# Patient Record
Sex: MALE | Race: WHITE | Employment: OTHER | URBAN - METROPOLITAN AREA
[De-identification: names, ages, dates, MRNs, and addresses within clinical notes are randomized per-mention and may not be internally consistent; named-entity substitution may affect disease eponyms.]

---

## 2017-01-27 ENCOUNTER — HOSPITAL ENCOUNTER (OUTPATIENT)
Dept: CARDIOLOGY | Facility: CLINIC | Age: 52
Discharge: HOME | End: 2017-01-27
Attending: INTERNAL MEDICINE | Admitting: INTERNAL MEDICINE
Payer: COMMERCIAL

## 2017-01-27 DIAGNOSIS — I35.9 AORTIC VALVE DISORDER: ICD-10-CM

## 2017-01-27 DIAGNOSIS — I77.810 ASCENDING AORTA DILATATION (H): ICD-10-CM

## 2017-01-27 PROCEDURE — 25500064 ZZH RX 255 OP 636: Performed by: INTERNAL MEDICINE

## 2017-01-27 PROCEDURE — 40000264 ECHO COMPLETE WITH LUMASON

## 2017-01-27 PROCEDURE — 93306 TTE W/DOPPLER COMPLETE: CPT | Mod: 26 | Performed by: INTERNAL MEDICINE

## 2017-01-27 PROCEDURE — 93306 TTE W/DOPPLER COMPLETE: CPT

## 2017-01-27 RX ADMIN — SULFUR HEXAFLUORIDE 5 ML: KIT at 11:32

## 2017-01-30 ENCOUNTER — OFFICE VISIT (OUTPATIENT)
Dept: CARDIOLOGY | Facility: CLINIC | Age: 52
End: 2017-01-30
Payer: COMMERCIAL

## 2017-01-30 VITALS
HEART RATE: 87 BPM | BODY MASS INDEX: 33.96 KG/M2 | DIASTOLIC BLOOD PRESSURE: 88 MMHG | WEIGHT: 237.2 LBS | HEIGHT: 70 IN | SYSTOLIC BLOOD PRESSURE: 138 MMHG

## 2017-01-30 DIAGNOSIS — I77.810 ASCENDING AORTA DILATATION (H): ICD-10-CM

## 2017-01-30 DIAGNOSIS — G47.33 OSA (OBSTRUCTIVE SLEEP APNEA): Primary | ICD-10-CM

## 2017-01-30 DIAGNOSIS — I35.9 AORTIC VALVE DISORDER: ICD-10-CM

## 2017-01-30 PROCEDURE — 99213 OFFICE O/P EST LOW 20 MIN: CPT | Performed by: INTERNAL MEDICINE

## 2017-01-30 NOTE — LETTER
1/30/2017    Axel Roberson MD  [facility]  [address] [place] [address]    RE: Benjamín Peñaalex       Dear Colleague,    I had the pleasure of seeing Benjamín Us in the [facility].    REASON FOR CLINIC VISIT:  Followup aortic valve disease and ascending aorta dilatation.      Mr. Us is a very pleasant, 51-year-old gentleman with a history of hypertensive urgency in 2014, mild aortic stenosis, mild to moderate aortic regurgitation, mildly dilated ascending aorta and tobacco abuse who is being seen in Cardiology Clinic for followup.  The patient had a repeat echocardiogram done a few days ago that showed a hyperdynamic LV function with LVEF greater than 70%, moderate LVH, mild aortic regurgitation and moderate aortic stenosis with a mean gradient of 32 mmHg, calculated aortic valve area of 1.1 cm2 and peak aortic valve velocity of 3.8 m/sec.  Today he is coming for routine followup.  To be noted, the ascending aorta was also noted to be mildly dilated at 3.9 cm.  He tells me that overall he feels well other than that in the last one month or so, he is having trouble sleeping.  In fact, he is waking up with a headache, and sometimes he is waking up with difficulty breathing.  He tells me that this sounds like sleep apnea, which was diagnosed about 4 years ago and was advised to be on CPAP, but he could not tolerate CPAP because of allergies.  He also feels daytime somnolence and fatigue.  No chest discomfort or shortness of breath with physical activity.  Unfortunately, he is still consuming tobacco at least 1 pack per day, if not more.  He and his wife both smoke.  The patient has tried Chantix in the past without much success.  He tells me that his wife and he are now thinking seriously about at least cutting down on tobacco.  He tells me that he feels that his blood pressure has been not well controlled over the last 1 month, especially when  he wakes up, likely because of untreated sleep apnea.  Today in the clinic, his initial blood pressure was recorded high at around 150/90, but subsequently was recorded as 138/88.  He is on lisinopril and carvedilol for blood pressure control.  He also has some dyslipidemia and follows Dr. Roberson, who is planning to repeat a lipid panel to see if statins are indicated or not.  He also has diabetes with a hemoglobin A1c of 7.2.      PHYSICAL EXAMINATION:   VITAL SIGNS:  Blood pressure 138/88, heart rate 87 and regular, weight 237 pounds, BMI 34.11.   GENERAL:  The patient appears pleasant, comfortable.   NECK:  Normal JVP, no bruit.   CARDIOVASCULAR:  Grade 2/6 ejection systolic murmur with mid peaking best heard over the right upper sternal border without any radiation.  No S3, S4, rub or gallop.   RESPIRATORY:  Clear to auscultation bilaterally.   ABDOMEN:  Soft, nontender.   EXTREMITIES:  No pitting pedal edema.   NEUROLOGICAL:  Alert and oriented x3.   PSYCHIATRIC:  Normal affect.   SKIN:  No skin rash.   HEENT:  No pallor or icterus.     Outpatient Encounter Prescriptions as of 1/30/2017   Medication Sig Dispense Refill     lisinopril (PRINIVIL/ZESTRIL) 40 MG tablet Take 1 tablet (40 mg) by mouth daily 90 tablet 0     [DISCONTINUED] carvedilol (COREG) 6.25 MG tablet Take 3 tablets (18.75 mg) by mouth 2 times daily (with meals) 540 tablet 1     amphetamine-dextroamphetamine (ADDERALL) 20 MG tablet 20 mg 3 times daily        ibuprofen (ADVIL,MOTRIN) 800 MG tablet as needed   0     acyclovir (ZOVIRAX) 400 MG tablet   0     varenicline (CHANTIX) 1 MG tablet Take 1 tablet (1 mg) by mouth 2 times daily 60 tablet 1     No facility-administered encounter medications on file as of 1/30/2017.       IMPRESSION AND PLAN:  A very delightful, 51-year-old gentleman with a history of moderate aortic stenosis, mild aortic regurgitation, mildly dilated ascending aorta and tobacco abuse.  Overall, cardiovascular status wise, he  is doing well.  There is progression of aortic stenosis from mild to moderate with increase in mean gradient from 22-32 mmHg.  I had a long discussion with the patient regarding the natural history of aortic stenosis and indication for elective surgery.  At this time, I recommend a followup echocardiogram in about 1 year's time.  His symptoms are suggestive of significant sleep apnea, and I recommended a Sleep Medicine review, and I gave him a referral for the same.  The patient sees Dr. Roberson for dyslipidemia and diabetes, and he is due to be seen again, and usually with diabetes, a moderate-intensity statin is recommended, and I deferred this to Dr. Roberson, his Primary Care physician, regarding this.  I am hoping that with the treatment of sleep apnea, his blood pressure control will become better.  If he continues to feel well cardiovascular status wise, we will see him back in 1 year's time with a repeat echocardiogram.  I also strongly counseled him to quit tobacco.  Approximately 5 minutes was spent discussing about tobacco cessation.  He does not appear extremely motivated to quit tobacco, but tells me that he and his wife, who both smoke, are seriously thinking about at least cutting down on tobacco.     Again, thank you for allowing me to participate in the care of your patient.      Sincerely,    Myles Dwons MD     [facility]

## 2017-01-30 NOTE — MR AVS SNAPSHOT
After Visit Summary   1/30/2017    Benjamín Us    MRN: [id number]           Patient Information     Date Of Birth          1965        Visit Information        Provider Department      1/30/2017 8:45 AM Myles Downs MD [facility]        Today's Diagnoses     HARIS (obstructive sleep apnea)    -  1     Aortic valve disorder         Ascending aorta dilatation (H)            Follow-ups after your visit        Additional Services     SLEEP EVALUATION & MANAGEMENT REFERRAL - ADULT       Please be aware that coverage of these services is subject to the terms and limitations of your health insurance plan.  Call member services at your health plan with any benefit or coverage questions.      Please bring the following to your appointment:    >>   List of current medications   >>   This referral request   >>   Any documents/labs given to you for this referral    [facility] [phone] (Age 18 and up)            Follow-Up with Cardiologist                 Future tests that were ordered for you today     Open Future Orders        Priority Expected Expires Ordered    SLEEP EVALUATION & MANAGEMENT REFERRAL - ADULT Routine 1/30/2017 1/30/2018 1/30/2017    Echocardiogram Routine 1/30/2018 3/6/2018 1/30/2017    Follow-Up with Cardiologist Routine 1/30/2018 6/14/2018 1/30/2017            Who to contact     If you have questions or need follow up information about today's clinic visit or your schedule please contact [facility] directly at [phone].  Normal or non-critical lab and imaging results will be communicated to you by MyChart, letter or phone within 4 business days after the clinic has received the results. If you do not hear from us within 7 days, please contact the clinic through MyChart or phone. If you have a critical or abnormal lab result, we will notify you by phone as soon as  "possible.  Submit refill requests through [employer] or call your pharmacy and they will forward the refill request to us. Please allow 3 business days for your refill to be completed.          Additional Information About Your Visit        "[employer] Information     [employer] lets you send messages to your doctor, view your test results, renew your prescriptions, schedule appointments and more. To sign up, go to [email or web address] . Click on \"Log in\" on the left side of the screen, which will take you to the Welcome page. Then click on \"Sign up Now\" on the right side of the page.     You will be asked to enter the access code listed below, as well as some personal information. Please follow the directions to create your username and password.     Your access code is: [email or web address]  Expires: 2017  9:33 AM     Your access code will  in 90 days. If you need help or a new code, please call your [facility] clinic or [phone].        Care EveryWhere ID     This is your Care EveryWhere ID. This could be used by other organizations to access your [facility] medical records  [id number]        Your Vitals Were     Pulse Height BMI (Body Mass Index)             87 1.778 m (5' 10\") 34.03 kg/m2          Blood Pressure from Last 3 Encounters:   17 138/88   16 138/88   16 152/78    Weight from Last 3 Encounters:   17 107.593 kg (237 lb 3.2 oz)   16 101.606 kg (224 lb)   16 107.049 kg (236 lb)              We Performed the Following     Follow-Up with Cardiologist        Primary Care Provider Office Phone # Fax #    Axel Roberson [phone]       [address] [address]        Thank you!     Thank you for choosing [facility]  for your care. Our goal is always to provide you with excellent care. Hearing back from our patients is one way we can continue to improve our " services. Please take a few minutes to complete the written survey that you may receive in the mail after your visit with us. Thank you!             Your Updated Medication List - Protect others around you: Learn how to safely use, store and throw away your medicines at www.disposemymeds.org.          This list is accurate as of: 1/30/17  9:33 AM.  Always use your most recent med list.                   Brand Name Dispense Instructions for use    acyclovir 400 MG tablet    ZOVIRAX         amphetamine-dextroamphetamine 20 MG per tablet    ADDERALL     20 mg 3 times daily       carvedilol 6.25 MG tablet    COREG    540 tablet    Take 3 tablets (18.75 mg) by mouth 2 times daily (with meals)       ibuprofen 800 MG tablet    ADVIL/MOTRIN     as needed       lisinopril 40 MG tablet    PRINIVIL/ZESTRIL    90 tablet    Take 1 tablet (40 mg) by mouth daily       varenicline 1 MG tablet    CHANTIX    60 tablet    Take 1 tablet (1 mg) by mouth 2 times daily

## 2017-01-30 NOTE — PROGRESS NOTES
REASON FOR CLINIC VISIT:  Followup aortic valve disease and ascending aorta dilatation.      HISTORY OF PRESENT ILLNESS:  Mr. Us is a very pleasant, 51-year-old gentleman with a history of hypertensive urgency in 2014, mild aortic stenosis, mild to moderate aortic regurgitation, mildly dilated ascending aorta and tobacco abuse who is being seen in Cardiology Clinic for followup.  The patient had a repeat echocardiogram done a few days ago that showed a hyperdynamic LV function with LVEF greater than 70%, moderate LVH, mild aortic regurgitation and moderate aortic stenosis with a mean gradient of 32 mmHg, calculated aortic valve area of 1.1 cm2 and peak aortic valve velocity of 3.8 m/sec.  Today he is coming for routine followup.  To be noted, the ascending aorta was also noted to be mildly dilated at 3.9 cm.  He tells me that overall he feels well other than that in the last one month or so, he is having trouble sleeping.  In fact, he is waking up with a headache, and sometimes he is waking up with difficulty breathing.  He tells me that this sounds like sleep apnea, which was diagnosed about 4 years ago and was advised to be on CPAP, but he could not tolerate CPAP because of allergies.  He also feels daytime somnolence and fatigue.  No chest discomfort or shortness of breath with physical activity.  Unfortunately, he is still consuming tobacco at least 1 pack per day, if not more.  He and his wife both smoke.  The patient has tried Chantix in the past without much success.  He tells me that his wife and he are now thinking seriously about at least cutting down on tobacco.  He tells me that he feels that his blood pressure has been not well controlled over the last 1 month, especially when he wakes up, likely because of untreated sleep apnea.  Today in the clinic, his initial blood pressure was recorded high at around 150/90, but subsequently was recorded as 138/88.  He is on lisinopril and carvedilol for blood  pressure control.  He also has some dyslipidemia and follows Dr. Roberson, who is planning to repeat a lipid panel to see if statins are indicated or not.  He also has diabetes with a hemoglobin A1c of 7.2.      PHYSICAL EXAMINATION:   VITAL SIGNS:  Blood pressure 138/88, heart rate 87 and regular, weight 237 pounds, BMI 34.11.   GENERAL:  The patient appears pleasant, comfortable.   NECK:  Normal JVP, no bruit.   CARDIOVASCULAR:  Grade 2/6 ejection systolic murmur with mid peaking best heard over the right upper sternal border without any radiation.  No S3, S4, rub or gallop.   RESPIRATORY:  Clear to auscultation bilaterally.   ABDOMEN:  Soft, nontender.   EXTREMITIES:  No pitting pedal edema.   NEUROLOGICAL:  Alert and oriented x3.   PSYCHIATRIC:  Normal affect.   SKIN:  No skin rash.   HEENT:  No pallor or icterus.      IMPRESSION AND PLAN:  A very delightful, 51-year-old gentleman with a history of moderate aortic stenosis, mild aortic regurgitation, mildly dilated ascending aorta and tobacco abuse.  Overall, cardiovascular status wise, he is doing well.  There is progression of aortic stenosis from mild to moderate with increase in mean gradient from 22-32 mmHg.  I had a long discussion with the patient regarding the natural history of aortic stenosis and indication for elective surgery.  At this time, I recommend a followup echocardiogram in about 1 year's time.  His symptoms are suggestive of significant sleep apnea, and I recommended a Sleep Medicine review, and I gave him a referral for the same.  The patient sees Dr. Roberson for dyslipidemia and diabetes, and he is due to be seen again, and usually with diabetes, a moderate-intensity statin is recommended, and I deferred this to Dr. Roberson, his Primary Care physician, regarding this.  I am hoping that with the treatment of sleep apnea, his blood pressure control will become better.  If he continues to feel well cardiovascular status wise, we will see him  back in 1 year's time with a repeat echocardiogram.  I also strongly counseled him to quit tobacco.  Approximately 5 minutes was spent discussing about tobacco cessation.  He does not appear extremely motivated to quit tobacco, but tells me that he and his wife, who both smoke, are seriously thinking about at least cutting down on tobacco.         MARLEE MORALES MD             D: 2017 09:39   T: 2017 11:50   MT: essie      Name:     JOYCE MEDRANO   MRN:      [id number]        Account:      [id number]   :      1965           Service Date: 2017      Document: [id number]

## 2017-01-30 NOTE — PROGRESS NOTES
HPI and Plan:   See dictation    Orders Placed This Encounter   Procedures     Follow-Up with Cardiologist     SLEEP EVALUATION & MANAGEMENT REFERRAL - ADULT     Echocardiogram     No orders of the defined types were placed in this encounter.     There are no discontinued medications.      Encounter Diagnoses   Name Primary?     Aortic valve disorder      Ascending aorta dilatation (H)      HARIS (obstructive sleep apnea) Yes       CURRENT MEDICATIONS:  Current Outpatient Prescriptions   Medication Sig Dispense Refill     lisinopril (PRINIVIL/ZESTRIL) 40 MG tablet Take 1 tablet (40 mg) by mouth daily 90 tablet 0     carvedilol (COREG) 6.25 MG tablet Take 3 tablets (18.75 mg) by mouth 2 times daily (with meals) 540 tablet 1     amphetamine-dextroamphetamine (ADDERALL) 20 MG tablet 20 mg 3 times daily        ibuprofen (ADVIL,MOTRIN) 800 MG tablet as needed   0     acyclovir (ZOVIRAX) 400 MG tablet   0     varenicline (CHANTIX) 1 MG tablet Take 1 tablet (1 mg) by mouth 2 times daily 60 tablet 1       ALLERGIES     Allergies   Allergen Reactions     Penicillins Nausea and Vomiting     Zithromax [Azithromycin Dihydrate]      hives       PAST MEDICAL HISTORY:  Past Medical History   Diagnosis Date     ADD (attention deficit disorder)      Aortic valve disorders      Hypertension        PAST SURGICAL HISTORY:  Past Surgical History   Procedure Laterality Date     Appendectomy       Colonoscopy N/A 6/15/2015     Procedure: COLONOSCOPY;  Surgeon: Vasyl Lawson MD;  Location:  GI       FAMILY HISTORY:  Family History   Problem Relation Age of Onset     Hypertension Mother      Breast Cancer Mother      DIABETES Father      DIABETES Brother      Thyroid Disease Sister      Colon Cancer No family hx of        SOCIAL HISTORY:  Social History     Social History     Marital Status:      Spouse Name: N/A     Number of Children: N/A     Years of Education: N/A     Social History Main Topics     Smoking status: Current  Every Day Smoker -- 0.50 packs/day     Types: Cigarettes     Smokeless tobacco: Never Used      Comment: 5/2015 Two cigarettes per day      Alcohol Use: No     Drug Use: No     Sexual Activity:     Partners: Female     Other Topics Concern     Caffeine Concern No     Sleep Concern No     Weight Concern No     Exercise No     Seat Belt Yes     Social History Narrative       Review of Systems:  Skin:  Negative     Eyes:  Positive for glasses  ENT:  Negative    Respiratory:  Negative    Cardiovascular:  Negative    Gastroenterology: Negative    Genitourinary:  not assessed    Musculoskeletal:  Negative    Neurologic:  Negative    Psychiatric:  Negative    Heme/Lymph/Imm:  Positive for allergies  Endocrine:  Negative          Recent Lab Results:  LIPID RESULTS:  Lab Results   Component Value Date    CHOL 198 03/25/2014    HDL 37* 03/25/2014    * 03/25/2016    * 03/25/2014    TRIG 129 03/25/2014    CHOLHDLRATIO 5.3* 03/25/2014       LIVER ENZYME RESULTS:  Lab Results   Component Value Date    AST 26 03/25/2014    ALT 37 03/25/2014       CBC RESULTS:  Lab Results   Component Value Date    WBC 9.8 01/26/2016    RBC 4.48 01/26/2016    HGB 14.6 01/26/2016    HCT 41.2 01/26/2016    MCV 92 01/26/2016    MCH 32.6 01/26/2016    MCHC 35.4 01/26/2016    RDW 12.8 01/26/2016     01/26/2016       BMP RESULTS:  Lab Results   Component Value Date     03/25/2016    POTASSIUM 4.2 03/25/2016    CHLORIDE 104 03/25/2016    CO2 30 03/25/2016    ANIONGAP 6 03/25/2016    * 03/25/2016    BUN 16 03/25/2016    CR 0.88 03/25/2016     03/16/2014    GFRESTIMATED >90  Non  GFR Calc   03/25/2016    GFRESTBLACK >90   GFR Calc   03/25/2016    ARMIN 8.7 03/25/2016        A1C RESULTS:  Lab Results   Component Value Date    A1C 7.2* 01/26/2016       INR RESULTS:  No results found for: INR        CC  Axel Roberson MD  [address] [place] [address]

## 2017-02-06 DIAGNOSIS — I10 BENIGN ESSENTIAL HYPERTENSION: Primary | ICD-10-CM

## 2017-02-06 RX ORDER — CARVEDILOL 6.25 MG/1
18.75 TABLET ORAL 2 TIMES DAILY WITH MEALS
Qty: 270 TABLET | Refills: 1 | Status: SHIPPED | OUTPATIENT
Start: 2017-02-06 | End: 2017-05-01

## 2017-03-22 DIAGNOSIS — I16.0 HYPERTENSIVE URGENCY: ICD-10-CM

## 2017-03-22 RX ORDER — LISINOPRIL 40 MG/1
40 TABLET ORAL DAILY
Qty: 90 TABLET | Refills: 3 | Status: SHIPPED | OUTPATIENT
Start: 2017-03-22 | End: 2018-03-05

## 2017-03-22 NOTE — TELEPHONE ENCOUNTER
Received refill request for:  Lisinopril  Last OV was: 1/30/2017 with Dr. Downs  Labs/EKG: last BMP 5/25/2016  F/U scheduled: orders in Epic for 1/2018  New script sent to: [facility]

## 2017-05-01 DIAGNOSIS — I10 BENIGN ESSENTIAL HYPERTENSION: ICD-10-CM

## 2017-05-01 RX ORDER — CARVEDILOL 6.25 MG/1
18.75 TABLET ORAL 2 TIMES DAILY WITH MEALS
Qty: 270 TABLET | Refills: 2 | Status: SHIPPED | OUTPATIENT
Start: 2017-05-01 | End: 2017-10-16

## 2017-10-16 DIAGNOSIS — I10 BENIGN ESSENTIAL HYPERTENSION: ICD-10-CM

## 2017-10-16 RX ORDER — CARVEDILOL 6.25 MG/1
18.75 TABLET ORAL 2 TIMES DAILY WITH MEALS
Qty: 540 TABLET | Refills: 0 | Status: SHIPPED | OUTPATIENT
Start: 2017-10-16 | End: 2018-01-12

## 2018-01-12 DIAGNOSIS — I10 BENIGN ESSENTIAL HYPERTENSION: ICD-10-CM

## 2018-01-12 RX ORDER — CARVEDILOL 6.25 MG/1
18.75 TABLET ORAL 2 TIMES DAILY WITH MEALS
Qty: 540 TABLET | Refills: 0 | Status: SHIPPED | OUTPATIENT
Start: 2018-01-12 | End: 2018-03-22

## 2018-01-24 ENCOUNTER — MEDICAL CORRESPONDENCE (OUTPATIENT)
Dept: HEALTH INFORMATION MANAGEMENT | Facility: CLINIC | Age: 53
End: 2018-01-24

## 2018-01-26 ENCOUNTER — DOCUMENTATION ONLY (OUTPATIENT)
Dept: CARDIOLOGY | Facility: CLINIC | Age: 53
End: 2018-01-26

## 2018-01-26 NOTE — PROGRESS NOTES
Received letter from [employer] from [employer] requesting Dr. Downs to verify if his current  stimulant for inattention is acceptable.  BP has been elevated.   Scheduling has made several attempts by phone and letters for F/U appointments needed.  He is overdue. Will await for patient to call and schedule OV

## 2018-03-05 DIAGNOSIS — I16.0 HYPERTENSIVE URGENCY: ICD-10-CM

## 2018-03-05 RX ORDER — LISINOPRIL 40 MG/1
40 TABLET ORAL DAILY
Qty: 90 TABLET | Refills: 0 | Status: SHIPPED | OUTPATIENT
Start: 2018-03-05 | End: 2018-03-22

## 2018-03-05 NOTE — LETTER
March 5, 2018       TO: Benjamín Us  [id number]  [facility] [facility] [id number]       Dear Benjamín Us,    You have requested a medication refill for Lisinopril and our records indicate that you have not been seen by one of our providers for your annual office visit.  We will refill your medication for 3 months, which will allow you enough time to be seen by one of our providers.    Please call our clinic at [phone] to schedule your appointment as soon as possible.    Thank you,    [facility]

## 2018-03-05 NOTE — TELEPHONE ENCOUNTER
Received refill request for:  Lisinopril  Last OV was: 01/30/2017 Vats  Labs/EKG: 3/25/2016 BMP  F/U scheduled: Overdue for follow up, Refill letter sent  New script sent to: [facility]

## 2018-03-22 DIAGNOSIS — I10 BENIGN ESSENTIAL HYPERTENSION: ICD-10-CM

## 2018-03-22 DIAGNOSIS — I35.9 AORTIC VALVE DISORDER: Primary | ICD-10-CM

## 2018-03-22 DIAGNOSIS — I16.0 HYPERTENSIVE URGENCY: ICD-10-CM

## 2018-03-22 RX ORDER — CARVEDILOL 6.25 MG/1
18.75 TABLET ORAL 2 TIMES DAILY WITH MEALS
Qty: 540 TABLET | Refills: 1 | Status: SHIPPED | OUTPATIENT
Start: 2018-03-22 | End: 2018-04-03

## 2018-03-22 RX ORDER — LISINOPRIL 40 MG/1
40 TABLET ORAL DAILY
Qty: 90 TABLET | Refills: 1 | Status: SHIPPED | OUTPATIENT
Start: 2018-03-22 | End: 2018-10-22

## 2018-03-22 NOTE — TELEPHONE ENCOUNTER
Medication refills sent for lisinopril and coreg. Patient is to return for clinic visit this summer, 18 mo's after last OV per Dr. Downs. New orders placed.

## 2018-04-03 DIAGNOSIS — I10 BENIGN ESSENTIAL HYPERTENSION: ICD-10-CM

## 2018-04-03 RX ORDER — CARVEDILOL 6.25 MG/1
18.75 TABLET ORAL 2 TIMES DAILY WITH MEALS
Qty: 540 TABLET | Refills: 0 | Status: SHIPPED | OUTPATIENT
Start: 2018-04-03 | End: 2018-08-14

## 2018-08-14 DIAGNOSIS — I10 BENIGN ESSENTIAL HYPERTENSION: ICD-10-CM

## 2018-08-14 RX ORDER — CARVEDILOL 6.25 MG/1
18.75 TABLET ORAL 2 TIMES DAILY WITH MEALS
Qty: 180 TABLET | Refills: 0 | Status: SHIPPED | OUTPATIENT
Start: 2018-08-14 | End: 2018-09-24

## 2018-08-14 NOTE — TELEPHONE ENCOUNTER
Patient has an appointment with Dr. Downs scheduled for 8/27/18, so refilled his carvedilol for 1 month with no refills.  Last visit was 1/30/2017.  CLARENCE Cohen 8/14/2018

## 2018-09-14 ENCOUNTER — OFFICE VISIT (OUTPATIENT)
Dept: FAMILY MEDICINE | Facility: CLINIC | Age: 53
End: 2018-09-14
Payer: COMMERCIAL

## 2018-09-14 VITALS
HEIGHT: 70 IN | WEIGHT: 232 LBS | BODY MASS INDEX: 33.21 KG/M2 | DIASTOLIC BLOOD PRESSURE: 124 MMHG | HEART RATE: 109 BPM | SYSTOLIC BLOOD PRESSURE: 185 MMHG | TEMPERATURE: 98.3 F | RESPIRATION RATE: 16 BRPM

## 2018-09-14 DIAGNOSIS — E11.620 TYPE 2 DIABETES MELLITUS WITH DIABETIC DERMATITIS, WITHOUT LONG-TERM CURRENT USE OF INSULIN (H): Primary | ICD-10-CM

## 2018-09-14 DIAGNOSIS — I10 ESSENTIAL HYPERTENSION, BENIGN: ICD-10-CM

## 2018-09-14 DIAGNOSIS — L30.9 DERMATITIS: ICD-10-CM

## 2018-09-14 PROCEDURE — 99214 OFFICE O/P EST MOD 30 MIN: CPT | Performed by: FAMILY MEDICINE

## 2018-09-14 RX ORDER — HYDROXYZINE HYDROCHLORIDE 25 MG/1
25 TABLET, FILM COATED ORAL EVERY 6 HOURS PRN
Qty: 60 TABLET | Refills: 1 | Status: ON HOLD | OUTPATIENT
Start: 2018-09-14 | End: 2020-01-03

## 2018-09-14 RX ORDER — PREDNISONE 10 MG/1
10 TABLET ORAL DAILY
Qty: 10 TABLET | Refills: 0 | Status: ON HOLD | OUTPATIENT
Start: 2018-09-14 | End: 2020-01-03

## 2018-09-14 NOTE — PROGRESS NOTES
"  SUBJECTIVE:   Benjamín Us is a 53 year old male who presents to clinic today for the following health issues:        Dermatitis  Rash  9/8/18 woke up from a nap with an itchy rash on his arms bilaterally. States that it has progressively gotten worse, does not\" bubble.\" no therapies tried.   Onset:     Description:   Location: ALL OVER   Character: round, blotchy, red  Itching (Pruritis): YES    Progression of Symptoms:  worsening    Accompanying Signs & Symptoms:  Fever: no   Body aches or joint pain: no   Sore throat symptoms: no   Recent cold symptoms: no     History:   Previous similar rash: no     Precipitating factors:   Exposure to similar rash: no   New exposures: None   Recent travel: no     Alleviating factors:    Type 2 diabetes mellitus with diabetic dermatitis, without long-term current use of insulin (H) - Pt denies diabetes, reports he was told years ago  \"he had nothing to worry about\"  Notes Recorded by Melissa Washington CMA on 1/27/2016 at 9:03 AM  Talked to pt said the test was wrong. I did send letter with results. Pt said he will come back in to retest  Melissa Washington CMA    ------    Notes Recorded by Axel Roberson MD on 1/26/2016 at 4:53 PM  Blood shows full fledged diabetes now, you should see me again in three months fasting so I can check your cholesterol too.    Lab Results   Component Value Date    A1C 7.2 01/26/2016    A1C 6.5 03/12/2014     HTN  Pt reports he \"(doesn't) want to talk about his blood pressure\" becomes quite abgry when this subject is brought up  BP Readings from Last 1 Encounters:   09/14/18 (!) 185/124     Past Medical History:   Diagnosis Date     ADD (attention deficit disorder)      Aneurysm of thoracic aorta (H) 5/21/2014     Problem list name updated by automated process. Provider to review     Aortic valve disorder 5/21/2014     Aortic valve disorders      CARDIOVASCULAR SCREENING; LDL GOAL LESS THAN 160 2/25/2014     Dermatitis 9/14/2018     " "Essential hypertension, benign 9/14/2018     Essential hypertension, benign 9/14/2018     Hypertension      Hypertensive urgency 8/8/2013     Type 2 diabetes mellitus with diabetic dermatitis, without long-term current use of insulin (H) 9/14/2018       Past Surgical History:   Procedure Laterality Date     APPENDECTOMY       COLONOSCOPY N/A 6/15/2015    Procedure: COLONOSCOPY;  Surgeon: Vasyl Lawson MD;  Location:  GI       Family History   Problem Relation Age of Onset     Hypertension Mother      Breast Cancer Mother      Diabetes Father      Diabetes Brother      Thyroid Disease Sister      Colon Cancer No family hx of        Social History   Substance Use Topics     Smoking status: Current Every Day Smoker     Packs/day: 0.50     Types: Cigarettes     Smokeless tobacco: Never Used      Comment: 5/2015 Two cigarettes per day      Alcohol use No         Medications Reviewed    Problem list and histories reviewed & adjusted, as indicated.  Additional history: none    Reviewed and updated as needed this visit by clinical staff  Tobacco  Allergies  Meds  Med Hx  Surg Hx  Fam Hx  Soc Hx      Reviewed and updated as needed this visit by Provider       ROS:  No HA dyspnea visual disturbance    This document serves as a record of the services and decisions personally performed and made by Simone Brown MD. It was created on his behalf by Colton Bond, a trained medical scribe.  The creation of this document is based on the scribe's personal observations and the provider's statements to the medical scribe.  Colton Bond, September 14, 2018 9:47 AM  OBJECTIVE:   BP (!) 185/124 (BP Location: Left arm, Patient Position: Chair, Cuff Size: Adult Large)  Pulse 109  Temp 98.3  F (36.8  C) (Oral)  Resp 16  Ht 5' 10\" (1.778 m)  Wt 232 lb (105.2 kg)  BMI 33.29 kg/m2  Body mass index is 33.29 kg/(m^2).    GENERAL: angry  SKIN: Dermatitis noted on forearms bilaterally, less so on torso. No scale plaque  CHEST: " "Clear to auscultation, respirations unlabored      Diagnostic Test Results:  No results found for this or any previous visit (from the past 24 hour(s)).  ASSESSMENT/PLAN:   ASSESSMENT / PLAN:  (E11.620) Type 2 diabetes mellitus with diabetic dermatitis, without long-term current use of insulin (H)  (primary encounter diagnosis)  Comment: status unknown.   Plan: Pt refuses consideration    (I10) Essential hypertension, benign  Comment: uncontrolled  Plan:\"I'll let my cardiologist handle that\" no symptoms suggesting secondary effects    (L30.9) Dermatitis  Comment: broad differential including virus, diabetes. Lisinopril rash 2%, amphetamines rash frequency not in UptoDate  Plan: hydrOXYzine (ATARAX) 25 MG tablet, predniSONE         (DELTASONE) 10 MG tablet       Treat. Neutral soaps    We shall forward this note to pt's cardiologist and PCP          Simone Brown MD      The information in this document, created by the medical scribe for me, accurately reflects the services I personally performed and the decisions made by me. I have reviewed and approved this document for accuracy prior to leaving the patient care area.  Simone Brown MD September 14, 2018 9:48 AM    Simone Brown MD  [facility]  "

## 2018-09-14 NOTE — MR AVS SNAPSHOT
After Visit Summary   9/14/2018    Benjamín Us    MRN: [id number]           Patient Information     Date Of Birth          1965        Visit Information        Provider Department      9/14/2018 9:15 AM Simone Brown MD [facility]        Today's Diagnoses     Type 2 diabetes mellitus with diabetic dermatitis, without long-term current use of insulin (H)    -  1    Essential hypertension, benign        Dermatitis          Care Instructions    Use dove soap in order to reduce drying out skin.               Follow-ups after your visit        Your next 10 appointments already scheduled     Sep 25, 2018  9:45 AM CDT   Ech Complete with [id number] ([facility])    [address]  [facility] [address] [phone]           1.  Please bring or wear a comfortable two-piece outfit. 2.  You may eat, drink and take your normal medicines. 3.  For any questions that cannot be answered, please contact the ordering physician 4.  Please do not wear perfumes or scented lotions on the day of your exam. ***Please check-in at the [place] Registration Office located in [address] in the [facility] building. When you are finished registering, please go to [address] and have a seat. The technician will call your name for the test.            Nov 27, 2018  9:45 AM CST   Return Visit with Myles Downs MD   [facility] ([email or web address])    [address] [place] [address] [address]  [facility] [id number] [phone] OPT 2              Who to contact     If you have questions or need follow up information about today's clinic visit or your schedule please contact [facility] directly at [phone].  Normal or non-critical lab and imaging results will be communicated to you by MyChart, letter or phone within 4 business days after the clinic has received the  "results. If you do not hear from us within 7 days, please contact the clinic through [employer] or phone. If you have a critical or abnormal lab result, we will notify you by phone as soon as possible.  Submit refill requests through [employer] or call your pharmacy and they will forward the refill request to us. Please allow 3 business days for your refill to be completed.          Additional Information About Your Visit        Care EveryWhere ID     This is your Care EveryWhere ID. This could be used by other organizations to access your [facility] medical records  [id number]        Your Vitals Were     Pulse Temperature Respirations Height BMI (Body Mass Index)       109 98.3  F (36.8  C) (Oral) 16 5' 10\" (1.778 m) 33.29 kg/m2        Blood Pressure from Last 3 Encounters:   09/14/18 (!) 185/124   01/30/17 138/88   03/25/16 138/88    Weight from Last 3 Encounters:   09/14/18 232 lb (105.2 kg)   01/30/17 237 lb 3.2 oz (107.6 kg)   03/25/16 224 lb (101.6 kg)              Today, you had the following     No orders found for display         Today's Medication Changes          These changes are accurate as of 9/14/18 10:33 AM.  If you have any questions, ask your nurse or doctor.               Start taking these medicines.        Dose/Directions    hydrOXYzine 25 MG tablet   Commonly known as:  ATARAX   Used for:  Dermatitis   Started by:  Simone Brown MD        Dose:  25 mg   Take 1 tablet (25 mg) by mouth every 6 hours as needed for itching   Quantity:  60 tablet   Refills:  1       predniSONE 10 MG tablet   Commonly known as:  DELTASONE   Used for:  Dermatitis   Started by:  Simone Brown MD        Dose:  10 mg   Take 1 tablet (10 mg) by mouth daily   Quantity:  10 tablet   Refills:  0         Stop taking these medicines if you haven't already. Please contact your care team if you have questions.     ibuprofen 800 MG tablet   Commonly known as:  ADVIL/MOTRIN   Stopped by:  Simone Brown MD           varenicline 1 " MG tablet   Commonly known as:  CHANTIX   Stopped by:  Simone Brown MD                Where to get your medicines      These medications were sent to [id number] - [place], [place] - [address]  [address] [facility] [address]    Hours:  9Am-9Pm (M-F) 9Am-6Pm (S&S) Phone:  [phone]     hydrOXYzine 25 MG tablet    predniSONE 10 MG tablet                Primary Care Provider Office Phone # Fax #    Axel Gerald Roberson [phone] [address] [facility] [address]        Equal Access to Services     [facility]: Hadii aad ku hadasho Soomaali, waaxda luqadaha, qaybta kaalmada adeegyada, brenda austin haytyler patel . [facility] [phone].    ATENCIÓN: Si habla español, tiene a hooks disposición servicios gratuitos de asistencia lingüística. [facility] [phone].    We comply with applicable federal civil rights laws and [place] laws. We do not discriminate on the basis of race, color, national origin, age, disability, sex, sexual orientation, or gender identity.            Thank you!     Thank you for choosing [facility]  for your care. Our goal is always to provide you with excellent care. Hearing back from our patients is one way we can continue to improve our services. Please take a few minutes to complete the written survey that you may receive in the mail after your visit with us. Thank you!             Your Updated Medication List - Protect others around you: Learn how to safely use, store and throw away your medicines at www.disposemymeds.org.          This list is accurate as of 9/14/18 10:33 AM.  Always use your most recent med list.                   Brand Name Dispense Instructions for use Diagnosis    amphetamine-dextroamphetamine 20 MG per tablet    ADDERALL     20 mg 3 times daily        carvedilol 6.25 MG tablet    COREG    180 tablet    Take 3 tablets (18.75 mg) by mouth 2 times daily (with meals)    Benign  essential hypertension       hydrOXYzine 25 MG tablet    ATARAX    60 tablet    Take 1 tablet (25 mg) by mouth every 6 hours as needed for itching    Dermatitis       lisinopril 40 MG tablet    PRINIVIL/ZESTRIL    90 tablet    Take 1 tablet (40 mg) by mouth daily    Hypertensive urgency       predniSONE 10 MG tablet    DELTASONE    10 tablet    Take 1 tablet (10 mg) by mouth daily    Dermatitis

## 2018-09-24 DIAGNOSIS — I10 BENIGN ESSENTIAL HYPERTENSION: ICD-10-CM

## 2018-09-24 RX ORDER — CARVEDILOL 6.25 MG/1
18.75 TABLET ORAL 2 TIMES DAILY WITH MEALS
Qty: 540 TABLET | Refills: 2 | Status: SHIPPED | OUTPATIENT
Start: 2018-09-24 | End: 2019-06-12

## 2018-10-22 DIAGNOSIS — I16.0 HYPERTENSIVE URGENCY: ICD-10-CM

## 2018-10-22 RX ORDER — LISINOPRIL 40 MG/1
40 TABLET ORAL DAILY
Qty: 90 TABLET | Refills: 0 | Status: SHIPPED | OUTPATIENT
Start: 2018-10-22 | End: 2019-03-11

## 2019-02-18 ENCOUNTER — TELEPHONE (OUTPATIENT)
Dept: FAMILY MEDICINE | Facility: CLINIC | Age: 54
End: 2019-02-18

## 2019-02-19 NOTE — TELEPHONE ENCOUNTER
Panel Management Review      Patient has the following on his problem list:     Diabetes    ASA: Failed    Last A1C  Lab Results   Component Value Date    A1C 7.2 01/26/2016    A1C 6.5 03/12/2014     A1C tested: MONITOR    Last LDL:    Lab Results   Component Value Date    CHOL 198 03/25/2014     Lab Results   Component Value Date    HDL 37 03/25/2014     Lab Results   Component Value Date     03/25/2016     03/25/2014     Lab Results   Component Value Date    TRIG 129 03/25/2014     Lab Results   Component Value Date    CHOLHDLRATIO 5.3 03/25/2014     No results found for: NHDL    Is the patient on a Statin? NO             Is the patient on Aspirin? NO        Last three blood pressure readings:  BP Readings from Last 3 Encounters:   09/14/18 (!) 185/124   01/30/17 138/88   03/25/16 138/88       Date of last diabetes office visit: 09/14/2018     Tobacco History:     History   Smoking Status     Current Every Day Smoker     Packs/day: 0.50     Types: Cigarettes   Smokeless Tobacco     Never Used     Comment: 5/2015 Two cigarettes per day          Hypertension   Last three blood pressure readings:  BP Readings from Last 3 Encounters:   09/14/18 (!) 185/124   01/30/17 138/88   03/25/16 138/88     Blood pressure: FAILED    HTN Guidelines:  Age 18-59 BP range:  Less than 140/90  Age 60-85 with Diabetes:  Less than 140/90  Age 60-85 without Diabetes:  less than 150/90      Composite cancer screening  Chart review shows that this patient is due/due soon for the following None  Summary:    Patient is due/failing the following:   A1C and BP CHECK    Action needed:   Patient needs office visit for f/u BP check.    Type of outreach:    panel pool    Questions for provider review:    None                                                                                                                                    Nataliya Kraft/BRENNEN  [place]---[facility]       Chart routed to Care Team .

## 2019-03-11 DIAGNOSIS — I16.0 HYPERTENSIVE URGENCY: ICD-10-CM

## 2019-03-11 RX ORDER — LISINOPRIL 40 MG/1
40 TABLET ORAL DAILY
Qty: 90 TABLET | Refills: 0 | Status: SHIPPED | OUTPATIENT
Start: 2019-03-11 | End: 2019-06-04

## 2019-03-19 NOTE — TELEPHONE ENCOUNTER
Type of outreach:  Phone, spoke to patient.  Patient will see his cardiologist for his blood pressure follow up

## 2019-06-04 DIAGNOSIS — I16.0 HYPERTENSIVE URGENCY: ICD-10-CM

## 2019-06-04 RX ORDER — LISINOPRIL 40 MG/1
40 TABLET ORAL DAILY
Qty: 90 TABLET | Refills: 0 | Status: SHIPPED | OUTPATIENT
Start: 2019-06-04 | End: 2019-09-11

## 2019-06-12 DIAGNOSIS — I10 BENIGN ESSENTIAL HYPERTENSION: ICD-10-CM

## 2019-06-12 RX ORDER — CARVEDILOL 6.25 MG/1
18.75 TABLET ORAL 2 TIMES DAILY WITH MEALS
Qty: 540 TABLET | Refills: 0 | Status: SHIPPED | OUTPATIENT
Start: 2019-06-12 | End: 2019-09-11

## 2019-08-09 DIAGNOSIS — I35.9 AORTIC VALVE DISORDER: Primary | ICD-10-CM

## 2019-09-11 DIAGNOSIS — I16.0 HYPERTENSIVE URGENCY: ICD-10-CM

## 2019-09-11 DIAGNOSIS — I10 BENIGN ESSENTIAL HYPERTENSION: ICD-10-CM

## 2019-09-11 RX ORDER — LISINOPRIL 40 MG/1
40 TABLET ORAL DAILY
Qty: 90 TABLET | Refills: 0 | Status: SHIPPED | OUTPATIENT
Start: 2019-09-11 | End: 2019-10-30

## 2019-09-11 RX ORDER — CARVEDILOL 6.25 MG/1
18.75 TABLET ORAL 2 TIMES DAILY WITH MEALS
Qty: 540 TABLET | Refills: 0 | Status: SHIPPED | OUTPATIENT
Start: 2019-09-11 | End: 2019-10-30

## 2019-10-29 ENCOUNTER — HOSPITAL ENCOUNTER (OUTPATIENT)
Dept: CARDIOLOGY | Facility: CLINIC | Age: 54
Discharge: HOME | End: 2019-10-29
Attending: INTERNAL MEDICINE | Admitting: INTERNAL MEDICINE
Payer: COMMERCIAL

## 2019-10-29 DIAGNOSIS — I35.9 AORTIC VALVE DISORDER: ICD-10-CM

## 2019-10-29 PROCEDURE — 25500064 ZZH RX 255 OP 636: Performed by: INTERNAL MEDICINE

## 2019-10-29 PROCEDURE — 93306 TTE W/DOPPLER COMPLETE: CPT | Mod: 26 | Performed by: INTERNAL MEDICINE

## 2019-10-29 PROCEDURE — 40000264 ECHOCARDIOGRAM COMPLETE

## 2019-10-29 RX ADMIN — HUMAN ALBUMIN MICROSPHERES AND PERFLUTREN 3 ML: 10; .22 INJECTION, SOLUTION INTRAVENOUS at 13:05

## 2019-10-29 NOTE — PROGRESS NOTES
HISTORY OF PRESENT ILLNESS:  This is a 54 year old male who present to [facility] today for a follow up visit.  He is a patient of dr. Downs seen in our clinic for hypertension, aortic valve disease, ascending aorta dilatation, elevated glucose readings, untreated HARIS, and tobacco dependence.    Benjamín was hospitalized in 2014 for hypertensive urgency. An ECHO in 2017 showed LVEF > 70%, moderate LVH, moderate aortic stenosis (mean gradient 32 mmHg / MARIEL 1.1 cm2) and mild aortic regurgitation. His ascending aorta was measured at 3.9cm. He was previously diagnosed with sleep apnea, but has been unable to tolerate CPAPs.  He was last in our clinic in 2017 and was doing well from a cardiovascular standpoint.  Annual ECHOs were recommended, but he has been overdue for followup.     Benjamín denies any limitations in his activity.  He has no chest pain, shortness of breath, palpitations, orthopnea, or peripheral edema. He tells me he was diagnosed with sleep apnea years ago, but cannot tolerate the CPAP mask.  At times he feels his blood pressure is elevated by symptoms of a strong heart beat.    An ECHO performed yesterday (10/30/19) now showed severe aortic stenosis (peak AV gradient 75 mmHg / mean AV gradient 50 mmHg / MARIEL 0.9 cm2)  LVEF 65% with severe concentric LVH, normal RV function, mild ascending aorta dilatation (4.3cm)    Today BP: 152/96 mmHg  HR: 106 bpm and regular  His lungs are clear.  II/VI systolic murmur heard best at base.  There is no peripheral edema.  Weight 229 lbs (BMI 32)       IMPRESSION and PLAN:  1. HTN:  BP elevated in the office today and at time of ECHO yesterday.  I have asked him to increase his Coreg to 25mg BID.  He states that he is faithful about taking his meds.  2. Severe Aortic Stenosis: His aortic stenosis has progressed, now with mean AV gradient 50 mmhg and an MARIEL 0.9 cm2.  At this time, I have placed an order for TAVR evaluation.  Fortunately, he is  asymptomatic.  3. Untreated Sleep apnea.  He has not tolerated CPAP masks  He has deferred repeat sleep evaluation  4. Tobacco Dependence  Encouraged cessation  5. Severe concentric LVH  This has progressed over the past 1 1/2 years  6. Poor medical follow up  However, he assures me that he is taking his medications as prescribed  7. Likely untreated DM  His hgb A1C was 7.2 in 2016    Thanks for allowing me to participate in his care      JACKSON Decker, CNP          Orders Placed This Encounter   Procedures     [facility]       Orders Placed This Encounter   Medications     carvedilol (COREG) 25 MG tablet     Sig: Take 1 tablet (25 mg) by mouth 2 times daily (with meals)     Dispense:  180 tablet     Refill:  3     lisinopril (PRINIVIL/ZESTRIL) 40 MG tablet     Sig: Take 1 tablet (40 mg) by mouth daily     Dispense:  90 tablet     Refill:  0       Medications Discontinued During This Encounter   Medication Reason     carvedilol (COREG) 6.25 MG tablet      lisinopril (PRINIVIL/ZESTRIL) 40 MG tablet Reorder         Encounter Diagnoses   Name Primary?     Aortic valve stenosis, etiology of cardiac valve disease unspecified Yes     Benign essential hypertension      Hypertensive urgency        CURRENT MEDICATIONS:  Current Outpatient Medications   Medication Sig Dispense Refill     amphetamine-dextroamphetamine (ADDERALL) 20 MG tablet 20 mg 3 times daily        carvedilol (COREG) 25 MG tablet Take 1 tablet (25 mg) by mouth 2 times daily (with meals) 180 tablet 3     lisinopril (PRINIVIL/ZESTRIL) 40 MG tablet Take 1 tablet (40 mg) by mouth daily 90 tablet 0     hydrOXYzine (ATARAX) 25 MG tablet Take 1 tablet (25 mg) by mouth every 6 hours as needed for itching (Patient not taking: Reported on 10/30/2019) 60 tablet 1     predniSONE (DELTASONE) 10 MG tablet Take 1 tablet (10 mg) by mouth daily (Patient not taking: Reported on 10/30/2019) 10 tablet 0       ALLERGIES     Allergies   Allergen Reactions      Penicillins Nausea and Vomiting     Zithromax [Azithromycin Dihydrate]      hives       PAST MEDICAL HISTORY:  Past Medical History:   Diagnosis Date     ADD (attention deficit disorder)      Aneurysm of thoracic aorta (H) 5/21/2014     Problem list name updated by automated process. Provider to review     Aortic valve disorder 5/21/2014     CARDIOVASCULAR SCREENING; LDL GOAL LESS THAN 160 2/25/2014     Dermatitis 9/14/2018     Essential hypertension, benign 9/14/2018     Hypertension      Hypertensive urgency 8/8/2013     Type 2 diabetes mellitus with diabetic dermatitis, without long-term current use of insulin (H) 9/14/2018       PAST SURGICAL HISTORY:  Past Surgical History:   Procedure Laterality Date     APPENDECTOMY       COLONOSCOPY N/A 6/15/2015    Procedure: COLONOSCOPY;  Surgeon: Vasyl Lawson MD;  Location:  GI       FAMILY HISTORY:  Family History   Problem Relation Age of Onset     Hypertension Mother      Breast Cancer Mother      Diabetes Father      Diabetes Brother      Thyroid Disease Sister      Colon Cancer No family hx of        SOCIAL HISTORY:  Social History     Socioeconomic History     Marital status:      Spouse name: None     Number of children: None     Years of education: None     Highest education level: None   Occupational History     None   Social Needs     Financial resource strain: None     Food insecurity:     Worry: None     Inability: None     Transportation needs:     Medical: None     Non-medical: None   Tobacco Use     Smoking status: Current Every Day Smoker     Packs/day: 0.50     Types: Cigarettes     Smokeless tobacco: Never Used     Tobacco comment: 5/2015 Two cigarettes per day    Substance and Sexual Activity     Alcohol use: No     Alcohol/week: 0.0 standard drinks     Drug use: No     Sexual activity: Yes     Partners: Female   Lifestyle     Physical activity:     Days per week: None     Minutes per session: None     Stress: None   Relationships      "Social connections:     Talks on phone: None     Gets together: None     Attends Confucianist service: None     Active member of club or organization: None     Attends meetings of clubs or organizations: None     Relationship status: None     Intimate partner violence:     Fear of current or ex partner: None     Emotionally abused: None     Physically abused: None     Forced sexual activity: None   Other Topics Concern     Parent/sibling w/ CABG, MI or angioplasty before 65F 55M? Not Asked      Service Not Asked     Blood Transfusions Not Asked     Caffeine Concern No     Occupational Exposure Not Asked     Hobby Hazards Not Asked     Sleep Concern No     Stress Concern Not Asked     Weight Concern No     Special Diet Not Asked     Back Care Not Asked     Exercise No     Bike Helmet Not Asked     Seat Belt Yes     Self-Exams Not Asked   Social History Narrative     None       Review of Systems:  Skin:  Negative       Eyes:  Positive for glasses    ENT:  Negative sinus trouble    Respiratory:  Positive for cough     Cardiovascular:  Negative for;palpitations;chest pain;edema;fatigue;lightheadedness;dizziness      Gastroenterology: Negative      Genitourinary:  Negative      Musculoskeletal:  Negative      Neurologic:  Negative headaches(have decreased since starting meds)    Psychiatric:  Negative      Heme/Lymph/Imm:  Positive for allergies    Endocrine:  Negative        Physical Exam:  Vitals: BP (!) 152/96 (BP Location: Right arm, Patient Position: Sitting, Cuff Size: Adult Regular)   Pulse 106   Ht 1.778 m (5' 10\")   Wt 103.9 kg (229 lb)   SpO2 96%   BMI 32.86 kg/m      Constitutional:  cooperative;in no acute distress obese      Skin:  warm and dry to the touch          Head:  normocephalic        Eyes:  pupils equal and round        Lymph:      ENT:  no pallor or cyanosis poor dentition      Neck:  JVP normal transmitted murmur      Respiratory:  clear to auscultation;normal respiratory excursion     "     Cardiac: regular rhythm;normal S1 and S2       systolic murmur;LUSB;grade 2          pulses below the femoral arteries are diminished                                      GI:  abdomen soft obese      Extremities and Muscular Skeletal:  no edema              Neurological:  affect appropriate        Psych:  Alert and Oriented x 3          CC  Myles Downs MD  [address]  [place] [address]

## 2019-10-30 ENCOUNTER — DOCUMENTATION ONLY (OUTPATIENT)
Dept: CARDIOLOGY | Facility: CLINIC | Age: 54
End: 2019-10-30

## 2019-10-30 ENCOUNTER — OFFICE VISIT (OUTPATIENT)
Dept: CARDIOLOGY | Facility: CLINIC | Age: 54
End: 2019-10-30
Attending: INTERNAL MEDICINE
Payer: COMMERCIAL

## 2019-10-30 VITALS
SYSTOLIC BLOOD PRESSURE: 152 MMHG | HEART RATE: 106 BPM | HEIGHT: 70 IN | BODY MASS INDEX: 32.78 KG/M2 | OXYGEN SATURATION: 96 % | DIASTOLIC BLOOD PRESSURE: 96 MMHG | WEIGHT: 229 LBS

## 2019-10-30 DIAGNOSIS — I16.0 HYPERTENSIVE URGENCY: ICD-10-CM

## 2019-10-30 DIAGNOSIS — I35.0 AORTIC VALVE STENOSIS, ETIOLOGY OF CARDIAC VALVE DISEASE UNSPECIFIED: Primary | ICD-10-CM

## 2019-10-30 DIAGNOSIS — I10 BENIGN ESSENTIAL HYPERTENSION: ICD-10-CM

## 2019-10-30 PROCEDURE — 99214 OFFICE O/P EST MOD 30 MIN: CPT | Performed by: NURSE PRACTITIONER

## 2019-10-30 RX ORDER — LISINOPRIL 40 MG/1
40 TABLET ORAL DAILY
Qty: 90 TABLET | Refills: 0 | Status: SHIPPED | OUTPATIENT
Start: 2019-10-30 | End: 2019-12-12

## 2019-10-30 RX ORDER — CARVEDILOL 25 MG/1
25 TABLET ORAL 2 TIMES DAILY WITH MEALS
Qty: 180 TABLET | Refills: 3 | Status: ON HOLD | OUTPATIENT
Start: 2019-10-30 | End: 2020-05-28

## 2019-10-30 ASSESSMENT — MIFFLIN-ST. JEOR: SCORE: 1874.99

## 2019-10-30 NOTE — PROGRESS NOTES
Severe aortic stenosis now noted. I recommend clinical evaluation as he is already scheduled in the clinic today and referral to TAVR clinic for aortic valve replacement- he may qualify for either traditional AVR vs TAVR and it will be helpful to be seen in TAVR clinic by both CV surgeon and interventional TAVR team. No strenuous physical activity till AVR.    Thanks  Myles

## 2019-10-30 NOTE — PROGRESS NOTES
Echo done pre office visit. Scheduled to see Fátima Richards TIFFANIE on 10/30/19.   Changes noted are Severe aortic stenosis and LV hypertrophy.    Will message Dr. Downs to review and advise. Last seen in heart clinic was January 2017.    Echo:  Interpretation Summary     Severe valvular aortic stenosis. There has been significant progression of the  aortic stenosis since 2017.  There is severe concentric left ventricular hypertrophy.  The visual ejection fraction is estimated at 65-70%.  Left ventricular systolic function is normal.  The ascending aorta is Mildly dilated.  ___________________________________   Left Ventricle  The left ventricle is normal in size. There is severe concentric left  ventricular hypertrophy. Diastolic function not assessed due to significant  mitral annular calcification. The visual ejection fraction is estimated at 65-  70%. Left ventricular systolic function is normal.     Right Ventricle  The right ventricle is normal in size and function.     Atria  Normal left atrial size. Right atrial size is normal. There is no color  Doppler evidence of an atrial shunt.     Mitral Valve  There is mild to moderate mitral annular calcification. There is trace mitral  regurgitation.        Tricuspid Valve  There is trace tricuspid regurgitation. Right ventricular systolic pressure  could not be approximated due to inadequate tricuspid regurgitation.     Aortic Valve  The aortic valve is not well visualized. Thickened aortic valve leaflets.  There is trace aortic regurgitation. Severe valvular aortic stenosis. The peak  AoV pressure gradient is 75.0 mmHg. The mean AoV pressure gradient is 50.3  mmHg. The calculated aortic valve are is 0.90 cm^2.     Pulmonic Valve  There is no pulmonic valvular regurgitation. Normal pulmonic valve velocity.     Vessels  Mild aortic root dilatation. The ascending aorta is Mildly dilated. The  inferior vena cava was normal in size with preserved respiratory variability.      Pericardium  There is no pericardial effusion.        Rhythm  Sinus rhythm was noted.  _____________________________________________________________________________  __  MMode/2D Measurements & Calculations  IVSd: 1.8 cm     LVIDd: 3.6 cm  LVIDs: 2.1 cm  LVPWd: 1.9 cm  FS: 42.7 %  LV mass(C)d: 285.1 grams  LV mass(C)dI: 128.2 grams/m2  Ao root diam: 3.9 cm  asc Aorta Diam: 4.3 cm  LVOT diam: 2.0 cm  LVOT area: 3.1 cm2  LA Volume (BP): 73.1 ml  LA Volume Index (BP): 32.9 ml/m2  RWT: 1.0           Doppler Measurements & Calculations  MV E max emery: 105.4 cm/sec  MV A max emery: 117.6 cm/sec  MV E/A: 0.90  MV max P.3 mmHg  MV mean PG: 3.8 mmHg  MV V2 VTI: 30.6 cm  MVA(VTI): 2.6 cm2  MV P1/2t max emery: 118.1 cm/sec  MV P1/2t: 91.9 msec  MVA(P1/2t): 2.4 cm2  MV dec slope: 376.3 cm/sec2  MV dec time: 0.24 sec  Ao V2 max: 438.4 cm/sec  Ao max P.0 mmHg  Ao V2 mean: 337.3 cm/sec  Ao mean P.3 mmHg  Ao V2 VTI: 89.1 cm  MARIEL(I,D): 0.90 cm2  MARIEL(V,D): 0.83 cm2  AI P1/2t: 501.5 msec  LV V1 max P.5 mmHg  LV V1 max: 117.1 cm/sec  LV V1 VTI: 26.0 cm  SV(LVOT): 80.5 ml  SI(LVOT): 36.2 ml/m2  PA acc time: 0.09 sec  AV Emery Ratio (DI): 0.27  MARIEL Index (cm2/m2): 0.41  E/E' av.5  Lateral E/e': 28.6  Medial E/e': 20.4      Report approved by: Germaine Arshad 10/29/2019 01:32 PM

## 2019-10-30 NOTE — LETTER
10/30/2019    Axel Roberson MD  [id number] [facility] [id number]    RE: Benjamín Us       Dear Colleague,    I had the pleasure of seeing Benjamín Us in the [facility].    HISTORY OF PRESENT ILLNESS:  This is a 54 year old male who present to [facility] care today for a follow up visit.  He is a patient of dr. Downs seen in our clinic for hypertension, aortic valve disease, ascending aorta dilatation, elevated glucose readings, untreated HARIS, and tobacco dependence.    Benjamín was hospitalized in 2014 for hypertensive urgency. An ECHO in 2017 showed LVEF > 70%, moderate LVH, moderate aortic stenosis (mean gradient 32 mmHg / MARIEL 1.1 cm2) and mild aortic regurgitation. His ascending aorta was measured at 3.9cm. He was previously diagnosed with sleep apnea, but has been unable to tolerate CPAPs.  He was last in our clinic in 2017 and was doing well from a cardiovascular standpoint.  Annual ECHOs were recommended, but he has been overdue for followup.     Benjamín denies any limitations in his activity.  He has no chest pain, shortness of breath, palpitations, orthopnea, or peripheral edema. He tells me he was diagnosed with sleep apnea years ago, but cannot tolerate the CPAP mask.  At times he feels his blood pressure is elevated by symptoms of a strong heart beat.    An ECHO performed yesterday (10/30/19) now showed severe aortic stenosis (peak AV gradient 75 mmHg / mean AV gradient 50 mmHg / MARIEL 0.9 cm2)  LVEF 65% with severe concentric LVH, normal RV function, mild ascending aorta dilatation (4.3cm)    Today BP: 152/96 mmHg  HR: 106 bpm and regular  His lungs are clear.  II/VI systolic murmur heard best at base.  There is no peripheral edema.  Weight 229 lbs (BMI 32)       IMPRESSION and PLAN:  1. HTN:  BP elevated in the office today and at time of ECHO yesterday.  I have asked him to increase his Coreg to 25mg BID.  He states that he is faithful about  taking his meds.  2. Severe Aortic Stenosis: His aortic stenosis has progressed, now with mean AV gradient 50 mmhg and an MARIEL 0.9 cm2.  At this time, I have placed an order for TAVR evaluation.  Fortunately, he is asymptomatic.  3. Untreated Sleep apnea.  He has not tolerated CPAP masks  He has deferred repeat sleep evaluation  4. Tobacco Dependence  Encouraged cessation  5. Severe concentric LVH  This has progressed over the past 1 1/2 years  6. Poor medical follow up  However, he assures me that he is taking his medications as prescribed  7. Likely untreated DM  His hgb A1C was 7.2 in 2016    Thanks for allowing me to participate in his care      JACKSON Decker, CNP          Orders Placed This Encounter   Procedures     Cardiac [facility] Clinic       Orders Placed This Encounter   Medications     carvedilol (COREG) 25 MG tablet     Sig: Take 1 tablet (25 mg) by mouth 2 times daily (with meals)     Dispense:  180 tablet     Refill:  3     lisinopril (PRINIVIL/ZESTRIL) 40 MG tablet     Sig: Take 1 tablet (40 mg) by mouth daily     Dispense:  90 tablet     Refill:  0       Medications Discontinued During This Encounter   Medication Reason     carvedilol (COREG) 6.25 MG tablet      lisinopril (PRINIVIL/ZESTRIL) 40 MG tablet Reorder         Encounter Diagnoses   Name Primary?     Aortic valve stenosis, etiology of cardiac valve disease unspecified Yes     Benign essential hypertension      Hypertensive urgency        CURRENT MEDICATIONS:  Current Outpatient Medications   Medication Sig Dispense Refill     amphetamine-dextroamphetamine (ADDERALL) 20 MG tablet 20 mg 3 times daily        carvedilol (COREG) 25 MG tablet Take 1 tablet (25 mg) by mouth 2 times daily (with meals) 180 tablet 3     lisinopril (PRINIVIL/ZESTRIL) 40 MG tablet Take 1 tablet (40 mg) by mouth daily 90 tablet 0     hydrOXYzine (ATARAX) 25 MG tablet Take 1 tablet (25 mg) by mouth every 6 hours as needed for itching (Patient not taking:  Reported on 10/30/2019) 60 tablet 1     predniSONE (DELTASONE) 10 MG tablet Take 1 tablet (10 mg) by mouth daily (Patient not taking: Reported on 10/30/2019) 10 tablet 0       ALLERGIES     Allergies   Allergen Reactions     Penicillins Nausea and Vomiting     Zithromax [Azithromycin Dihydrate]      hives       PAST MEDICAL HISTORY:  Past Medical History:   Diagnosis Date     ADD (attention deficit disorder)      Aneurysm of thoracic aorta (H) 5/21/2014     Problem list name updated by automated process. Provider to review     Aortic valve disorder 5/21/2014     CARDIOVASCULAR SCREENING; LDL GOAL LESS THAN 160 2/25/2014     Dermatitis 9/14/2018     Essential hypertension, benign 9/14/2018     Hypertension      Hypertensive urgency 8/8/2013     Type 2 diabetes mellitus with diabetic dermatitis, without long-term current use of insulin (H) 9/14/2018       PAST SURGICAL HISTORY:  Past Surgical History:   Procedure Laterality Date     APPENDECTOMY       COLONOSCOPY N/A 6/15/2015    Procedure: COLONOSCOPY;  Surgeon: Vasyl Lawson MD;  Location:  GI       FAMILY HISTORY:  Family History   Problem Relation Age of Onset     Hypertension Mother      Breast Cancer Mother      Diabetes Father      Diabetes Brother      Thyroid Disease Sister      Colon Cancer No family hx of        SOCIAL HISTORY:  Social History     Socioeconomic History     Marital status:      Spouse name: None     Number of children: None     Years of education: None     Highest education level: None   Occupational History     None   Social Needs     Financial resource strain: None     Food insecurity:     Worry: None     Inability: None     Transportation needs:     Medical: None     Non-medical: None   Tobacco Use     Smoking status: Current Every Day Smoker     Packs/day: 0.50     Types: Cigarettes     Smokeless tobacco: Never Used     Tobacco comment: 5/2015 Two cigarettes per day    Substance and Sexual Activity     Alcohol use: No      "Alcohol/week: 0.0 standard drinks     Drug use: No     Sexual activity: Yes     Partners: Female   Lifestyle     Physical activity:     Days per week: None     Minutes per session: None     Stress: None   Relationships     Social connections:     Talks on phone: None     Gets together: None     Attends Muslim service: None     Active member of club or organization: None     Attends meetings of clubs or organizations: None     Relationship status: None     Intimate partner violence:     Fear of current or ex partner: None     Emotionally abused: None     Physically abused: None     Forced sexual activity: None   Other Topics Concern     Parent/sibling w/ CABG, MI or angioplasty before 65F 55M? Not Asked      Service Not Asked     Blood Transfusions Not Asked     Caffeine Concern No     Occupational Exposure Not Asked     Hobby Hazards Not Asked     Sleep Concern No     Stress Concern Not Asked     Weight Concern No     Special Diet Not Asked     Back Care Not Asked     Exercise No     Bike Helmet Not Asked     Seat Belt Yes     Self-Exams Not Asked   Social History Narrative     None       Review of Systems:  Skin:  Negative       Eyes:  Positive for glasses    ENT:  Negative sinus trouble    Respiratory:  Positive for cough     Cardiovascular:  Negative for;palpitations;chest pain;edema;fatigue;lightheadedness;dizziness      Gastroenterology: Negative      Genitourinary:  Negative      Musculoskeletal:  Negative      Neurologic:  Negative headaches(have decreased since starting meds)    Psychiatric:  Negative      Heme/Lymph/Imm:  Positive for allergies    Endocrine:  Negative        Physical Exam:  Vitals: BP (!) 152/96 (BP Location: Right arm, Patient Position: Sitting, Cuff Size: Adult Regular)   Pulse 106   Ht 1.778 m (5' 10\")   Wt 103.9 kg (229 lb)   SpO2 96%   BMI 32.86 kg/m       Constitutional:  cooperative;in no acute distress obese      Skin:  warm and dry to the touch          Head:  " normocephalic        Eyes:  pupils equal and round        Lymph:      ENT:  no pallor or cyanosis poor dentition      Neck:  JVP normal transmitted murmur      Respiratory:  clear to auscultation;normal respiratory excursion         Cardiac: regular rhythm;normal S1 and S2       systolic murmur;LUSB;grade 2          pulses below the femoral arteries are diminished                                      GI:  abdomen soft obese      Extremities and Muscular Skeletal:  no edema              Neurological:  affect appropriate        Psych:  Alert and Oriented x 3            Thank you for allowing me to participate in the care of your patient.    Sincerely,     JACKSON Dunaway [facility]

## 2019-10-30 NOTE — LETTER
10/30/2019    Axel Roberson MD  [id number] [facility] [address]    RE: Benjamín Us       Dear Colleague,    I had the pleasure of seeing Benjamín Us in the [facility].    HISTORY OF PRESENT ILLNESS:  This is a 54 year old male who present to [facility] care today for a follow up visit.  He is a patient of dr. Downs seen in our clinic for hypertension, aortic valve disease, ascending aorta dilatation, elevated glucose readings, untreated HARIS, and tobacco dependence.    Benjamín was hospitalized in 2014 for hypertensive urgency. An ECHO in 2017 showed LVEF > 70%, moderate LVH, moderate aortic stenosis (mean gradient 32 mmHg / MARIEL 1.1 cm2) and mild aortic regurgitation. His ascending aorta was measured at 3.9cm. He was previously diagnosed with sleep apnea, but has been unable to tolerate CPAPs.  He was last in our clinic in 2017 and was doing well from a cardiovascular standpoint.  Annual ECHOs were recommended, but he has been overdue for followup.     Benjamín denies any limitations in his activity.  He has no chest pain, shortness of breath, palpitations, orthopnea, or peripheral edema. He tells me he was diagnosed with sleep apnea years ago, but cannot tolerate the CPAP mask.  At times he feels his blood pressure is elevated by symptoms of a strong heart beat.    An ECHO performed yesterday (10/30/19) now showed severe aortic stenosis (peak AV gradient 75 mmHg / mean AV gradient 50 mmHg / MARIEL 0.9 cm2)  LVEF 65% with severe concentric LVH, normal RV function, mild ascending aorta dilatation (4.3cm)    Today BP: 152/96 mmHg  HR: 106 bpm and regular  His lungs are clear.  II/VI systolic murmur heard best at base.  There is no peripheral edema.  Weight 229 lbs (BMI 32)       IMPRESSION and PLAN:  1. HTN:  BP elevated in the office today and at time of ECHO yesterday.  I have asked him to increase his Coreg to 25mg BID.  He states that he is faithful about  taking his meds.  2. Severe Aortic Stenosis: His aortic stenosis has progressed, now with mean AV gradient 50 mmhg and an MARIEL 0.9 cm2.  At this time, I have placed an order for TAVR evaluation.  Fortunately, he is asymptomatic.  3. Untreated Sleep apnea.  He has not tolerated CPAP masks  He has deferred repeat sleep evaluation  4. Tobacco Dependence  Encouraged cessation  5. Severe concentric LVH  This has progressed over the past 1 1/2 years  6. Poor medical follow up  However, he assures me that he is taking his medications as prescribed  7. Likely untreated DM  His hgb A1C was 7.2 in 2016    Thanks for allowing me to participate in his care      JACKSON Decker, CNP          Orders Placed This Encounter   Procedures     Cardiac Structural Heart Clinic       Orders Placed This Encounter   Medications     carvedilol (COREG) 25 MG tablet     Sig: Take 1 tablet (25 mg) by mouth 2 times daily (with meals)     Dispense:  180 tablet     Refill:  3     lisinopril (PRINIVIL/ZESTRIL) 40 MG tablet     Sig: Take 1 tablet (40 mg) by mouth daily     Dispense:  90 tablet     Refill:  0       Medications Discontinued During This Encounter   Medication Reason     carvedilol (COREG) 6.25 MG tablet      lisinopril (PRINIVIL/ZESTRIL) 40 MG tablet Reorder         Encounter Diagnoses   Name Primary?     Aortic valve stenosis, etiology of cardiac valve disease unspecified Yes     Benign essential hypertension      Hypertensive urgency        CURRENT MEDICATIONS:  Current Outpatient Medications   Medication Sig Dispense Refill     amphetamine-dextroamphetamine (ADDERALL) 20 MG tablet 20 mg 3 times daily        carvedilol (COREG) 25 MG tablet Take 1 tablet (25 mg) by mouth 2 times daily (with meals) 180 tablet 3     lisinopril (PRINIVIL/ZESTRIL) 40 MG tablet Take 1 tablet (40 mg) by mouth daily 90 tablet 0     hydrOXYzine (ATARAX) 25 MG tablet Take 1 tablet (25 mg) by mouth every 6 hours as needed for itching (Patient not taking:  Reported on 10/30/2019) 60 tablet 1     predniSONE (DELTASONE) 10 MG tablet Take 1 tablet (10 mg) by mouth daily (Patient not taking: Reported on 10/30/2019) 10 tablet 0       ALLERGIES     Allergies   Allergen Reactions     Penicillins Nausea and Vomiting     Zithromax [Azithromycin Dihydrate]      hives       PAST MEDICAL HISTORY:  Past Medical History:   Diagnosis Date     ADD (attention deficit disorder)      Aneurysm of thoracic aorta (H) 5/21/2014     Problem list name updated by automated process. Provider to review     Aortic valve disorder 5/21/2014     CARDIOVASCULAR SCREENING; LDL GOAL LESS THAN 160 2/25/2014     Dermatitis 9/14/2018     Essential hypertension, benign 9/14/2018     Hypertension      Hypertensive urgency 8/8/2013     Type 2 diabetes mellitus with diabetic dermatitis, without long-term current use of insulin (H) 9/14/2018       PAST SURGICAL HISTORY:  Past Surgical History:   Procedure Laterality Date     APPENDECTOMY       COLONOSCOPY N/A 6/15/2015    Procedure: COLONOSCOPY;  Surgeon: Vasyl Lawson MD;  Location:  GI       FAMILY HISTORY:  Family History   Problem Relation Age of Onset     Hypertension Mother      Breast Cancer Mother      Diabetes Father      Diabetes Brother      Thyroid Disease Sister      Colon Cancer No family hx of        SOCIAL HISTORY:  Social History     Socioeconomic History     Marital status:      Spouse name: None     Number of children: None     Years of education: None     Highest education level: None   Occupational History     None   Social Needs     Financial resource strain: None     Food insecurity:     Worry: None     Inability: None     Transportation needs:     Medical: None     Non-medical: None   Tobacco Use     Smoking status: Current Every Day Smoker     Packs/day: 0.50     Types: Cigarettes     Smokeless tobacco: Never Used     Tobacco comment: 5/2015 Two cigarettes per day    Substance and Sexual Activity     Alcohol use: No      "Alcohol/week: 0.0 standard drinks     Drug use: No     Sexual activity: Yes     Partners: Female   Lifestyle     Physical activity:     Days per week: None     Minutes per session: None     Stress: None   Relationships     Social connections:     Talks on phone: None     Gets together: None     Attends Anabaptism service: None     Active member of club or organization: None     Attends meetings of clubs or organizations: None     Relationship status: None     Intimate partner violence:     Fear of current or ex partner: None     Emotionally abused: None     Physically abused: None     Forced sexual activity: None   Other Topics Concern     Parent/sibling w/ CABG, MI or angioplasty before 65F 55M? Not Asked      Service Not Asked     Blood Transfusions Not Asked     Caffeine Concern No     Occupational Exposure Not Asked     Hobby Hazards Not Asked     Sleep Concern No     Stress Concern Not Asked     Weight Concern No     Special Diet Not Asked     Back Care Not Asked     Exercise No     Bike Helmet Not Asked     Seat Belt Yes     Self-Exams Not Asked   Social History Narrative     None       Review of Systems:  Skin:  Negative       Eyes:  Positive for glasses    ENT:  Negative sinus trouble    Respiratory:  Positive for cough     Cardiovascular:  Negative for;palpitations;chest pain;edema;fatigue;lightheadedness;dizziness      Gastroenterology: Negative      Genitourinary:  Negative      Musculoskeletal:  Negative      Neurologic:  Negative headaches(have decreased since starting meds)    Psychiatric:  Negative      Heme/Lymph/Imm:  Positive for allergies    Endocrine:  Negative        Physical Exam:  Vitals: BP (!) 152/96 (BP Location: Right arm, Patient Position: Sitting, Cuff Size: Adult Regular)   Pulse 106   Ht 1.778 m (5' 10\")   Wt 103.9 kg (229 lb)   SpO2 96%   BMI 32.86 kg/m       Constitutional:  cooperative;in no acute distress obese      Skin:  warm and dry to the touch          Head:  " normocephalic        Eyes:  pupils equal and round        Lymph:      ENT:  no pallor or cyanosis poor dentition      Neck:  JVP normal transmitted murmur      Respiratory:  clear to auscultation;normal respiratory excursion         Cardiac: regular rhythm;normal S1 and S2       systolic murmur;LUSB;grade 2          pulses below the femoral arteries are diminished                                      GI:  abdomen soft obese      Extremities and Muscular Skeletal:  no edema              Neurological:  affect appropriate        Psych:  Alert and Oriented x 3          CC  Myles Downs MD  [address]  [place] [id number]                    Thank you for allowing me to participate in the care of your patient.      Sincerely,     JACKSON Dunaway [facility]    cc:   Myles Downs MD  [address]  [place] [id number]

## 2019-10-31 DIAGNOSIS — R09.89 ABNORMAL CHEST SOUNDS: Primary | ICD-10-CM

## 2019-10-31 DIAGNOSIS — I35.0 AORTIC VALVE STENOSIS, ETIOLOGY OF CARDIAC VALVE DISEASE UNSPECIFIED: ICD-10-CM

## 2019-11-13 ENCOUNTER — TELEPHONE (OUTPATIENT)
Dept: CARDIOLOGY | Facility: CLINIC | Age: 54
End: 2019-11-13

## 2019-11-13 NOTE — TELEPHONE ENCOUNTER
Pt called to see if he could move up his TAVR testing and appointment. Pt states his insurance is changing January 2020 and he has already met his deductible this year. He is concerned he would have to pay out of pocket for surgery if he waited until 2020. Explained the workup process and authorization required for patients under 65. Pt verbalized understanding. Stated I would call patient back if I could find a different date for him. Solange Livingston RN

## 2019-11-15 ENCOUNTER — TELEPHONE (OUTPATIENT)
Dept: FAMILY MEDICINE | Facility: CLINIC | Age: 54
End: 2019-11-15

## 2019-11-15 NOTE — TELEPHONE ENCOUNTER
Panel Management Review      Patient has the following on his problem list:     Diabetes    ASA: Not Required     Last A1C  Lab Results   Component Value Date    A1C 7.2 01/26/2016    A1C 6.5 03/12/2014     A1C tested: FAILED    Last LDL:    Lab Results   Component Value Date    CHOL 198 03/25/2014     Lab Results   Component Value Date    HDL 37 03/25/2014     Lab Results   Component Value Date     03/25/2016     03/25/2014     Lab Results   Component Value Date    TRIG 129 03/25/2014     Lab Results   Component Value Date    CHOLHDLRATIO 5.3 03/25/2014     No results found for: NHDL    Is the patient on a Statin? NO             Is the patient on Aspirin? NO        Last three blood pressure readings:  BP Readings from Last 3 Encounters:   10/30/19 (!) 152/96   09/14/18 (!) 185/124   01/30/17 138/88       Date of last diabetes office visit: Year ago     Tobacco History:     History   Smoking Status     Current Every Day Smoker     Packs/day: 0.50     Types: Cigarettes   Smokeless Tobacco     Never Used     Comment: 5/2015 Two cigarettes per day          Hypertension   Last three blood pressure readings:  BP Readings from Last 3 Encounters:   10/30/19 (!) 152/96   09/14/18 (!) 185/124   01/30/17 138/88     Blood pressure: FAILED    HTN Guidelines:  Less than 140/90      Composite cancer screening  Chart review shows that this patient is due/due soon for the following None  Summary:    Patient is due/failing the following:   Follow up with provider, A1C, BP CHECK and LDL    Action needed:   Patient needs office visit for Diabetes, HTN.    Type of outreach:    Phone, spoke to patient.  said he is not interested in dealing with another doctor with his Diabetes is having cardiac issues and will be having surgery will call us    Questions for provider review:    None                                                                                                                                    Melissa  Felix, [facility]       Chart routed to none .

## 2019-11-27 NOTE — TELEPHONE ENCOUNTER
Called patient to discuss clinic appointment date and he stated he would like to leave the appointments as scheduled. Solange Livingston RN

## 2019-12-03 ENCOUNTER — HOSPITAL ENCOUNTER (OUTPATIENT)
Dept: CARDIOLOGY | Facility: CLINIC | Age: 54
Discharge: HOME | End: 2019-12-03
Attending: INTERNAL MEDICINE | Admitting: INTERNAL MEDICINE
Payer: COMMERCIAL

## 2019-12-03 ENCOUNTER — HOSPITAL ENCOUNTER (OUTPATIENT)
Dept: ULTRASOUND IMAGING | Facility: CLINIC | Age: 54
End: 2019-12-03
Attending: INTERNAL MEDICINE
Payer: COMMERCIAL

## 2019-12-03 DIAGNOSIS — R09.89 ABNORMAL CHEST SOUNDS: ICD-10-CM

## 2019-12-03 DIAGNOSIS — I35.0 AORTIC VALVE STENOSIS, ETIOLOGY OF CARDIAC VALVE DISEASE UNSPECIFIED: ICD-10-CM

## 2019-12-03 LAB
CREAT BLD-MCNC: 0.8 MG/DL (ref 0.66–1.25)
GFR SERPL CREATININE-BSD FRML MDRD: >90 ML/MIN/{1.73_M2}

## 2019-12-03 PROCEDURE — 25000128 H RX IP 250 OP 636: Performed by: INTERNAL MEDICINE

## 2019-12-03 PROCEDURE — 25800030 ZZH RX IP 258 OP 636: Performed by: INTERNAL MEDICINE

## 2019-12-03 PROCEDURE — 74174 CTA ABD&PLVS W/CONTRAST: CPT

## 2019-12-03 PROCEDURE — 82565 ASSAY OF CREATININE: CPT

## 2019-12-03 PROCEDURE — 71275 CT ANGIOGRAPHY CHEST: CPT | Mod: 26 | Performed by: INTERNAL MEDICINE

## 2019-12-03 PROCEDURE — 93880 EXTRACRANIAL BILAT STUDY: CPT | Mod: 26 | Performed by: INTERNAL MEDICINE

## 2019-12-03 PROCEDURE — 74174 CTA ABD&PLVS W/CONTRAST: CPT | Mod: 26 | Performed by: INTERNAL MEDICINE

## 2019-12-03 PROCEDURE — 93880 EXTRACRANIAL BILAT STUDY: CPT

## 2019-12-03 RX ORDER — IOPAMIDOL 755 MG/ML
500 INJECTION, SOLUTION INTRAVASCULAR ONCE
Status: COMPLETED | OUTPATIENT
Start: 2019-12-03 | End: 2019-12-03

## 2019-12-03 RX ORDER — DIPHENHYDRAMINE HYDROCHLORIDE 50 MG/ML
25-50 INJECTION INTRAMUSCULAR; INTRAVENOUS
Status: DISCONTINUED | OUTPATIENT
Start: 2019-12-03 | End: 2019-12-04 | Stop reason: HOSPADM

## 2019-12-03 RX ORDER — DIPHENHYDRAMINE HCL 25 MG
25 CAPSULE ORAL
Status: DISCONTINUED | OUTPATIENT
Start: 2019-12-03 | End: 2019-12-04 | Stop reason: HOSPADM

## 2019-12-03 RX ORDER — ONDANSETRON 2 MG/ML
4 INJECTION INTRAMUSCULAR; INTRAVENOUS
Status: DISCONTINUED | OUTPATIENT
Start: 2019-12-03 | End: 2019-12-04 | Stop reason: HOSPADM

## 2019-12-03 RX ORDER — METHYLPREDNISOLONE SODIUM SUCCINATE 125 MG/2ML
125 INJECTION, POWDER, LYOPHILIZED, FOR SOLUTION INTRAMUSCULAR; INTRAVENOUS
Status: DISCONTINUED | OUTPATIENT
Start: 2019-12-03 | End: 2019-12-04 | Stop reason: HOSPADM

## 2019-12-03 RX ORDER — ACYCLOVIR 200 MG/1
0-1 CAPSULE ORAL
Status: DISCONTINUED | OUTPATIENT
Start: 2019-12-03 | End: 2019-12-04 | Stop reason: HOSPADM

## 2019-12-03 RX ADMIN — SODIUM CHLORIDE 100 ML: 9 INJECTION, SOLUTION INTRAVENOUS at 11:30

## 2019-12-03 RX ADMIN — IOPAMIDOL 115 ML: 755 INJECTION, SOLUTION INTRAVENOUS at 11:30

## 2019-12-12 DIAGNOSIS — I16.0 HYPERTENSIVE URGENCY: ICD-10-CM

## 2019-12-12 RX ORDER — LISINOPRIL 40 MG/1
40 TABLET ORAL DAILY
Qty: 90 TABLET | Refills: 3 | Status: ON HOLD | OUTPATIENT
Start: 2019-12-12 | End: 2020-05-28

## 2019-12-23 ENCOUNTER — OFFICE VISIT (OUTPATIENT)
Dept: CARDIOLOGY | Facility: CLINIC | Age: 54
End: 2019-12-23
Attending: NURSE PRACTITIONER
Payer: COMMERCIAL

## 2019-12-23 ENCOUNTER — OFFICE VISIT (OUTPATIENT)
Dept: CARDIOLOGY | Facility: CLINIC | Age: 54
End: 2019-12-23
Payer: COMMERCIAL

## 2019-12-23 VITALS
BODY MASS INDEX: 32.14 KG/M2 | HEART RATE: 86 BPM | SYSTOLIC BLOOD PRESSURE: 142 MMHG | DIASTOLIC BLOOD PRESSURE: 92 MMHG | WEIGHT: 224.5 LBS | HEIGHT: 70 IN

## 2019-12-23 DIAGNOSIS — I35.0 AORTIC VALVE STENOSIS, ETIOLOGY OF CARDIAC VALVE DISEASE UNSPECIFIED: Primary | ICD-10-CM

## 2019-12-23 DIAGNOSIS — I10 BENIGN ESSENTIAL HYPERTENSION: ICD-10-CM

## 2019-12-23 PROCEDURE — 99214 OFFICE O/P EST MOD 30 MIN: CPT | Performed by: INTERNAL MEDICINE

## 2019-12-23 ASSESSMENT — MIFFLIN-ST. JEOR: SCORE: 1854.58

## 2019-12-23 NOTE — LETTER
12/23/2019      RE: Benjamín Us  [id number]  [facility] [address]       Dear Colleague,    Thank you for the opportunity to participate in the care of your patient, Benjamín Us, at the [facility] CARDIOTHORACIC at [facility]. Please see a copy of my visit note below.    CARDIAC SURGERY CLINIC NOTE    Reason for visit: aortic valve stenosis    HPI: 54 year old male with severe symptomatic aortic stenosis. He has noted worsening dyspnea in the past few months.    He has completed much of his evaluation already, but still needs a coronary angiogram.    He is accompanied today by his wife.      A/P: Patient is a 54 year old male with severe symptomatic aortic stenosis. He is low risk for SAVR. It is unknown if he has coronary disease.    We discussed valve options, specifically related to tissue valve longevity. He is leaning toward a mechanical valve, with the understanding that it requires life long anticoagulation. He is interested in avoided subsequent interventions related to tissue valve degeneration.    He and his wife would like to discuss further. If he ultimately decides on a tissue valve, I think either SAVR or TAVR would be an option. Obviously coronary disease would push us toward surgery.    Will obtain cor angio. Will also need dental evaluation. Also needs tobacco cessation, and he was counseled in this regard.    Will be in touch with him as his further studies are completed. I happy to see him back to discuss surgery further should he desire.      Thank you for the opportunity to participate in the care of this patient.      Brian Bustillo MD         [facility]:  Past Medical History:   Diagnosis Date     ADD (attention deficit disorder)      Aneurysm of thoracic aorta (H) 5/21/2014     Problem list name updated by automated process. Provider to review     Aortic valve disorder 5/21/2014     CARDIOVASCULAR SCREENING; LDL GOAL LESS THAN 160 2/25/2014      Dermatitis 9/14/2018     Essential hypertension, benign 9/14/2018     Hypertension      Hypertensive urgency 8/8/2013     HARIS (obstructive sleep apnea)      Type 2 diabetes mellitus with diabetic dermatitis, without long-term current use of insulin (H) 9/14/2018         PSH:  Past Surgical History:   Procedure Laterality Date     APPENDECTOMY       COLONOSCOPY N/A 6/15/2015    Procedure: COLONOSCOPY;  Surgeon: Vasyl Lawson MD;  Location:  GI         FH:  family history includes Breast Cancer in his mother; Diabetes in his brother and father; Hypertension in his mother; Thyroid Disease in his sister.      SH:  Active Tobacco use    Allergies:  Allergies   Allergen Reactions     Penicillins Nausea and Vomiting     Zithromax [Azithromycin Dihydrate]      hives       Home Meds:  (Not in a hospital admission)      ROS: + as noted above      Physical Exam:  Pulse:  [86] 86  BP: (142)/(92) 142/92    Gen: NAD, appears older than stated age, poor dentition  Lungs: non-labored breathing  Abd: non distended  Ext: cool  Neuro: AOx3    Labs:  None today    Imaging:    ECHO    Interpretation Summary     Severe valvular aortic stenosis. There has been significant progression of the  aortic stenosis since 2017.  There is severe concentric left ventricular hypertrophy.  The visual ejection fraction is estimated at 65-70%.  Left ventricular systolic function is normal.  The ascending aorta is Mildly dilated.  _____________________________________________________________________________  __        Left Ventricle  The left ventricle is normal in size. There is severe concentric left  ventricular hypertrophy. Diastolic function not assessed due to significant  mitral annular calcification. The visual ejection fraction is estimated at 65-  70%. Left ventricular systolic function is normal.     Right Ventricle  The right ventricle is normal in size and function.     Atria  Normal left atrial size. Right atrial size is normal.  There is no color  Doppler evidence of an atrial shunt.     Mitral Valve  There is mild to moderate mitral annular calcification. There is trace mitral  regurgitation.        Tricuspid Valve  There is trace tricuspid regurgitation. Right ventricular systolic pressure  could not be approximated due to inadequate tricuspid regurgitation.     Aortic Valve  The aortic valve is not well visualized. Thickened aortic valve leaflets.  There is trace aortic regurgitation. Severe valvular aortic stenosis. The peak  AoV pressure gradient is 75.0 mmHg. The mean AoV pressure gradient is 50.3  mmHg. The calculated aortic valve are is 0.90 cm^2.     Pulmonic Valve  There is no pulmonic valvular regurgitation. Normal pulmonic valve velocity.     Vessels  Mild aortic root dilatation. The ascending aorta is Mildly dilated. The  inferior vena cava was normal in size with preserved respiratory variability.     Pericardium  There is no pericardial effusion.        Rhythm  Sinus rhythm was noted.        CAROTIDS    IMPRESSION:  1. Mild (<50%) bilateral internal carotid artery stenosis.     EXAM DESCRIPTION AND FINDINGS:  A bilateral carotid and vertebral artery ultrasound was performed  using duplex ultrasound imaging with spectral and color Doppler. There  is a minimal heterogeneous atherosclerotic plaque in the proximal  internal carotid arteries bilaterally. The flow velocities in the  right and left internal carotid artery are within normal range  indicating no significant stenosis (diameter stenosis less than 50%).  Normal antegrade vertebral flow bilaterally.     Right ICA peak systolic velocity (PSV) 81 cm/s  Right ICA end diastolic velocity (EDV) 38 cm/s  Right ICA/CCA peak systolic velocity ratio 0.9  Left ICA peak systolic velocity (PSV) 117  cm/s  Left ICA end diastolic velocity (EDV) 45 cm/s  Left ICA/CCA peak systolic velocity ratio  1.6      TAVR CTA    FINDINGS:     1.  Severely calcified trileafletaortic valve. Aortic  valve calcium  score is 2854. The LVOT is not calcified. The ascending aorta is  mildly calcified, aortic arch is  mildly calcified, the descending  thoracic aorta is mildly calcified.   2.  The arch vessel branching pattern is normal.   3.  The suprarenal abdominal aorta is mildly calcified; infrarenal  abdominal aorta is mildly calcified with moderate luminal  atherosclerosis.  4.  Widely patent origins of celiac trunk, superior mesenteric artery  and inferior mesenteric artery.  Single bilateral renal arteries with  widely patent origins.   5.  There is no acute aortic pathology, such as dissection, intramural  hematoma, or contained rupture.      MEASUREMENTS:   Representative dimensions of the thoracoabdominal aorta are as  follows:     1. AORTIC ANNULUS MEASUREMENTS:     1.  The average aortic annulus diameter is 26.3 mm, (long diameter is  29.2 mm and short diameter is 24.0 mm)  2.  Aortic annulus area is 5.46 cm2  3.  Aortic annulus perimeter is 84.6 mm  4.  Suggested 3-cusp coaxial angle for aortic valve is (Georgian 2 , CRA 6)  and alternate A-P coaxial angle is (Georgian 9 , CRA  15 ), these  angles  will vary depending upon the patient's body position  5.  Aortic root angle is 48.0  6.  Left main - Annulus distance: 17.6 mm, RCA - Annulus distance:  14.6 mm     2. LVOT MEASUREMENTS:     1.  The average LVOT diameter (measured 4 mm below annular plane) is  23.7 mm  2.  LVOT area is 4.44 cm2  3.  LVOT perimeter is 78.4 mm     3. AORTA MEASUREMENTS     1.  The aortic root at the sinuses of Valsalva (left cusp, right cusp,  non cusp): 34.5 mm x  33.4 mm x 35.2 mm  2.  The sinotubular junction:  30.6 mm x 30.2 mm  3.  Sinotubular junction height: 24.2 mm x 23.1 mm  4.  Proximal ascending aorta: 38.0 mm x 37.6 mm  5.  Distal abdominal aorta proximal to the bifurcation: 13.9 mm x 13.9  mm  6.  Innominate artery 3 cm from ostium: 14.2 mm x 11.9 mm  7.  Left common carotid artery 3 cm from ostium: 6.68 mm x 5.98 mm     4.  ILIOFEMORAL MEASUREMENTS:     RIGHT:  1.  Right common iliac artery: 9.67 mm x 8.63 mm   2.  Right external iliac artery: 8.83 mm x 8.38 mm   3.  Right common femoral artery: 8.75 mm x 8.13 mm   4.  Tortuosity: mild   5.  Calcification: mild      LEFT:  1.  Left common iliac artery: 9.46 mm x  8.6 mm  2.  Left external iliac artery: 8.55 mm x 8.0 mm  3.  Left common femoral artery: 8.23 mm x 7.66 mm  4.  Tortuosity: mild   5.  Calcification: mild      5. SUBCLAVIAN MEASUREMENTS:     LEFT:  1. Minimal luminal diameter: 10.5 mm x  10.1 mm  2. Tortuosity: mild   3. Calcification: mild           Please do not hesitate to contact me if you have any questions/concerns.     Sincerely,     Brian Bustillo MD

## 2019-12-23 NOTE — LETTER
12/23/2019    [place]  [address] [facility] [address]    RE: Benjamín Us       Dear Colleague,    I had the pleasure of seeing Benjamín Us in the [facility].    HPI and Plan:     MR. Us is a 55 yo male with hypertension, hyperlipidemia, nicotine dependency, diabetes and severe symptomatic aortic stenosis who is referred for aortic valve replacement. We've known about his aortic stenosis over the past several years. Echo from 2017 showed moderate aortic stenosis. Repeat echo last month showed progression of aortic stenosis which is now severe with a mean gradient of 50 mmHg and valve area of 0.9 cm2.     He developed fatigue and dyspnea over the past several months. No chest pain or syncope      IMPRESSION/PLAN    1. Severe symptomatic aortic stenosis  2. Diabetes  3. HTN  4. Nicotine dependency    Reviewed the patient's recent echocardiogram. He developed severe aortic stenosis over the past couple of years. He is now symptomatic, there fore AVR is indicated. Given his young age and low risk for surgery (STS 0.7%) would recommend surgical AVR with mechanical valve. If he declines mechanical AVR then tissue valve with surgery or TAVR is advised. He needs angiogram and carotid ultrasound to complete his work up.    I appreciate the opportunity to be part of his care    Delfino Belcher MD    No orders of the defined types were placed in this encounter.      No orders of the defined types were placed in this encounter.      There are no discontinued medications.      Encounter Diagnoses   Name Primary?     Aortic valve stenosis, etiology of cardiac valve disease unspecified Yes     Benign essential hypertension        CURRENT MEDICATIONS:  Current Outpatient Medications   Medication Sig Dispense Refill     amphetamine-dextroamphetamine (ADDERALL) 20 MG tablet 20 mg 3 times daily        carvedilol (COREG) 25 MG tablet Take 1 tablet (25 mg) by mouth 2 times  daily (with meals) 180 tablet 3     lisinopril (PRINIVIL/ZESTRIL) 40 MG tablet Take 1 tablet (40 mg) by mouth daily 90 tablet 3     hydrOXYzine (ATARAX) 25 MG tablet Take 1 tablet (25 mg) by mouth every 6 hours as needed for itching (Patient not taking: Reported on 12/23/2019) 60 tablet 1     predniSONE (DELTASONE) 10 MG tablet Take 1 tablet (10 mg) by mouth daily (Patient not taking: Reported on 12/23/2019) 10 tablet 0       ALLERGIES     Allergies   Allergen Reactions     Penicillins Nausea and Vomiting     Zithromax [Azithromycin Dihydrate]      hives       PAST MEDICAL HISTORY:  Past Medical History:   Diagnosis Date     ADD (attention deficit disorder)      Aneurysm of thoracic aorta (H) 5/21/2014     Problem list name updated by automated process. Provider to review     Aortic valve disorder 5/21/2014     CARDIOVASCULAR SCREENING; LDL GOAL LESS THAN 160 2/25/2014     Dermatitis 9/14/2018     Essential hypertension, benign 9/14/2018     Hypertension      Hypertensive urgency 8/8/2013     HARIS (obstructive sleep apnea)      Type 2 diabetes mellitus with diabetic dermatitis, without long-term current use of insulin (H) 9/14/2018       PAST SURGICAL HISTORY:  Past Surgical History:   Procedure Laterality Date     APPENDECTOMY       COLONOSCOPY N/A 6/15/2015    Procedure: COLONOSCOPY;  Surgeon: Vasyl Lawson MD;  Location:  GI       FAMILY HISTORY:  Family History   Problem Relation Age of Onset     Hypertension Mother      Breast Cancer Mother      Diabetes Father      Diabetes Brother      Thyroid Disease Sister      Colon Cancer No family hx of        SOCIAL HISTORY:  Social History     Socioeconomic History     Marital status:      Spouse name: None     Number of children: None     Years of education: None     Highest education level: None   Occupational History     None   Social Needs     Financial resource strain: None     Food insecurity:     Worry: None     Inability: None     Transportation  "needs:     Medical: None     Non-medical: None   Tobacco Use     Smoking status: Current Every Day Smoker     Packs/day: 0.50     Types: Cigarettes     Smokeless tobacco: Never Used     Tobacco comment: 5/2015 Two cigarettes per day    Substance and Sexual Activity     Alcohol use: No     Alcohol/week: 0.0 standard drinks     Drug use: No     Sexual activity: Yes     Partners: Female   Lifestyle     Physical activity:     Days per week: None     Minutes per session: None     Stress: None   Relationships     Social connections:     Talks on phone: None     Gets together: None     Attends Jew service: None     Active member of club or organization: None     Attends meetings of clubs or organizations: None     Relationship status: None     Intimate partner violence:     Fear of current or ex partner: None     Emotionally abused: None     Physically abused: None     Forced sexual activity: None   Other Topics Concern     Parent/sibling w/ CABG, MI or angioplasty before 65F 55M? Not Asked      Service Not Asked     Blood Transfusions Not Asked     Caffeine Concern No     Occupational Exposure Not Asked     Hobby Hazards Not Asked     Sleep Concern No     Stress Concern Not Asked     Weight Concern No     Special Diet Not Asked     Back Care Not Asked     Exercise No     Bike Helmet Not Asked     Seat Belt Yes     Self-Exams Not Asked   Social History Narrative     None       Review of Systems:  Skin:  Negative       Eyes:  Positive for glasses    ENT:  Negative      Respiratory:  Positive for dyspnea on exertion     Cardiovascular:    Positive for;fatigue    Gastroenterology: Negative      Genitourinary:  Negative      Musculoskeletal:  Negative      Neurologic:  Negative      Psychiatric:  Negative      Heme/Lymph/Imm:  Positive for allergies    Endocrine:  Negative        Physical Exam:  Vitals: BP (!) 142/92   Pulse 86   Ht 1.778 m (5' 10\")   Wt 101.8 kg (224 lb 8 oz)   BMI 32.21 kg/m   "     Constitutional:  cooperative;in no acute distress        Skin:  warm and dry to the touch          Head:  normocephalic        Eyes:           Lymph:No Cervical lymphadenopathy present     ENT:  no pallor or cyanosis        Neck:  JVP normal;no carotid bruit        Respiratory:  clear to auscultation         Cardiac: regular rhythm       systolic ejection murmur;grade 3        pulses full and equal                                        GI:  abdomen soft;no masses;non-tender        Extremities and Muscular Skeletal:  no edema              Neurological:  no gross motor deficits        Psych:  Alert and Oriented x 3        CC  Axel Roberson MD  [address] [place] [address]                Thank you for allowing me to participate in the care of your patient.      Sincerely,     Delfino Belcher MD, MD     [facility]    cc:   Axel Roberson MD  [address] [place] [address]

## 2019-12-23 NOTE — PROGRESS NOTES
HPI and Plan:     MR. Us is a 53 yo male with hypertension, hyperlipidemia, nicotine dependency, diabetes and severe symptomatic aortic stenosis who is referred for aortic valve replacement. We've known about his aortic stenosis over the past several years. Echo from 2017 showed moderate aortic stenosis. Repeat echo last month showed progression of aortic stenosis which is now severe with a mean gradient of 50 mmHg and valve area of 0.9 cm2.     He developed fatigue and dyspnea over the past several months. No chest pain or syncope      IMPRESSION/PLAN    1. Severe symptomatic aortic stenosis  2. Diabetes  3. HTN  4. Nicotine dependency    Reviewed the patient's recent echocardiogram. He developed severe aortic stenosis over the past couple of years. He is now symptomatic, there fore AVR is indicated. Given his young age and low risk for surgery (STS 0.7%) would recommend surgical AVR with mechanical valve. If he declines mechanical AVR then tissue valve with surgery or TAVR is advised. He needs angiogram and carotid ultrasound to complete his work up.    I appreciate the opportunity to be part of his care    Delfino Belcher MD    No orders of the defined types were placed in this encounter.      No orders of the defined types were placed in this encounter.      There are no discontinued medications.      Encounter Diagnoses   Name Primary?     Aortic valve stenosis, etiology of cardiac valve disease unspecified Yes     Benign essential hypertension        CURRENT MEDICATIONS:  Current Outpatient Medications   Medication Sig Dispense Refill     amphetamine-dextroamphetamine (ADDERALL) 20 MG tablet 20 mg 3 times daily        carvedilol (COREG) 25 MG tablet Take 1 tablet (25 mg) by mouth 2 times daily (with meals) 180 tablet 3     lisinopril (PRINIVIL/ZESTRIL) 40 MG tablet Take 1 tablet (40 mg) by mouth daily 90 tablet 3     hydrOXYzine (ATARAX) 25 MG tablet Take 1 tablet (25 mg) by mouth every 6 hours as needed for  itching (Patient not taking: Reported on 12/23/2019) 60 tablet 1     predniSONE (DELTASONE) 10 MG tablet Take 1 tablet (10 mg) by mouth daily (Patient not taking: Reported on 12/23/2019) 10 tablet 0       ALLERGIES     Allergies   Allergen Reactions     Penicillins Nausea and Vomiting     Zithromax [Azithromycin Dihydrate]      hives       PAST MEDICAL HISTORY:  Past Medical History:   Diagnosis Date     ADD (attention deficit disorder)      Aneurysm of thoracic aorta (H) 5/21/2014     Problem list name updated by automated process. Provider to review     Aortic valve disorder 5/21/2014     CARDIOVASCULAR SCREENING; LDL GOAL LESS THAN 160 2/25/2014     Dermatitis 9/14/2018     Essential hypertension, benign 9/14/2018     Hypertension      Hypertensive urgency 8/8/2013     HARIS (obstructive sleep apnea)      Type 2 diabetes mellitus with diabetic dermatitis, without long-term current use of insulin (H) 9/14/2018       PAST SURGICAL HISTORY:  Past Surgical History:   Procedure Laterality Date     APPENDECTOMY       COLONOSCOPY N/A 6/15/2015    Procedure: COLONOSCOPY;  Surgeon: Vasyl Lawson MD;  Location:  GI       FAMILY HISTORY:  Family History   Problem Relation Age of Onset     Hypertension Mother      Breast Cancer Mother      Diabetes Father      Diabetes Brother      Thyroid Disease Sister      Colon Cancer No family hx of        SOCIAL HISTORY:  Social History     Socioeconomic History     Marital status:      Spouse name: None     Number of children: None     Years of education: None     Highest education level: None   Occupational History     None   Social Needs     Financial resource strain: None     Food insecurity:     Worry: None     Inability: None     Transportation needs:     Medical: None     Non-medical: None   Tobacco Use     Smoking status: Current Every Day Smoker     Packs/day: 0.50     Types: Cigarettes     Smokeless tobacco: Never Used     Tobacco comment: 5/2015 Two cigarettes  "per day    Substance and Sexual Activity     Alcohol use: No     Alcohol/week: 0.0 standard drinks     Drug use: No     Sexual activity: Yes     Partners: Female   Lifestyle     Physical activity:     Days per week: None     Minutes per session: None     Stress: None   Relationships     Social connections:     Talks on phone: None     Gets together: None     Attends Christianity service: None     Active member of club or organization: None     Attends meetings of clubs or organizations: None     Relationship status: None     Intimate partner violence:     Fear of current or ex partner: None     Emotionally abused: None     Physically abused: None     Forced sexual activity: None   Other Topics Concern     Parent/sibling w/ CABG, MI or angioplasty before 65F 55M? Not Asked      Service Not Asked     Blood Transfusions Not Asked     Caffeine Concern No     Occupational Exposure Not Asked     Hobby Hazards Not Asked     Sleep Concern No     Stress Concern Not Asked     Weight Concern No     Special Diet Not Asked     Back Care Not Asked     Exercise No     Bike Helmet Not Asked     Seat Belt Yes     Self-Exams Not Asked   Social History Narrative     None       Review of Systems:  Skin:  Negative       Eyes:  Positive for glasses    ENT:  Negative      Respiratory:  Positive for dyspnea on exertion     Cardiovascular:    Positive for;fatigue    Gastroenterology: Negative      Genitourinary:  Negative      Musculoskeletal:  Negative      Neurologic:  Negative      Psychiatric:  Negative      Heme/Lymph/Imm:  Positive for allergies    Endocrine:  Negative        Physical Exam:  Vitals: BP (!) 142/92   Pulse 86   Ht 1.778 m (5' 10\")   Wt 101.8 kg (224 lb 8 oz)   BMI 32.21 kg/m      Constitutional:  cooperative;in no acute distress        Skin:  warm and dry to the touch          Head:  normocephalic        Eyes:           Lymph:No Cervical lymphadenopathy present     ENT:  no pallor or cyanosis        Neck:  JVP " normal;no carotid bruit        Respiratory:  clear to auscultation         Cardiac: regular rhythm       systolic ejection murmur;grade 3        pulses full and equal                                        GI:  abdomen soft;no masses;non-tender        Extremities and Muscular Skeletal:  no edema              Neurological:  no gross motor deficits        Psych:  Alert and Oriented x 3        CC  Axel Roberson [place]  [address] [place] [address]

## 2019-12-23 NOTE — PROGRESS NOTES
CARDIAC SURGERY CLINIC NOTE    Reason for visit: aortic valve stenosis    HPI: 54 year old male with severe symptomatic aortic stenosis. He has noted worsening dyspnea in the past few months.    He has completed much of his evaluation already, but still needs a coronary angiogram.    He is accompanied today by his wife.      A/P: Patient is a 54 year old male with severe symptomatic aortic stenosis. He is low risk for SAVR. It is unknown if he has coronary disease.    We discussed valve options, specifically related to tissue valve longevity. He is leaning toward a mechanical valve, with the understanding that it requires life long anticoagulation. He is interested in avoided subsequent interventions related to tissue valve degeneration.    He and his wife would like to discuss further. If he ultimately decides on a tissue valve, I think either SAVR or TAVR would be an option. Obviously coronary disease would push us toward surgery.    Will obtain cor angio. Will also need dental evaluation. Also needs tobacco cessation, and he was counseled in this regard.    Will be in touch with him as his further studies are completed. I happy to see him back to discuss surgery further should he desire.      Thank you for the opportunity to participate in the care of this patient.      Brian Bustillo MD         PMH:  Past Medical History:   Diagnosis Date     ADD (attention deficit disorder)      Aneurysm of thoracic aorta (H) 5/21/2014     Problem list name updated by automated process. Provider to review     Aortic valve disorder 5/21/2014     CARDIOVASCULAR SCREENING; LDL GOAL LESS THAN 160 2/25/2014     Dermatitis 9/14/2018     Essential hypertension, benign 9/14/2018     Hypertension      Hypertensive urgency 8/8/2013     HARIS (obstructive sleep apnea)      Type 2 diabetes mellitus with diabetic dermatitis, without long-term current use of insulin (H) 9/14/2018         PSH:  Past Surgical History:   Procedure Laterality  Date     APPENDECTOMY       COLONOSCOPY N/A 6/15/2015    Procedure: COLONOSCOPY;  Surgeon: Vasyl Lawson MD;  Location:  GI         FH:  family history includes Breast Cancer in his mother; Diabetes in his brother and father; Hypertension in his mother; Thyroid Disease in his sister.      SH:  Active Tobacco use    Allergies:  Allergies   Allergen Reactions     Penicillins Nausea and Vomiting     Zithromax [Azithromycin Dihydrate]      hives       Home Meds:  (Not in a hospital admission)      ROS: + as noted above      Physical Exam:  Pulse:  [86] 86  BP: (142)/(92) 142/92    Gen: NAD, appears older than stated age, poor dentition  Lungs: non-labored breathing  Abd: non distended  Ext: cool  Neuro: AOx3    Labs:  None today    Imaging:    ECHO    Interpretation Summary     Severe valvular aortic stenosis. There has been significant progression of the  aortic stenosis since 2017.  There is severe concentric left ventricular hypertrophy.  The visual ejection fraction is estimated at 65-70%.  Left ventricular systolic function is normal.  The ascending aorta is Mildly dilated.  _____________________________________________________________________________  __        Left Ventricle  The left ventricle is normal in size. There is severe concentric left  ventricular hypertrophy. Diastolic function not assessed due to significant  mitral annular calcification. The visual ejection fraction is estimated at 65-  70%. Left ventricular systolic function is normal.     Right Ventricle  The right ventricle is normal in size and function.     Atria  Normal left atrial size. Right atrial size is normal. There is no color  Doppler evidence of an atrial shunt.     Mitral Valve  There is mild to moderate mitral annular calcification. There is trace mitral  regurgitation.        Tricuspid Valve  There is trace tricuspid regurgitation. Right ventricular systolic pressure  could not be approximated due to inadequate tricuspid  regurgitation.     Aortic Valve  The aortic valve is not well visualized. Thickened aortic valve leaflets.  There is trace aortic regurgitation. Severe valvular aortic stenosis. The peak  AoV pressure gradient is 75.0 mmHg. The mean AoV pressure gradient is 50.3  mmHg. The calculated aortic valve are is 0.90 cm^2.     Pulmonic Valve  There is no pulmonic valvular regurgitation. Normal pulmonic valve velocity.     Vessels  Mild aortic root dilatation. The ascending aorta is Mildly dilated. The  inferior vena cava was normal in size with preserved respiratory variability.     Pericardium  There is no pericardial effusion.        Rhythm  Sinus rhythm was noted.        CAROTIDS    IMPRESSION:  1. Mild (<50%) bilateral internal carotid artery stenosis.     EXAM DESCRIPTION AND FINDINGS:  A bilateral carotid and vertebral artery ultrasound was performed  using duplex ultrasound imaging with spectral and color Doppler. There  is a minimal heterogeneous atherosclerotic plaque in the proximal  internal carotid arteries bilaterally. The flow velocities in the  right and left internal carotid artery are within normal range  indicating no significant stenosis (diameter stenosis less than 50%).  Normal antegrade vertebral flow bilaterally.     Right ICA peak systolic velocity (PSV) 81 cm/s  Right ICA end diastolic velocity (EDV) 38 cm/s  Right ICA/CCA peak systolic velocity ratio 0.9  Left ICA peak systolic velocity (PSV) 117  cm/s  Left ICA end diastolic velocity (EDV) 45 cm/s  Left ICA/CCA peak systolic velocity ratio  1.6      TAVR CTA    FINDINGS:     1.  Severely calcified trileafletaortic valve. Aortic valve calcium  score is 2854. The LVOT is not calcified. The ascending aorta is  mildly calcified, aortic arch is  mildly calcified, the descending  thoracic aorta is mildly calcified.   2.  The arch vessel branching pattern is normal.   3.  The suprarenal abdominal aorta is mildly calcified; infrarenal  abdominal aorta is  mildly calcified with moderate luminal  atherosclerosis.  4.  Widely patent origins of celiac trunk, superior mesenteric artery  and inferior mesenteric artery.  Single bilateral renal arteries with  widely patent origins.   5.  There is no acute aortic pathology, such as dissection, intramural  hematoma, or contained rupture.      MEASUREMENTS:   Representative dimensions of the thoracoabdominal aorta are as  follows:     1. AORTIC ANNULUS MEASUREMENTS:     1.  The average aortic annulus diameter is 26.3 mm, (long diameter is  29.2 mm and short diameter is 24.0 mm)  2.  Aortic annulus area is 5.46 cm2  3.  Aortic annulus perimeter is 84.6 mm  4.  Suggested 3-cusp coaxial angle for aortic valve is (Divehi 2 , CRA 6)  and alternate A-P coaxial angle is (Divehi 9 , CRA  15 ), these  angles  will vary depending upon the patient's body position  5.  Aortic root angle is 48.0  6.  Left main - Annulus distance: 17.6 mm, RCA - Annulus distance:  14.6 mm     2. LVOT MEASUREMENTS:     1.  The average LVOT diameter (measured 4 mm below annular plane) is  23.7 mm  2.  LVOT area is 4.44 cm2  3.  LVOT perimeter is 78.4 mm     3. AORTA MEASUREMENTS     1.  The aortic root at the sinuses of Valsalva (left cusp, right cusp,  non cusp): 34.5 mm x  33.4 mm x 35.2 mm  2.  The sinotubular junction:  30.6 mm x 30.2 mm  3.  Sinotubular junction height: 24.2 mm x 23.1 mm  4.  Proximal ascending aorta: 38.0 mm x 37.6 mm  5.  Distal abdominal aorta proximal to the bifurcation: 13.9 mm x 13.9  mm  6.  Innominate artery 3 cm from ostium: 14.2 mm x 11.9 mm  7.  Left common carotid artery 3 cm from ostium: 6.68 mm x 5.98 mm     4. ILIOFEMORAL MEASUREMENTS:     RIGHT:  1.  Right common iliac artery: 9.67 mm x 8.63 mm   2.  Right external iliac artery: 8.83 mm x 8.38 mm   3.  Right common femoral artery: 8.75 mm x 8.13 mm   4.  Tortuosity: mild   5.  Calcification: mild      LEFT:  1.  Left common iliac artery: 9.46 mm x  8.6 mm  2.  Left external  iliac artery: 8.55 mm x 8.0 mm  3.  Left common femoral artery: 8.23 mm x 7.66 mm  4.  Tortuosity: mild   5.  Calcification: mild      5. SUBCLAVIAN MEASUREMENTS:     LEFT:  1. Minimal luminal diameter: 10.5 mm x  10.1 mm  2. Tortuosity: mild   3. Calcification: mild

## 2019-12-23 NOTE — LETTER
12/23/2019    Axel Roberson [place]  [id number] [facility] [address]    RE: Benjamín Us       Dear Colleague,    I had the pleasure of seeing Benjamín Us in the [facility].    HPI and Plan:     MR. Us is a 55 yo male with hypertension, hyperlipidemia, nicotine dependency, diabetes and severe symptomatic aortic stenosis who is referred for aortic valve replacement. We've known about his aortic stenosis over the past several years. Echo from 2017 showed moderate aortic stenosis. Repeat echo last month showed progression of aortic stenosis which is now severe with a mean gradient of 50 mmHg and valve area of 0.9 cm2.     He developed fatigue and dyspnea over the past several months. No chest pain or syncope      IMPRESSION/PLAN    1. Severe symptomatic aortic stenosis  2. Diabetes  3. HTN  4. Nicotine dependency    Reviewed the patient's recent echocardiogram. He developed severe aortic stenosis over the past couple of years. He is now symptomatic, there fore AVR is indicated. Given his young age and low risk for surgery (STS 0.7%) would recommend surgical AVR with mechanical valve. If he declines mechanical AVR then tissue valve with surgery or TAVR is advised. He needs angiogram and carotid ultrasound to complete his work up.    I appreciate the opportunity to be part of his care    Delfino Belcher MD    No orders of the defined types were placed in this encounter.      No orders of the defined types were placed in this encounter.      There are no discontinued medications.      Encounter Diagnoses   Name Primary?     Aortic valve stenosis, etiology of cardiac valve disease unspecified Yes     Benign essential hypertension        CURRENT MEDICATIONS:  Current Outpatient Medications   Medication Sig Dispense Refill     amphetamine-dextroamphetamine (ADDERALL) 20 MG tablet 20 mg 3 times daily        carvedilol (COREG) 25 MG tablet Take 1 tablet (25 mg) by mouth 2 times  daily (with meals) 180 tablet 3     lisinopril (PRINIVIL/ZESTRIL) 40 MG tablet Take 1 tablet (40 mg) by mouth daily 90 tablet 3     hydrOXYzine (ATARAX) 25 MG tablet Take 1 tablet (25 mg) by mouth every 6 hours as needed for itching (Patient not taking: Reported on 12/23/2019) 60 tablet 1     predniSONE (DELTASONE) 10 MG tablet Take 1 tablet (10 mg) by mouth daily (Patient not taking: Reported on 12/23/2019) 10 tablet 0       ALLERGIES     Allergies   Allergen Reactions     Penicillins Nausea and Vomiting     Zithromax [Azithromycin Dihydrate]      hives       PAST MEDICAL HISTORY:  Past Medical History:   Diagnosis Date     ADD (attention deficit disorder)      Aneurysm of thoracic aorta (H) 5/21/2014     Problem list name updated by automated process. Provider to review     Aortic valve disorder 5/21/2014     CARDIOVASCULAR SCREENING; LDL GOAL LESS THAN 160 2/25/2014     Dermatitis 9/14/2018     Essential hypertension, benign 9/14/2018     Hypertension      Hypertensive urgency 8/8/2013     HARIS (obstructive sleep apnea)      Type 2 diabetes mellitus with diabetic dermatitis, without long-term current use of insulin (H) 9/14/2018       PAST SURGICAL HISTORY:  Past Surgical History:   Procedure Laterality Date     APPENDECTOMY       COLONOSCOPY N/A 6/15/2015    Procedure: COLONOSCOPY;  Surgeon: Vasyl Lawson MD;  Location:  GI       FAMILY HISTORY:  Family History   Problem Relation Age of Onset     Hypertension Mother      Breast Cancer Mother      Diabetes Father      Diabetes Brother      Thyroid Disease Sister      Colon Cancer No family hx of        SOCIAL HISTORY:  Social History     Socioeconomic History     Marital status:      Spouse name: None     Number of children: None     Years of education: None     Highest education level: None   Occupational History     None   Social Needs     Financial resource strain: None     Food insecurity:     Worry: None     Inability: None     Transportation  "needs:     Medical: None     Non-medical: None   Tobacco Use     Smoking status: Current Every Day Smoker     Packs/day: 0.50     Types: Cigarettes     Smokeless tobacco: Never Used     Tobacco comment: 5/2015 Two cigarettes per day    Substance and Sexual Activity     Alcohol use: No     Alcohol/week: 0.0 standard drinks     Drug use: No     Sexual activity: Yes     Partners: Female   Lifestyle     Physical activity:     Days per week: None     Minutes per session: None     Stress: None   Relationships     Social connections:     Talks on phone: None     Gets together: None     Attends [employer] service: None     Active member of club or organization: None     Attends meetings of clubs or organizations: None     Relationship status: None     Intimate partner violence:     Fear of current or ex partner: None     Emotionally abused: None     Physically abused: None     Forced sexual activity: None   Other Topics Concern     Parent/sibling w/ CABG, MI or angioplasty before 65F 55M? Not Asked      Service Not Asked     Blood Transfusions Not Asked     Caffeine Concern No     Occupational Exposure Not Asked     Hobby Hazards Not Asked     Sleep Concern No     Stress Concern Not Asked     Weight Concern No     Special Diet Not Asked     Back Care Not Asked     Exercise No     Bike Helmet Not Asked     Seat Belt Yes     Self-Exams Not Asked   Social History Narrative     None       Review of Systems:  Skin:  Negative       Eyes:  Positive for glasses    ENT:  Negative      Respiratory:  Positive for dyspnea on exertion     Cardiovascular:    Positive for;fatigue    Gastroenterology: Negative      Genitourinary:  Negative      Musculoskeletal:  Negative      Neurologic:  Negative      Psychiatric:  Negative      Heme/Lymph/Imm:  Positive for allergies    Endocrine:  Negative        Physical Exam:  Vitals: BP (!) 142/92   Pulse 86   Ht 1.778 m (5' 10\")   Wt 101.8 kg (224 lb 8 oz)   BMI 32.21 kg/m   "     Constitutional:  cooperative;in no acute distress        Skin:  warm and dry to the touch          Head:  normocephalic        Eyes:           Lymph:No Cervical lymphadenopathy present     ENT:  no pallor or cyanosis        Neck:  JVP normal;no carotid bruit        Respiratory:  clear to auscultation         Cardiac: regular rhythm       systolic ejection murmur;grade 3        pulses full and equal                                        GI:  abdomen soft;no masses;non-tender        Extremities and Muscular Skeletal:  no edema              Neurological:  no gross motor deficits        Psych:  Alert and Oriented x 3            Thank you for allowing me to participate in the care of your patient.    Sincerely,     Delfino Belcher MD, MD     [facility]

## 2019-12-30 ENCOUNTER — TELEPHONE (OUTPATIENT)
Dept: CARDIOLOGY | Facility: CLINIC | Age: 54
End: 2019-12-30

## 2019-12-30 RX ORDER — POTASSIUM CHLORIDE 1500 MG/1
20 TABLET, EXTENDED RELEASE ORAL
Status: CANCELLED | OUTPATIENT
Start: 2019-12-30

## 2019-12-30 RX ORDER — LIDOCAINE 40 MG/G
CREAM TOPICAL
Status: CANCELLED | OUTPATIENT
Start: 2019-12-30

## 2019-12-30 RX ORDER — SODIUM CHLORIDE 9 MG/ML
INJECTION, SOLUTION INTRAVENOUS CONTINUOUS
Status: CANCELLED | OUTPATIENT
Start: 2019-12-30

## 2019-12-30 NOTE — TELEPHONE ENCOUNTER
Called Benjamín in regards to their upcoming coronary angiogram. They know to be here at 0630 for a procedure time of 0830. He knows to be NPO after midnight and to take their am pills with a small sip of water.They will have a  present since they will be getting conscious sedation during the procedure. No further questions at this time.Masood Lucia RN

## 2020-01-03 ENCOUNTER — SURGERY (OUTPATIENT)
Age: 55
End: 2020-01-03
Payer: COMMERCIAL

## 2020-01-03 ENCOUNTER — HOSPITAL ENCOUNTER (OUTPATIENT)
Facility: CLINIC | Age: 55
Discharge: HOME | End: 2020-01-03
Admitting: INTERNAL MEDICINE
Payer: COMMERCIAL

## 2020-01-03 VITALS
BODY MASS INDEX: 31.78 KG/M2 | OXYGEN SATURATION: 96 % | RESPIRATION RATE: 16 BRPM | TEMPERATURE: 97 F | WEIGHT: 222 LBS | HEIGHT: 70 IN | HEART RATE: 72 BPM | DIASTOLIC BLOOD PRESSURE: 84 MMHG | SYSTOLIC BLOOD PRESSURE: 152 MMHG

## 2020-01-03 DIAGNOSIS — I35.0 AORTIC VALVE STENOSIS, ETIOLOGY OF CARDIAC VALVE DISEASE UNSPECIFIED: ICD-10-CM

## 2020-01-03 LAB
ANION GAP SERPL CALCULATED.3IONS-SCNC: 3 MMOL/L (ref 3–14)
APTT PPP: 34 SEC (ref 22–37)
BUN SERPL-MCNC: 14 MG/DL (ref 7–30)
CALCIUM SERPL-MCNC: 8.8 MG/DL (ref 8.5–10.1)
CHLORIDE SERPL-SCNC: 106 MMOL/L (ref 94–109)
CO2 SERPL-SCNC: 31 MMOL/L (ref 20–32)
CREAT SERPL-MCNC: 0.75 MG/DL (ref 0.66–1.25)
ERYTHROCYTE [DISTWIDTH] IN BLOOD BY AUTOMATED COUNT: 12.8 % (ref 10–15)
GFR SERPL CREATININE-BSD FRML MDRD: >90 ML/MIN/{1.73_M2}
GLUCOSE SERPL-MCNC: 225 MG/DL (ref 70–99)
HCT VFR BLD AUTO: 40.7 % (ref 40–53)
HGB BLD-MCNC: 14.5 G/DL (ref 13.3–17.7)
INR PPP: 0.97 (ref 0.86–1.14)
MCH RBC QN AUTO: 31.6 PG (ref 26.5–33)
MCHC RBC AUTO-ENTMCNC: 35.6 G/DL (ref 31.5–36.5)
MCV RBC AUTO: 89 FL (ref 78–100)
PLATELET # BLD AUTO: 288 10E9/L (ref 150–450)
POTASSIUM SERPL-SCNC: 3.7 MMOL/L (ref 3.4–5.3)
RBC # BLD AUTO: 4.59 10E12/L (ref 4.4–5.9)
SODIUM SERPL-SCNC: 140 MMOL/L (ref 133–144)
WBC # BLD AUTO: 11.8 10E9/L (ref 4–11)

## 2020-01-03 PROCEDURE — 85610 PROTHROMBIN TIME: CPT

## 2020-01-03 PROCEDURE — 40000852 ZZH STATISTIC HEART CATH LAB OR EP LAB

## 2020-01-03 PROCEDURE — 25000128 H RX IP 250 OP 636: Performed by: INTERNAL MEDICINE

## 2020-01-03 PROCEDURE — 27210794 ZZH OR GENERAL SUPPLY STERILE: Performed by: INTERNAL MEDICINE

## 2020-01-03 PROCEDURE — 25000125 ZZHC RX 250: Performed by: INTERNAL MEDICINE

## 2020-01-03 PROCEDURE — 93005 ELECTROCARDIOGRAM TRACING: CPT

## 2020-01-03 PROCEDURE — 80048 BASIC METABOLIC PNL TOTAL CA: CPT

## 2020-01-03 PROCEDURE — C1769 GUIDE WIRE: HCPCS | Performed by: INTERNAL MEDICINE

## 2020-01-03 PROCEDURE — 99152 MOD SED SAME PHYS/QHP 5/>YRS: CPT | Performed by: INTERNAL MEDICINE

## 2020-01-03 PROCEDURE — 85027 COMPLETE CBC AUTOMATED: CPT

## 2020-01-03 PROCEDURE — 93454 CORONARY ARTERY ANGIO S&I: CPT | Performed by: INTERNAL MEDICINE

## 2020-01-03 PROCEDURE — 93454 CORONARY ARTERY ANGIO S&I: CPT | Mod: 26 | Performed by: INTERNAL MEDICINE

## 2020-01-03 PROCEDURE — C1894 INTRO/SHEATH, NON-LASER: HCPCS | Performed by: INTERNAL MEDICINE

## 2020-01-03 PROCEDURE — C1725 CATH, TRANSLUMIN NON-LASER: HCPCS | Performed by: INTERNAL MEDICINE

## 2020-01-03 PROCEDURE — 36415 COLL VENOUS BLD VENIPUNCTURE: CPT

## 2020-01-03 PROCEDURE — 25000128 H RX IP 250 OP 636

## 2020-01-03 PROCEDURE — 85730 THROMBOPLASTIN TIME PARTIAL: CPT

## 2020-01-03 PROCEDURE — 40000065 ZZH STATISTIC EKG NON-CHARGEABLE

## 2020-01-03 PROCEDURE — 25800030 ZZH RX IP 258 OP 636: Performed by: INTERNAL MEDICINE

## 2020-01-03 PROCEDURE — 93010 ELECTROCARDIOGRAM REPORT: CPT | Performed by: INTERNAL MEDICINE

## 2020-01-03 PROCEDURE — 40000235 ZZH STATISTIC TELEMETRY

## 2020-01-03 RX ORDER — HEPARIN SODIUM 1000 [USP'U]/ML
INJECTION, SOLUTION INTRAVENOUS; SUBCUTANEOUS
Status: DISCONTINUED | OUTPATIENT
Start: 2020-01-03 | End: 2020-01-03 | Stop reason: HOSPADM

## 2020-01-03 RX ORDER — NITROGLYCERIN 5 MG/ML
VIAL (ML) INTRAVENOUS
Status: DISCONTINUED | OUTPATIENT
Start: 2020-01-03 | End: 2020-01-03 | Stop reason: HOSPADM

## 2020-01-03 RX ORDER — LIDOCAINE 40 MG/G
CREAM TOPICAL
Status: DISCONTINUED | OUTPATIENT
Start: 2020-01-03 | End: 2020-01-03 | Stop reason: HOSPADM

## 2020-01-03 RX ORDER — SODIUM CHLORIDE 9 MG/ML
INJECTION, SOLUTION INTRAVENOUS CONTINUOUS
Status: DISCONTINUED | OUTPATIENT
Start: 2020-01-03 | End: 2020-01-03 | Stop reason: HOSPADM

## 2020-01-03 RX ORDER — FLUMAZENIL 0.1 MG/ML
0.2 INJECTION, SOLUTION INTRAVENOUS
Status: DISCONTINUED | OUTPATIENT
Start: 2020-01-03 | End: 2020-01-03 | Stop reason: HOSPADM

## 2020-01-03 RX ORDER — NALOXONE HYDROCHLORIDE 0.4 MG/ML
.2-.4 INJECTION, SOLUTION INTRAMUSCULAR; INTRAVENOUS; SUBCUTANEOUS
Status: DISCONTINUED | OUTPATIENT
Start: 2020-01-03 | End: 2020-01-03 | Stop reason: HOSPADM

## 2020-01-03 RX ORDER — NALOXONE HYDROCHLORIDE 0.4 MG/ML
.1-.4 INJECTION, SOLUTION INTRAMUSCULAR; INTRAVENOUS; SUBCUTANEOUS
Status: DISCONTINUED | OUTPATIENT
Start: 2020-01-03 | End: 2020-01-03 | Stop reason: HOSPADM

## 2020-01-03 RX ORDER — ACETAMINOPHEN 325 MG/1
650 TABLET ORAL EVERY 4 HOURS PRN
Status: DISCONTINUED | OUTPATIENT
Start: 2020-01-03 | End: 2020-01-03 | Stop reason: HOSPADM

## 2020-01-03 RX ORDER — POTASSIUM CHLORIDE 1500 MG/1
20 TABLET, EXTENDED RELEASE ORAL
Status: DISCONTINUED | OUTPATIENT
Start: 2020-01-03 | End: 2020-01-03 | Stop reason: HOSPADM

## 2020-01-03 RX ORDER — FENTANYL CITRATE 50 UG/ML
INJECTION, SOLUTION INTRAMUSCULAR; INTRAVENOUS
Status: DISCONTINUED | OUTPATIENT
Start: 2020-01-03 | End: 2020-01-03 | Stop reason: HOSPADM

## 2020-01-03 RX ORDER — VERAPAMIL HYDROCHLORIDE 2.5 MG/ML
INJECTION, SOLUTION INTRAVENOUS
Status: DISCONTINUED | OUTPATIENT
Start: 2020-01-03 | End: 2020-01-03 | Stop reason: HOSPADM

## 2020-01-03 RX ORDER — FENTANYL CITRATE 50 UG/ML
25-50 INJECTION, SOLUTION INTRAMUSCULAR; INTRAVENOUS
Status: DISCONTINUED | OUTPATIENT
Start: 2020-01-03 | End: 2020-01-03 | Stop reason: HOSPADM

## 2020-01-03 RX ORDER — ATROPINE SULFATE 0.1 MG/ML
0.5 INJECTION INTRAVENOUS EVERY 5 MIN PRN
Status: DISCONTINUED | OUTPATIENT
Start: 2020-01-03 | End: 2020-01-03 | Stop reason: HOSPADM

## 2020-01-03 RX ADMIN — VERAPAMIL HYDROCHLORIDE 2.5 MG: 2.5 INJECTION, SOLUTION INTRAVENOUS at 08:49

## 2020-01-03 RX ADMIN — HEPARIN SODIUM 5000 UNITS: 1000 INJECTION, SOLUTION INTRAVENOUS; SUBCUTANEOUS at 08:59

## 2020-01-03 RX ADMIN — MIDAZOLAM 1 MG: 1 INJECTION INTRAMUSCULAR; INTRAVENOUS at 08:45

## 2020-01-03 RX ADMIN — FENTANYL CITRATE 50 MCG: 50 INJECTION, SOLUTION INTRAMUSCULAR; INTRAVENOUS at 08:36

## 2020-01-03 RX ADMIN — NITROGLYCERIN 200 MCG: 5 INJECTION, SOLUTION INTRAVENOUS at 08:49

## 2020-01-03 RX ADMIN — MIDAZOLAM 1 MG: 1 INJECTION INTRAMUSCULAR; INTRAVENOUS at 08:36

## 2020-01-03 RX ADMIN — SODIUM CHLORIDE: 9 INJECTION, SOLUTION INTRAVENOUS at 07:17

## 2020-01-03 RX ADMIN — LIDOCAINE HYDROCHLORIDE 2 ML: 10 INJECTION, SOLUTION EPIDURAL; INFILTRATION; INTRACAUDAL; PERINEURAL at 08:47

## 2020-01-03 RX ADMIN — FENTANYL CITRATE 50 MCG: 50 INJECTION, SOLUTION INTRAMUSCULAR; INTRAVENOUS at 08:45

## 2020-01-03 ASSESSMENT — MIFFLIN-ST. JEOR: SCORE: 1843.24

## 2020-01-03 NOTE — PROGRESS NOTES
Care Suites Post-Procedure Note    Procedure: post heart cath  CS arrival time: 0914  Accompanied by: noreen LIMA  Concerns/abnormal assessment after procedure: TR band to right wrist with 15 ml of air. No bleeding/swelling at site. VSS Denies pain. Taking juice. Instructed on activity restrictions. Wife at bedside.  Plan: Continue to monitor. Discharge later.

## 2020-01-03 NOTE — PRE-PROCEDURE
GENERAL PRE-PROCEDURE:   Procedure:  Heart cath  Date/Time:  1/3/2020 8:34 AM    Written consent obtained?: Yes    Risks and benefits: Risks, benefits and alternatives were discussed    DC Plan: Appropriate discharge home plan in place for patients who are going home after procedure   Consent given by:  Patient  Patient states understanding of procedure being performed: Yes    Patient's understanding of procedure matches consent: Yes    Procedure consent matches procedure scheduled: Yes    Expected level of sedation:  Moderate  Appropriately NPO:  Yes  ASA Class:  Class 3- Severe systemic disease, definite functional limitations  Mallampati  :  Grade 3- soft palate visible, posterior pharyngeal wall not visible  Lungs:  Lungs clear with good breath sounds bilaterally  Heart:  Normal heart sounds and rate  History & Physical reviewed:  History and physical reviewed and no updates needed  Statement of review:  I have reviewed the lab findings, diagnostic data, medications, and the plan for sedation

## 2020-01-03 NOTE — PROGRESS NOTES
Care Suites Admission Nursing Note    Reason for admission:heart cath  CS arrival time: 0645  Accompanied by: wife  Name/phone of DC : [email or web address]/[phone]  Medications held: NA  Consent signed: pending   Abnormal assessment/labs: pending   If abnormal, provider notified:pending   Education/questions answered: yes  Plan: heart cath

## 2020-01-03 NOTE — PROGRESS NOTES
Care Suites Discharge Nursing Note    Education/questions answered: yes  Patient DC location: care suite  Accompanied by: wife  CS discharge time: 1230

## 2020-01-03 NOTE — DISCHARGE INSTRUCTIONS
Cardiac Angiogram Discharge Instructions - Radial    After you go home:      Have an adult stay with you until tomorrow.    Drink extra fluids for 2 days.    You may resume your normal diet.    No smoking       For 24 hours - due to the sedation you received:    Relax and take it easy.    Do NOT make any important or legal decisions.    Do NOT drive or operate machines at home or at work.    Do NOT drink alcohol.    Care of Wrist Puncture Site:      For the first 24 hrs - check the puncture site every 1-2 hours while awake.    It is normal to have soreness at the puncture site and mild tingling in your hand for up to 3 days.    Remove the bandaid after 24 hours. If there is minor oozing, apply another bandaid and remove it after 12 hours.    You may shower tomorrow.  Do NOT take a bath, or use a hot tub or pool for at least 3 days. Do NOT scrub the site. Do not use lotion or powder near the puncture site.           Activity:        For 2 days:     do not use your hand or arm to support your weight (such as rising from a chair)     do not bend your wrist (such as lifting a garage door).    do not lift more than 5 pounds or exercise your arm (such as tennis, golf or bowling).    Do NOT do any heavy activity such as exercise, lifting, or straining.     Bleeding:      If you start bleeding from the site in your wrist, sit down and press firmly on/above the site for 10 minutes.     Once bleeding stops, keep arm still for 2 hours.     Call [facility] as soon as you can.       Call 911 right away if you have heavy bleeding or bleeding that does not stop.      Medicines:      If you are taking an antiplatelet medication such as Plavix, Brilinta or Effient, do not stop taking it until you talk to your cardiologist.        If you are on Metformin (Glucophage), do not restart it until you have blood tests (within 2 to 3 days after discharge).  After you have your blood drawn, you may restart the Metformin.     Take your  medications, including blood thinners, unless your provider tells you not to.  If you take Coumadin (Warfarin), have your INR checked by your provider in  3-5 days. Call your clinic to schedule this.    If you have stopped any medicines, check with your provider about when to restart them.    Follow Up Appointments:      Follow up with [facility] Nurse Practitioner at [facility] of patient preference in 7-10 days.    Call the clinic if:      You have a large or growing hard lump around the site.    The site is red, swollen, hot or tender.    Blood or fluid is draining from the site.    You have chills or a fever greater than 101 F (38 C).    Your arm feels numb, cool or changes color.    You have hives, a rash or unusual itching.    Any questions or concerns.          [facility] [place]:    [phone] [facility] (7 days a week)

## 2020-01-07 LAB — INTERPRETATION ECG - MUSE: NORMAL

## 2020-01-08 ENCOUNTER — TELEPHONE (OUTPATIENT)
Dept: CARDIOLOGY | Facility: CLINIC | Age: 55
End: 2020-01-08

## 2020-01-08 NOTE — TELEPHONE ENCOUNTER
Per task, pt needs to schedule a appt with a CV surgeon. LM asking pt to call back to schedule. Will try again later

## 2020-01-10 ENCOUNTER — DOCUMENTATION ONLY (OUTPATIENT)
Dept: CARDIOLOGY | Facility: CLINIC | Age: 55
End: 2020-01-10

## 2020-01-10 ENCOUNTER — OFFICE VISIT (OUTPATIENT)
Dept: CARDIOLOGY | Facility: CLINIC | Age: 55
End: 2020-01-10
Payer: COMMERCIAL

## 2020-01-10 VITALS
DIASTOLIC BLOOD PRESSURE: 88 MMHG | HEIGHT: 70 IN | WEIGHT: 224 LBS | OXYGEN SATURATION: 98 % | HEART RATE: 94 BPM | BODY MASS INDEX: 32.07 KG/M2 | SYSTOLIC BLOOD PRESSURE: 150 MMHG

## 2020-01-10 DIAGNOSIS — I25.10 CORONARY ARTERY DISEASE INVOLVING NATIVE CORONARY ARTERY OF NATIVE HEART WITHOUT ANGINA PECTORIS: ICD-10-CM

## 2020-01-10 DIAGNOSIS — I35.0 AORTIC VALVE STENOSIS, ETIOLOGY OF CARDIAC VALVE DISEASE UNSPECIFIED: Primary | ICD-10-CM

## 2020-01-10 DIAGNOSIS — I10 ESSENTIAL HYPERTENSION, BENIGN: ICD-10-CM

## 2020-01-10 DIAGNOSIS — E11.620 TYPE 2 DIABETES MELLITUS WITH DIABETIC DERMATITIS, WITHOUT LONG-TERM CURRENT USE OF INSULIN (H): ICD-10-CM

## 2020-01-10 DIAGNOSIS — G47.33 OBSTRUCTIVE SLEEP APNEA SYNDROME: ICD-10-CM

## 2020-01-10 DIAGNOSIS — Z72.0 TOBACCO ABUSE: ICD-10-CM

## 2020-01-10 PROCEDURE — 99214 OFFICE O/P EST MOD 30 MIN: CPT | Performed by: NURSE PRACTITIONER

## 2020-01-10 RX ORDER — ATORVASTATIN CALCIUM 40 MG/1
40 TABLET, FILM COATED ORAL DAILY
Qty: 30 TABLET | Refills: 3 | Status: SHIPPED | OUTPATIENT
Start: 2020-01-10 | End: 2020-05-14

## 2020-01-10 RX ORDER — VARENICLINE TARTRATE 1 MG/1
1 TABLET, FILM COATED ORAL 2 TIMES DAILY
COMMUNITY
End: 2021-01-05

## 2020-01-10 ASSESSMENT — MIFFLIN-ST. JEOR: SCORE: 1852.31

## 2020-01-10 NOTE — LETTER
1/10/2020    Axel Roberson MD  [id number]  [facility] [address]    RE: Benjamín Us       Dear Colleague,    I had the pleasure of seeing Benjamín Us in the [facility].    Cardiology Clinic Progress Note  Benjamín Us MRN# [id number]   YOB: 1965 Age: 54 year old     Reason For Visit:  Follow up post angiogram   Primary Cardiologist:   Dr. Downs          History of Presenting Illness:      Benjamín Us is a pleasant 54 year old patient who carries a past medical history significant for aortic stenosis, hypertension, hyperlipidemia, Type II diabetes, tobacco abuse, ascending aorta dilatation and HARIS without CPAP use.     In October 2019, he underwent a follow up echocardiogram that demonstrated severe aortic stenosis with a mean AoV pressure gradient of 50 mmHg and MARIEL of 0.9 cm2. He was evaluated in our structural heart clinic on 12/23/19. Given his young age and low risk of surgery ( STS 0.7%), he was recommended for surgical AVR with mechanical valve. He subsequently underwent a coronary angiogram that demonstrated significant single vessel coronanry disease involving the mid LAD and diagonal. Recommended for LAD/Diagonal grafting at the time of valve surgery. Carotid US showed < 50% bilateral internal carotid artery stenosis. He presents to the office today accompanied by his wife for follow up.      He is feeling well on a cardiac standpoint, denies chest pain, shortness of breath, PND, orthopnea, presyncope, syncope, edema, heart racing, or palpitations.     Blood pressure is elevated at 150/88  BMP showed a sodium of 140, potassium 3.7, BUN 14, creatinine 0.75, GFR greater than 90  Hemoglobin 14.5 and platelet 288  BMI 32.14    He does not engage in any routine exercise  Does not follow any strict diet.  He denies having diabetes although last A1c was 7.2 (2016)  Unable to tolerate CPAP due to allergies    He continues to smoke 1 ppd,  currently using Chantix, does not use alcohol.   Remains compliant with all medications.                   Assessment and Plan:       1.  Severe aortic stenosis -recent echocardiogram demonstrated severe aortic stenosis with a mean AOV pressure gradient of 50 mmHg and aortic valve area of 0.9 cm .  Due to his age and low risk for surgery, he is recommended for surgical AVR with a mechanical valve.  Follow-up with CV surgery for further management.    2.  Coronary artery disease -coronary angiogram demonstrated significant single-vessel coronary disease involving the mid LAD and diagonal.  Recommended for LAD/diagonal grafting at the time of valve surgery.  I have asked that he start low-dose aspirin 81 mg daily and atorvastatin 40 mg daily.  Follow-up fasting lipid panel in 8 weeks.  Continue with carvedilol and metoprolol.     3.  Hypertension -mildly elevated today at 150/88, which he attributes to whitecoat syndrome.  I have asked he monitor his blood pressure at home with a goal of less than 130/90.  Continue on lisinopril and carvedilol.  Strict low-sodium diet    4.  Obstructive sleep apnea -intolerant to CPAP  5.  Tobacco abuse -encourage smoking cessation.  Currently on Chantix   6.  Type 2 diabetes -refer to primary care physician for management             Thank you for allowing me to participate in this delightful patient's care.        Allie Soto, JACKSON CNP         Review of Systems:     Review of Systems:  Skin:  Negative     Eyes:  Positive for glasses  ENT:  Positive for    Respiratory:  Positive for sleep apnea  Cardiovascular:  Negative    Gastroenterology: Negative    Genitourinary:  Positive for urinary frequency;prostate problem  Musculoskeletal:  Negative    Neurologic:  Negative    Psychiatric:  Negative    Heme/Lymph/Imm:  Negative    Endocrine:  Positive for                Physical Exam:     GEN:  In general, this is a overweight male in no acute distress.  HEENT:  Pupils equal, round.  Sclerae nonicteric. Clear oropharynx. Mucous membranes moist.  NECK: Supple, no masses appreciated. Trachea midline.  No JVD   C/V:  Regular rate and rhythm, systolic murmur, rub or gallop. No S3 or RV heave.   RESP: Respirations are unlabored. No use of accessory muscles. Clear to auscultation bilaterally without wheezing, rales, or rhonchi.  GI: Abdomen soft, nontender, nondistended. No HSM appreciated.   EXTREM: No LE edema. No cyanosis or clubbing.  NEURO: Alert and oriented, cooperative. No obvious focal deficits.   PSYCH: Normal affect.  SKIN: Warm and dry. No rashes or petechiae appreciated.          Past Medical History:     Past Medical History:   Diagnosis Date     ADD (attention deficit disorder)      Aneurysm of thoracic aorta (H) 5/21/2014     Problem list name updated by automated process. Provider to review     Aortic valve disorder 5/21/2014     CARDIOVASCULAR SCREENING; LDL GOAL LESS THAN 160 2/25/2014     Dermatitis 9/14/2018     Essential hypertension, benign 9/14/2018     Hypertension      Hypertensive urgency 8/8/2013     HARIS (obstructive sleep apnea)      Type 2 diabetes mellitus with diabetic dermatitis, without long-term current use of insulin (H) 9/14/2018              Past Surgical History:     Past Surgical History:   Procedure Laterality Date     APPENDECTOMY       COLONOSCOPY N/A 6/15/2015    Procedure: COLONOSCOPY;  Surgeon: Vasyl Lawson MD;  Location:  GI     CV CORONARY ANGIOGRAM N/A 1/3/2020    Procedure: Coronary Angiogram;  Surgeon: Álvaro Andrade MD;  Location:  HEART CARDIAC CATH LAB              Allergies:   Penicillins and Zithromax [azithromycin dihydrate]       Data:   All laboratory data reviewed:    LAST CHOLESTEROL:  Lab Results   Component Value Date    CHOL 198 03/25/2014     Lab Results   Component Value Date    HDL 37 03/25/2014     Lab Results   Component Value Date     03/25/2016     03/25/2014     Lab Results   Component Value Date     TRIG 129 03/25/2014     Lab Results   Component Value Date    CHOLHDLRATIO 5.3 03/25/2014       LAST BMP:  Lab Results   Component Value Date     01/03/2020      Lab Results   Component Value Date    POTASSIUM 3.7 01/03/2020     Lab Results   Component Value Date    CHLORIDE 106 01/03/2020     Lab Results   Component Value Date    ARMIN 8.8 01/03/2020     Lab Results   Component Value Date    CO2 31 01/03/2020     Lab Results   Component Value Date    BUN 14 01/03/2020     Lab Results   Component Value Date    CR 0.75 01/03/2020     Lab Results   Component Value Date     01/03/2020       LAST CBC:  Lab Results   Component Value Date    WBC 11.8 01/03/2020     Lab Results   Component Value Date    RBC 4.59 01/03/2020     Lab Results   Component Value Date    HGB 14.5 01/03/2020     Lab Results   Component Value Date    HCT 40.7 01/03/2020     Lab Results   Component Value Date    MCV 89 01/03/2020     Lab Results   Component Value Date    MCH 31.6 01/03/2020     Lab Results   Component Value Date    MCHC 35.6 01/03/2020     Lab Results   Component Value Date    RDW 12.8 01/03/2020     Lab Results   Component Value Date     01/03/2020         Thank you for allowing me to participate in the care of your patient.    Sincerely,     JACKSON Obrien [facility]

## 2020-01-10 NOTE — PATIENT INSTRUCTIONS
Thanks for coming into [facility] Heart clinic today.    Doing well on a cardiac standpoint  Reviewed results of angiogram with need for single bypass at the time of valve surgery.   Start low dose aspirin 81mg daily  Start 40 mg atorvastatin, follow-up fasting lipid panel in 8 weeks  Blood pressure mildly elevated, continue on carvedilol and lisinopril  Recommend monitoring blood pressures daily with a goal of less than 130/90.  Follow up with Cardiovascular surgery for further recommendations regarding surgical intervention.          Please call my nurse at [phone] with any questions or concerns.    Scheduling phone number: [phone]  Reminder: Please bring in all current medications, over the counter supplements and vitamin bottles to your next appointment.

## 2020-01-10 NOTE — PROGRESS NOTES
Attempted to call pt with recommendations from Allie Soto NP, left message for pt to call back. Bobo LIMA

## 2020-01-10 NOTE — PROGRESS NOTES
Patient was seen in office today s/p angiogram in preparation for valve surgery.   He was found to have LAD/Diagonal disease. He has had no recent lipids evaluated.  Recommend he start atorvastatin 40mg daily with follow up FLP in 8 weeks.   Please contact patient with the above recommendations.

## 2020-01-10 NOTE — PROGRESS NOTES
Cardiology Clinic Progress Note  Benjamín Us MRN# [id number]   YOB: 1965 Age: 54 year old     Reason For Visit:  Follow up post angiogram   Primary Cardiologist:   Dr. Downs          History of Presenting Illness:      Benjamín Us is a pleasant 54 year old patient who carries a past medical history significant for aortic stenosis, hypertension, hyperlipidemia, Type II diabetes, tobacco abuse, ascending aorta dilatation and HARIS without CPAP use.     In October 2019, he underwent a follow up echocardiogram that demonstrated severe aortic stenosis with a mean AoV pressure gradient of 50 mmHg and MARIEL of 0.9 cm2. He was evaluated in our structural heart clinic on 12/23/19. Given his young age and low risk of surgery ( STS 0.7%), he was recommended for surgical AVR with mechanical valve. He subsequently underwent a coronary angiogram that demonstrated significant single vessel coronanry disease involving the mid LAD and diagonal. Recommended for LAD/Diagonal grafting at the time of valve surgery. Carotid US showed < 50% bilateral internal carotid artery stenosis. He presents to the office today accompanied by his wife for follow up.      He is feeling well on a cardiac standpoint, denies chest pain, shortness of breath, PND, orthopnea, presyncope, syncope, edema, heart racing, or palpitations.     Blood pressure is elevated at 150/88  BMP showed a sodium of 140, potassium 3.7, BUN 14, creatinine 0.75, GFR greater than 90  Hemoglobin 14.5 and platelet 288  BMI 32.14    He does not engage in any routine exercise  Does not follow any strict diet.  He denies having diabetes although last A1c was 7.2 (2016)  Unable to tolerate CPAP due to allergies    He continues to smoke 1 ppd, currently using Chantix, does not use alcohol.   Remains compliant with all medications.                   Assessment and Plan:       1.  Severe aortic stenosis -recent echocardiogram demonstrated severe aortic stenosis with a mean  AOV pressure gradient of 50 mmHg and aortic valve area of 0.9 cm .  Due to his age and low risk for surgery, he is recommended for surgical AVR with a mechanical valve.  Follow-up with CV surgery for further management.    2.  Coronary artery disease -coronary angiogram demonstrated significant single-vessel coronary disease involving the mid LAD and diagonal.  Recommended for LAD/diagonal grafting at the time of valve surgery.  I have asked that he start low-dose aspirin 81 mg daily and atorvastatin 40 mg daily.  Follow-up fasting lipid panel in 8 weeks.  Continue with carvedilol and metoprolol.     3.  Hypertension -mildly elevated today at 150/88, which he attributes to whitecoat syndrome.  I have asked he monitor his blood pressure at home with a goal of less than 130/90.  Continue on lisinopril and carvedilol.  Strict low-sodium diet    4.  Obstructive sleep apnea -intolerant to CPAP  5.  Tobacco abuse -encourage smoking cessation.  Currently on Chantix   6.  Type 2 diabetes -refer to primary care physician for management             Thank you for allowing me to participate in this delightful patient's care.        Allie Soto, JACKSON CNP         Review of Systems:     Review of Systems:  Skin:  Negative     Eyes:  Positive for glasses  ENT:  Positive for    Respiratory:  Positive for sleep apnea  Cardiovascular:  Negative    Gastroenterology: Negative    Genitourinary:  Positive for urinary frequency;prostate problem  Musculoskeletal:  Negative    Neurologic:  Negative    Psychiatric:  Negative    Heme/Lymph/Imm:  Negative    Endocrine:  Positive for                Physical Exam:     GEN:  In general, this is a overweight male in no acute distress.  HEENT:  Pupils equal, round. Sclerae nonicteric. Clear oropharynx. Mucous membranes moist.  NECK: Supple, no masses appreciated. Trachea midline.  No JVD   C/V:  Regular rate and rhythm, systolic murmur, rub or gallop. No S3 or RV heave.   RESP: Respirations are  unlabored. No use of accessory muscles. Clear to auscultation bilaterally without wheezing, rales, or rhonchi.  GI: Abdomen soft, nontender, nondistended. No HSM appreciated.   EXTREM: No LE edema. No cyanosis or clubbing.  NEURO: Alert and oriented, cooperative. No obvious focal deficits.   PSYCH: Normal affect.  SKIN: Warm and dry. No rashes or petechiae appreciated.          Past Medical History:     Past Medical History:   Diagnosis Date     ADD (attention deficit disorder)      Aneurysm of thoracic aorta (H) 5/21/2014     Problem list name updated by automated process. Provider to review     Aortic valve disorder 5/21/2014     CARDIOVASCULAR SCREENING; LDL GOAL LESS THAN 160 2/25/2014     Dermatitis 9/14/2018     Essential hypertension, benign 9/14/2018     Hypertension      Hypertensive urgency 8/8/2013     HARIS (obstructive sleep apnea)      Type 2 diabetes mellitus with diabetic dermatitis, without long-term current use of insulin (H) 9/14/2018              Past Surgical History:     Past Surgical History:   Procedure Laterality Date     APPENDECTOMY       COLONOSCOPY N/A 6/15/2015    Procedure: COLONOSCOPY;  Surgeon: Vasyl Lawson MD;  Location:  GI     CV CORONARY ANGIOGRAM N/A 1/3/2020    Procedure: Coronary Angiogram;  Surgeon: Álvaro Andrade MD;  Location:  HEART CARDIAC CATH LAB              Allergies:   Penicillins and Zithromax [azithromycin dihydrate]       Data:   All laboratory data reviewed:    LAST CHOLESTEROL:  Lab Results   Component Value Date    CHOL 198 03/25/2014     Lab Results   Component Value Date    HDL 37 03/25/2014     Lab Results   Component Value Date     03/25/2016     03/25/2014     Lab Results   Component Value Date    TRIG 129 03/25/2014     Lab Results   Component Value Date    CHOLHDLRATIO 5.3 03/25/2014       LAST BMP:  Lab Results   Component Value Date     01/03/2020      Lab Results   Component Value Date    POTASSIUM 3.7 01/03/2020      Lab Results   Component Value Date    CHLORIDE 106 01/03/2020     Lab Results   Component Value Date    ARMIN 8.8 01/03/2020     Lab Results   Component Value Date    CO2 31 01/03/2020     Lab Results   Component Value Date    BUN 14 01/03/2020     Lab Results   Component Value Date    CR 0.75 01/03/2020     Lab Results   Component Value Date     01/03/2020       LAST CBC:  Lab Results   Component Value Date    WBC 11.8 01/03/2020     Lab Results   Component Value Date    RBC 4.59 01/03/2020     Lab Results   Component Value Date    HGB 14.5 01/03/2020     Lab Results   Component Value Date    HCT 40.7 01/03/2020     Lab Results   Component Value Date    MCV 89 01/03/2020     Lab Results   Component Value Date    MCH 31.6 01/03/2020     Lab Results   Component Value Date    MCHC 35.6 01/03/2020     Lab Results   Component Value Date    RDW 12.8 01/03/2020     Lab Results   Component Value Date     01/03/2020

## 2020-01-13 ENCOUNTER — TELEPHONE (OUTPATIENT)
Dept: OTHER | Facility: CLINIC | Age: 55
End: 2020-01-13

## 2020-01-13 NOTE — TELEPHONE ENCOUNTER
Patient called regarding scheduling AVR/CABG with DR Bustillo. States he is needing to delay a few months to finish his dental work. Contact number given for patient to call when ready schedule. Instructed patient on symptoms of Aortic Stenosis/CAD and encouraged to call or go to ED if starts to become symptomatic. Patient states understanding. Will follow along

## 2020-01-21 NOTE — PROGRESS NOTES
Called pt with recommendations from Allie Soto NP to start atorvastatin 40 mg daily & recheck FLP/ALT in 8 weeks. Orders in chart & will message scheduling to call pt to arrange. Bobo LIMA

## 2020-01-30 ENCOUNTER — TELEPHONE (OUTPATIENT)
Dept: FAMILY MEDICINE | Facility: CLINIC | Age: 55
End: 2020-01-30

## 2020-01-30 NOTE — TELEPHONE ENCOUNTER
Panel Management Review      Patient has the following on his problem list:     **DIABETES, HTN, ASTHMA, COPD, DEPRESSION, IVD, CHLAMYDIA, ADHD, CHILDHOOD IMMUNIZATIONS**    Composite cancer screening  Chart review shows that this patient is due/due soon for the following None  Summary:    Patient is due/failing the following:   BP CHECK    Action needed:   Patient needs nurse only appointment.    Type of outreach:    Phone, left message for patient to call back.     Questions for provider review:    None                                                                                                                                    Nereyda Kapadia CMA      Chart routed to Care Team .

## 2020-04-24 ENCOUNTER — TELEPHONE (OUTPATIENT)
Dept: OTHER | Facility: CLINIC | Age: 55
End: 2020-04-24

## 2020-04-24 NOTE — TELEPHONE ENCOUNTER
Patient called stating he is becoming more symptomatic with his Aortic Stenosis/ CAD. Increasing shortness of breath and chest tightness on exertion. Needs multiple teeth extractions and dentures prior to surgery. Dental referral given and asked him to call Dr Downs office for follow up. Last seen 1/2020 and last echo 10/19. Patient states understanding of plan. Will follow along.

## 2020-04-29 ENCOUNTER — TELEPHONE (OUTPATIENT)
Dept: OTHER | Facility: CLINIC | Age: 55
End: 2020-04-29

## 2020-04-29 NOTE — TELEPHONE ENCOUNTER
Patient requiring AVR/CABG but also ahs multiple teeth that need to be extracted with done filling placed prior to dentures. Due to Covid 19 he is having trouble finding dental care. He is becoming more symptomatic with SOB and chest pain on exertion. [facility] given for [facility]. Message left to return call to me to help facilitate his care. Will also discuss with Dr Rose.  Follow up visit with Dr Downs arranged for 5/15.

## 2020-04-30 ENCOUNTER — TELEPHONE (OUTPATIENT)
Dept: OTHER | Facility: CLINIC | Age: 55
End: 2020-04-30

## 2020-04-30 NOTE — TELEPHONE ENCOUNTER
Oral surgeon called this am. Discussed patient's needs. Unfortunately they are not doing elective surgery during Covid 19 restrictions in place but would be able to consult if patient admitted to hospital for instability. Dr Downs appt 5/13. Appt made with Dr. Rose for 5/18. Instructed to go to ED if CP unrelieve or increasing shortness of breath or dizziness. Patient states understanding of plan.

## 2020-05-13 ENCOUNTER — PREP FOR PROCEDURE (OUTPATIENT)
Dept: CARDIOLOGY | Facility: CLINIC | Age: 55
End: 2020-05-13

## 2020-05-13 ENCOUNTER — TELEPHONE (OUTPATIENT)
Dept: OTHER | Facility: CLINIC | Age: 55
End: 2020-05-13

## 2020-05-13 ENCOUNTER — VIRTUAL VISIT (OUTPATIENT)
Dept: CARDIOLOGY | Facility: CLINIC | Age: 55
End: 2020-05-13
Payer: COMMERCIAL

## 2020-05-13 DIAGNOSIS — I35.0 AORTIC VALVE STENOSIS, ETIOLOGY OF CARDIAC VALVE DISEASE UNSPECIFIED: Primary | ICD-10-CM

## 2020-05-13 DIAGNOSIS — I25.10 CORONARY ARTERY DISEASE INVOLVING NATIVE CORONARY ARTERY OF NATIVE HEART WITHOUT ANGINA PECTORIS: ICD-10-CM

## 2020-05-13 DIAGNOSIS — I10 ESSENTIAL HYPERTENSION, BENIGN: ICD-10-CM

## 2020-05-13 DIAGNOSIS — I25.10 CAD (CORONARY ARTERY DISEASE): Primary | ICD-10-CM

## 2020-05-13 DIAGNOSIS — I35.0 AORTIC STENOSIS: ICD-10-CM

## 2020-05-13 PROCEDURE — 99214 OFFICE O/P EST MOD 30 MIN: CPT | Mod: 95 | Performed by: INTERNAL MEDICINE

## 2020-05-13 NOTE — PROGRESS NOTES
"Benjamín Us is a 54 year old male who is being evaluated via a billable video visit.      The patient has been notified of following:     \"This video visit will be conducted via a call between you and your physician/provider. We have found that certain health care needs can be provided without the need for an in-person physical exam.  This service lets us provide the care you need with a video conversation.  If a prescription is necessary we can send it directly to your pharmacy.  If lab work is needed we can place an order for that and you can then stop by our lab to have the test done at a later time.    Video visits are billed at different rates depending on your insurance coverage.  Please reach out to your insurance provider with any questions.    If during the course of the call the physician/provider feels a video visit is not appropriate, you will not be charged for this service.\"    BP: N/A  HR: N/A  Weight: 220lb  Review Of Systems  Skin: ezcema  Eyes:Ears/Nose/Throat: recent sore throat, dental problems  Respiratory: PAYNE, sleep apnea, not wearing cpap  Cardiovascular: chest pain on exertion, fatigue, exercise intolerance  Gastrointestinal: NEGATIVE  Genitourinary:NEGATIVE   Musculoskeletal: NEGATIVE  Neurologic: NEGATIVE  Psychiatric: NEGATIVE  Hematologic/Lymphatic/Immunologic: NEGATIVE  Endocrine:diet controlled diabtes    Patient has given verbal consent for Video visit?  Yes    How would you like to obtain your AVS? Mail a copy    Patient would like the video invitation sent by: Text to cell phone: [phone]    Will anyone else be joining your video visit? No        Video-Visit Details    Type of service:  Video Visit    Total video call duration 20 minutes    Originating Location (pt. Location): Home    Distant Location (provider location):  [facility]     Platform used for Video Visit: Doximity    Mr. Agustin is a pleasant 54-year-old gentleman with severe " aortic valve stenosis who I saw in fall last year and was symptomatic from severe aortic valve stenosis.  Patient was referred to TAVR clinic where he was seen by TAVR team and was felt to be a candidate for mechanical aortic valve replacement through traditional surgical route.  Coronary angiogram showed mid LAD and first diagonal disease.  The plan was to do aortic valve placement along with LAD and diagonal bypass grafting.  Remarkably patient also had dental issues and due to ongoing coronavirus pandemic the surgery has been deferred and delayed.  CV surgery has been in close contact with patient and looks like the plan is to proceed with surgery next week at [facility] this will be preceded by patient having dental work-up done while inpatient.  It looks like patient has been having more symptoms for past 1 month he gets chest discomfort and shortness of breath climbing stairs up and down.  Fortunately the symptoms have been stable for last few weeks.  No resting symptoms.  Patient tells me that CV surgery has been in touch with him and they are planning surgery next week.  Patient also will need to be seen by an oral maxillofacial/dental surgeon to have dental work done prior to the aortic valve surgery and this is being planned to be done inpatient.  He is on aspirin statin, beta-blocker, ACE inhibitor.  Patient tells me that his blood pressure is reasonably controlled this morning it was 132/85.    On examination  General patient appears comfortable  Neck no particular lump seen  HEENT no pallor  Respiratory system normal respiratory rate    Assessment and plan  A pleasant 54 a gentleman with severe symptomatic aortic stenosis, LAD diagonal obstructive coronary disease is being planned for mechanical aortic valve replacement surgery along with bypass grafting unfortunately this has been delayed due to ongoing coronavirus pandemic and need for dental treatment prior to the surgery.  It looks like this is been  planned for next week.  I was personally able to connect with the CV surgery and they are planning to expedite the surgery next week and will have dental work-up and treatment done inpatient.  I did discuss with patient that in case he notice any resting symptoms or worsening of symptoms he should call 911 and go to the ER.  Fortunately even the symptoms have worsened a month ago but looks like they have been stable for last few weeks.  He should continue current cardiac medications.    Myles Downs MD

## 2020-05-13 NOTE — LETTER
5/13/2020      RE: Benjamín Us  [address]  [facility] [address]       Dear Colleague,    Thank you for the opportunity to participate in the care of your patient, Benjamín Us, at the [facility] at [facility]. Please see a copy of my visit note below.    Mr. Agustin is a pleasant 54-year-old gentleman with severe aortic valve stenosis who I saw in fall last year and was symptomatic from severe aortic valve stenosis.  Patient was referred to TAVR clinic where he was seen by TAVR team and was felt to be a candidate for mechanical aortic valve replacement through traditional surgical route.  Coronary angiogram showed mid LAD and first diagonal disease.  The plan was to do aortic valve placement along with LAD and diagonal bypass grafting.  Remarkably patient also had dental issues and due to ongoing coronavirus pandemic the surgery has been deferred and delayed.  CV surgery has been in close contact with patient and looks like the plan is to proceed with surgery next week at [facility] this will be preceded by patient having dental work-up done while inpatient.  It looks like patient has been having more symptoms for past 1 month he gets chest discomfort and shortness of breath climbing stairs up and down.  Fortunately the symptoms have been stable for last few weeks.  No resting symptoms.  Patient tells me that CV surgery has been in touch with him and they are planning surgery next week.  Patient also will need to be seen by an oral maxillofacial/dental surgeon to have dental work done prior to the aortic valve surgery and this is being planned to be done inpatient.  He is on aspirin statin, beta-blocker, ACE inhibitor.  Patient tells me that his blood pressure is reasonably controlled this morning it was 132/85.    On examination  General patient appears comfortable  Neck no particular lump seen  HEENT no pallor  Respiratory system  normal respiratory rate    Assessment and plan  A pleasant 54 a gentleman with severe symptomatic aortic stenosis, LAD diagonal obstructive coronary disease is being planned for mechanical aortic valve replacement surgery along with bypass grafting unfortunately this has been delayed due to ongoing coronavirus pandemic and need for dental treatment prior to the surgery.  It looks like this is been planned for next week.  I was personally able to connect with the CV surgery and they are planning to expedite the surgery next week and will have dental work-up and treatment done inpatient.  I did discuss with patient that in case he notice any resting symptoms or worsening of symptoms he should call 911 and go to the ER.  Fortunately even the symptoms have worsened a month ago but looks like they have been stable for last few weeks.  He should continue current cardiac medications.    Please do not hesitate to contact me if you have any questions/concerns.     Sincerely,     Myles Downs MD

## 2020-05-13 NOTE — TELEPHONE ENCOUNTER
I received a call from Dr Downs after virtual visit with patient. Requested expediting patient's consult and surgery due to increasing symptoms of Aortic Stenosis/CAD. Patient has dental needs that have been difficult to address due to Covid 19 restrictions. Consultation arranged with Dr. Luís Feliz 5/13. Will proceed with consult and pre op testing accordingly with tentative surgery date 5/22. Patient updated on plan. Will follow along.

## 2020-05-14 DIAGNOSIS — I25.10 CORONARY ARTERY DISEASE INVOLVING NATIVE CORONARY ARTERY OF NATIVE HEART WITHOUT ANGINA PECTORIS: ICD-10-CM

## 2020-05-14 RX ORDER — ATORVASTATIN CALCIUM 40 MG/1
40 TABLET, FILM COATED ORAL DAILY
Qty: 90 TABLET | Refills: 1 | Status: SHIPPED | OUTPATIENT
Start: 2020-05-14 | End: 2020-11-30

## 2020-05-14 NOTE — TELEPHONE ENCOUNTER
Received refill request for:  Atorvastatin (started 1/10/20)  Last OV was: 5/13/2020  Labs/EKG: FLP last done 3/25/14, Orders in for repeat   F/U scheduled: 5/18/20  New script sent to:  Braulio Martin RN 8:14 AM 05/14/20

## 2020-05-15 ENCOUNTER — HOSPITAL ENCOUNTER (INPATIENT)
Facility: CLINIC | Age: 55
Setting detail: SURGERY ADMIT
End: 2020-05-15
Attending: SURGERY | Admitting: SURGERY
Payer: COMMERCIAL

## 2020-05-15 DIAGNOSIS — I25.10 CAD (CORONARY ARTERY DISEASE): ICD-10-CM

## 2020-05-15 DIAGNOSIS — I35.0 AORTIC STENOSIS: ICD-10-CM

## 2020-05-15 DIAGNOSIS — Z11.59 ENCOUNTER FOR SCREENING FOR OTHER VIRAL DISEASES: Primary | ICD-10-CM

## 2020-05-17 ENCOUNTER — ANESTHESIA EVENT (OUTPATIENT)
Dept: SURGERY | Facility: CLINIC | Age: 55
DRG: 220 | End: 2020-05-17
Payer: COMMERCIAL

## 2020-05-18 DIAGNOSIS — Z11.59 ENCOUNTER FOR SCREENING FOR OTHER VIRAL DISEASES: Primary | ICD-10-CM

## 2020-05-20 ENCOUNTER — ANESTHESIA EVENT (OUTPATIENT)
Dept: SURGERY | Facility: CLINIC | Age: 55
DRG: 220 | End: 2020-05-20
Payer: COMMERCIAL

## 2020-05-20 ENCOUNTER — OFFICE VISIT (OUTPATIENT)
Dept: URGENT CARE | Facility: URGENT CARE | Age: 55
End: 2020-05-20
Attending: SURGERY
Payer: COMMERCIAL

## 2020-05-20 DIAGNOSIS — Z11.59 ENCOUNTER FOR SCREENING FOR OTHER VIRAL DISEASES: ICD-10-CM

## 2020-05-20 PROCEDURE — 87635 SARS-COV-2 COVID-19 AMP PRB: CPT | Performed by: SURGERY

## 2020-05-20 PROCEDURE — 99207 ZZC NO BILLABLE SERVICE THIS VISIT: CPT

## 2020-05-20 PROCEDURE — 99000 SPECIMEN HANDLING OFFICE-LAB: CPT | Performed by: SURGERY

## 2020-05-21 ENCOUNTER — HOSPITAL ENCOUNTER (INPATIENT)
Facility: CLINIC | Age: 55
LOS: 7 days | Discharge: HOME | DRG: 220 | End: 2020-05-28
Attending: DENTIST | Admitting: DENTIST
Payer: COMMERCIAL

## 2020-05-21 ENCOUNTER — ANESTHESIA (OUTPATIENT)
Dept: SURGERY | Facility: CLINIC | Age: 55
DRG: 220 | End: 2020-05-21
Payer: COMMERCIAL

## 2020-05-21 ENCOUNTER — APPOINTMENT (OUTPATIENT)
Dept: ULTRASOUND IMAGING | Facility: CLINIC | Age: 55
DRG: 220 | End: 2020-05-21
Attending: SURGERY
Payer: COMMERCIAL

## 2020-05-21 DIAGNOSIS — I25.10 CAD (CORONARY ARTERY DISEASE): ICD-10-CM

## 2020-05-21 DIAGNOSIS — E11.69 TYPE 2 DIABETES MELLITUS WITH OTHER SPECIFIED COMPLICATION, WITHOUT LONG-TERM CURRENT USE OF INSULIN (H): ICD-10-CM

## 2020-05-21 DIAGNOSIS — Z95.1 S/P CABG (CORONARY ARTERY BYPASS GRAFT): ICD-10-CM

## 2020-05-21 DIAGNOSIS — I35.0 AORTIC STENOSIS: ICD-10-CM

## 2020-05-21 DIAGNOSIS — I48.3 TYPICAL ATRIAL FLUTTER (H): Primary | ICD-10-CM

## 2020-05-21 PROBLEM — I35.9 AORTIC VALVE DISEASE: Status: ACTIVE | Noted: 2020-05-21

## 2020-05-21 PROBLEM — I35.2 AORTIC INSUFFICIENCY WITH AORTIC STENOSIS: Status: ACTIVE | Noted: 2020-05-21

## 2020-05-21 LAB
ALBUMIN SERPL-MCNC: 3.4 G/DL (ref 3.4–5)
ALBUMIN UR-MCNC: 10 MG/DL
ALP SERPL-CCNC: 104 U/L (ref 40–150)
ALT SERPL W P-5'-P-CCNC: 32 U/L (ref 0–70)
APPEARANCE UR: CLEAR
AST SERPL W P-5'-P-CCNC: 19 U/L (ref 0–45)
BILIRUB SERPL-MCNC: 0.3 MG/DL (ref 0.2–1.3)
BILIRUB UR QL STRIP: NEGATIVE
BUN SERPL-MCNC: 23 MG/DL (ref 7–30)
CALCIUM SERPL-MCNC: 8.9 MG/DL (ref 8.5–10.1)
CHLORIDE SERPL-SCNC: 100 MMOL/L (ref 94–109)
CO2 SERPL-SCNC: 23 MMOL/L (ref 20–32)
COLOR UR AUTO: YELLOW
CREAT SERPL-MCNC: 0.95 MG/DL (ref 0.66–1.25)
ERYTHROCYTE [DISTWIDTH] IN BLOOD BY AUTOMATED COUNT: 12.7 % (ref 10–15)
GFR SERPL CREATININE-BSD FRML MDRD: 88 ML/MIN/{1.73_M2}
GLUCOSE BLDC GLUCOMTR-MCNC: 292 MG/DL (ref 70–99)
GLUCOSE BLDC GLUCOMTR-MCNC: 316 MG/DL (ref 70–99)
GLUCOSE BLDC GLUCOMTR-MCNC: 348 MG/DL (ref 70–99)
GLUCOSE BLDC GLUCOMTR-MCNC: 361 MG/DL (ref 70–99)
GLUCOSE BLDC GLUCOMTR-MCNC: 377 MG/DL (ref 70–99)
GLUCOSE BLDC GLUCOMTR-MCNC: 381 MG/DL (ref 70–99)
GLUCOSE BLDC GLUCOMTR-MCNC: 389 MG/DL (ref 70–99)
GLUCOSE BLDC GLUCOMTR-MCNC: 464 MG/DL (ref 70–99)
GLUCOSE BLDC GLUCOMTR-MCNC: 481 MG/DL (ref 70–99)
GLUCOSE SERPL-MCNC: 482 MG/DL (ref 70–99)
GLUCOSE UR STRIP-MCNC: >1000 MG/DL
HBA1C MFR BLD: 12.2 % (ref 0–5.6)
HCT VFR BLD AUTO: 36.5 % (ref 40–53)
HGB BLD-MCNC: 12.7 G/DL (ref 13.3–17.7)
HGB UR QL STRIP: NEGATIVE
HYALINE CASTS #/AREA URNS LPF: 3 /LPF (ref 0–2)
KETONES UR STRIP-MCNC: NEGATIVE MG/DL
LEUKOCYTE ESTERASE UR QL STRIP: NEGATIVE
MCH RBC QN AUTO: 31 PG (ref 26.5–33)
MCHC RBC AUTO-ENTMCNC: 34.8 G/DL (ref 31.5–36.5)
MCV RBC AUTO: 89 FL (ref 78–100)
MUCOUS THREADS #/AREA URNS LPF: PRESENT /LPF
NITRATE UR QL: NEGATIVE
PH UR STRIP: 5.5 PH (ref 5–7)
PLATELET # BLD AUTO: 265 10E9/L (ref 150–450)
POTASSIUM SERPL-SCNC: 4.4 MMOL/L (ref 3.4–5.3)
PROT SERPL-MCNC: 7.5 G/DL (ref 6.8–8.8)
RBC # BLD AUTO: 4.1 10E12/L (ref 4.4–5.9)
RBC #/AREA URNS AUTO: <1 /HPF (ref 0–2)
SARS-COV-2 RNA SPEC QL NAA+PROBE: NOT DETECTED
SODIUM SERPL-SCNC: 133 MMOL/L (ref 133–144)
SOURCE: ABNORMAL
SP GR UR STRIP: 1.02 (ref 1–1.03)
SPECIMEN SOURCE: NORMAL
SQUAMOUS #/AREA URNS AUTO: <1 /HPF (ref 0–1)
UROBILINOGEN UR STRIP-MCNC: NORMAL MG/DL (ref 0–2)
WBC # BLD AUTO: 12 10E9/L (ref 4–11)
WBC #/AREA URNS AUTO: 1 /HPF (ref 0–5)

## 2020-05-21 PROCEDURE — 71000012 ZZH RECOVERY PHASE 1 LEVEL 1 FIRST HR: Performed by: DENTIST

## 2020-05-21 PROCEDURE — 36000056 ZZH SURGERY LEVEL 3 1ST 30 MIN: Performed by: DENTIST

## 2020-05-21 PROCEDURE — 86850 RBC ANTIBODY SCREEN: CPT | Performed by: SURGERY

## 2020-05-21 PROCEDURE — 93971 EXTREMITY STUDY: CPT | Mod: LT

## 2020-05-21 PROCEDURE — 25000132 ZZH RX MED GY IP 250 OP 250 PS 637: Performed by: NURSE PRACTITIONER

## 2020-05-21 PROCEDURE — 25000125 ZZHC RX 250: Performed by: DENTIST

## 2020-05-21 PROCEDURE — 25800030 ZZH RX IP 258 OP 636: Performed by: ANESTHESIOLOGY

## 2020-05-21 PROCEDURE — 25000566 ZZH SEVOFLURANE, EA 15 MIN: Performed by: DENTIST

## 2020-05-21 PROCEDURE — 36415 COLL VENOUS BLD VENIPUNCTURE: CPT | Performed by: ANESTHESIOLOGY

## 2020-05-21 PROCEDURE — 40000169 ZZH STATISTIC PRE-PROCEDURE ASSESSMENT I: Performed by: DENTIST

## 2020-05-21 PROCEDURE — 81001 URINALYSIS AUTO W/SCOPE: CPT | Performed by: SURGERY

## 2020-05-21 PROCEDURE — 80053 COMPREHEN METABOLIC PANEL: CPT | Performed by: ANESTHESIOLOGY

## 2020-05-21 PROCEDURE — 0CDWXZ1 EXTRACTION OF UPPER TOOTH, MULTIPLE, EXTERNAL APPROACH: ICD-10-PCS | Performed by: DENTIST

## 2020-05-21 PROCEDURE — 12000000 ZZH R&B MED SURG/OB

## 2020-05-21 PROCEDURE — 0CDXXZ1 EXTRACTION OF LOWER TOOTH, MULTIPLE, EXTERNAL APPROACH: ICD-10-PCS | Performed by: DENTIST

## 2020-05-21 PROCEDURE — 82330 ASSAY OF CALCIUM: CPT | Performed by: SURGERY

## 2020-05-21 PROCEDURE — 37000009 ZZH ANESTHESIA TECHNICAL FEE, EACH ADDTL 15 MIN: Performed by: DENTIST

## 2020-05-21 PROCEDURE — 83605 ASSAY OF LACTIC ACID: CPT | Performed by: SURGERY

## 2020-05-21 PROCEDURE — 85018 HEMOGLOBIN: CPT | Performed by: ANESTHESIOLOGY

## 2020-05-21 PROCEDURE — 36000058 ZZH SURGERY LEVEL 3 EA 15 ADDTL MIN: Performed by: DENTIST

## 2020-05-21 PROCEDURE — 27210794 ZZH OR GENERAL SUPPLY STERILE: Performed by: DENTIST

## 2020-05-21 PROCEDURE — 37000008 ZZH ANESTHESIA TECHNICAL FEE, 1ST 30 MIN: Performed by: DENTIST

## 2020-05-21 PROCEDURE — 25000132 ZZH RX MED GY IP 250 OP 250 PS 637: Performed by: DENTIST

## 2020-05-21 PROCEDURE — 71000013 ZZH RECOVERY PHASE 1 LEVEL 1 EA ADDTL HR: Performed by: DENTIST

## 2020-05-21 PROCEDURE — 86901 BLOOD TYPING SEROLOGIC RH(D): CPT | Performed by: SURGERY

## 2020-05-21 PROCEDURE — 83036 HEMOGLOBIN GLYCOSYLATED A1C: CPT | Performed by: SURGERY

## 2020-05-21 PROCEDURE — 25000128 H RX IP 250 OP 636: Performed by: DENTIST

## 2020-05-21 PROCEDURE — 25000125 ZZHC RX 250: Performed by: NURSE ANESTHETIST, CERTIFIED REGISTERED

## 2020-05-21 PROCEDURE — 25000125 ZZHC RX 250: Performed by: ANESTHESIOLOGY

## 2020-05-21 PROCEDURE — 25000131 ZZH RX MED GY IP 250 OP 636 PS 637: Performed by: ANESTHESIOLOGY

## 2020-05-21 PROCEDURE — 80053 COMPREHEN METABOLIC PANEL: CPT | Performed by: SURGERY

## 2020-05-21 PROCEDURE — 25000131 ZZH RX MED GY IP 250 OP 636 PS 637: Performed by: NURSE PRACTITIONER

## 2020-05-21 PROCEDURE — 86923 COMPATIBILITY TEST ELECTRIC: CPT | Performed by: SURGERY

## 2020-05-21 PROCEDURE — 25000128 H RX IP 250 OP 636: Performed by: NURSE ANESTHETIST, CERTIFIED REGISTERED

## 2020-05-21 PROCEDURE — 82805 BLOOD GASES W/O2 SATURATION: CPT | Performed by: SURGERY

## 2020-05-21 PROCEDURE — 36415 COLL VENOUS BLD VENIPUNCTURE: CPT | Performed by: SURGERY

## 2020-05-21 PROCEDURE — 25000128 H RX IP 250 OP 636: Performed by: ANESTHESIOLOGY

## 2020-05-21 PROCEDURE — 25800030 ZZH RX IP 258 OP 636: Performed by: NURSE ANESTHETIST, CERTIFIED REGISTERED

## 2020-05-21 PROCEDURE — 25000128 H RX IP 250 OP 636: Performed by: NURSE PRACTITIONER

## 2020-05-21 PROCEDURE — 99223 1ST HOSP IP/OBS HIGH 75: CPT | Mod: AI | Performed by: NURSE PRACTITIONER

## 2020-05-21 PROCEDURE — 86900 BLOOD TYPING SEROLOGIC ABO: CPT | Performed by: SURGERY

## 2020-05-21 PROCEDURE — 25000132 ZZH RX MED GY IP 250 OP 250 PS 637: Performed by: PHYSICIAN ASSISTANT

## 2020-05-21 PROCEDURE — 85027 COMPLETE CBC AUTOMATED: CPT | Performed by: SURGERY

## 2020-05-21 PROCEDURE — 00000146 ZZHCL STATISTIC GLUCOSE BY METER IP

## 2020-05-21 RX ORDER — NALOXONE HYDROCHLORIDE 0.4 MG/ML
.1-.4 INJECTION, SOLUTION INTRAMUSCULAR; INTRAVENOUS; SUBCUTANEOUS
Status: ACTIVE | OUTPATIENT
Start: 2020-05-21 | End: 2020-05-22

## 2020-05-21 RX ORDER — METOPROLOL TARTRATE 1 MG/ML
5 INJECTION, SOLUTION INTRAVENOUS EVERY 6 HOURS
Status: DISCONTINUED | OUTPATIENT
Start: 2020-05-22 | End: 2020-05-21

## 2020-05-21 RX ORDER — NICOTINE POLACRILEX 4 MG
15-30 LOZENGE BUCCAL
Status: DISCONTINUED | OUTPATIENT
Start: 2020-05-21 | End: 2020-05-22

## 2020-05-21 RX ORDER — SODIUM CHLORIDE, SODIUM LACTATE, POTASSIUM CHLORIDE, CALCIUM CHLORIDE 600; 310; 30; 20 MG/100ML; MG/100ML; MG/100ML; MG/100ML
INJECTION, SOLUTION INTRAVENOUS CONTINUOUS
Status: DISCONTINUED | OUTPATIENT
Start: 2020-05-21 | End: 2020-05-21 | Stop reason: HOSPADM

## 2020-05-21 RX ORDER — PROPOFOL 10 MG/ML
INJECTION, EMULSION INTRAVENOUS PRN
Status: DISCONTINUED | OUTPATIENT
Start: 2020-05-21 | End: 2020-05-21

## 2020-05-21 RX ORDER — NITROGLYCERIN 0.4 MG/1
0.4 TABLET SUBLINGUAL EVERY 5 MIN PRN
Status: DISCONTINUED | OUTPATIENT
Start: 2020-05-21 | End: 2020-05-22

## 2020-05-21 RX ORDER — ALUMINA, MAGNESIA, AND SIMETHICONE 2400; 2400; 240 MG/30ML; MG/30ML; MG/30ML
30 SUSPENSION ORAL EVERY 4 HOURS PRN
Status: DISCONTINUED | OUTPATIENT
Start: 2020-05-21 | End: 2020-05-22

## 2020-05-21 RX ORDER — LIDOCAINE 40 MG/G
CREAM TOPICAL
Status: DISCONTINUED | OUTPATIENT
Start: 2020-05-21 | End: 2020-05-22

## 2020-05-21 RX ORDER — FENTANYL CITRATE 0.05 MG/ML
25-50 INJECTION, SOLUTION INTRAMUSCULAR; INTRAVENOUS
Status: DISCONTINUED | OUTPATIENT
Start: 2020-05-21 | End: 2020-05-21 | Stop reason: HOSPADM

## 2020-05-21 RX ORDER — ONDANSETRON 4 MG/1
4 TABLET, ORALLY DISINTEGRATING ORAL EVERY 6 HOURS PRN
Status: DISCONTINUED | OUTPATIENT
Start: 2020-05-21 | End: 2020-05-22

## 2020-05-21 RX ORDER — GLYCOPYRROLATE 0.2 MG/ML
INJECTION, SOLUTION INTRAMUSCULAR; INTRAVENOUS PRN
Status: DISCONTINUED | OUTPATIENT
Start: 2020-05-21 | End: 2020-05-21

## 2020-05-21 RX ORDER — ATORVASTATIN CALCIUM 40 MG/1
40 TABLET, FILM COATED ORAL DAILY
Status: DISCONTINUED | OUTPATIENT
Start: 2020-05-21 | End: 2020-05-22

## 2020-05-21 RX ORDER — ONDANSETRON 4 MG/1
4 TABLET, ORALLY DISINTEGRATING ORAL EVERY 30 MIN PRN
Status: DISCONTINUED | OUTPATIENT
Start: 2020-05-21 | End: 2020-05-21 | Stop reason: HOSPADM

## 2020-05-21 RX ORDER — ACETAMINOPHEN 325 MG/1
975 TABLET ORAL EVERY 8 HOURS
Status: DISCONTINUED | OUTPATIENT
Start: 2020-05-21 | End: 2020-05-22

## 2020-05-21 RX ORDER — LIDOCAINE HYDROCHLORIDE AND EPINEPHRINE 10; 10 MG/ML; UG/ML
INJECTION, SOLUTION INFILTRATION; PERINEURAL PRN
Status: DISCONTINUED | OUTPATIENT
Start: 2020-05-21 | End: 2020-05-21 | Stop reason: HOSPADM

## 2020-05-21 RX ORDER — CEFAZOLIN SODIUM 2 G/100ML
2 INJECTION, SOLUTION INTRAVENOUS
Status: COMPLETED | OUTPATIENT
Start: 2020-05-21 | End: 2020-05-21

## 2020-05-21 RX ORDER — CEFAZOLIN SODIUM 1 G/3ML
1 INJECTION, POWDER, FOR SOLUTION INTRAMUSCULAR; INTRAVENOUS SEE ADMIN INSTRUCTIONS
Status: DISCONTINUED | OUTPATIENT
Start: 2020-05-21 | End: 2020-05-22

## 2020-05-21 RX ORDER — ACETAMINOPHEN 325 MG/1
650 TABLET ORAL EVERY 4 HOURS PRN
Status: DISCONTINUED | OUTPATIENT
Start: 2020-05-21 | End: 2020-05-21

## 2020-05-21 RX ORDER — OXYCODONE HYDROCHLORIDE 5 MG/1
5-10 TABLET ORAL
Status: DISCONTINUED | OUTPATIENT
Start: 2020-05-21 | End: 2020-05-22

## 2020-05-21 RX ORDER — LIDOCAINE HYDROCHLORIDE 20 MG/ML
INJECTION, SOLUTION INFILTRATION; PERINEURAL PRN
Status: DISCONTINUED | OUTPATIENT
Start: 2020-05-21 | End: 2020-05-21

## 2020-05-21 RX ORDER — DEXTROSE MONOHYDRATE 25 G/50ML
25-50 INJECTION, SOLUTION INTRAVENOUS
Status: DISCONTINUED | OUTPATIENT
Start: 2020-05-21 | End: 2020-05-22

## 2020-05-21 RX ORDER — NALOXONE HYDROCHLORIDE 0.4 MG/ML
.1-.4 INJECTION, SOLUTION INTRAMUSCULAR; INTRAVENOUS; SUBCUTANEOUS
Status: DISCONTINUED | OUTPATIENT
Start: 2020-05-21 | End: 2020-05-22

## 2020-05-21 RX ORDER — DEXAMETHASONE SODIUM PHOSPHATE 4 MG/ML
INJECTION, SOLUTION INTRA-ARTICULAR; INTRALESIONAL; INTRAMUSCULAR; INTRAVENOUS; SOFT TISSUE PRN
Status: DISCONTINUED | OUTPATIENT
Start: 2020-05-21 | End: 2020-05-21

## 2020-05-21 RX ORDER — CEFAZOLIN SODIUM 1 G/3ML
1 INJECTION, POWDER, FOR SOLUTION INTRAMUSCULAR; INTRAVENOUS EVERY 8 HOURS
Status: DISCONTINUED | OUTPATIENT
Start: 2020-05-21 | End: 2020-05-22

## 2020-05-21 RX ORDER — NEOSTIGMINE METHYLSULFATE 1 MG/ML
VIAL (ML) INJECTION PRN
Status: DISCONTINUED | OUTPATIENT
Start: 2020-05-21 | End: 2020-05-21

## 2020-05-21 RX ORDER — CHLORHEXIDINE GLUCONATE ORAL RINSE 1.2 MG/ML
15 SOLUTION DENTAL 2 TIMES DAILY
Status: DISCONTINUED | OUTPATIENT
Start: 2020-05-21 | End: 2020-05-22

## 2020-05-21 RX ORDER — MAGNESIUM HYDROXIDE 1200 MG/15ML
LIQUID ORAL PRN
Status: DISCONTINUED | OUTPATIENT
Start: 2020-05-21 | End: 2020-05-21 | Stop reason: HOSPADM

## 2020-05-21 RX ORDER — FENTANYL CITRATE 50 UG/ML
INJECTION, SOLUTION INTRAMUSCULAR; INTRAVENOUS PRN
Status: DISCONTINUED | OUTPATIENT
Start: 2020-05-21 | End: 2020-05-21

## 2020-05-21 RX ORDER — ACETAMINOPHEN 325 MG/1
650 TABLET ORAL EVERY 4 HOURS PRN
Status: DISCONTINUED | OUTPATIENT
Start: 2020-05-24 | End: 2020-05-22

## 2020-05-21 RX ORDER — HYDRALAZINE HYDROCHLORIDE 20 MG/ML
2.5-5 INJECTION INTRAMUSCULAR; INTRAVENOUS EVERY 10 MIN PRN
Status: DISCONTINUED | OUTPATIENT
Start: 2020-05-21 | End: 2020-05-21 | Stop reason: HOSPADM

## 2020-05-21 RX ORDER — ACETAMINOPHEN 650 MG/1
650 SUPPOSITORY RECTAL EVERY 4 HOURS PRN
Status: DISCONTINUED | OUTPATIENT
Start: 2020-05-21 | End: 2020-05-22

## 2020-05-21 RX ORDER — DEXTROAMPHETAMINE SACCHARATE, AMPHETAMINE ASPARTATE, DEXTROAMPHETAMINE SULFATE AND AMPHETAMINE SULFATE 5; 5; 5; 5 MG/1; MG/1; MG/1; MG/1
20 TABLET ORAL 3 TIMES DAILY
Status: DISCONTINUED | OUTPATIENT
Start: 2020-05-21 | End: 2020-05-21

## 2020-05-21 RX ORDER — METOPROLOL TARTRATE 1 MG/ML
5 INJECTION, SOLUTION INTRAVENOUS
Status: DISCONTINUED | OUTPATIENT
Start: 2020-05-21 | End: 2020-05-22

## 2020-05-21 RX ORDER — ASPIRIN 81 MG/1
81 TABLET ORAL 2 TIMES DAILY
Status: DISCONTINUED | OUTPATIENT
Start: 2020-05-21 | End: 2020-05-21

## 2020-05-21 RX ORDER — ONDANSETRON 2 MG/ML
4 INJECTION INTRAMUSCULAR; INTRAVENOUS EVERY 6 HOURS PRN
Status: DISCONTINUED | OUTPATIENT
Start: 2020-05-21 | End: 2020-05-22

## 2020-05-21 RX ORDER — LABETALOL HYDROCHLORIDE 5 MG/ML
10 INJECTION, SOLUTION INTRAVENOUS
Status: DISCONTINUED | OUTPATIENT
Start: 2020-05-21 | End: 2020-05-21 | Stop reason: HOSPADM

## 2020-05-21 RX ORDER — ONDANSETRON 2 MG/ML
4 INJECTION INTRAMUSCULAR; INTRAVENOUS EVERY 30 MIN PRN
Status: DISCONTINUED | OUTPATIENT
Start: 2020-05-21 | End: 2020-05-21 | Stop reason: HOSPADM

## 2020-05-21 RX ORDER — HYDROMORPHONE HYDROCHLORIDE 1 MG/ML
.3-.5 INJECTION, SOLUTION INTRAMUSCULAR; INTRAVENOUS; SUBCUTANEOUS EVERY 5 MIN PRN
Status: DISCONTINUED | OUTPATIENT
Start: 2020-05-21 | End: 2020-05-21 | Stop reason: HOSPADM

## 2020-05-21 RX ORDER — ONDANSETRON 2 MG/ML
INJECTION INTRAMUSCULAR; INTRAVENOUS PRN
Status: DISCONTINUED | OUTPATIENT
Start: 2020-05-21 | End: 2020-05-21

## 2020-05-21 RX ORDER — SODIUM CHLORIDE, SODIUM LACTATE, POTASSIUM CHLORIDE, CALCIUM CHLORIDE 600; 310; 30; 20 MG/100ML; MG/100ML; MG/100ML; MG/100ML
INJECTION, SOLUTION INTRAVENOUS CONTINUOUS
Status: DISCONTINUED | OUTPATIENT
Start: 2020-05-21 | End: 2020-05-22

## 2020-05-21 RX ORDER — LISINOPRIL 40 MG/1
40 TABLET ORAL DAILY
Status: DISCONTINUED | OUTPATIENT
Start: 2020-05-22 | End: 2020-05-21

## 2020-05-21 RX ORDER — CHLORHEXIDINE GLUCONATE ORAL RINSE 1.2 MG/ML
10 SOLUTION DENTAL ONCE
Status: COMPLETED | OUTPATIENT
Start: 2020-05-21 | End: 2020-05-21

## 2020-05-21 RX ORDER — CARVEDILOL 25 MG/1
25 TABLET ORAL 2 TIMES DAILY WITH MEALS
Status: DISCONTINUED | OUTPATIENT
Start: 2020-05-21 | End: 2020-05-22

## 2020-05-21 RX ORDER — CHLORHEXIDINE GLUCONATE ORAL RINSE 1.2 MG/ML
SOLUTION DENTAL PRN
Status: DISCONTINUED | OUTPATIENT
Start: 2020-05-21 | End: 2020-05-21 | Stop reason: HOSPADM

## 2020-05-21 RX ORDER — VARENICLINE TARTRATE 1 MG/1
1 TABLET, FILM COATED ORAL 2 TIMES DAILY
Status: DISCONTINUED | OUTPATIENT
Start: 2020-05-21 | End: 2020-05-22

## 2020-05-21 RX ADMIN — INSULIN ASPART 5 UNITS: 100 INJECTION, SOLUTION INTRAVENOUS; SUBCUTANEOUS at 17:58

## 2020-05-21 RX ADMIN — SODIUM CHLORIDE 8 UNITS: 9 INJECTION, SOLUTION INTRAVENOUS at 11:58

## 2020-05-21 RX ADMIN — PHENYLEPHRINE HYDROCHLORIDE 100 MCG: 10 INJECTION INTRAVENOUS at 10:21

## 2020-05-21 RX ADMIN — FENTANYL CITRATE 25 MCG: 50 INJECTION, SOLUTION INTRAMUSCULAR; INTRAVENOUS at 10:57

## 2020-05-21 RX ADMIN — PHENYLEPHRINE HYDROCHLORIDE 100 MCG: 10 INJECTION INTRAVENOUS at 10:35

## 2020-05-21 RX ADMIN — PROPOFOL 50 MG: 10 INJECTION, EMULSION INTRAVENOUS at 09:34

## 2020-05-21 RX ADMIN — CHLORHEXIDINE GLUCONATE 15 ML: 1.2 RINSE ORAL at 21:40

## 2020-05-21 RX ADMIN — PROPOFOL 50 MG: 10 INJECTION, EMULSION INTRAVENOUS at 09:39

## 2020-05-21 RX ADMIN — ONDANSETRON 4 MG: 2 INJECTION INTRAMUSCULAR; INTRAVENOUS at 10:26

## 2020-05-21 RX ADMIN — CEFAZOLIN 1 G: 330 INJECTION, POWDER, FOR SOLUTION INTRAMUSCULAR; INTRAVENOUS at 16:15

## 2020-05-21 RX ADMIN — ROCURONIUM BROMIDE 50 MG: 10 INJECTION INTRAVENOUS at 09:36

## 2020-05-21 RX ADMIN — NEOSTIGMINE METHYLSULFATE 5 MG: 1 INJECTION, SOLUTION INTRAVENOUS at 11:15

## 2020-05-21 RX ADMIN — OXYCODONE HYDROCHLORIDE 5 MG: 5 TABLET ORAL at 14:52

## 2020-05-21 RX ADMIN — FENTANYL CITRATE 50 MCG: 50 INJECTION, SOLUTION INTRAMUSCULAR; INTRAVENOUS at 09:34

## 2020-05-21 RX ADMIN — OXYCODONE HYDROCHLORIDE 10 MG: 5 TABLET ORAL at 17:55

## 2020-05-21 RX ADMIN — LIDOCAINE HYDROCHLORIDE 100 MG: 20 INJECTION, SOLUTION INFILTRATION; PERINEURAL at 09:34

## 2020-05-21 RX ADMIN — SODIUM CHLORIDE, POTASSIUM CHLORIDE, SODIUM LACTATE AND CALCIUM CHLORIDE: 600; 310; 30; 20 INJECTION, SOLUTION INTRAVENOUS at 07:40

## 2020-05-21 RX ADMIN — PHENYLEPHRINE HYDROCHLORIDE 100 MCG: 10 INJECTION INTRAVENOUS at 10:20

## 2020-05-21 RX ADMIN — ACETAMINOPHEN 975 MG: 325 TABLET, FILM COATED ORAL at 21:28

## 2020-05-21 RX ADMIN — PHENYLEPHRINE HYDROCHLORIDE 200 MCG: 10 INJECTION INTRAVENOUS at 10:50

## 2020-05-21 RX ADMIN — PHENYLEPHRINE HYDROCHLORIDE 100 MCG: 10 INJECTION INTRAVENOUS at 10:37

## 2020-05-21 RX ADMIN — CARVEDILOL 25 MG: 25 TABLET, FILM COATED ORAL at 17:55

## 2020-05-21 RX ADMIN — PHENYLEPHRINE HYDROCHLORIDE 100 MCG: 10 INJECTION INTRAVENOUS at 10:44

## 2020-05-21 RX ADMIN — FENTANYL CITRATE 25 MCG: 50 INJECTION, SOLUTION INTRAMUSCULAR; INTRAVENOUS at 11:00

## 2020-05-21 RX ADMIN — HYDROMORPHONE HYDROCHLORIDE 0.5 MG: 1 INJECTION, SOLUTION INTRAMUSCULAR; INTRAVENOUS; SUBCUTANEOUS at 12:38

## 2020-05-21 RX ADMIN — OXYCODONE HYDROCHLORIDE 10 MG: 5 TABLET ORAL at 21:40

## 2020-05-21 RX ADMIN — PHENYLEPHRINE HYDROCHLORIDE 0.25 MCG/KG/MIN: 10 INJECTION INTRAVENOUS at 09:30

## 2020-05-21 RX ADMIN — OXYCODONE HYDROCHLORIDE 5 MG: 5 TABLET ORAL at 14:01

## 2020-05-21 RX ADMIN — PROPOFOL 50 MG: 10 INJECTION, EMULSION INTRAVENOUS at 09:42

## 2020-05-21 RX ADMIN — SODIUM CHLORIDE, POTASSIUM CHLORIDE, SODIUM LACTATE AND CALCIUM CHLORIDE: 600; 310; 30; 20 INJECTION, SOLUTION INTRAVENOUS at 10:57

## 2020-05-21 RX ADMIN — CEFAZOLIN SODIUM 2 G: 2 INJECTION, SOLUTION INTRAVENOUS at 09:55

## 2020-05-21 RX ADMIN — INSULIN ASPART 6 UNITS: 100 INJECTION, SOLUTION INTRAVENOUS; SUBCUTANEOUS at 21:32

## 2020-05-21 RX ADMIN — SODIUM CHLORIDE 4 UNITS: 9 INJECTION, SOLUTION INTRAVENOUS at 09:00

## 2020-05-21 RX ADMIN — ATORVASTATIN CALCIUM 40 MG: 40 TABLET, FILM COATED ORAL at 14:00

## 2020-05-21 RX ADMIN — GLYCOPYRROLATE 0.9 MG: 0.2 INJECTION, SOLUTION INTRAMUSCULAR; INTRAVENOUS at 11:15

## 2020-05-21 RX ADMIN — DEXAMETHASONE SODIUM PHOSPHATE 4 MG: 4 INJECTION, SOLUTION INTRA-ARTICULAR; INTRALESIONAL; INTRAMUSCULAR; INTRAVENOUS; SOFT TISSUE at 09:59

## 2020-05-21 RX ADMIN — CHLORHEXIDINE GLUCONATE 0.12% ORAL RINSE 10 ML: 1.2 LIQUID ORAL at 07:40

## 2020-05-21 RX ADMIN — FENTANYL CITRATE 50 MCG: 0.05 INJECTION, SOLUTION INTRAMUSCULAR; INTRAVENOUS at 13:01

## 2020-05-21 RX ADMIN — MIDAZOLAM 2 MG: 1 INJECTION INTRAMUSCULAR; INTRAVENOUS at 09:24

## 2020-05-21 RX ADMIN — SODIUM CHLORIDE 6 UNITS: 9 INJECTION, SOLUTION INTRAVENOUS at 08:33

## 2020-05-21 ASSESSMENT — ACTIVITIES OF DAILY LIVING (ADL)
ADLS_ACUITY_SCORE: 13
ADLS_ACUITY_SCORE: 13

## 2020-05-21 ASSESSMENT — MIFFLIN-ST. JEOR: SCORE: 1847.77

## 2020-05-21 ASSESSMENT — LIFESTYLE VARIABLES
TOBACCO_USE: 1
TOBACCO_USE: 1

## 2020-05-21 NOTE — ANESTHESIA CARE TRANSFER NOTE
Patient: Benjamín Us    Procedure(s):  EXTRACTION, REMAINING TEETH    Diagnosis: Dental caries limited to enamel [K02.9]  Abscessed tooth [K04.7]  Diagnosis Additional Information: No value filed.    Anesthesia Type:   General     Note:  Airway :Face Mask  Patient transferred to:PACU  Comments: Neuromuscular blockade reversed after TOF 4/4, spontaneous respirations, adequate tidal volumes, followed commands to voice, extubated atraumatically, airway patent after extubation.  Oxygen via facemask at 6 liters per minute to PACU. Oxygen tubing connected to wall O2 in PACU, SpO2, NiBP, and EKG monitors and alarms on and functioning, Pranav Hugger warmer connected to patient gown, report on patient's clinical status given to PACU RN, RN questions answered.     /82  HR 86  RR 22  O2 100%  Temp 97.6    Handoff Report: Identifed the Patient, Identified the Reponsible Provider, Reviewed the pertinent medical history, Discussed the surgical course, Reviewed Intra-OP anesthesia mangement and issues during anesthesia, Set expectations for post-procedure period and Allowed opportunity for questions and acknowledgement of understanding      Vitals: (Last set prior to Anesthesia Care Transfer)    CRNA VITALS  5/21/2020 1051 - 5/21/2020 1129      5/21/2020             Resp Rate (set):  10                Electronically Signed By: Christine Marie Volp Hodgkins, CRNA, APRN CRNA  May 21, 2020  11:29 AM

## 2020-05-21 NOTE — ANESTHESIA POSTPROCEDURE EVALUATION
Patient: Benjamín Us    Procedure(s):  EXTRACTION, REMAINING TEETH    Diagnosis:Dental caries limited to enamel [K02.9]  Abscessed tooth [K04.7]  Diagnosis Additional Information: No value filed.    Anesthesia Type:  General    Note:  Anesthesia Post Evaluation    Patient location during evaluation: PACU  Patient participation: Able to fully participate in evaluation  Level of consciousness: awake  Pain management: adequate  Airway patency: patent  Cardiovascular status: acceptable  Respiratory status: acceptable  Hydration status: acceptable  PONV: none             Last vitals:  Vitals:    05/21/20 1215 05/21/20 1230 05/21/20 1245   BP: 117/76 122/80 131/83   Pulse: 79 80 81   Resp: 18 17 10   Temp:      SpO2: 96% 96% 96%         Electronically Signed By: Luís Brizuela MD  May 21, 2020  12:54 PM

## 2020-05-21 NOTE — PROVIDER NOTIFICATION
Dr Brizuela aware of pre-op lab draw glucose: 482.  RN rechecked glucose with Fingerstick, glucose:464. Glucose levels controlled with diet and exercise-no medications.  Verbal order for 6 units of regular insulin IV.      Recheck glucose: 389 after 6 units of regular IV insulin    Verbal order for 4 units IV Regular insulin to be given.    pt remains in stable condition.

## 2020-05-21 NOTE — OR NURSING
Pt transferred to room 324/  Report given to Slime LIMA.  Pt transported via cart with CNA escort and 02 @ 2L/nc.  Pt verbalizes readiness for transfer.  Wife was called and updated about his room and given phone number of unit.

## 2020-05-21 NOTE — ANESTHESIA PREPROCEDURE EVALUATION
Anesthesia Pre-Procedure Evaluation    Patient: Benjamín Us   MRN: [id number] : 1965          Preoperative Diagnosis: Dental caries limited to enamel [K02.9]  Abscessed tooth [K04.7]    Procedure(s):  EXTRACTION, REMAINING TEETH    Past Medical History:   Diagnosis Date     ADD (attention deficit disorder)      Aneurysm of thoracic aorta (H) 2014     Problem list name updated by automated process. Provider to review     Aortic valve disorder 2014     CARDIOVASCULAR SCREENING; LDL GOAL LESS THAN 160 2014     Dermatitis 2018     Essential hypertension, benign 2018     Hypertension      Hypertensive urgency 2013     HARIS (obstructive sleep apnea)      Type 2 diabetes mellitus with diabetic dermatitis, without long-term current use of insulin (H) 2018     Past Surgical History:   Procedure Laterality Date     APPENDECTOMY       COLONOSCOPY N/A 6/15/2015    Procedure: COLONOSCOPY;  Surgeon: Vasyl Lawson MD;  Location:  GI     CV CORONARY ANGIOGRAM N/A 1/3/2020    Procedure: Coronary Angiogram;  Surgeon: Álvaro Andrade MD;  Location: [facility] CARDIAC CATH LAB       Anesthesia Evaluation     . Pt has had prior anesthetic.     No history of anesthetic complications          ROS/MED HX    ENT/Pulmonary:     (+)sleep apnea, tobacco use, Past use , . .    Neurologic:       Cardiovascular: Comment: Aortic aneurysm    (+) Dyslipidemia, hypertension--CAD, --. : . . PAYNE, . :. valvular problems/murmurs type: AS Severe AS:. Previous cardiac testing Echodate:10/29/19results:Left Ventricle  The left ventricle is normal in size. There is severe concentric left ventricular hypertrophy. Diastolic function not assessed due to significant mitral annular calcification. The visual ejection fraction is estimated at 65-70%. Left ventricular systolic function is normal.    Right Ventricle  The right ventricle is normal in size and function.    Atria   Normal left atrial size. Right  atrial size is normal. There is no color Doppler evidence of an atrial shunt.    Mitral Valve  There is mild to moderate mitral annular calcification. There is trace mitral regurgitation.    Tricuspid Valve  There is trace tricuspid regurgitation. Right ventricular systolic pressure could not be approximated due to inadequate tricuspid regurgitation.    Aortic Valve  The aortic valve is not well visualized. Thickened aortic valve leaflets. There is trace aortic regurgitation. Severe valvular aortic stenosis. The peak AoV pressure gradient is 75.0 mmHg. The mean AoV pressure gradient is 50.3 mmHg. The calculated aortic valve are is 0.90 cm^2.    Pulmonic Valve  There is no pulmonic valvular regurgitation. Normal pulmonic valve velocity.    Vessels  Mild aortic root dilatation. The ascending aorta is Mildly dilated. The inferior vena cava was normal in size with preserved respiratory variability.    Pericardium  There is no pericardial effusion.    Rhythm  Sinus rhythm was noted.date: results:ECG reviewed date:1/3/20 results:NSRCath date: 1/3/20 results:Left Main   The vessel was visualized by selective angiography and is moderate in size. There was 0% vessel disease.    Left Anterior Descending   Mid LAD-1 lesion is 50% stenosed.   Mid LAD-2 lesion is 70% stenosed.   Dist LAD lesion is 50% stenosed.   Second Diagonal Branch   The vessel is moderate in size. This is the major diagonal branch and is fairly large with a very significant ostial stenosis   2nd Diag lesion is 85% stenosed.    Left Circumflex   The vessel was visualized by selective angiography and is large.   Mid Cx lesion is 10% stenosed.    Right Coronary Artery  The vessel was visualized by selective angiography and is large.   Prox RCA lesion is 15% stenosed.   Mid RCA lesion is 20% stenosed.   Right Posterior Descending Artery   RPDA lesion is 40% stenosed.           METS/Exercise Tolerance:  >4 METS   Hematologic:     (+) Anemia, -     "  Musculoskeletal:         GI/Hepatic:         Renal/Genitourinary:         Endo:     (+) type II DM Obesity, .   (-) Type I DM   Psychiatric:     (+) psychiatric history other (comment) (ADD)      Infectious Disease:         Malignancy:         Other:                          Physical Exam      Airway   Mallampati: III  TM distance: >3 FB  Neck ROM: full    Dental   (+) missing and chipped    Cardiovascular       Pulmonary             Lab Results   Component Value Date    WBC 11.8 (H) 01/03/2020    HGB 14.5 01/03/2020    HCT 40.7 01/03/2020     01/03/2020     01/03/2020    POTASSIUM 3.7 01/03/2020    CHLORIDE 106 01/03/2020    CO2 31 01/03/2020    BUN 14 01/03/2020    CR 0.75 01/03/2020     (H) 01/03/2020    ARMIN 8.8 01/03/2020    ALBUMIN 4.1 03/25/2014    PROTTOTAL 7.7 03/25/2014    ALT 37 03/25/2014    AST 26 03/25/2014    ALKPHOS 106 03/25/2014    BILITOTAL 0.2 03/25/2014    PTT 34 01/03/2020    INR 0.97 01/03/2020    TSH 1.15 03/05/2014       Preop Vitals  BP Readings from Last 3 Encounters:   05/21/20 (!) 132/98   01/10/20 (!) 150/88   01/03/20 (!) 152/84    Pulse Readings from Last 3 Encounters:   01/10/20 94   01/03/20 72   12/23/19 86      Resp Readings from Last 3 Encounters:   05/21/20 16   01/03/20 16   09/14/18 16    SpO2 Readings from Last 3 Encounters:   05/21/20 95%   01/10/20 98%   01/03/20 96%      Temp Readings from Last 1 Encounters:   05/21/20 36.2  C (97.1  F) (Temporal)    Ht Readings from Last 1 Encounters:   05/21/20 1.778 m (5' 10\")      Wt Readings from Last 1 Encounters:   05/21/20 101.2 kg (223 lb)    Estimated body mass index is 32 kg/m  as calculated from the following:    Height as of this encounter: 1.778 m (5' 10\").    Weight as of this encounter: 101.2 kg (223 lb).       Anesthesia Plan      History & Physical Review  History and physical reviewed and following examination, relevant changes include: BG in Preop in the 400's; pt asymptomatic; treating with IV " insulin    ASA Status:  4 .    NPO Status:  > 8 hours    Plan for General with Intravenous and Propofol induction. Maintenance will be Balanced.    PONV prophylaxis:  Ondansetron (or other 5HT-3)  Phenylephrine gtt        Postoperative Care  Postoperative pain management:  IV analgesics and Multi-modal analgesia.      Consents  Anesthetic plan, risks, benefits and alternatives discussed with:  Patient..                 Luís Brizuela MD

## 2020-05-21 NOTE — PLAN OF CARE
Arrived from PACU at 1320. A&O x4. VSS ex HTN on room air. CMS intact. Lungs clear. BS hypoactive, BM-, flatus-. Incisions ADELITA, sutures in mouth, all teeth removed. Tolerating full liquid diet. Denies N/V. BGs 361. Voiding adequately. Oxycodone for pain. Up w/independently.    Scheduled for AV replacement w/Dr. Rose tomorrow AM at 0720. NPO at midnight.

## 2020-05-21 NOTE — BRIEF OP NOTE
[facility]    Brief Operative Note    Pre-operative diagnosis: Carious dentition, caries extend into dentin, Chronic apical periodontitis. Dental clearance for aortic valve replacement  Post-operative diagnosis: Same as pre-operative diagnosis    Procedure: Procedure(s):  EXTRACTION, REMAINING TEETH #2,3,4,5,6,7,8,9,10,11,14,15,18,19,20,21,22,23,24,25,26,27,28,29,30 and 31   Surgeon: Surgeon(s) and Role:     * Vasyl Brand DDS - Primary  Anesthesia: General   Estimated blood loss: Less than 50 ml  Drains: None  Specimens:   ID Type Source Tests Collected by Time Destination   1 : MULTIPLE TEETH Other (specify in comments) Mouth OR DOCUMENTATION ONLY Vasyl Brand DDS 5/21/2020 10:23 AM      Findings:   Generalized carious dentition with chronic periapical disease.  Complications: None.  Implants: * No implants in log *      Post operative care:  - Have patient bite on 4x4 gauze if excessive oozing, expect oozing for 24-48 hours  - Sergicel placed in all extraction sites and patient hemostatic at end of procedure  - Peridex rinses 15cc swish and spit BID  - Please continue 3 doses of ancef perioperatively, or longer duration if desired by cardiology/cardiothoracic surgery  - Keep head of bed elevated  - Ice to jaws 20 minutes on and off first 24-48 hours  - Okay for full liquid diet  - Please place a formal OMFS consult through the hospital system   - Please page or call with any questions    Vasyl Brand DDS, MD  [phone]

## 2020-05-22 ENCOUNTER — ANESTHESIA (OUTPATIENT)
Dept: SURGERY | Facility: CLINIC | Age: 55
DRG: 220 | End: 2020-05-22
Payer: COMMERCIAL

## 2020-05-22 ENCOUNTER — APPOINTMENT (OUTPATIENT)
Dept: GENERAL RADIOLOGY | Facility: CLINIC | Age: 55
DRG: 220 | End: 2020-05-22
Attending: STUDENT IN AN ORGANIZED HEALTH CARE EDUCATION/TRAINING PROGRAM
Payer: COMMERCIAL

## 2020-05-22 LAB
ABO + RH BLD: NORMAL
ABO + RH BLD: NORMAL
ALBUMIN SERPL-MCNC: 3.1 G/DL (ref 3.4–5)
ALBUMIN UR-MCNC: 10 MG/DL
ALP SERPL-CCNC: 74 U/L (ref 40–150)
ALT SERPL W P-5'-P-CCNC: 23 U/L (ref 0–70)
ANION GAP SERPL CALCULATED.3IONS-SCNC: 5 MMOL/L (ref 3–14)
ANION GAP SERPL CALCULATED.3IONS-SCNC: 8 MMOL/L (ref 3–14)
APPEARANCE UR: CLEAR
APTT PPP: 43 SEC (ref 22–37)
APTT PPP: 88 SEC (ref 22–37)
AST SERPL W P-5'-P-CCNC: 45 U/L (ref 0–45)
BACTERIA #/AREA URNS HPF: ABNORMAL /HPF
BASE DEFICIT BLDA-SCNC: 3.1 MMOL/L
BASOPHILS # BLD AUTO: 0 10E9/L (ref 0–0.2)
BASOPHILS NFR BLD AUTO: 0 %
BILIRUB SERPL-MCNC: 0.6 MG/DL (ref 0.2–1.3)
BILIRUB UR QL STRIP: NEGATIVE
BLD GP AB SCN SERPL QL: NORMAL
BLD PROD TYP BPU: NORMAL
BLD UNIT ID BPU: 0
BLD UNIT ID BPU: 0
BLOOD BANK CMNT PATIENT-IMP: NORMAL
BLOOD PRODUCT CODE: NORMAL
BLOOD PRODUCT CODE: NORMAL
BPU ID: NORMAL
BPU ID: NORMAL
BUN SERPL-MCNC: 19 MG/DL (ref 7–30)
BUN SERPL-MCNC: 21 MG/DL (ref 7–30)
CA-I BLD-MCNC: 4.8 MG/DL (ref 4.4–5.2)
CALCIUM SERPL-MCNC: 8.2 MG/DL (ref 8.5–10.1)
CALCIUM SERPL-MCNC: 8.4 MG/DL (ref 8.5–10.1)
CHLORIDE SERPL-SCNC: 107 MMOL/L (ref 94–109)
CHLORIDE SERPL-SCNC: 107 MMOL/L (ref 94–109)
CO2 SERPL-SCNC: 23 MMOL/L (ref 20–32)
CO2 SERPL-SCNC: 25 MMOL/L (ref 20–32)
COLOR UR AUTO: YELLOW
CREAT SERPL-MCNC: 0.9 MG/DL (ref 0.66–1.25)
CREAT SERPL-MCNC: 0.94 MG/DL (ref 0.66–1.25)
DIFFERENTIAL METHOD BLD: ABNORMAL
EOSINOPHIL # BLD AUTO: 0.3 10E9/L (ref 0–0.7)
EOSINOPHIL NFR BLD AUTO: 1 %
ERYTHROCYTE [DISTWIDTH] IN BLOOD BY AUTOMATED COUNT: 13.1 % (ref 10–15)
ERYTHROCYTE [DISTWIDTH] IN BLOOD BY AUTOMATED COUNT: 13.2 % (ref 10–15)
FIBRINOGEN PPP-MCNC: 218 MG/DL (ref 200–420)
GFR SERPL CREATININE-BSD FRML MDRD: 89 ML/MIN/{1.73_M2}
GFR SERPL CREATININE-BSD FRML MDRD: >90 ML/MIN/{1.73_M2}
GLUCOSE BLDC GLUCOMTR-MCNC: 114 MG/DL (ref 70–99)
GLUCOSE BLDC GLUCOMTR-MCNC: 160 MG/DL (ref 70–99)
GLUCOSE BLDC GLUCOMTR-MCNC: 172 MG/DL (ref 70–99)
GLUCOSE BLDC GLUCOMTR-MCNC: 181 MG/DL (ref 70–99)
GLUCOSE BLDC GLUCOMTR-MCNC: 182 MG/DL (ref 70–99)
GLUCOSE BLDC GLUCOMTR-MCNC: 191 MG/DL (ref 70–99)
GLUCOSE BLDC GLUCOMTR-MCNC: 192 MG/DL (ref 70–99)
GLUCOSE BLDC GLUCOMTR-MCNC: 204 MG/DL (ref 70–99)
GLUCOSE BLDC GLUCOMTR-MCNC: 207 MG/DL (ref 70–99)
GLUCOSE BLDC GLUCOMTR-MCNC: 225 MG/DL (ref 70–99)
GLUCOSE BLDC GLUCOMTR-MCNC: 253 MG/DL (ref 70–99)
GLUCOSE BLDC GLUCOMTR-MCNC: 283 MG/DL (ref 70–99)
GLUCOSE BLDC GLUCOMTR-MCNC: 309 MG/DL (ref 70–99)
GLUCOSE BLDC GLUCOMTR-MCNC: 336 MG/DL (ref 70–99)
GLUCOSE BLDC GLUCOMTR-MCNC: 379 MG/DL (ref 70–99)
GLUCOSE SERPL-MCNC: 186 MG/DL (ref 70–99)
GLUCOSE SERPL-MCNC: 219 MG/DL (ref 70–99)
GLUCOSE UR STRIP-MCNC: NEGATIVE MG/DL
HCO3 BLD-SCNC: 23 MMOL/L (ref 21–28)
HCT VFR BLD AUTO: 22 % (ref 40–53)
HCT VFR BLD AUTO: 29.3 % (ref 40–53)
HGB BLD-MCNC: 10.1 G/DL (ref 13.3–17.7)
HGB BLD-MCNC: 7.9 G/DL (ref 13.3–17.7)
HGB UR QL STRIP: NEGATIVE
HYALINE CASTS #/AREA URNS LPF: 8 /LPF (ref 0–2)
INR PPP: 1.24 (ref 0.86–1.14)
INR PPP: 1.59 (ref 0.86–1.14)
KETONES UR STRIP-MCNC: NEGATIVE MG/DL
LACTATE BLD-SCNC: 2.5 MMOL/L (ref 0.7–2)
LEUKOCYTE ESTERASE UR QL STRIP: NEGATIVE
LYMPHOCYTES # BLD AUTO: 3 10E9/L (ref 0.8–5.3)
LYMPHOCYTES NFR BLD AUTO: 11 %
MAGNESIUM SERPL-MCNC: 2.3 MG/DL (ref 1.6–2.3)
MCH RBC QN AUTO: 31.3 PG (ref 26.5–33)
MCH RBC QN AUTO: 32.5 PG (ref 26.5–33)
MCHC RBC AUTO-ENTMCNC: 34.5 G/DL (ref 31.5–36.5)
MCHC RBC AUTO-ENTMCNC: 35.9 G/DL (ref 31.5–36.5)
MCV RBC AUTO: 91 FL (ref 78–100)
MCV RBC AUTO: 91 FL (ref 78–100)
MONOCYTES # BLD AUTO: 0.8 10E9/L (ref 0–1.3)
MONOCYTES NFR BLD AUTO: 3 %
MRSA DNA SPEC QL NAA+PROBE: NEGATIVE
MUCOUS THREADS #/AREA URNS LPF: PRESENT /LPF
NEUTROPHILS # BLD AUTO: 23.2 10E9/L (ref 1.6–8.3)
NEUTROPHILS NFR BLD AUTO: 85 %
NITRATE UR QL: NEGATIVE
NUM BPU REQUESTED: 4
OXYHGB MFR BLD: 98 % (ref 92–100)
PCO2 BLD: 46 MM HG (ref 35–45)
PH BLD: 7.31 PH (ref 7.35–7.45)
PH UR STRIP: 5 PH (ref 5–7)
PHOSPHATE SERPL-MCNC: 4.1 MG/DL (ref 2.5–4.5)
PLATELET # BLD AUTO: 162 10E9/L (ref 150–450)
PLATELET # BLD AUTO: 214 10E9/L (ref 150–450)
PLATELET # BLD EST: ABNORMAL 10*3/UL
PO2 BLD: 130 MM HG (ref 80–105)
POTASSIUM SERPL-SCNC: 4.7 MMOL/L (ref 3.4–5.3)
POTASSIUM SERPL-SCNC: 4.7 MMOL/L (ref 3.4–5.3)
PROT SERPL-MCNC: 6 G/DL (ref 6.8–8.8)
RBC # BLD AUTO: 2.43 10E12/L (ref 4.4–5.9)
RBC # BLD AUTO: 3.23 10E12/L (ref 4.4–5.9)
RBC #/AREA URNS AUTO: 1 /HPF (ref 0–2)
RBC MORPH BLD: ABNORMAL
SODIUM SERPL-SCNC: 137 MMOL/L (ref 133–144)
SODIUM SERPL-SCNC: 138 MMOL/L (ref 133–144)
SOURCE: ABNORMAL
SP GR UR STRIP: 1.03 (ref 1–1.03)
SPECIMEN EXP DATE BLD: NORMAL
SPECIMEN SOURCE: NORMAL
TRANSFUSION STATUS PATIENT QL: NORMAL
UROBILINOGEN UR STRIP-MCNC: NORMAL MG/DL (ref 0–2)
WBC # BLD AUTO: 20.1 10E9/L (ref 4–11)
WBC # BLD AUTO: 27.3 10E9/L (ref 4–11)
WBC #/AREA URNS AUTO: 3 /HPF (ref 0–5)

## 2020-05-22 PROCEDURE — C9113 INJ PANTOPRAZOLE SODIUM, VIA: HCPCS | Performed by: STUDENT IN AN ORGANIZED HEALTH CARE EDUCATION/TRAINING PROGRAM

## 2020-05-22 PROCEDURE — 80053 COMPREHEN METABOLIC PANEL: CPT | Performed by: STUDENT IN AN ORGANIZED HEALTH CARE EDUCATION/TRAINING PROGRAM

## 2020-05-22 PROCEDURE — 06BQ4ZZ EXCISION OF LEFT SAPHENOUS VEIN, PERCUTANEOUS ENDOSCOPIC APPROACH: ICD-10-PCS | Performed by: SURGERY

## 2020-05-22 PROCEDURE — 25800025 ZZH RX 258: Performed by: SURGERY

## 2020-05-22 PROCEDURE — 83605 ASSAY OF LACTIC ACID: CPT | Performed by: STUDENT IN AN ORGANIZED HEALTH CARE EDUCATION/TRAINING PROGRAM

## 2020-05-22 PROCEDURE — 93005 ELECTROCARDIOGRAM TRACING: CPT

## 2020-05-22 PROCEDURE — 27810169 ZZH OR IMPLANT GENERAL: Performed by: SURGERY

## 2020-05-22 PROCEDURE — 82330 ASSAY OF CALCIUM: CPT | Performed by: STUDENT IN AN ORGANIZED HEALTH CARE EDUCATION/TRAINING PROGRAM

## 2020-05-22 PROCEDURE — 80048 BASIC METABOLIC PNL TOTAL CA: CPT | Performed by: SURGERY

## 2020-05-22 PROCEDURE — P9041 ALBUMIN (HUMAN),5%, 50ML: HCPCS | Performed by: STUDENT IN AN ORGANIZED HEALTH CARE EDUCATION/TRAINING PROGRAM

## 2020-05-22 PROCEDURE — 82805 BLOOD GASES W/O2 SATURATION: CPT | Performed by: STUDENT IN AN ORGANIZED HEALTH CARE EDUCATION/TRAINING PROGRAM

## 2020-05-22 PROCEDURE — P9016 RBC LEUKOCYTES REDUCED: HCPCS | Performed by: SURGERY

## 2020-05-22 PROCEDURE — 85730 THROMBOPLASTIN TIME PARTIAL: CPT | Performed by: SURGERY

## 2020-05-22 PROCEDURE — 41000023 ZZH PERA-PERFUSION, SH ONLY,  EACH ADDTL 15 MIN: Performed by: SURGERY

## 2020-05-22 PROCEDURE — 87641 MR-STAPH DNA AMP PROBE: CPT | Performed by: SURGERY

## 2020-05-22 PROCEDURE — 25000125 ZZHC RX 250: Performed by: NURSE ANESTHETIST, CERTIFIED REGISTERED

## 2020-05-22 PROCEDURE — 36000075 ZZH SURGERY LEVEL 6 EA 15 ADDTL MIN: Performed by: SURGERY

## 2020-05-22 PROCEDURE — 02RF0JZ REPLACEMENT OF AORTIC VALVE WITH SYNTHETIC SUBSTITUTE, OPEN APPROACH: ICD-10-PCS | Performed by: SURGERY

## 2020-05-22 PROCEDURE — 87040 BLOOD CULTURE FOR BACTERIA: CPT | Performed by: PHYSICIAN ASSISTANT

## 2020-05-22 PROCEDURE — 02NN0ZZ RELEASE PERICARDIUM, OPEN APPROACH: ICD-10-PCS | Performed by: SURGERY

## 2020-05-22 PROCEDURE — 36000073 ZZH SURGERY LEVEL 6 1ST 30 MIN: Performed by: SURGERY

## 2020-05-22 PROCEDURE — 25000128 H RX IP 250 OP 636: Performed by: SURGERY

## 2020-05-22 PROCEDURE — 82805 BLOOD GASES W/O2 SATURATION: CPT | Performed by: SURGERY

## 2020-05-22 PROCEDURE — 84100 ASSAY OF PHOSPHORUS: CPT | Performed by: STUDENT IN AN ORGANIZED HEALTH CARE EDUCATION/TRAINING PROGRAM

## 2020-05-22 PROCEDURE — 40000985 XR CHEST PORT 1 VW

## 2020-05-22 PROCEDURE — 88311 DECALCIFY TISSUE: CPT | Mod: 26 | Performed by: SURGERY

## 2020-05-22 PROCEDURE — 25000128 H RX IP 250 OP 636: Performed by: STUDENT IN AN ORGANIZED HEALTH CARE EDUCATION/TRAINING PROGRAM

## 2020-05-22 PROCEDURE — C1757 CATH, THROMBECTOMY/EMBOLECT: HCPCS | Performed by: SURGERY

## 2020-05-22 PROCEDURE — 20000003 ZZH R&B ICU

## 2020-05-22 PROCEDURE — 02UN0JZ SUPPLEMENT PERICARDIUM WITH SYNTHETIC SUBSTITUTE, OPEN APPROACH: ICD-10-PCS | Performed by: SURGERY

## 2020-05-22 PROCEDURE — 93010 ELECTROCARDIOGRAM REPORT: CPT | Mod: 76 | Performed by: INTERNAL MEDICINE

## 2020-05-22 PROCEDURE — 5A1221Z PERFORMANCE OF CARDIAC OUTPUT, CONTINUOUS: ICD-10-PCS | Performed by: SURGERY

## 2020-05-22 PROCEDURE — 85025 COMPLETE CBC W/AUTO DIFF WBC: CPT | Performed by: SURGERY

## 2020-05-22 PROCEDURE — 36415 COLL VENOUS BLD VENIPUNCTURE: CPT | Performed by: PHYSICIAN ASSISTANT

## 2020-05-22 PROCEDURE — 25000128 H RX IP 250 OP 636: Performed by: PHYSICIAN ASSISTANT

## 2020-05-22 PROCEDURE — 85730 THROMBOPLASTIN TIME PARTIAL: CPT | Performed by: STUDENT IN AN ORGANIZED HEALTH CARE EDUCATION/TRAINING PROGRAM

## 2020-05-22 PROCEDURE — 41000022 ZZH PER-PERFUSION, SH ONLY,  1ST 30 MIN: Performed by: SURGERY

## 2020-05-22 PROCEDURE — 25800030 ZZH RX IP 258 OP 636: Performed by: ANESTHESIOLOGY

## 2020-05-22 PROCEDURE — 25000125 ZZHC RX 250: Performed by: SURGERY

## 2020-05-22 PROCEDURE — 88305 TISSUE EXAM BY PATHOLOGIST: CPT | Performed by: SURGERY

## 2020-05-22 PROCEDURE — 85384 FIBRINOGEN ACTIVITY: CPT | Performed by: SURGERY

## 2020-05-22 PROCEDURE — 99291 CRITICAL CARE FIRST HOUR: CPT | Performed by: INTERNAL MEDICINE

## 2020-05-22 PROCEDURE — 85610 PROTHROMBIN TIME: CPT | Performed by: SURGERY

## 2020-05-22 PROCEDURE — 85610 PROTHROMBIN TIME: CPT | Performed by: STUDENT IN AN ORGANIZED HEALTH CARE EDUCATION/TRAINING PROGRAM

## 2020-05-22 PROCEDURE — 83735 ASSAY OF MAGNESIUM: CPT | Performed by: STUDENT IN AN ORGANIZED HEALTH CARE EDUCATION/TRAINING PROGRAM

## 2020-05-22 PROCEDURE — 25000565 ZZH ISOFLURANE, EA 15 MIN: Performed by: SURGERY

## 2020-05-22 PROCEDURE — 25000128 H RX IP 250 OP 636: Performed by: NURSE ANESTHETIST, CERTIFIED REGISTERED

## 2020-05-22 PROCEDURE — 25000132 ZZH RX MED GY IP 250 OP 250 PS 637: Performed by: SURGERY

## 2020-05-22 PROCEDURE — 40000275 ZZH STATISTIC RCP TIME EA 10 MIN

## 2020-05-22 PROCEDURE — 99207 ZZC NON-BILLABLE SERV PER CHARTING: CPT | Performed by: INTERNAL MEDICINE

## 2020-05-22 PROCEDURE — 3E043XZ INTRODUCTION OF VASOPRESSOR INTO CENTRAL VEIN, PERCUTANEOUS APPROACH: ICD-10-PCS | Performed by: SURGERY

## 2020-05-22 PROCEDURE — 02100Z9 BYPASS CORONARY ARTERY, ONE ARTERY FROM LEFT INTERNAL MAMMARY, OPEN APPROACH: ICD-10-PCS | Performed by: SURGERY

## 2020-05-22 PROCEDURE — 25000128 H RX IP 250 OP 636: Performed by: ANESTHESIOLOGY

## 2020-05-22 PROCEDURE — 25000131 ZZH RX MED GY IP 250 OP 636 PS 637: Performed by: STUDENT IN AN ORGANIZED HEALTH CARE EDUCATION/TRAINING PROGRAM

## 2020-05-22 PROCEDURE — 25000131 ZZH RX MED GY IP 250 OP 636 PS 637: Performed by: SURGERY

## 2020-05-22 PROCEDURE — 25000125 ZZHC RX 250: Performed by: STUDENT IN AN ORGANIZED HEALTH CARE EDUCATION/TRAINING PROGRAM

## 2020-05-22 PROCEDURE — 25000131 ZZH RX MED GY IP 250 OP 636 PS 637: Performed by: NURSE ANESTHETIST, CERTIFIED REGISTERED

## 2020-05-22 PROCEDURE — 25800030 ZZH RX IP 258 OP 636: Performed by: STUDENT IN AN ORGANIZED HEALTH CARE EDUCATION/TRAINING PROGRAM

## 2020-05-22 PROCEDURE — P9041 ALBUMIN (HUMAN),5%, 50ML: HCPCS | Performed by: NURSE ANESTHETIST, CERTIFIED REGISTERED

## 2020-05-22 PROCEDURE — 25800030 ZZH RX IP 258 OP 636: Performed by: NURSE ANESTHETIST, CERTIFIED REGISTERED

## 2020-05-22 PROCEDURE — 27210794 ZZH OR GENERAL SUPPLY STERILE: Performed by: SURGERY

## 2020-05-22 PROCEDURE — 88311 DECALCIFY TISSUE: CPT | Performed by: SURGERY

## 2020-05-22 PROCEDURE — 5A1223Z PERFORMANCE OF CARDIAC PACING, CONTINUOUS: ICD-10-PCS | Performed by: SURGERY

## 2020-05-22 PROCEDURE — 40000171 ZZH STATISTIC PRE-PROCEDURE ASSESSMENT III: Performed by: SURGERY

## 2020-05-22 PROCEDURE — 25000132 ZZH RX MED GY IP 250 OP 250 PS 637: Performed by: NURSE PRACTITIONER

## 2020-05-22 PROCEDURE — 82330 ASSAY OF CALCIUM: CPT | Performed by: SURGERY

## 2020-05-22 PROCEDURE — 83605 ASSAY OF LACTIC ACID: CPT | Performed by: SURGERY

## 2020-05-22 PROCEDURE — 37000008 ZZH ANESTHESIA TECHNICAL FEE, 1ST 30 MIN: Performed by: SURGERY

## 2020-05-22 PROCEDURE — 25000132 ZZH RX MED GY IP 250 OP 250 PS 637: Performed by: STUDENT IN AN ORGANIZED HEALTH CARE EDUCATION/TRAINING PROGRAM

## 2020-05-22 PROCEDURE — 25000132 ZZH RX MED GY IP 250 OP 250 PS 637: Performed by: DENTIST

## 2020-05-22 PROCEDURE — 25800030 ZZH RX IP 258 OP 636: Performed by: SURGERY

## 2020-05-22 PROCEDURE — 81001 URINALYSIS AUTO W/SCOPE: CPT | Performed by: STUDENT IN AN ORGANIZED HEALTH CARE EDUCATION/TRAINING PROGRAM

## 2020-05-22 PROCEDURE — 37000009 ZZH ANESTHESIA TECHNICAL FEE, EACH ADDTL 15 MIN: Performed by: SURGERY

## 2020-05-22 PROCEDURE — 25000128 H RX IP 250 OP 636: Performed by: NURSE PRACTITIONER

## 2020-05-22 PROCEDURE — 85027 COMPLETE CBC AUTOMATED: CPT | Performed by: STUDENT IN AN ORGANIZED HEALTH CARE EDUCATION/TRAINING PROGRAM

## 2020-05-22 PROCEDURE — 88305 TISSUE EXAM BY PATHOLOGIST: CPT | Mod: 26 | Performed by: SURGERY

## 2020-05-22 PROCEDURE — 87640 STAPH A DNA AMP PROBE: CPT | Performed by: SURGERY

## 2020-05-22 PROCEDURE — 00000146 ZZHCL STATISTIC GLUCOSE BY METER IP

## 2020-05-22 DEVICE — VALVE AORTIC REGENT FLEX-CUFF 23MM 23AGFN-756: Type: IMPLANTABLE DEVICE | Site: HEART | Status: FUNCTIONAL

## 2020-05-22 RX ORDER — ACETAMINOPHEN 325 MG/1
650 TABLET ORAL EVERY 4 HOURS PRN
Status: DISCONTINUED | OUTPATIENT
Start: 2020-05-25 | End: 2020-05-24

## 2020-05-22 RX ORDER — POTASSIUM CL/LIDO/0.9 % NACL 10MEQ/0.1L
10 INTRAVENOUS SOLUTION, PIGGYBACK (ML) INTRAVENOUS
Status: DISCONTINUED | OUTPATIENT
Start: 2020-05-22 | End: 2020-05-27

## 2020-05-22 RX ORDER — ONDANSETRON 4 MG/1
4 TABLET, ORALLY DISINTEGRATING ORAL EVERY 6 HOURS PRN
Status: DISCONTINUED | OUTPATIENT
Start: 2020-05-22 | End: 2020-05-28 | Stop reason: HOSPADM

## 2020-05-22 RX ORDER — ONDANSETRON 2 MG/ML
4 INJECTION INTRAMUSCULAR; INTRAVENOUS EVERY 6 HOURS PRN
Status: DISCONTINUED | OUTPATIENT
Start: 2020-05-22 | End: 2020-05-28 | Stop reason: HOSPADM

## 2020-05-22 RX ORDER — ALBUMIN, HUMAN INJ 5% 5 %
500-1000 SOLUTION INTRAVENOUS 2 TIMES DAILY PRN
Status: COMPLETED | OUTPATIENT
Start: 2020-05-22 | End: 2020-05-22

## 2020-05-22 RX ORDER — DEXTROSE MONOHYDRATE 25 G/50ML
25-50 INJECTION, SOLUTION INTRAVENOUS
Status: DISCONTINUED | OUTPATIENT
Start: 2020-05-22 | End: 2020-05-28 | Stop reason: HOSPADM

## 2020-05-22 RX ORDER — DEXTROSE MONOHYDRATE 100 MG/ML
INJECTION, SOLUTION INTRAVENOUS CONTINUOUS PRN
Status: DISCONTINUED | OUTPATIENT
Start: 2020-05-22 | End: 2020-05-28 | Stop reason: HOSPADM

## 2020-05-22 RX ORDER — POTASSIUM CHLORIDE 7.45 MG/ML
10 INJECTION INTRAVENOUS
Status: DISCONTINUED | OUTPATIENT
Start: 2020-05-22 | End: 2020-05-27

## 2020-05-22 RX ORDER — KETOROLAC TROMETHAMINE 15 MG/ML
15 INJECTION, SOLUTION INTRAMUSCULAR; INTRAVENOUS EVERY 6 HOURS PRN
Status: DISCONTINUED | OUTPATIENT
Start: 2020-05-22 | End: 2020-05-25

## 2020-05-22 RX ORDER — MEPERIDINE HYDROCHLORIDE 25 MG/ML
12.5-25 INJECTION INTRAMUSCULAR; INTRAVENOUS; SUBCUTANEOUS
Status: DISCONTINUED | OUTPATIENT
Start: 2020-05-22 | End: 2020-05-24

## 2020-05-22 RX ORDER — METOCLOPRAMIDE HYDROCHLORIDE 5 MG/ML
10 INJECTION INTRAMUSCULAR; INTRAVENOUS EVERY 6 HOURS PRN
Status: DISCONTINUED | OUTPATIENT
Start: 2020-05-22 | End: 2020-05-28 | Stop reason: HOSPADM

## 2020-05-22 RX ORDER — MUPIROCIN 20 MG/G
OINTMENT TOPICAL 2 TIMES DAILY
Status: DISCONTINUED | OUTPATIENT
Start: 2020-05-22 | End: 2020-05-22 | Stop reason: HOSPADM

## 2020-05-22 RX ORDER — ALBUMIN, HUMAN INJ 5% 5 %
SOLUTION INTRAVENOUS
Status: DISCONTINUED
Start: 2020-05-22 | End: 2020-05-23 | Stop reason: HOSPADM

## 2020-05-22 RX ORDER — CEFAZOLIN SODIUM 1 G/3ML
1 INJECTION, POWDER, FOR SOLUTION INTRAMUSCULAR; INTRAVENOUS SEE ADMIN INSTRUCTIONS
Status: DISCONTINUED | OUTPATIENT
Start: 2020-05-22 | End: 2020-05-22 | Stop reason: HOSPADM

## 2020-05-22 RX ORDER — SODIUM CHLORIDE 9 MG/ML
INJECTION, SOLUTION INTRAVENOUS CONTINUOUS PRN
Status: DISCONTINUED | OUTPATIENT
Start: 2020-05-22 | End: 2020-05-22

## 2020-05-22 RX ORDER — LIDOCAINE 40 MG/G
CREAM TOPICAL
Status: DISCONTINUED | OUTPATIENT
Start: 2020-05-22 | End: 2020-05-28 | Stop reason: HOSPADM

## 2020-05-22 RX ORDER — DEXTROSE MONOHYDRATE, SODIUM CHLORIDE, AND POTASSIUM CHLORIDE 50; 1.49; 4.5 G/1000ML; G/1000ML; G/1000ML
INJECTION, SOLUTION INTRAVENOUS CONTINUOUS
Status: DISCONTINUED | OUTPATIENT
Start: 2020-05-22 | End: 2020-05-24

## 2020-05-22 RX ORDER — POTASSIUM CHLORIDE 1.5 G/1.58G
20-40 POWDER, FOR SOLUTION ORAL
Status: DISCONTINUED | OUTPATIENT
Start: 2020-05-22 | End: 2020-05-27

## 2020-05-22 RX ORDER — POLYETHYLENE GLYCOL 3350 17 G/17G
17 POWDER, FOR SOLUTION ORAL DAILY
Status: DISCONTINUED | OUTPATIENT
Start: 2020-05-23 | End: 2020-05-28 | Stop reason: HOSPADM

## 2020-05-22 RX ORDER — NITROGLYCERIN 20 MG/100ML
10-200 INJECTION INTRAVENOUS CONTINUOUS
Status: DISCONTINUED | OUTPATIENT
Start: 2020-05-22 | End: 2020-05-24

## 2020-05-22 RX ORDER — CLINDAMYCIN PHOSPHATE 900 MG/50ML
900 INJECTION, SOLUTION INTRAVENOUS EVERY 8 HOURS
Status: COMPLETED | OUTPATIENT
Start: 2020-05-22 | End: 2020-05-23

## 2020-05-22 RX ORDER — CEFAZOLIN SODIUM 2 G/100ML
2 INJECTION, SOLUTION INTRAVENOUS
Status: COMPLETED | OUTPATIENT
Start: 2020-05-22 | End: 2020-05-22

## 2020-05-22 RX ORDER — PROPOFOL 10 MG/ML
INJECTION, EMULSION INTRAVENOUS PRN
Status: DISCONTINUED | OUTPATIENT
Start: 2020-05-22 | End: 2020-05-22

## 2020-05-22 RX ORDER — PROTAMINE SULFATE 10 MG/ML
INJECTION, SOLUTION INTRAVENOUS PRN
Status: DISCONTINUED | OUTPATIENT
Start: 2020-05-22 | End: 2020-05-22

## 2020-05-22 RX ORDER — METOPROLOL TARTRATE 25 MG/1
25 TABLET, FILM COATED ORAL
Status: DISCONTINUED | OUTPATIENT
Start: 2020-05-22 | End: 2020-05-22 | Stop reason: HOSPADM

## 2020-05-22 RX ORDER — ASPIRIN 81 MG/1
81 TABLET ORAL DAILY
Status: DISCONTINUED | OUTPATIENT
Start: 2020-05-23 | End: 2020-05-28 | Stop reason: HOSPADM

## 2020-05-22 RX ORDER — NICOTINE POLACRILEX 4 MG
15-30 LOZENGE BUCCAL
Status: DISCONTINUED | OUTPATIENT
Start: 2020-05-22 | End: 2020-05-28 | Stop reason: HOSPADM

## 2020-05-22 RX ORDER — SODIUM CHLORIDE, SODIUM LACTATE, POTASSIUM CHLORIDE, CALCIUM CHLORIDE 600; 310; 30; 20 MG/100ML; MG/100ML; MG/100ML; MG/100ML
INJECTION, SOLUTION INTRAVENOUS CONTINUOUS
Status: DISCONTINUED | OUTPATIENT
Start: 2020-05-22 | End: 2020-05-22 | Stop reason: HOSPADM

## 2020-05-22 RX ORDER — HYDROMORPHONE HYDROCHLORIDE 1 MG/ML
0.5 INJECTION, SOLUTION INTRAMUSCULAR; INTRAVENOUS; SUBCUTANEOUS ONCE
Status: COMPLETED | OUTPATIENT
Start: 2020-05-22 | End: 2020-05-22

## 2020-05-22 RX ORDER — NITROGLYCERIN 10 MG/100ML
INJECTION INTRAVENOUS PRN
Status: DISCONTINUED | OUTPATIENT
Start: 2020-05-22 | End: 2020-05-22

## 2020-05-22 RX ORDER — GABAPENTIN 300 MG/1
300 CAPSULE ORAL 2 TIMES DAILY
Status: COMPLETED | OUTPATIENT
Start: 2020-05-22 | End: 2020-05-25

## 2020-05-22 RX ORDER — PROPOFOL 10 MG/ML
5-75 INJECTION, EMULSION INTRAVENOUS CONTINUOUS
Status: DISCONTINUED | OUTPATIENT
Start: 2020-05-22 | End: 2020-05-23

## 2020-05-22 RX ORDER — FENTANYL CITRATE 50 UG/ML
INJECTION, SOLUTION INTRAMUSCULAR; INTRAVENOUS PRN
Status: DISCONTINUED | OUTPATIENT
Start: 2020-05-22 | End: 2020-05-22

## 2020-05-22 RX ORDER — VECURONIUM BROMIDE 1 MG/ML
INJECTION, POWDER, LYOPHILIZED, FOR SOLUTION INTRAVENOUS PRN
Status: DISCONTINUED | OUTPATIENT
Start: 2020-05-22 | End: 2020-05-22

## 2020-05-22 RX ORDER — ACETAMINOPHEN 325 MG/1
975 TABLET ORAL EVERY 8 HOURS
Status: DISPENSED | OUTPATIENT
Start: 2020-05-22 | End: 2020-05-25

## 2020-05-22 RX ORDER — POTASSIUM CHLORIDE 1500 MG/1
20-40 TABLET, EXTENDED RELEASE ORAL
Status: DISCONTINUED | OUTPATIENT
Start: 2020-05-22 | End: 2020-05-27

## 2020-05-22 RX ORDER — MUPIROCIN 20 MG/G
0.5 OINTMENT TOPICAL 2 TIMES DAILY
Status: DISCONTINUED | OUTPATIENT
Start: 2020-05-22 | End: 2020-05-23 | Stop reason: CLARIF

## 2020-05-22 RX ORDER — HYDROMORPHONE HYDROCHLORIDE 1 MG/ML
.3-.5 INJECTION, SOLUTION INTRAMUSCULAR; INTRAVENOUS; SUBCUTANEOUS
Status: DISCONTINUED | OUTPATIENT
Start: 2020-05-22 | End: 2020-05-25

## 2020-05-22 RX ORDER — AMOXICILLIN 250 MG
1 CAPSULE ORAL 2 TIMES DAILY
Status: DISCONTINUED | OUTPATIENT
Start: 2020-05-22 | End: 2020-05-28 | Stop reason: HOSPADM

## 2020-05-22 RX ORDER — OXYCODONE HYDROCHLORIDE 5 MG/1
5-10 TABLET ORAL
Status: DISCONTINUED | OUTPATIENT
Start: 2020-05-22 | End: 2020-05-28 | Stop reason: HOSPADM

## 2020-05-22 RX ORDER — PROPOFOL 10 MG/ML
INJECTION, EMULSION INTRAVENOUS CONTINUOUS PRN
Status: DISCONTINUED | OUTPATIENT
Start: 2020-05-22 | End: 2020-05-22

## 2020-05-22 RX ORDER — MAGNESIUM SULFATE HEPTAHYDRATE 40 MG/ML
4 INJECTION, SOLUTION INTRAVENOUS EVERY 4 HOURS PRN
Status: DISCONTINUED | OUTPATIENT
Start: 2020-05-22 | End: 2020-05-28 | Stop reason: HOSPADM

## 2020-05-22 RX ORDER — AMOXICILLIN 250 MG
2 CAPSULE ORAL 2 TIMES DAILY
Status: DISCONTINUED | OUTPATIENT
Start: 2020-05-22 | End: 2020-05-28 | Stop reason: HOSPADM

## 2020-05-22 RX ORDER — PAPAVERINE HYDROCHLORIDE 30 MG/ML
INJECTION INTRAMUSCULAR; INTRAVENOUS PRN
Status: DISCONTINUED | OUTPATIENT
Start: 2020-05-22 | End: 2020-05-22 | Stop reason: HOSPADM

## 2020-05-22 RX ORDER — FENTANYL CITRATE 50 UG/ML
50 INJECTION, SOLUTION INTRAMUSCULAR; INTRAVENOUS
Status: DISCONTINUED | OUTPATIENT
Start: 2020-05-22 | End: 2020-05-22

## 2020-05-22 RX ORDER — HEPARIN SODIUM 1000 [USP'U]/ML
INJECTION, SOLUTION INTRAVENOUS; SUBCUTANEOUS PRN
Status: DISCONTINUED | OUTPATIENT
Start: 2020-05-22 | End: 2020-05-22

## 2020-05-22 RX ORDER — FAMOTIDINE 20 MG/1
20 TABLET, FILM COATED ORAL
Status: COMPLETED | OUTPATIENT
Start: 2020-05-22 | End: 2020-05-22

## 2020-05-22 RX ORDER — PHENYLEPHRINE HCL IN 0.9% NACL 50MG/250ML
.5-2 PLASTIC BAG, INJECTION (ML) INTRAVENOUS CONTINUOUS
Status: DISCONTINUED | OUTPATIENT
Start: 2020-05-22 | End: 2020-05-24

## 2020-05-22 RX ORDER — LIDOCAINE 4 G/G
3 PATCH TOPICAL
Status: DISCONTINUED | OUTPATIENT
Start: 2020-05-23 | End: 2020-05-23

## 2020-05-22 RX ORDER — POTASSIUM CHLORIDE 29.8 MG/ML
20 INJECTION INTRAVENOUS
Status: DISCONTINUED | OUTPATIENT
Start: 2020-05-22 | End: 2020-05-27

## 2020-05-22 RX ORDER — NALOXONE HYDROCHLORIDE 0.4 MG/ML
.1-.4 INJECTION, SOLUTION INTRAMUSCULAR; INTRAVENOUS; SUBCUTANEOUS
Status: DISCONTINUED | OUTPATIENT
Start: 2020-05-22 | End: 2020-05-28 | Stop reason: HOSPADM

## 2020-05-22 RX ORDER — SODIUM CHLORIDE, SODIUM LACTATE, POTASSIUM CHLORIDE, CALCIUM CHLORIDE 600; 310; 30; 20 MG/100ML; MG/100ML; MG/100ML; MG/100ML
INJECTION, SOLUTION INTRAVENOUS CONTINUOUS PRN
Status: DISCONTINUED | OUTPATIENT
Start: 2020-05-22 | End: 2020-05-22

## 2020-05-22 RX ORDER — HYDRALAZINE HYDROCHLORIDE 20 MG/ML
10 INJECTION INTRAMUSCULAR; INTRAVENOUS EVERY 30 MIN PRN
Status: DISCONTINUED | OUTPATIENT
Start: 2020-05-22 | End: 2020-05-28 | Stop reason: HOSPADM

## 2020-05-22 RX ORDER — FENTANYL CITRATE 50 UG/ML
50-100 INJECTION, SOLUTION INTRAMUSCULAR; INTRAVENOUS
Status: DISCONTINUED | OUTPATIENT
Start: 2020-05-22 | End: 2020-05-22 | Stop reason: HOSPADM

## 2020-05-22 RX ORDER — ALBUMIN, HUMAN INJ 5% 5 %
SOLUTION INTRAVENOUS CONTINUOUS PRN
Status: DISCONTINUED | OUTPATIENT
Start: 2020-05-22 | End: 2020-05-22

## 2020-05-22 RX ORDER — METOCLOPRAMIDE 10 MG/1
10 TABLET ORAL EVERY 6 HOURS PRN
Status: DISCONTINUED | OUTPATIENT
Start: 2020-05-22 | End: 2020-05-28 | Stop reason: HOSPADM

## 2020-05-22 RX ADMIN — ROCURONIUM BROMIDE 50 MG: 10 INJECTION INTRAVENOUS at 07:43

## 2020-05-22 RX ADMIN — FENTANYL CITRATE 100 MCG: 50 INJECTION, SOLUTION INTRAMUSCULAR; INTRAVENOUS at 09:30

## 2020-05-22 RX ADMIN — EPINEPHRINE 10 MCG: 1 INJECTION INTRAMUSCULAR; INTRAVENOUS; SUBCUTANEOUS at 12:21

## 2020-05-22 RX ADMIN — SODIUM CHLORIDE, POTASSIUM CHLORIDE, SODIUM LACTATE AND CALCIUM CHLORIDE: 600; 310; 30; 20 INJECTION, SOLUTION INTRAVENOUS at 07:31

## 2020-05-22 RX ADMIN — PHENYLEPHRINE HYDROCHLORIDE 100 MCG: 10 INJECTION INTRAVENOUS at 08:10

## 2020-05-22 RX ADMIN — ALBUMIN HUMAN 500 ML: 0.05 INJECTION, SOLUTION INTRAVENOUS at 23:33

## 2020-05-22 RX ADMIN — HUMAN INSULIN 4 UNITS/HR: 100 INJECTION, SOLUTION SUBCUTANEOUS at 08:04

## 2020-05-22 RX ADMIN — ACETAMINOPHEN 975 MG: 325 TABLET, FILM COATED ORAL at 04:52

## 2020-05-22 RX ADMIN — VECURONIUM BROMIDE 4 MG: 1 INJECTION, POWDER, LYOPHILIZED, FOR SOLUTION INTRAVENOUS at 11:51

## 2020-05-22 RX ADMIN — HYDROMORPHONE HYDROCHLORIDE 0.5 MG: 1 INJECTION, SOLUTION INTRAMUSCULAR; INTRAVENOUS; SUBCUTANEOUS at 15:16

## 2020-05-22 RX ADMIN — PHENYLEPHRINE HYDROCHLORIDE 200 MCG: 10 INJECTION INTRAVENOUS at 07:53

## 2020-05-22 RX ADMIN — MUPIROCIN: 20 OINTMENT TOPICAL at 05:25

## 2020-05-22 RX ADMIN — SODIUM CHLORIDE 2 UNITS/HR: 9 INJECTION, SOLUTION INTRAVENOUS at 15:43

## 2020-05-22 RX ADMIN — Medication 1500 MG: at 07:58

## 2020-05-22 RX ADMIN — OXYCODONE HYDROCHLORIDE 10 MG: 5 TABLET ORAL at 01:59

## 2020-05-22 RX ADMIN — SODIUM CHLORIDE, POTASSIUM CHLORIDE, SODIUM LACTATE AND CALCIUM CHLORIDE: 600; 310; 30; 20 INJECTION, SOLUTION INTRAVENOUS at 06:42

## 2020-05-22 RX ADMIN — SODIUM CHLORIDE, POTASSIUM CHLORIDE, SODIUM LACTATE AND CALCIUM CHLORIDE: 600; 310; 30; 20 INJECTION, SOLUTION INTRAVENOUS at 13:13

## 2020-05-22 RX ADMIN — PROPOFOL 20 MCG/KG/MIN: 10 INJECTION, EMULSION INTRAVENOUS at 16:16

## 2020-05-22 RX ADMIN — VANCOMYCIN HYDROCHLORIDE 1500 MG: 5 INJECTION, POWDER, LYOPHILIZED, FOR SOLUTION INTRAVENOUS at 21:03

## 2020-05-22 RX ADMIN — INSULIN ASPART 4 UNITS: 100 INJECTION, SOLUTION INTRAVENOUS; SUBCUTANEOUS at 04:55

## 2020-05-22 RX ADMIN — CEFAZOLIN 1 G: 330 INJECTION, POWDER, FOR SOLUTION INTRAMUSCULAR; INTRAVENOUS at 01:29

## 2020-05-22 RX ADMIN — NITROGLYCERIN 10 MCG: 10 INJECTION INTRAVENOUS at 12:33

## 2020-05-22 RX ADMIN — PHENYLEPHRINE HYDROCHLORIDE 100 MCG: 10 INJECTION INTRAVENOUS at 07:41

## 2020-05-22 RX ADMIN — AMINOCAPROIC ACID 5 G/HR: 250 INJECTION, SOLUTION INTRAVENOUS at 07:45

## 2020-05-22 RX ADMIN — SODIUM CHLORIDE: 9 INJECTION, SOLUTION INTRAVENOUS at 13:12

## 2020-05-22 RX ADMIN — PHENYLEPHRINE HYDROCHLORIDE 100 MCG: 10 INJECTION INTRAVENOUS at 08:15

## 2020-05-22 RX ADMIN — VECURONIUM BROMIDE 4 MG: 1 INJECTION, POWDER, LYOPHILIZED, FOR SOLUTION INTRAVENOUS at 10:45

## 2020-05-22 RX ADMIN — MIDAZOLAM HYDROCHLORIDE 2 MG: 1 INJECTION, SOLUTION INTRAMUSCULAR; INTRAVENOUS at 08:34

## 2020-05-22 RX ADMIN — HYDROMORPHONE HYDROCHLORIDE 0.5 MG: 1 INJECTION, SOLUTION INTRAMUSCULAR; INTRAVENOUS; SUBCUTANEOUS at 14:54

## 2020-05-22 RX ADMIN — NITROGLYCERIN 40 MCG/MIN: 20 INJECTION INTRAVENOUS at 14:30

## 2020-05-22 RX ADMIN — ALBUMIN HUMAN: 0.05 INJECTION, SOLUTION INTRAVENOUS at 08:16

## 2020-05-22 RX ADMIN — NITROGLYCERIN 10 MCG: 10 INJECTION INTRAVENOUS at 12:54

## 2020-05-22 RX ADMIN — HEPARIN SODIUM 40000 UNITS: 1000 INJECTION INTRAVENOUS; SUBCUTANEOUS at 09:11

## 2020-05-22 RX ADMIN — PANTOPRAZOLE SODIUM 40 MG: 40 INJECTION, POWDER, FOR SOLUTION INTRAVENOUS at 16:32

## 2020-05-22 RX ADMIN — CEFAZOLIN SODIUM 1 G: 2 INJECTION, SOLUTION INTRAVENOUS at 11:58

## 2020-05-22 RX ADMIN — VECURONIUM BROMIDE 5 MG: 1 INJECTION, POWDER, LYOPHILIZED, FOR SOLUTION INTRAVENOUS at 08:15

## 2020-05-22 RX ADMIN — PHENYLEPHRINE HYDROCHLORIDE 0.25 MCG/KG/MIN: 10 INJECTION INTRAVENOUS at 07:32

## 2020-05-22 RX ADMIN — PHENYLEPHRINE HYDROCHLORIDE 150 MCG: 10 INJECTION INTRAVENOUS at 12:21

## 2020-05-22 RX ADMIN — GABAPENTIN 300 MG: 300 CAPSULE ORAL at 20:24

## 2020-05-22 RX ADMIN — CEFAZOLIN SODIUM 1 G: 2 INJECTION, SOLUTION INTRAVENOUS at 09:58

## 2020-05-22 RX ADMIN — EPINEPHRINE 0.03 MCG/KG/MIN: 1 INJECTION INTRAMUSCULAR; INTRAVENOUS; SUBCUTANEOUS at 12:01

## 2020-05-22 RX ADMIN — PROPOFOL 50 MG: 10 INJECTION, EMULSION INTRAVENOUS at 07:41

## 2020-05-22 RX ADMIN — INSULIN HUMAN 4 UNITS: 100 INJECTION, SOLUTION PARENTERAL at 08:04

## 2020-05-22 RX ADMIN — PHENYLEPHRINE HYDROCHLORIDE 150 MCG: 10 INJECTION INTRAVENOUS at 12:09

## 2020-05-22 RX ADMIN — MUPIROCIN 0.5 G: 20 OINTMENT TOPICAL at 20:24

## 2020-05-22 RX ADMIN — HYDROMORPHONE HYDROCHLORIDE 0.5 MG: 1 INJECTION, SOLUTION INTRAMUSCULAR; INTRAVENOUS; SUBCUTANEOUS at 16:43

## 2020-05-22 RX ADMIN — ACETAMINOPHEN 975 MG: 325 TABLET, FILM COATED ORAL at 21:48

## 2020-05-22 RX ADMIN — FENTANYL CITRATE 200 MCG: 50 INJECTION, SOLUTION INTRAMUSCULAR; INTRAVENOUS at 08:20

## 2020-05-22 RX ADMIN — ALBUMIN HUMAN 500 ML: 0.05 INJECTION, SOLUTION INTRAVENOUS at 17:00

## 2020-05-22 RX ADMIN — MIDAZOLAM HYDROCHLORIDE 3 MG: 1 INJECTION, SOLUTION INTRAMUSCULAR; INTRAVENOUS at 07:29

## 2020-05-22 RX ADMIN — PROPOFOL 30 MCG/KG/MIN: 10 INJECTION, EMULSION INTRAVENOUS at 13:06

## 2020-05-22 RX ADMIN — SODIUM CHLORIDE: 9 INJECTION, SOLUTION INTRAVENOUS at 07:31

## 2020-05-22 RX ADMIN — FENTANYL CITRATE 100 MCG: 50 INJECTION, SOLUTION INTRAMUSCULAR; INTRAVENOUS at 08:28

## 2020-05-22 RX ADMIN — VECURONIUM BROMIDE 3 MG: 1 INJECTION, POWDER, LYOPHILIZED, FOR SOLUTION INTRAVENOUS at 09:05

## 2020-05-22 RX ADMIN — PHENYLEPHRINE HYDROCHLORIDE 100 MCG: 10 INJECTION INTRAVENOUS at 07:42

## 2020-05-22 RX ADMIN — PHENYLEPHRINE HYDROCHLORIDE 100 MCG: 10 INJECTION INTRAVENOUS at 07:55

## 2020-05-22 RX ADMIN — FAMOTIDINE 20 MG: 20 TABLET ORAL at 06:12

## 2020-05-22 RX ADMIN — NITROGLYCERIN 10 MCG: 10 INJECTION INTRAVENOUS at 12:36

## 2020-05-22 RX ADMIN — PHENYLEPHRINE HYDROCHLORIDE 100 MCG: 10 INJECTION INTRAVENOUS at 07:47

## 2020-05-22 RX ADMIN — FENTANYL CITRATE 250 MCG: 50 INJECTION, SOLUTION INTRAMUSCULAR; INTRAVENOUS at 07:41

## 2020-05-22 RX ADMIN — CLINDAMYCIN IN 5 PERCENT DEXTROSE 900 MG: 18 INJECTION, SOLUTION INTRAVENOUS at 20:20

## 2020-05-22 RX ADMIN — MIDAZOLAM HYDROCHLORIDE 2 MG: 1 INJECTION, SOLUTION INTRAMUSCULAR; INTRAVENOUS at 07:02

## 2020-05-22 RX ADMIN — NITROGLYCERIN 10 MCG: 10 INJECTION INTRAVENOUS at 12:34

## 2020-05-22 RX ADMIN — PHENYLEPHRINE HYDROCHLORIDE 150 MCG: 10 INJECTION INTRAVENOUS at 08:50

## 2020-05-22 RX ADMIN — INSULIN ASPART 5 UNITS: 100 INJECTION, SOLUTION INTRAVENOUS; SUBCUTANEOUS at 01:32

## 2020-05-22 RX ADMIN — CHLORHEXIDINE GLUCONATE 15 ML: 1.2 RINSE ORAL at 04:53

## 2020-05-22 RX ADMIN — VECURONIUM BROMIDE 2 MG: 1 INJECTION, POWDER, LYOPHILIZED, FOR SOLUTION INTRAVENOUS at 09:45

## 2020-05-22 RX ADMIN — NITROGLYCERIN 10 MCG: 10 INJECTION INTRAVENOUS at 09:30

## 2020-05-22 RX ADMIN — OXYCODONE HYDROCHLORIDE 10 MG: 5 TABLET ORAL at 20:23

## 2020-05-22 RX ADMIN — PHENYLEPHRINE HYDROCHLORIDE 100 MCG: 10 INJECTION INTRAVENOUS at 12:24

## 2020-05-22 RX ADMIN — AMINOCAPROIC ACID 1 G/HR: 250 INJECTION, SOLUTION INTRAVENOUS at 08:45

## 2020-05-22 RX ADMIN — FENTANYL CITRATE 100 MCG: 50 INJECTION, SOLUTION INTRAMUSCULAR; INTRAVENOUS at 07:03

## 2020-05-22 RX ADMIN — PHENYLEPHRINE HYDROCHLORIDE 100 MCG: 10 INJECTION INTRAVENOUS at 08:46

## 2020-05-22 RX ADMIN — CARVEDILOL 25 MG: 25 TABLET, FILM COATED ORAL at 04:52

## 2020-05-22 RX ADMIN — DOCUSATE SODIUM 50 MG AND SENNOSIDES 8.6 MG 1 TABLET: 8.6; 5 TABLET, FILM COATED ORAL at 20:24

## 2020-05-22 RX ADMIN — CEFAZOLIN SODIUM 2 G: 2 INJECTION, SOLUTION INTRAVENOUS at 07:58

## 2020-05-22 RX ADMIN — MIDAZOLAM HYDROCHLORIDE 1 MG: 1 INJECTION, SOLUTION INTRAMUSCULAR; INTRAVENOUS at 12:55

## 2020-05-22 RX ADMIN — SODIUM CHLORIDE 1.5 UNITS/HR: 9 INJECTION, SOLUTION INTRAVENOUS at 23:08

## 2020-05-22 RX ADMIN — SUGAMMADEX 200 MG: 100 INJECTION, SOLUTION INTRAVENOUS at 13:17

## 2020-05-22 RX ADMIN — FENTANYL CITRATE 50 MCG: 50 INJECTION, SOLUTION INTRAMUSCULAR; INTRAVENOUS at 12:35

## 2020-05-22 RX ADMIN — MIDAZOLAM HYDROCHLORIDE 2 MG: 1 INJECTION, SOLUTION INTRAMUSCULAR; INTRAVENOUS at 12:48

## 2020-05-22 RX ADMIN — FENTANYL CITRATE 50 MCG: 50 INJECTION, SOLUTION INTRAMUSCULAR; INTRAVENOUS at 12:32

## 2020-05-22 RX ADMIN — PHENYLEPHRINE HYDROCHLORIDE 100 MCG: 10 INJECTION INTRAVENOUS at 09:14

## 2020-05-22 RX ADMIN — PROTAMINE SULFATE 230 MG: 10 INJECTION, SOLUTION INTRAVENOUS at 12:20

## 2020-05-22 RX ADMIN — MIDAZOLAM HYDROCHLORIDE 2 MG: 1 INJECTION, SOLUTION INTRAMUSCULAR; INTRAVENOUS at 07:41

## 2020-05-22 RX ADMIN — NITROGLYCERIN 10 MCG: 10 INJECTION INTRAVENOUS at 12:56

## 2020-05-22 RX ADMIN — ALBUMIN HUMAN: 0.05 INJECTION, SOLUTION INTRAVENOUS at 07:50

## 2020-05-22 RX ADMIN — PHENYLEPHRINE HYDROCHLORIDE 100 MCG: 10 INJECTION INTRAVENOUS at 07:35

## 2020-05-22 RX ADMIN — HYDROMORPHONE HYDROCHLORIDE 0.5 MG: 1 INJECTION, SOLUTION INTRAMUSCULAR; INTRAVENOUS; SUBCUTANEOUS at 18:40

## 2020-05-22 RX ADMIN — PHENYLEPHRINE HYDROCHLORIDE 50 MCG: 10 INJECTION INTRAVENOUS at 08:43

## 2020-05-22 RX ADMIN — FENTANYL CITRATE 100 MCG: 50 INJECTION, SOLUTION INTRAMUSCULAR; INTRAVENOUS at 12:34

## 2020-05-22 RX ADMIN — POTASSIUM CHLORIDE, DEXTROSE MONOHYDRATE AND SODIUM CHLORIDE: 150; 5; 450 INJECTION, SOLUTION INTRAVENOUS at 14:55

## 2020-05-22 RX ADMIN — FENTANYL CITRATE 100 MCG: 50 INJECTION, SOLUTION INTRAMUSCULAR; INTRAVENOUS at 08:34

## 2020-05-22 RX ADMIN — FENTANYL CITRATE 50 MCG: 50 INJECTION, SOLUTION INTRAMUSCULAR; INTRAVENOUS at 12:15

## 2020-05-22 ASSESSMENT — MIFFLIN-ST. JEOR: SCORE: 1830.99

## 2020-05-22 ASSESSMENT — ACTIVITIES OF DAILY LIVING (ADL)
ADLS_ACUITY_SCORE: 13
ADLS_ACUITY_SCORE: 14

## 2020-05-22 NOTE — CONSULTS
CARDIAC SURGERY NUTRITION CONSULT     Received standing order to assess and educate patient.     Will follow and complete assessment once patient is extubated and/or is transferred to medical unit.     Patient will receive nutrition education during the Outpatient Cardiac Rehab Program (nutrition classes/dietitian counseling).    Belinda Wilson RD, LD

## 2020-05-22 NOTE — PROGRESS NOTES
Patient has been in the OR since today morning,  Was not able to visit with him today ,chart reviewed, will try to visit later if possible.

## 2020-05-22 NOTE — ANESTHESIA PROCEDURE NOTES
CENTRAL LINE INSERTION PROCEDURE NOTE:   Staff -   Anesthesiologist:  Jose Claudio DO      Performed By: anesthesiologist  Procedure performed by resident/CRNA in presence of a teaching physician.        Pre-Procedure  Performed by  in the presence of a teaching physician  Jose Claudio DO  Location: pre-op      Pre-Anesthestic Checklist: patient identified, IV checked, site marked, risks and benefits discussed, informed consent, monitors and equipment checked, pre-op evaluation and at physician/surgeon's request    Timeout  Correct Patient: Yes   Correct Procedure: Yes   Correct Site: Yes   Correct Laterality: Yes   Correct Position: Yes   Site Marked: Yes   .   Procedure Documentation   Procedure: central line  Position: Trendelenburg  Patient Prep/Sterile Barriers; chlorhexidine gluconate and isopropyl alcohol, maximum sterile barriers used during central venous catheter insertion      Insertion Site:right, internal jugular  . A permanent image is entered into the patient's record.   Skin infiltrated with 1 mL of 1% lidocaine.  Catheter: 9 Fr, 2-lumen MAC  Assessment/Narrative         Secured by suture  Tegaderm and Biopatch dressing used.  blood aspirated from all lumens  All lumens flushed: Yes    Comments:  A time out was preformed identifying the correct procedure, the correct location with the nursing staff.  The right neck was prepped with 2% chlorhexidine and draped with a full length sterile sheet in the usual fashion.  1% lidocaine was administered subcutaneously for local anesthesia.  The right internal jugular vein was accessed under ultrasound guidance with an 18 gauge thin wall needle, a 1.75 inch catheter was advanced over the needle and the needle was removed. Normal venous pressure was confirmed using a fluid column technique. A wire was then advanced through the catheter and catheter was then removed. A 9 Mongolian MAC cordis was advanced over the wire via the seldinger technique.  Blood was withdrawn from all lumens and flushed with normal saline.   Attention was then turned to placement of a triple lumen Talala-Sid catheter. All ports were flushed and balloon inflation confirmed prior to insertion into the cordis port. The catheter was introduced and advanced to 20 cm. The balloon was then inflated and the catheter was advanced through the right ventricle and into the pulmonary artery until a wedge position pressure tracing was obtained. The balloon was then deflated and verification of return of pulmonary artery pressure tracing was made. The catheter was then sutured in place and a sterile dressing was applied over the site prior to removal of drapes. This was atraumatic and there were no obvious complications.

## 2020-05-22 NOTE — ANESTHESIA PROCEDURE NOTES
ARTERIAL LINE PROCEDURE NOTE:  Staff -   Anesthesiologist:  Jose Claudio DO      Performed By: anesthesiologist         Pre-Procedure  Performed by Jose Claudio DO  Location: pre-op      Pre-Anesthestic Checklist: patient identified, IV checked, site marked, risks and benefits discussed, informed consent, monitors and equipment checked, pre-op evaluation and at physician/surgeon's request    Timeout  Correct Patient: Yes   Correct Procedure: Yes   Correct Site: Yes   Correct Laterality: Yes   Correct Position: Yes   Site Marked: Yes, N/A   .   Procedure Documentation  Procedure: arterial line  ASA 4  Supine  Insertion Site:radial (left).Skin infiltrated with 2 mL of 1% lidocaine. Injection technique: Seldinger Technique and ultrasound guided  .  .  Patient Prep/Sterile Barriers; all elements of maximal sterile barrier technique followed, mask, hat, sterile gown, sterile gloves, draped, hand hygiene, chlorhexidine gluconate and isopropyl alcohol    Assessment/Narrative    Catheter: 20 gauge, 1.75 in/4.5 cm quick cath (integral wire)      Tegaderm dressing used.    Arterial waveform: Yes IBP within 10% of NIBP: Yes

## 2020-05-22 NOTE — CONSULTS
[facility]  History and Physical/Consult  Critical Care Service  Date of Admission: 5/21/2020  Date of Service (when I saw the patient): 05/22/20  Assessment & Plan   Benjamín Us is a 54 year old male with a past medical history of aortic stenosis, hypertension, hyperlipidemia, DMII, tobacco abuse, and HARIS admitted to the hospital on 5/21/2020 following full mouth dental extraction with planned AVR and CABG on 5/22/2020, admitted to ICU s/p CABG and AVR.     Neuro:  Dx: Post-operative pain and sedation needs  - Propofol infusion to achieve RASS goal 0 to -1  - Hydromorphone, robaxin, and oxycodone available prn  - Scheduled acetaminophen, lidocaine patch, and gapabentin    CV:  Dx: CAD  Dx: HTN  Dx: S/p AVR with 23 mm mechanical [employer]  Dx: S/p CABG - LIMA to LAD with left leg endoscopic vein harvest.  - Wean vasopressors and inotropes as able per CV surgery  - Defer to CVTS as to when to resume ASA and statin  - Anticoagulation per primary team.   - Epicardial pacing wires in place, currently NSR    Resp:  Dx: Post-operative ventilator management  - Assist control: 16/500/5/50%  - SBT when awake  - IS and OOB for pulmonary hygiene when extubated    GI/Nutrition:  Dx: Nutrition  - NPO until extubated    Renal:  Dx: No acute issues. Baseline creatinine of 0.7-1.0  - Monitor function and electrolytes as needed with replacement per ICU protocols.   - Avoid nephrotoxic agents such as NSAID, IV contrast unless specifically required  - Adjust medications as needed for renal clearance  - Bernal for strict I/O in post-operative period    ID:  Dx: No acute issues  - Complete aron-operative antibiotics (ancef and vancomycin)  - Monitor fevers and leukocytosis consistent with stress response from procedure, no cultures for fevers in POD 1-2    Endocrine:  Dx: Poorly controlled DM with Hgb A1C 12.2 (5/21/20)  Dx: Stress induced hyperglycemia  - Insulin gtt   - POC glucose every 2 hours for the  first 4 hours and then 4 hours for 48 hours, goal to maintain less than 150  - Insulin drip to maintain blood glucose <150    Heme:  Dx: Acute blood loss anemia (Admission hgb 12.7)  Dx: Thrombocytopenia related to bypass  - Hgb 7.9 postop.   - Hgb at 6 pm  - Monitor chest tube output  - Transfuse for hemoglobin <7    MSK:  Dx: Sternotomy  - Wound vac in place.   - Sternal precautions per protocol    General cares:  DVT Prophylaxis: Defer to primary service  GI Prophylaxis: PPI per primary service  Restraints needed: NO  Family update by me today: No  Current lines are required for patient management  Access:  - Right internal jugular -SWAN  - Arterial line- left radial   - ETT  - OG  - Bernal  - Chest tube x 2  - Wound vac     Lee Correa    Time Spent on this Encounter   Billing: I spent 45 minutes bedside and on the inpatient unit today managing the critical care of Benjamín Us in relation to the issues listed in this note.    Code Status   Full Code    Primary Care Physician   Axel Roberson    Chief Complaint     History is obtained from the chart. The patient is unable to participate in interview secondary to intubation and sedation.     History of Present Illness   Benjamín Us is a 54 year old male with PMHx of HTN, HLP, severe aortic stenosis, poorly controlled DM who presents to the ICU after AVR and CABG.  Patient has had worsening symptoms related to severe aortic stenosis including exertional dyspnea and chest discomfort. Furthermore, patient was noted to have severe LAD disease and 85% moderate -size second diagonal artery disease. Patient underwent extraction of carious dentition for pre-operative management for planned AVR/CABG. Patient underwent the extraction w/o complication. She underwent AVR and CABG on 05/22/20 and admitted to ICU for postoperative management.     Patient was an easy intubation with Chirinos. The procedure was uncomplicated. CPB was 145 minutes. He received  2.1 L crystalloid, 500 ml of albumin, 277 mL cell saver. EBL was 700 mL and UOP 1000 mL.     Past Medical History    PMH is obtained from the chart. The patient is unable to participate in interview secondary to intubation and sedation.  I have reviewed this patient's medical history and updated it with pertinent information if needed.   Past Medical History:   Diagnosis Date     ADD (attention deficit disorder)      Aneurysm of thoracic aorta (H) 2014     Problem list name updated by automated process. Provider to review     Aortic valve disorder 2014     CARDIOVASCULAR SCREENING; LDL GOAL LESS THAN 160 2014     Dermatitis 2018     Essential hypertension, benign 2018     Hypertension      Hypertensive urgency 2013     HARIS (obstructive sleep apnea)      Type 2 diabetes mellitus with diabetic dermatitis, without long-term current use of insulin (H) 2018       Past Surgical History   Surgical history is obtained from the chart. The patient is unable to participate in interview secondary to intubation and sedation.  I have reviewed this patient's surgical history and updated it with pertinent information if needed.  Past Surgical History:   Procedure Laterality Date     APPENDECTOMY       COLONOSCOPY N/A 6/15/2015    Procedure: COLONOSCOPY;  Surgeon: Vasyl Lawson MD;  Location:  GI     CV CORONARY ANGIOGRAM N/A 1/3/2020    Procedure: Coronary Angiogram;  Surgeon: Álvaro Andrade MD;  Location:  HEART CARDIAC CATH LAB     EXTRACTION(S) DENTAL N/A 2020    Procedure: EXTRACTION, REMAINING TEETH;  Surgeon: Vasyl Brand DDS;  Location:  OR       Prior to Admission Medications   Prior to Admission Medications   Prescriptions Last Dose Informant Patient Reported? Taking?   amphetamine-dextroamphetamine (ADDERALL) 20 MG tablet 2020 at 0800  Yes Yes   Si mg 3 times daily    aspirin (ASPIRIN) 81 MG EC tablet 2020 at 0800  Yes Yes   Sig: Take 81 mg by  mouth 2 times daily   atorvastatin (LIPITOR) 40 MG tablet 5/20/2020 at 1600  No Yes   Sig: Take 1 tablet (40 mg) by mouth daily   carvedilol (COREG) 25 MG tablet 5/20/2020 at 1600  No Yes   Sig: Take 1 tablet (25 mg) by mouth 2 times daily (with meals)   lisinopril (PRINIVIL/ZESTRIL) 40 MG tablet 5/21/2020 at 0600  No Yes   Sig: Take 1 tablet (40 mg) by mouth daily   varenicline (CHANTIX) 1 MG tablet 5/20/2020 at Unknown time  Yes Yes   Sig: Take 1 mg by mouth 2 times daily      Facility-Administered Medications: None     Allergies   Allergies   Allergen Reactions     Penicillins Nausea and Vomiting     Zithromax [Azithromycin Dihydrate]      hives       Social History   Social history is obtained from the chart. The patient is unable to participate in interview secondary to intubation and sedation.  I have reviewed this patient's social history and updated it with pertinent information if needed. Benjamín Us  reports that he quit smoking about 7 weeks ago. His smoking use included cigarettes. He smoked 1.00 pack per day. He has never used smokeless tobacco. He reports that he does not drink alcohol or use drugs.    Family History   Family history is obtained from the chart. The patient is unable to participate in interview secondary to intubation and sedation.  I have reviewed this patient's family history and updated it with pertinent information if needed.   Family History   Problem Relation Age of Onset     Hypertension Mother      Breast Cancer Mother      Diabetes Father      Diabetes Brother      Thyroid Disease Sister      Colon Cancer No family hx of        Review of Systems   Unable to perform complete review of systems secondary to intubation and sedation.    Physical Exam   Temp: 98.4  F (36.9  C) Temp src: Oral Temp  Min: 97.6  F (36.4  C)  Max: 99  F (37.2  C) BP: 115/68 Pulse: 122 Heart Rate: 98 Resp: 18 SpO2: 98 % O2 Device: None (Room air) Oxygen Delivery: 2 LPM  Vital Signs with Ranges  Temp:   [97.6  F (36.4  C)-99  F (37.2  C)] 98.4  F (36.9  C)  Pulse:  [] 122  Heart Rate:  [] 98  Resp:  [10-18] 18  BP: (108-145)/(59-98) 115/68  SpO2:  [94 %-100 %] 98 %  219 lbs 4.8 oz    GEN: Intubated and sedated.   EYES: No icterus. Pupils are 4 mm  HEENT:  Normocephalic, trachea midline, ETT secure  CV: RRR, no gallops, rubs, or murmurs; pedal pulses 2+; no pedal edema  PULM/CHEST: Clear breath sounds bilaterally without rhonchi, crackles or wheeze, symmetric chest rise  GI: Bowel sounds present, abdomen soft and non-tender, no masses  : Bernal catheter in place, urine yellow and clear  NEURO: Unable to assess due to sedation and NMB. Pupils 4 mm  SKIN: No rashes noted. Sternotomy incision covered with dressing, clean/dry/intact. Chest tube insertion sites without drainage.    Imaging personally reviewed: CXR pending     Data   Results for orders placed or performed during the hospital encounter of 05/21/20 (from the past 24 hour(s))   Glucose by meter   Result Value Ref Range    Glucose 348 (H) 70 - 99 mg/dL   Glucose by meter   Result Value Ref Range    Glucose 316 (H) 70 - 99 mg/dL   Glucose by meter   Result Value Ref Range    Glucose 292 (H) 70 - 99 mg/dL   UA reflex to Microscopic   Result Value Ref Range    Color Urine Yellow     Appearance Urine Clear     Glucose Urine >1000 (A) NEG^Negative mg/dL    Bilirubin Urine Negative NEG^Negative    Ketones Urine Negative NEG^Negative mg/dL    Specific Gravity Urine 1.025 1.003 - 1.035    Blood Urine Negative NEG^Negative    pH Urine 5.5 5.0 - 7.0 pH    Protein Albumin Urine 10 (A) NEG^Negative mg/dL    Urobilinogen mg/dL Normal 0.0 - 2.0 mg/dL    Nitrite Urine Negative NEG^Negative    Leukocyte Esterase Urine Negative NEG^Negative    Source Midstream Urine     RBC Urine <1 0 - 2 /HPF    WBC Urine 1 0 - 5 /HPF    Squamous Epithelial /HPF Urine <1 0 - 1 /HPF    Mucous Urine Present (A) NEG^Negative /LPF    Hyaline Casts 3 (H) 0 - 2 /LPF   US Lower  Extremity Venous Mapping Left    Narrative    US LOWER EXTREMITY VENOUS MAPPING LEFT  5/21/2020 3:57 PM     HISTORY: Preoperative planning for coronary artery bypass graft  surgery.    COMPARISON: None.    FINDINGS: The left greater saphenous vein is patent. Its diameters  from the saphenofemoral junction to the knee range between 4.8 to 2.6  mm. Its diameters from below the knee to the ankle range between 3.5  to 2.5 mm.      Impression    IMPRESSION: Vein mapping of the left greater saphenous vein performed  as above.   Glucose by meter   Result Value Ref Range    Glucose 361 (H) 70 - 99 mg/dL   Glucose by meter   Result Value Ref Range    Glucose 381 (H) 70 - 99 mg/dL   Glucose by meter   Result Value Ref Range    Glucose 377 (H) 70 - 99 mg/dL   Glucose by meter   Result Value Ref Range    Glucose 481 (H) 70 - 99 mg/dL   Glucose by meter   Result Value Ref Range    Glucose 379 (H) 70 - 99 mg/dL   Methicillin Resistant Staph Aureus PCR    Specimen: Nasal Swab; Nares   Result Value Ref Range    Specimen Description Nares     Methicillin Resist/Sens S. aureus PCR Negative NEG^Negative   Glucose by meter   Result Value Ref Range    Glucose 309 (H) 70 - 99 mg/dL   Glucose by meter   Result Value Ref Range    Glucose 336 (H) 70 - 99 mg/dL   EKG 12-lead, tracing only   Result Value Ref Range    Interpretation ECG Click View Image link to view waveform and result    Glucose by meter   Result Value Ref Range    Glucose 283 (H) 70 - 99 mg/dL   Glucose by meter   Result Value Ref Range    Glucose 253 (H) 70 - 99 mg/dL

## 2020-05-22 NOTE — PROGRESS NOTES
Extubation Note    Successful completion of SBT (Yes or No):Yes  Extubation time:1835    Patient assessment:  Lung sounds:diminished  Stridor Present (Yes or No): No  Patient tolerance: Pt was on PS 5/5 for about 2hrs     Oxygen device: Aerosol mask  Liter flow:10lpm  FiO2:40%  SpO2:100%    Plan:Continue current therapy and transition to NC.  5/22/2020  Tamia Farrar, RT

## 2020-05-22 NOTE — ANESTHESIA POSTPROCEDURE EVALUATION
Patient: Benjamín Us    Procedure(s):  AORTIC VALVE REPLACEMENT WITH [employer] MECHANICAL VALVE SIZE 23 MM.  CORONARY ARTERY BYPASS GRAFT X 1  WITH LEFT LEG  ENDOSCOPIC VEIN HARVEST, ON PUMP WITH LEONA. WOUND VAC PLACEMENT. LIMA-LAD    Diagnosis:CAD (coronary artery disease) [I25.10]  Aortic stenosis [I35.0]  Diagnosis Additional Information: No value filed.    Anesthesia Type:  General    Note:  Anesthesia Post Evaluation    Patient location during evaluation: ICU  Patient participation: Unable to evaluate secondary to administered sedation  Level of consciousness: obtunded/minimal responses  Pain management: unable to assess  Airway patency: patent  Cardiovascular status: acceptable and hemodynamically stable  Respiratory status: acceptable, ETT, ventilator and intubated  Hydration status: acceptable  PONV: unable to assess             Last vitals:  Vitals:    05/22/20 0000 05/22/20 0500 05/22/20 1410   BP: 116/84 115/68    Pulse:      Resp: 18 18    Temp: 37.1  C (98.8  F) 36.9  C (98.4  F)    SpO2: 96% 98% 100%         Electronically Signed By: Jose Claudio DO  May 22, 2020  3:14 PM

## 2020-05-22 NOTE — BRIEF OP NOTE
[facility]    Brief Operative Note    Pre-operative diagnosis: CAD (coronary artery disease) [I25.10]  Aortic stenosis [I35.0]  Post-operative diagnosis Same as pre-operative diagnosis    Procedure: Procedure(s):  AORTIC VALVE REPLACEMENT WITH [employer] MECHANICAL VALVE SIZE 23 MM.  CORONARY ARTERY BYPASS GRAFT X 1  WITH LEFT LEG  ENDOSCOPIC VEIN HARVEST, ON PUMP WITH LEONA. WOUND VAC PLACEMENT. LIMA-LAD  Surgeon: Surgeon(s) and Role:     * Aimee Rose MD - Primary     * Madeline Bermeo PA-C - Assisting     * May Chino MD - Fellow - Assisting  Anesthesia: General   Estimated blood loss: 400 ml  Drains: 28F posterior mediastinal 32F anterior mediastinal  Specimens:   ID Type Source Tests Collected by Time Destination   A : AORTIC VALVE AND LEAFLETS Tissue Heart SURGICAL PATHOLOGY EXAM Aimee Rose MD 5/22/2020 12:02 PM      Findings:   functionally bicuspid valve, good LIMA target. Pericarditis on anterolateral wall..  Complications: None.  Implants:   Implant Name Type Inv. Item Serial No.  Lot No. LRB No. Used Action   VALVE AORTIC REGENT FLEX-CUFF 23MM [id number] Valve VALVE AORTIC REGENT FLEX-CUFF 23MM [id number] [employer]  N/A 1 Implanted

## 2020-05-22 NOTE — OP NOTE
Procedure Date: 05/22/2020      REFERRING CARDIOLOGIST:  Myles Downs MD      PREOPERATIVE DIAGNOSIS:  Severe symptomatic aortic stenosis and severe single-vessel coronary artery disease.      POSTOPERATIVE DIAGNOSIS:  Severe symptomatic aortic stenosis and severe single-vessel coronary artery disease.      SURGEON:  Aimee Rose MD      ASSISTANT:  May Wynn MD and Madeline Bermeo PA-C      NAME OF OPERATION:  Aortic valve replacement with a 23 mm [employer] mechanical valve, coronary artery bypass grafting x 1 with left internal mammary artery to the left anterior descending, endoscopic vein harvest from the left lower extremity, intraoperative LEONA.      ANESTHESIA:  General endotracheal.      INDICATIONS FOR PROCEDURE:  Mr. Us is a very pleasant 54-year-old gentleman who was recently found to have severe symptomatic aortic stenosis with a mean gradient of 50 mmHg.  He was also found to have severe single-vessel coronary artery disease involving the LAD and the second diagonal artery.  He was taken to the operating room today for aortic valve replacement with a mechanical valve and concomitant coronary artery bypass grafting.      OPERATIVE FINDINGS:  The patient had an overall normal LV systolic size and function.  He had significant LVH.  His aortic valve was actually bicuspid with fusion of the left and right coronary cusps.  It was heavily calcified and severely stenotic.  His left internal mammary artery was an excellent quality conduit, measuring 2.5 mm in diameter with excellent flow.  His left greater saphenous vein was harvested but not used.  It was of good quality and measured 3 mm plus in diameter.  The [id number] LAD was embedded in a deep layer of epicardial fat, but it only had mild disease, and the probe size was 1.5 mm.  The patient had some patchy pericardial adhesions and, in fact, the lateral LV was stuck to the pericardium, which we had to dissect out after we went on  pump.  From doing so, we encountered some superficial tear of the epicardial fat from the LV muscle, and I was concerned enough about mobilizing the LV and positioning it to find the second diagonal artery, which was also embedded in a deep layer of epicardial fat.  Based on the angiogram, it was not a large 2nd diagonal artery and given these circumstances we decided to not graft the diagonal artery.      DESCRIPTION OF PROCEDURE:  After informed consent was obtained, the patient was brought down to the operating room and was placed on the OR table in a supine position.  Intravenous and intra-arterial lines were begun.  While monitoring his blood pressure and EKG tracing, he was anesthetized and intubated using a single lumen endotracheal tube.  A Hamburg-Sid catheter was also placed.  His entire chest, abdomen, both groins and legs were prepped down to the toes using multiple layers of drip prep.  He was draped in sterile field.  Median sternotomy was performed and in the meantime, the left greater saphenous vein was harvested endoscopically.  The left internal mammary artery was taken down.  Prior to clipping the LIMA distally, the patient was fully heparinized.  The sternal edges were retracted laterally and pericardium was opened to suspend the heart in a pericardial cradle.  As mentioned above, the patient had some pericardial adhesions, which took a while for us to dissect out.  Carbon dioxide was flooded into the chest to prevent air embolism.  The ascending aorta and the right atrial appendage were cannulated.  A retrograde cardioplegia catheter was placed in the coronary sinus without difficulty.  An antegrade needle/aortic root vent was placed in the ascending aorta as well.  After appropriate ACT level was achieved, cardiopulmonary bypass was established, and the patient was cooled down to 34 degrees Celsius.  An LV vent was placed via the right superior pulmonary vein.  The aorta was then crossclamped,  and antegrade cold blood cardioplegia was given to arrest the heart.  The patient went into good diastolic arrest without any LV distention.  Following this, intermittent retrograde cardioplegia doses were given, on average every 15 minutes for myocardial protection while the aorta was crossclamped.      The LIMA to the LAD anastomosis was performed first.  The mid to distal LAD was opened up.  The anastomosis was performed in end-to-side fashion using running 7-0 Prolene.  This anastomosis was protected by tacking the LIMA pedicle down to the epicardium using interrupted 6-0 Prolene x 2.  Next, a transverse aortotomy was made, and the aortic valve was exposed.  Findings are noted above.  The valve was excised, and the annulus was meticulously debrided.  The annulus was sized to a 23 mm [employer] mechanical valve.  Annular sutures were placed using horizontal mattress sutures of 2-0 Ethibond with subannular pledgets.  The valve was brought to the field, all sutures were brought through the sewing ring, and the valve was seated down without difficulty.  All sutures were tied down securely using the Cor-Knot device.  We tested both mechanical leaflets, and they opened and closed freely.  The aorta was de-aired, and aortotomy was closed in 2 layers of running 4-0 Prolene.  A liter of warm blood cardioplegia was given as a retrograde hot shot, and the aortic crossclamp was removed.  The aortic crossclamp time was 117 minutes.  The patient was eventually weaned off cardiopulmonary bypass with low-dose epinephrine and Ramana-Synephrine drips.  Total cardiopulmonary bypass time was 145 minutes.  LV function was good on LEONA.  The heart was adequately deaired.  The prosthetic valve was seated down well with no paravalvular leak.  We placed a layer of TachoSil on LV epicardium where the previous epicardial bleeding was.  Everything appeared hemostatic.  Once the patient remained stable off bypass, the venous cannula was  removed, and protamine was administered.  The aortic cannula was subsequently removed as well.  Temporary ventricular pacing wires were placed in the RV muscle.  A 32-Slovenian angled and straight chest tube was placed in mediastinum as well.  These were all brought out percutaneously below the sternotomy incision and secured to the skin using 2-0 Ethibond.  The right pleural space was slightly opened.  The mediastinum was irrigated with antibiotic saline, and hemostasis was achieved.  The sternum was reapproximated using multiple interrupted single and double wires.  The incision was closed in layers of running Vicryl suture.  Skin was closed using 3-0 Vicryl and was sealed using Dermabond.      There were no intraoperative complications, and the patient tolerated the operation well.  No blood products were given intraoperatively.  All sponge counts, needle counts and instrument counts were correct x 2 at the end of the operation.      ESTIMATED BLOOD LOSS:  Unknown.      SPECIMEN REMOVED:  Aortic valve leaflets.      The patient was brought to the ICU in hemodynamically stable condition and remained intubated.         ISIDRO MCHUGH MD             D: 2020   T: 2020   MT:       Name:     JOYCE MEDRANO   MRN:      [id number]        Account:        [id number]   :      1965           Procedure Date: 2020      Document: [id number]

## 2020-05-22 NOTE — ANESTHESIA CARE TRANSFER NOTE
Patient: Benjamín Us    Procedure(s):  AORTIC VALVE REPLACEMENT WITH [employer] MECHANICAL VALVE SIZE 23 MM.  CORONARY ARTERY BYPASS GRAFT X 1  WITH LEFT LEG  ENDOSCOPIC VEIN HARVEST, ON PUMP WITH LEONA. WOUND VAC PLACEMENT. LIMA-LAD    Diagnosis: CAD (coronary artery disease) [I25.10]  Aortic stenosis [I35.0]  Diagnosis Additional Information: No value filed.    Anesthesia Type:   General     Note:  Airway :ETT  Patient transferred to:ICU  Comments: Patient connected to SpO2, EKG, and arterial blood pressure transport monitors and accompanied by CRNA, anesthesiologist, circulating RN, specialty care technician, surgeon (fellow) to ICU room. Patient ventilated by anesthesiologist with ambu via ETT with O2 at 15 liters per minute during transport.     On arrival to ICU, endotracheal tube position unchanged, equal, bilateral breath sounds auscultated in ICU room, patient placed on ICU ventilator by respiratory therapist, teeth and oral mucosa intact and unchanged at handoff of care. At anesthesia handoff of care, clinical monitors and alarms on and functioning, report on patient's clinical status given to ICU RN, report on patient's clinical status given to intensivist, ICU staff questions answered.ICU Handoff: Call for PAUSE to initiate/utilize ICU HANDOFF, Identified Patient, Identified Responsible Provider, Reviewed the Pertinent Medical History, Discussed Surgical Course, Reviewed Intra-OP Anesthesia Management and Issues during Anesthesia, Set Expectations for Post Procedure Period and Allowed Opportunity for Questions and Acknowledgement of Understanding      Vitals: (Last set prior to Anesthesia Care Transfer)    CRNA VITALS  5/22/2020 1328 - 5/22/2020 1419      5/22/2020             Resp Rate (observed):  10    Resp Rate (set):  14                Electronically Signed By: JACKSON Arndt CRNA  May 22, 2020  2:19 PM

## 2020-05-22 NOTE — ANESTHESIA PREPROCEDURE EVALUATION
Anesthesia Pre-Procedure Evaluation    Patient: Benjamín Us   MRN: [id number] : 1965          Preoperative Diagnosis: CAD (coronary artery disease) [I25.10]  Aortic stenosis [I35.0]    Procedure(s):  AORTIC VALVE REPLACEMENT  CORONARY ARTERY BYPASS GRAFT (CABG)    Past Medical History:   Diagnosis Date     ADD (attention deficit disorder)      Aneurysm of thoracic aorta (H) 2014     Problem list name updated by automated process. Provider to review     Aortic valve disorder 2014     CARDIOVASCULAR SCREENING; LDL GOAL LESS THAN 160 2014     Dermatitis 2018     Essential hypertension, benign 2018     Hypertension      Hypertensive urgency 2013     HARIS (obstructive sleep apnea)      Type 2 diabetes mellitus with diabetic dermatitis, without long-term current use of insulin (H) 2018     Past Surgical History:   Procedure Laterality Date     APPENDECTOMY       COLONOSCOPY N/A 6/15/2015    Procedure: COLONOSCOPY;  Surgeon: Vasyl Lawson MD;  Location:  GI     CV CORONARY ANGIOGRAM N/A 1/3/2020    Procedure: Coronary Angiogram;  Surgeon: Álvaro Andrade MD;  Location: [facility] CARDIAC CATH LAB       Anesthesia Evaluation     . Pt has had prior anesthetic.     No history of anesthetic complications          ROS/MED HX    ENT/Pulmonary:     (+)sleep apnea, tobacco use, Current use 0.5 packs/day  , . .    Neurologic:       Cardiovascular: Comment: Aortic aneurysm    (+) Dyslipidemia, hypertension--CAD, --. : . . PAYNE, . :. valvular problems/murmurs type: AS Severe AS:. Previous cardiac testing Echodate:10/29/19results:Left Ventricle  The left ventricle is normal in size. There is severe concentric left ventricular hypertrophy. Diastolic function not assessed due to significant mitral annular calcification. The visual ejection fraction is estimated at 65-70%. Left ventricular systolic function is normal.    Right Ventricle  The right ventricle is normal in size and  function.    Atria   Normal left atrial size. Right atrial size is normal. There is no color Doppler evidence of an atrial shunt.    Mitral Valve  There is mild to moderate mitral annular calcification. There is trace mitral regurgitation.    Tricuspid Valve  There is trace tricuspid regurgitation. Right ventricular systolic pressure could not be approximated due to inadequate tricuspid regurgitation.    Aortic Valve  The aortic valve is not well visualized. Thickened aortic valve leaflets. There is trace aortic regurgitation. Severe valvular aortic stenosis. The peak AoV pressure gradient is 75.0 mmHg. The mean AoV pressure gradient is 50.3 mmHg. The calculated aortic valve are is 0.90 cm^2.    Pulmonic Valve  There is no pulmonic valvular regurgitation. Normal pulmonic valve velocity.    Vessels  Mild aortic root dilatation. The ascending aorta is Mildly dilated. The inferior vena cava was normal in size with preserved respiratory variability.    Pericardium  There is no pericardial effusion.    Rhythm  Sinus rhythm was noted.date: results:ECG reviewed date:1/3/20 results:NSRCath date: 1/3/20 results:Left Main   The vessel was visualized by selective angiography and is moderate in size. There was 0% vessel disease.    Left Anterior Descending   Mid LAD-1 lesion is 50% stenosed.   Mid LAD-2 lesion is 70% stenosed.   Dist LAD lesion is 50% stenosed.   Second Diagonal Branch   The vessel is moderate in size. This is the major diagonal branch and is fairly large with a very significant ostial stenosis   2nd Diag lesion is 85% stenosed.    Left Circumflex   The vessel was visualized by selective angiography and is large.   Mid Cx lesion is 10% stenosed.    Right Coronary Artery  The vessel was visualized by selective angiography and is large.   Prox RCA lesion is 15% stenosed.   Mid RCA lesion is 20% stenosed.   Right Posterior Descending Artery   RPDA lesion is 40% stenosed.           METS/Exercise Tolerance:  >4 METS  "  Hematologic:     (+) Anemia, -      Musculoskeletal:         GI/Hepatic:         Renal/Genitourinary:         Endo: Comment: Very poor control.  BG's in 300-400s     (+) type II DM Last HgA1c: 12.2 date: 5/21/20 Using insulin Obesity, .   (-) Type I DM   Psychiatric:     (+) psychiatric history other (comment) (ADD)      Infectious Disease:         Malignancy:         Other:                          Physical Exam  Normal systems: pulmonary    Airway   Mallampati: III  TM distance: >3 FB  Neck ROM: full    Dental   (+) missing    Cardiovascular   (+) murmur       Pulmonary             Lab Results   Component Value Date    WBC 12.0 (H) 05/21/2020    HGB 12.7 (L) 05/21/2020    HCT 36.5 (L) 05/21/2020     05/21/2020     05/21/2020    POTASSIUM 4.4 05/21/2020    CHLORIDE 100 05/21/2020    CO2 23 05/21/2020    BUN 23 05/21/2020    CR 0.95 05/21/2020     (H) 05/21/2020    ARMIN 8.9 05/21/2020    ALBUMIN 3.4 05/21/2020    PROTTOTAL 7.5 05/21/2020    ALT 32 05/21/2020    AST 19 05/21/2020    ALKPHOS 104 05/21/2020    BILITOTAL 0.3 05/21/2020    PTT 34 01/03/2020    INR 0.97 01/03/2020    TSH 1.15 03/05/2014       Preop Vitals  BP Readings from Last 3 Encounters:   05/21/20 108/71   01/10/20 (!) 150/88   01/03/20 (!) 152/84    Pulse Readings from Last 3 Encounters:   05/21/20 122   01/10/20 94   01/03/20 72      Resp Readings from Last 3 Encounters:   05/21/20 18   01/03/20 16   09/14/18 16    SpO2 Readings from Last 3 Encounters:   05/21/20 97%   01/10/20 98%   01/03/20 96%      Temp Readings from Last 1 Encounters:   05/21/20 36.9  C (98.5  F) (Oral)    Ht Readings from Last 1 Encounters:   05/21/20 1.778 m (5' 10\")      Wt Readings from Last 1 Encounters:   05/21/20 101.2 kg (223 lb)    Estimated body mass index is 32 kg/m  as calculated from the following:    Height as of this encounter: 1.778 m (5' 10\").    Weight as of this encounter: 101.2 kg (223 lb).       Anesthesia Plan      History & Physical " Review  History and physical reviewed and following examination; no interval change.    ASA Status:  4 .    NPO Status:  > 8 hours    Plan for General with Intravenous and Propofol induction. Maintenance will be Inhalation.      Additional equipment: Videolaryngoscope, Arterial Line, Central Line, CVP, PA Catheter and LEONA        Postoperative Care  Postoperative pain management:  IV analgesics and Multi-modal analgesia.      Consents  Anesthetic plan, risks, benefits and alternatives discussed with:  Patient.  Use of blood products discussed: Yes.   Use of blood products discussed with Patient.  Consented to blood products.  .                 Jose Claudio, DO

## 2020-05-22 NOTE — PLAN OF CARE
Vital signs stable on room air. Alert and oriented. Lung sounds clear. Bowel sounds hypoactive, flatus present. Mouth incisions sutured with no drainage overnight. Patient using peridex rinse. Pain controlled with oxycodone and tylenol. Up independently. Tolerating NPO. CMS/neuros intact. Patient down to pre-op at 0550 after showering and using IS.

## 2020-05-23 ENCOUNTER — APPOINTMENT (OUTPATIENT)
Dept: GENERAL RADIOLOGY | Facility: CLINIC | Age: 55
DRG: 220 | End: 2020-05-23
Attending: STUDENT IN AN ORGANIZED HEALTH CARE EDUCATION/TRAINING PROGRAM
Payer: COMMERCIAL

## 2020-05-23 ENCOUNTER — APPOINTMENT (OUTPATIENT)
Dept: PHYSICAL THERAPY | Facility: CLINIC | Age: 55
DRG: 220 | End: 2020-05-23
Attending: STUDENT IN AN ORGANIZED HEALTH CARE EDUCATION/TRAINING PROGRAM
Payer: COMMERCIAL

## 2020-05-23 ENCOUNTER — DOCUMENTATION ONLY (OUTPATIENT)
Dept: OTHER | Facility: CLINIC | Age: 55
End: 2020-05-23

## 2020-05-23 LAB
ALBUMIN SERPL-MCNC: 3.7 G/DL (ref 3.4–5)
ALP SERPL-CCNC: 50 U/L (ref 40–150)
ALT SERPL W P-5'-P-CCNC: 19 U/L (ref 0–70)
ANION GAP SERPL CALCULATED.3IONS-SCNC: 4 MMOL/L (ref 3–14)
AST SERPL W P-5'-P-CCNC: 46 U/L (ref 0–45)
BILIRUB SERPL-MCNC: 0.9 MG/DL (ref 0.2–1.3)
BLD PROD TYP BPU: NORMAL
BLD PROD TYP BPU: NORMAL
BLD UNIT ID BPU: 0
BLD UNIT ID BPU: 0
BLOOD PRODUCT CODE: NORMAL
BLOOD PRODUCT CODE: NORMAL
BPU ID: NORMAL
BPU ID: NORMAL
BUN SERPL-MCNC: 18 MG/DL (ref 7–30)
CA-I BLD-MCNC: 4.4 MG/DL (ref 4.4–5.2)
CALCIUM SERPL-MCNC: 8 MG/DL (ref 8.5–10.1)
CHLORIDE SERPL-SCNC: 107 MMOL/L (ref 94–109)
CO2 SERPL-SCNC: 26 MMOL/L (ref 20–32)
CREAT SERPL-MCNC: 0.91 MG/DL (ref 0.66–1.25)
ERYTHROCYTE [DISTWIDTH] IN BLOOD BY AUTOMATED COUNT: 13.5 % (ref 10–15)
GFR SERPL CREATININE-BSD FRML MDRD: >90 ML/MIN/{1.73_M2}
GLUCOSE BLDC GLUCOMTR-MCNC: 112 MG/DL (ref 70–99)
GLUCOSE BLDC GLUCOMTR-MCNC: 113 MG/DL (ref 70–99)
GLUCOSE BLDC GLUCOMTR-MCNC: 117 MG/DL (ref 70–99)
GLUCOSE BLDC GLUCOMTR-MCNC: 127 MG/DL (ref 70–99)
GLUCOSE BLDC GLUCOMTR-MCNC: 174 MG/DL (ref 70–99)
GLUCOSE BLDC GLUCOMTR-MCNC: 188 MG/DL (ref 70–99)
GLUCOSE BLDC GLUCOMTR-MCNC: 232 MG/DL (ref 70–99)
GLUCOSE BLDC GLUCOMTR-MCNC: 256 MG/DL (ref 70–99)
GLUCOSE BLDC GLUCOMTR-MCNC: 96 MG/DL (ref 70–99)
GLUCOSE SERPL-MCNC: 144 MG/DL (ref 70–99)
HCT VFR BLD AUTO: 23 % (ref 40–53)
HGB BLD-MCNC: 7.8 G/DL (ref 13.3–17.7)
MAGNESIUM SERPL-MCNC: 1.9 MG/DL (ref 1.6–2.3)
MCH RBC QN AUTO: 31.5 PG (ref 26.5–33)
MCHC RBC AUTO-ENTMCNC: 33.9 G/DL (ref 31.5–36.5)
MCV RBC AUTO: 93 FL (ref 78–100)
PHOSPHATE SERPL-MCNC: 3.6 MG/DL (ref 2.5–4.5)
PLATELET # BLD AUTO: 148 10E9/L (ref 150–450)
POTASSIUM SERPL-SCNC: 4.2 MMOL/L (ref 3.4–5.3)
PROT SERPL-MCNC: 6 G/DL (ref 6.8–8.8)
RBC # BLD AUTO: 2.48 10E12/L (ref 4.4–5.9)
SODIUM SERPL-SCNC: 137 MMOL/L (ref 133–144)
TRANSFUSION STATUS PATIENT QL: NORMAL
WBC # BLD AUTO: 10.8 10E9/L (ref 4–11)

## 2020-05-23 PROCEDURE — 99291 CRITICAL CARE FIRST HOUR: CPT | Performed by: INTERNAL MEDICINE

## 2020-05-23 PROCEDURE — P9016 RBC LEUKOCYTES REDUCED: HCPCS | Performed by: SURGERY

## 2020-05-23 PROCEDURE — 80053 COMPREHEN METABOLIC PANEL: CPT | Performed by: STUDENT IN AN ORGANIZED HEALTH CARE EDUCATION/TRAINING PROGRAM

## 2020-05-23 PROCEDURE — 25000131 ZZH RX MED GY IP 250 OP 636 PS 637: Performed by: STUDENT IN AN ORGANIZED HEALTH CARE EDUCATION/TRAINING PROGRAM

## 2020-05-23 PROCEDURE — 99232 SBSQ HOSP IP/OBS MODERATE 35: CPT | Performed by: INTERNAL MEDICINE

## 2020-05-23 PROCEDURE — 97530 THERAPEUTIC ACTIVITIES: CPT | Mod: GP | Performed by: PHYSICAL THERAPIST

## 2020-05-23 PROCEDURE — 25000128 H RX IP 250 OP 636: Performed by: STUDENT IN AN ORGANIZED HEALTH CARE EDUCATION/TRAINING PROGRAM

## 2020-05-23 PROCEDURE — 83735 ASSAY OF MAGNESIUM: CPT | Performed by: STUDENT IN AN ORGANIZED HEALTH CARE EDUCATION/TRAINING PROGRAM

## 2020-05-23 PROCEDURE — 93005 ELECTROCARDIOGRAM TRACING: CPT

## 2020-05-23 PROCEDURE — 25000132 ZZH RX MED GY IP 250 OP 250 PS 637: Performed by: SURGERY

## 2020-05-23 PROCEDURE — 93010 ELECTROCARDIOGRAM REPORT: CPT | Performed by: INTERNAL MEDICINE

## 2020-05-23 PROCEDURE — 25000132 ZZH RX MED GY IP 250 OP 250 PS 637: Performed by: STUDENT IN AN ORGANIZED HEALTH CARE EDUCATION/TRAINING PROGRAM

## 2020-05-23 PROCEDURE — 71045 X-RAY EXAM CHEST 1 VIEW: CPT

## 2020-05-23 PROCEDURE — 25000128 H RX IP 250 OP 636

## 2020-05-23 PROCEDURE — 97161 PT EVAL LOW COMPLEX 20 MIN: CPT | Mod: GP | Performed by: PHYSICAL THERAPIST

## 2020-05-23 PROCEDURE — 82330 ASSAY OF CALCIUM: CPT | Performed by: STUDENT IN AN ORGANIZED HEALTH CARE EDUCATION/TRAINING PROGRAM

## 2020-05-23 PROCEDURE — 25000125 ZZHC RX 250: Performed by: STUDENT IN AN ORGANIZED HEALTH CARE EDUCATION/TRAINING PROGRAM

## 2020-05-23 PROCEDURE — 00000146 ZZHCL STATISTIC GLUCOSE BY METER IP

## 2020-05-23 PROCEDURE — 20000003 ZZH R&B ICU

## 2020-05-23 PROCEDURE — C9113 INJ PANTOPRAZOLE SODIUM, VIA: HCPCS | Performed by: STUDENT IN AN ORGANIZED HEALTH CARE EDUCATION/TRAINING PROGRAM

## 2020-05-23 PROCEDURE — P9041 ALBUMIN (HUMAN),5%, 50ML: HCPCS | Performed by: STUDENT IN AN ORGANIZED HEALTH CARE EDUCATION/TRAINING PROGRAM

## 2020-05-23 PROCEDURE — 85027 COMPLETE CBC AUTOMATED: CPT | Performed by: STUDENT IN AN ORGANIZED HEALTH CARE EDUCATION/TRAINING PROGRAM

## 2020-05-23 PROCEDURE — 25800030 ZZH RX IP 258 OP 636: Performed by: STUDENT IN AN ORGANIZED HEALTH CARE EDUCATION/TRAINING PROGRAM

## 2020-05-23 PROCEDURE — 84100 ASSAY OF PHOSPHORUS: CPT | Performed by: STUDENT IN AN ORGANIZED HEALTH CARE EDUCATION/TRAINING PROGRAM

## 2020-05-23 RX ORDER — WARFARIN SODIUM 2.5 MG/1
2.5 TABLET ORAL
Status: COMPLETED | OUTPATIENT
Start: 2020-05-23 | End: 2020-05-23

## 2020-05-23 RX ORDER — ALBUMIN, HUMAN INJ 5% 5 %
12.5 SOLUTION INTRAVENOUS ONCE
Status: COMPLETED | OUTPATIENT
Start: 2020-05-23 | End: 2020-05-23

## 2020-05-23 RX ORDER — LIDOCAINE 4 G/G
3 PATCH TOPICAL
Status: DISCONTINUED | OUTPATIENT
Start: 2020-05-23 | End: 2020-05-28 | Stop reason: HOSPADM

## 2020-05-23 RX ORDER — DOPAMINE HYDROCHLORIDE 160 MG/100ML
1-3 INJECTION, SOLUTION INTRAVENOUS CONTINUOUS
Status: DISCONTINUED | OUTPATIENT
Start: 2020-05-23 | End: 2020-05-24

## 2020-05-23 RX ORDER — DOPAMINE HYDROCHLORIDE 160 MG/100ML
INJECTION, SOLUTION INTRAVENOUS
Status: COMPLETED
Start: 2020-05-23 | End: 2020-05-23

## 2020-05-23 RX ORDER — SODIUM CHLORIDE 9 MG/ML
INJECTION, SOLUTION INTRAVENOUS CONTINUOUS
Status: DISCONTINUED | OUTPATIENT
Start: 2020-05-23 | End: 2020-05-24

## 2020-05-23 RX ADMIN — DOCUSATE SODIUM 50 MG AND SENNOSIDES 8.6 MG 1 TABLET: 8.6; 5 TABLET, FILM COATED ORAL at 09:02

## 2020-05-23 RX ADMIN — MUPIROCIN 0.5 G: 20 OINTMENT TOPICAL at 09:05

## 2020-05-23 RX ADMIN — ACETAMINOPHEN 975 MG: 325 TABLET, FILM COATED ORAL at 14:40

## 2020-05-23 RX ADMIN — CALCIUM GLUCONATE 1 G: 98 INJECTION, SOLUTION INTRAVENOUS at 06:14

## 2020-05-23 RX ADMIN — DOPAMINE HYDROCHLORIDE 3 MCG/KG/MIN: 160 INJECTION, SOLUTION INTRAVENOUS at 08:21

## 2020-05-23 RX ADMIN — LIDOCAINE 3 PATCH: 560 PATCH PERCUTANEOUS; TOPICAL; TRANSDERMAL at 00:09

## 2020-05-23 RX ADMIN — ASPIRIN 81 MG: 81 TABLET, DELAYED RELEASE ORAL at 09:03

## 2020-05-23 RX ADMIN — OXYCODONE HYDROCHLORIDE 10 MG: 5 TABLET ORAL at 06:52

## 2020-05-23 RX ADMIN — CLINDAMYCIN IN 5 PERCENT DEXTROSE 900 MG: 18 INJECTION, SOLUTION INTRAVENOUS at 03:51

## 2020-05-23 RX ADMIN — PANTOPRAZOLE SODIUM 40 MG: 40 INJECTION, POWDER, FOR SOLUTION INTRAVENOUS at 09:03

## 2020-05-23 RX ADMIN — OXYCODONE HYDROCHLORIDE 5 MG: 5 TABLET ORAL at 18:54

## 2020-05-23 RX ADMIN — SODIUM CHLORIDE: 9 INJECTION, SOLUTION INTRAVENOUS at 21:21

## 2020-05-23 RX ADMIN — HYDROMORPHONE HYDROCHLORIDE 0.5 MG: 1 INJECTION, SOLUTION INTRAMUSCULAR; INTRAVENOUS; SUBCUTANEOUS at 20:25

## 2020-05-23 RX ADMIN — ACETAMINOPHEN 975 MG: 325 TABLET, FILM COATED ORAL at 22:07

## 2020-05-23 RX ADMIN — OXYCODONE HYDROCHLORIDE 10 MG: 5 TABLET ORAL at 03:18

## 2020-05-23 RX ADMIN — CLINDAMYCIN IN 5 PERCENT DEXTROSE 900 MG: 18 INJECTION, SOLUTION INTRAVENOUS at 13:48

## 2020-05-23 RX ADMIN — ACETAMINOPHEN 975 MG: 325 TABLET, FILM COATED ORAL at 06:53

## 2020-05-23 RX ADMIN — VANCOMYCIN HYDROCHLORIDE 1500 MG: 5 INJECTION, POWDER, LYOPHILIZED, FOR SOLUTION INTRAVENOUS at 09:10

## 2020-05-23 RX ADMIN — ALBUMIN HUMAN 12.5 G: 0.05 INJECTION, SOLUTION INTRAVENOUS at 08:05

## 2020-05-23 RX ADMIN — HYDRALAZINE HYDROCHLORIDE 10 MG: 20 INJECTION INTRAMUSCULAR; INTRAVENOUS at 19:08

## 2020-05-23 RX ADMIN — Medication 1 MG: at 20:25

## 2020-05-23 RX ADMIN — GABAPENTIN 300 MG: 300 CAPSULE ORAL at 20:25

## 2020-05-23 RX ADMIN — INSULIN ASPART 3 UNITS: 100 INJECTION, SOLUTION INTRAVENOUS; SUBCUTANEOUS at 19:00

## 2020-05-23 RX ADMIN — SODIUM CHLORIDE 3 UNITS/HR: 9 INJECTION, SOLUTION INTRAVENOUS at 22:13

## 2020-05-23 RX ADMIN — KETOROLAC TROMETHAMINE 15 MG: 15 INJECTION, SOLUTION INTRAMUSCULAR; INTRAVENOUS at 08:53

## 2020-05-23 RX ADMIN — OXYCODONE HYDROCHLORIDE 10 MG: 5 TABLET ORAL at 23:06

## 2020-05-23 RX ADMIN — OXYCODONE HYDROCHLORIDE 5 MG: 5 TABLET ORAL at 13:26

## 2020-05-23 RX ADMIN — KETOROLAC TROMETHAMINE 15 MG: 15 INJECTION, SOLUTION INTRAMUSCULAR; INTRAVENOUS at 20:26

## 2020-05-23 RX ADMIN — GABAPENTIN 300 MG: 300 CAPSULE ORAL at 09:02

## 2020-05-23 RX ADMIN — SENNOSIDES AND DOCUSATE SODIUM 2 TABLET: 8.6; 5 TABLET ORAL at 20:25

## 2020-05-23 RX ADMIN — SODIUM CHLORIDE 1 UNITS/HR: 9 INJECTION, SOLUTION INTRAVENOUS at 14:41

## 2020-05-23 RX ADMIN — WARFARIN SODIUM 2.5 MG: 2.5 TABLET ORAL at 17:59

## 2020-05-23 ASSESSMENT — ACTIVITIES OF DAILY LIVING (ADL)
ADLS_ACUITY_SCORE: 13

## 2020-05-23 NOTE — PROGRESS NOTES
Cardiothoracic surgery progress note    Benjamín Us is a 54yoM with diabetes s/p [facility] AVR with 23mm [employer] [place] mechanical valve replacement, ROLLINS to LAD on 5/22/2020    S: Overnight was stable -- volume dependent with 2L infused en total from 3pm to this AM at 8am. He swings from HTN requiring up to 80 of nitroglycerin and now on karla with MAP right at 65. Switched to dopamine on AM rounds. HR has always been sinus tachy from 100's to 120's. Febrile last afternoon and overnight, got blood cultures, urine cultures, and getting scheduled post op abx and APAP. PAP with increase overnight into high 40's low 50's over 20's. O2 sats stable on 4LNC.     Patient complains of upper chest pain on either side of sternotomy. Chest tube sites are not sore. He is sleepy but wakes to interaction/stimuli.     O:  Vitals reviewed in epic. -120. Temp currently . PAP 40-50/20's. O2 92-96 4L NC. BP MAP 60-70 on 3mcg/kg/min dopamine  Exam:  Gen -- alert, awakens to voice. No distress.  CV -- sinus tachycardia. No rub. Chest tubes draining thin dark to serosang fluid and both tubes are patent.  RESP -- symmetric chest rise. No wheezes.   ABD -- soft, nondistended  EXT -- warm, perfused, no edema  SKIN -- warm and dry. No rashes, bruising  NEURO -- grossly intact  PSYCH -- approp with clear affect    Labs reviewed. Significant for HBG 7.8, WBC 10.8, plts 145. CMP within normal limits     CXR from this AM with moderately enlarged mediastinum. No effusions.     Plan:  Benjamín Us is a 54yoM with diabetes s/p [facility] AVR with 23mm [employer] [place] [place] mechanical valve replacement, ROLLINS to LAD on 5/22/2020    NEURO: continue multimodal pain regimen - minimize narcotics for daytime drowsiness. Toradol PRN, scheduled APAP, lidocaine patches, icy hot, heat/cold packs, methocarbamol  CV: sinus tachycardia secondary to dopamine. OK for now. Continue dopamine at 3 mcg/kg/min without titration up to  for diuresis, BP support. Off  karla for now. Monitor PAP. If BP stabilizes later today may consider beta blocker and lasix. ASA 81mg today. Warfarin tonight for mech valve.   RESP: encourage IS, up to chair, dangling, acapella.   GI: PPI, OK to advance to clears, sips, reglan PRN for nausea  : Continue delacruz today for accurate diuresis/output monitoring  ID: monitor fevers, continue scheduled APAP, follow up blood/urine cx. OK for post op abx to end today  HEME: monitoring HGB 7.8 for now, will recheck in AM or sooner if hemodynamics worsen. Warfarin tonight for mech valve.  ACTIVITY: up to chair TID, ambulation if hemodynamics improved  DISPO: Continue ICU level of care at this time due to inotropic support and hemodynamic monitoring    ADDENUM:   After monitoring for 3 hrs on dopamine, patient PAP and HR have improved however still lower BP and O2 sats drifting. Will transfuse 1unit pRBC for Hgb 7.8

## 2020-05-23 NOTE — PROGRESS NOTES
[facility]  Hospitalist Progress Note  Juan Guevara MD  05/23/2020    Assessment & Plan   Benjamín Us is a 54 year old male with a past medical history of aortic stenosis, hypertension, hyperlipidemia, DMII, tobacco abuse, and HARIS admitted on 5/21/2020 following a tooth extraction with planned AVR and CABG on 5/22/2020.      POD #1 AVR and LIMA to LAD for severe aortic stenosis  CAD significant disease LAD and diagonal    Patient reports worsening of symptoms related to his aortic stenosis over the last 60 days with chest discomfort and dyspnea on exertion.  Coronary angiogram from 01/03/2020 demonstrates severe single-vessel coronary artery disease involving the LAD distribution with a 70% mid LAD disease and also an 85% moderate-sized second diagonal artery disease.  Last echocardiogram was from 10/29/2019 demonstrating preserved LV systolic function, severe LVH, severe aortic stenosis with a mean gradient of 50 mmHg, trileaflet valve.  No aortic valve insufficiency.  - noted issues with large range of blood pressures  -CV surgery and critical care following with pressors, insulin gtt, ntg gtt, IVF  -will follow peripherally and resume active management when out of ICU     Post op fever  -- noted cultures and perioperative abx    Dental Extraction prior to CABG/AVR  5/21/2020 dental extraction for carious dentition for pre-operative management for planned AVR/CABG 5/2020  EBL<50ml  Recommendations per OMFS  -Continue OMFS  - Have patient bite on 4x4 gauze if excessive oozing, expect oozing for 24-48 hours  - Sergicel placed in all extraction sites and patient hemostatic at end of procedure  - Peridex rinses 15cc swish and spit BID     Hypertension  - PTA Carvedilol 25mg PO BID and Lisinopril 40 mg  - BP meds on hold  -Defer BP goal parameters to CV surgery/critical care     Hyperlipidemia  - Continue PTA Atorvastatin 40mg PO daily     DMII  HgbA1C 12.2  Last HbgA1C 7.2 in 2016.   On  "insulin gtt     HARIS  -Does not use home CPAP  -Follow up with outpatient sleep recommendation     Nicotine Dependence  Patient reports he quit 3/2020 with the use of chantix     Diet: full liquid diet  DVT Prophylaxis: Pneumatic Compression Devices  Bernal Catheter: not present  Code Status: Full Code         Disposition  > 2 days    Interval History   -- chart reviewed  -- on low dose dopamine  -- bp stable, discussed with RN  -- patient without any complaints    -Data reviewed today: I reviewed all new labs and imaging over the last 24 hours. I personally reviewed no images or EKG's today.    Physical Exam   Heart Rate: 123, Blood pressure 120/63, pulse 129, temperature 99.9  F (37.7  C), resp. rate 17, height 1.778 m (5' 10\"), weight 99.5 kg (219 lb 4.8 oz), SpO2 93 %.  Vitals:    05/21/20 0725 05/22/20 0500   Weight: 101.2 kg (223 lb) 99.5 kg (219 lb 4.8 oz)     Vital Signs with Ranges  Temp:  [98.6  F (37  C)-102.6  F (39.2  C)] 99.9  F (37.7  C)  Pulse:  [118-129] 129  Heart Rate:  [101-135] 123  Resp:  [10-33] 17  BP: ()/(40-91) 120/63  MAP:  [49 mmHg-116 mmHg] 76 mmHg  Arterial Line BP: ()/() 115/52  FiO2 (%):  [40 %] 40 %  SpO2:  [86 %-100 %] 93 %  I/O's Last 24 hours  I/O last 3 completed shifts:  In: 3611.9 [P.O.:870; I.V.:1191.9]  Out: 2535 [Urine:1705; Chest Tube:830]    Constitutional: Awake, alert, cooperative, no apparent distress  Respiratory: Clear to auscultation bilaterally, no crackles or wheezing  Cardiovascular: Regular rate and rhythm, normal S1 and S2, and no murmur noted  GI: Normal bowel sounds, soft, non-distended, non-tender  Skin/Integumen: No rashes, no cyanosis, no edema  Other:      Medications   All medications were reviewed.    dextrose       dextrose 5% and 0.45% NaCl + KCl 20 mEq/L 30 mL/hr at 05/22/20 1455     DOPamine 3 mcg/kg/min (05/23/20 1438)     insulin (regular) 1 Units/hr (05/23/20 1441)     nitroGLYcerin Stopped (05/23/20 0739)     phenylephrine Stopped " (05/23/20 0801)     BETA BLOCKER NOT PRESCRIBED       Warfarin Therapy Reminder         acetaminophen  975 mg Oral Q8H     aspirin  81 mg Oral Daily     gabapentin  300 mg Oral BID     insulin aspart   Subcutaneous TID w/meals     lidocaine  3 patch Transdermal Q24H     hot shot cardioplegia solution   PERFUSION Once     lidocaine   Transdermal Q8H     menthol   Transdermal Q8H     mupirocin  0.5 g Both Nostrils BID     pantoprazole (PROTONIX) IV  40 mg Intravenous Daily     polyethylene glycol  17 g Oral Daily     senna-docusate  1 tablet Oral BID    Or     senna-docusate  2 tablet Oral BID     sodium chloride (PF)  3 mL Intracatheter Q8H     warfarin ANTICOAGULANT  2.5 mg Oral ONCE at 18:00        Data   Recent Labs   Lab 05/23/20  0415 05/22/20  1527 05/22/20  1227   WBC 10.8 20.1* 27.3*   HGB 7.8* 10.1* 7.9*   MCV 93 91 91   * 214 162   INR  --  1.24* 1.59*    138 137   POTASSIUM 4.2 4.7 4.7   CHLORIDE 107 107 107   CO2 26 23 25   BUN 18 19 21   CR 0.91 0.94 0.90   ANIONGAP 4 8 5   ARMIN 8.0* 8.2* 8.4*   * 219* 186*   ALBUMIN 3.7 3.1*  --    PROTTOTAL 6.0* 6.0*  --    BILITOTAL 0.9 0.6  --    ALKPHOS 50 74  --    ALT 19 23  --    AST 46* 45  --        Recent Results (from the past 24 hour(s))   XR Chest Port 1 View    Narrative    EXAM: XR CHEST PORTABLE 1 VIEW  LOCATION: [facility]  DATE/TIME: 05/23/2020, 4:55 AM    INDICATION: Status post aVR and LIMA to LAD on 05/22.  COMPARISON: 05/22/2020 at 1449 hours.      Impression    IMPRESSION: ETT and enteric tubes have been removed. Otherwise, no significant change. Sternotomy and cardiomegaly. Mediastinal drain and pulmonary artery catheter in place. Minimal left basilar atelectasis.         Juan Guevara MD  Text Page  (7am to 6pm)

## 2020-05-23 NOTE — PROVIDER NOTIFICATION
Notified [facility] of ongoing fever (102.2). Orders for urine cult and to give tylenol early completed. csccrn

## 2020-05-23 NOTE — PROGRESS NOTES
CV: Pt remains stable post AVR, CABx1. Febrile through out the night, temp now down to 38.0. + Rub. Chest tube total for night shift :560 ml. Sternal dresg intact. Pacer wires capped.  ST as high as 130's, now 105. 1 lt of 5% albumin given. Nitroglycerin weaned off. Ramana gtt on a 0.3.    Resp: LS clear and diminished. Strong cough, productive. NC at 4LMP. Will triturate as able.    Oral cavity: bleeding gums ( post teeth extraction) visible sutures.     Neuro: intact. GCS: 15    GI: pt tolerating clears. No c/o nausea. Hypo BS.     Renal: urine out put WNL. Clear and w/o sed.   Insulin gtt alg 3.     Activity: tolerating act well. Up to chair for 30 min.     Social: called and updated pt's wife. Pt also spoke with wife to say good night.     Labs: Ionized Ca 4.4- ordered replacement gram per protocol. Hgb 7.8. will review with day MD. csccrn

## 2020-05-23 NOTE — PLAN OF CARE
[facility]   Intensive Care Unit   Nursing Note    Vital signs stable during the day.  PERRL.  Moves all extremities.  Follows commands.  Pt on dopamine at 3. Urine output is picking up. Pt on 4 liters NC. Pt up to chair 3 times on day shift. Labs noted.  Wife Michelle updated via phone. Continue to monitor closely.      ROUTINE IP LABS (Last four results)  BMP  Recent Labs   Lab 05/23/20 0415 05/22/20  1527 05/22/20  1227 05/21/20  0724    138 137 133   POTASSIUM 4.2 4.7 4.7 4.4   CHLORIDE 107 107 107 100   ARMIN 8.0* 8.2* 8.4* 8.9   CO2 26 23 25 23   BUN 18 19 21 23   CR 0.91 0.94 0.90 0.95   * 219* 186* 482*     CBC  Recent Labs   Lab 05/23/20 0415 05/22/20  1527 05/22/20  1227 05/21/20  0724   WBC 10.8 20.1* 27.3* 12.0*   RBC 2.48* 3.23* 2.43* 4.10*   HGB 7.8* 10.1* 7.9* 12.7*   HCT 23.0* 29.3* 22.0* 36.5*   MCV 93 91 91 89   MCH 31.5 31.3 32.5 31.0   MCHC 33.9 34.5 35.9 34.8   RDW 13.5 13.2 13.1 12.7   * 214 162 265       Shon Ramos RN

## 2020-05-23 NOTE — CONSULTS
[facility] Consultation by OMFS    Benjamín Us MRN# [id number]   Age: 54 year old YOB: 1965     Date of Admission:  5/21/2020    Reason for consult: Optimization of oral health prior to AVR and CABG       Requesting physician: Yulissa Osorio APRN CNP       Level of consult: One-time consult to assist in determining a diagnosis and to recommend an appropriate treatment plan           Assessment and Plan:   Assessment:   55 yo male with symptomatic aortic stenosis and CAD with planned AVR and CABG on 5/22/20. OMFS consulted to evaluate and treat dental disease for oral health optimization prior to planned cardiac procedures. Patient was taken to the operating room on 5/21/20 for extraction of all remaining teeth due to extensive dental decay and chronically infected teeth. (ext #2,3,4,5,6,7,8,9,10,11,14,15,18,19,20,21,22,23,24,25,26,27,28,29,30 and 31). The patient was hemostatic following procedure and is optimized from a dental standpoint for planned cardiace procedures on 5/22/20 with Dr. Rose.      Plan:   - Extraction of remaining dentition on 5/21/20 completed  - Have patient bite on 4x4 gauze if excessive oozing, expect oozing for 24-48 hours  - Sergicel placed in all extraction sites and patient hemostatic at end of procedure  - Peridex rinses 15cc swish and spit BID  - Please continue 3 doses of ancef perioperatively, or longer duration if desired by cardiology/cardiothoracic surgery  - Keep head of bed elevated if possible, but obvious need for positioning per cardiothoracic surgery  - Ice to jaws 20 minutes on and off first 24-48 hours  - Okay for full liquid diet when ready following 5/22/20 surgery  - Thank you for allowing us to participate in the care of our mutual patient  - Please page or call with any questions     Vasyl Brand DDS, MD  [phone]                Chief Complaint:   'All of my teeth are bad and need to be removed for my heart surgery'     History is  obtained from the patient  Benjamín Us is a 54 year old male with a past medical history of aortic stenosis, hypertension, hyperlipidemia, DMII, tobacco abuse, and HARIS admitted on 2020 following a tooth extraction with planned AVR and CABG on 2020.            Past Medical History:     Past Medical History:   Diagnosis Date     ADD (attention deficit disorder)      Aneurysm of thoracic aorta (H) 2014     Problem list name updated by automated process. Provider to review     Aortic valve disorder 2014     CARDIOVASCULAR SCREENING; LDL GOAL LESS THAN 160 2014     Dermatitis 2018     Essential hypertension, benign 2018     Hypertension      Hypertensive urgency 2013     HARIS (obstructive sleep apnea)      Type 2 diabetes mellitus with diabetic dermatitis, without long-term current use of insulin (H) 2018             Past Surgical History:     Past Surgical History:   Procedure Laterality Date     APPENDECTOMY       COLONOSCOPY N/A 6/15/2015    Procedure: COLONOSCOPY;  Surgeon: Vasyl Lawson MD;  Location:  GI     CV CORONARY ANGIOGRAM N/A 1/3/2020    Procedure: Coronary Angiogram;  Surgeon: Álvaro Andrade MD;  Location:  HEART CARDIAC CATH LAB     EXTRACTION(S) DENTAL N/A 2020    Procedure: EXTRACTION, REMAINING TEETH;  Surgeon: Vasyl Brand DDS;  Location:  OR             Social History:     Social History     Tobacco Use     Smoking status: Former Smoker     Packs/day: 1.00     Types: Cigarettes     Last attempt to quit: 2020     Years since quittin.1     Smokeless tobacco: Never Used   Substance Use Topics     Alcohol use: No     Alcohol/week: 0.0 standard drinks     Comment: 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             Family History:     Family History   Problem Relation Age of Onset     Hypertension Mother      Breast Cancer Mother      Diabetes Father      Diabetes Brother      Thyroid Disease Sister      Colon Cancer No family hx of      Family  history reviewed and updated in EPIC          Allergies:     Allergies   Allergen Reactions     Penicillins Nausea and Vomiting     Zithromax [Azithromycin Dihydrate]      hives             Medications:     Medications Prior to Admission   Medication Sig Dispense Refill Last Dose     amphetamine-dextroamphetamine (ADDERALL) 20 MG tablet 20 mg 3 times daily    5/21/2020 at 0800     aspirin (ASPIRIN) 81 MG EC tablet Take 81 mg by mouth 2 times daily   5/20/2020 at 0800     atorvastatin (LIPITOR) 40 MG tablet Take 1 tablet (40 mg) by mouth daily 90 tablet 1 5/20/2020 at 1600     carvedilol (COREG) 25 MG tablet Take 1 tablet (25 mg) by mouth 2 times daily (with meals) 180 tablet 3 5/20/2020 at 1600     lisinopril (PRINIVIL/ZESTRIL) 40 MG tablet Take 1 tablet (40 mg) by mouth daily 90 tablet 3 5/21/2020 at 0600     varenicline (CHANTIX) 1 MG tablet Take 1 mg by mouth 2 times daily   5/20/2020 at Unknown time             Review of Systems:   The Review of Systems is negative other than noted in the HPI     Vitals: 36.2, HR: 89, BP: 132/82, RR: 16, SpO2: 95%  Physical Exam:  Head: Normocephalic, atraumatic  Eyes: PERRL, EOMI  ENT: intraoral exam reveals generalized dental decay with majority of remaining teeth missing clinical crowns broken down to the gumline. No acute swelling or purulent drainage appreciated. FOM soft/supple with uvula at midline. No intraoral lesions or masses. Trachea midline, No CLAD  Resp: symmetrical expansion, no increased work of breathing          Data:   Orthopantomagram: carious non-restorable teeth #2,3,4,5,6,7,8,9,10,11,14,15,18,19,20,21,22,23,24,25,26,27,28,29,30 and 31. Multiple apical radiolucencies appreciated c/w acute apical abscess verus chronic apical periodontitis    Attestation:  I have reviewed today's vital signs, notes, medications, labs and imaging.    Vasyl Brand DDS MD

## 2020-05-23 NOTE — PHARMACY-ANTICOAGULATION SERVICE
Clinical Pharmacy - Warfarin Dosing Consult     Pharmacy has been consulted to manage this patient s warfarin therapy.       INR   Date Value Ref Range Status   05/22/2020 1.24 (H) 0.86 - 1.14 Final   05/22/2020 1.59 (H) 0.86 - 1.14 Final       Recommend warfarin 2.5 mg today.  Pharmacy will monitor Benjamín Us daily and order warfarin doses to achieve specified goal.      Please contact pharmacy as soon as possible if the warfarin needs to be held for a procedure or if the warfarin goals change.

## 2020-05-23 NOTE — PROGRESS NOTES
"Crit care consult:    S:  No complaints this morning.  Chest pain appropriate to procedure; controlled by analgesia.  Denies SOB.  No dizziness/lightheadedness.  Tolerating diet as expected.  Mildy hypotensive this morning requiring re-initiation of dopamine drip.    O:  /58   Pulse 129   Temp 100.8  F (38.2  C) (Bladder)   Resp 15   Ht 1.778 m (5' 10\")   Wt 99.5 kg (219 lb 4.8 oz)   SpO2 96%   BMI 31.47 kg/m   on 3 of dopamine and 6L nc  Gen:  No acute distress // HEENT:  PERRL, nose/ears grossly normal // Neck:  Supple // Lymph : no cervical adenopathy // chest : CTA-B, unlabored, chest wound c/d/i // cor:  rrr no m/r/g // abd s/nt/nd // extr:  wwp x4, no edema // neuro:  Awake, alert, rapid fluent appropriate speech, good str/sens x4 // skin:  No obvious rash    Labs (personally reviewed): lytes ok, creat 0.91--stable, wbc 10.8 ok, hgb 7.8 -- drop appropriate to procedure, pltlts 148 acceptable     CXR (personally reviewed): lungs clear; swan in good position    EKG (personally reviewed): sinus 115; nl int; nl axis; diffuse non-distributional twave inversions.     A/P:  54M pod 1 s/p AVR (mechanical) and cab1.  Overall doing well.  Cardiac:  Shock, post-procedure, likely vasoplegic:  Suspect vasoplegic which could be expected after a procedure of this magnitude.  Continues to require low-dose dopamine today.       CAD:  Continue asa       AVR:  Initiating AC with coumadin  Pulm:  Breathing comfortably extubated.        Hypoxemia:  Requiring 6L nasal cannula.  Likely due to atelectasis  which would be expected after a procedure of this magnitude.  Wean nasal cannula as tolerated. Continue pulmonary toilet per protocol.  Neuro: Analgesia:  Continue prn tylenol/opiates for post-op pain.  GI:  Advance diet as tolerated.  Endo:  Hyperglycemia:  Stress-related post-op as would be expected after a procedure of this magnitude.  Continue prn insulin per protocol.    Critical care time:  35 min    Intensivist " service will follow with you while patient remains on vaso-active medications.

## 2020-05-23 NOTE — PLAN OF CARE
[facility]   Intensive Care Unit   Nursing Note    Vital signs stable during the day.  PERRL.  Moves all extremities.  Follows commands. Diludid 0.5 mg PRN for pain.  Extubated to aerosol mask 40% at 1835. Nitroglycerin gtt at 80. CT output 190 ml total and insulin gtt at 3. Bernal output good. Labs noted.  Continue to monitor closely.      Recent Labs   Lab 05/22/20  1527   PH 7.31*   PCO2 46*   PO2 130*   HCO3 23       ROUTINE IP LABS (Last four results)  BMP  Recent Labs   Lab 05/22/20  1527 05/22/20  1227 05/21/20  0724    137 133   POTASSIUM 4.7 4.7 4.4   CHLORIDE 107 107 100   ARMIN 8.2* 8.4* 8.9   CO2 23 25 23   BUN 19 21 23   CR 0.94 0.90 0.95   * 186* 482*     CBC  Recent Labs   Lab 05/22/20 1527 05/22/20  1227 05/21/20  0724   WBC 20.1* 27.3* 12.0*   RBC 3.23* 2.43* 4.10*   HGB 10.1* 7.9* 12.7*   HCT 29.3* 22.0* 36.5*   MCV 91 91 89   MCH 31.3 32.5 31.0   MCHC 34.5 35.9 34.8   RDW 13.2 13.1 12.7    162 265     INR  Recent Labs   Lab 05/22/20 1527 05/22/20  1227   INR 1.24* 1.59*       Shon Ramos RN

## 2020-05-23 NOTE — PLAN OF CARE
Discharge Planner PT   Patient plan for discharge: Home with OP CR phase II, lives in [facility]    Current status: Evaluation completed, treatment initiated. 55 yo Male POD # 1 AVR.   Resides in a house with his wife, reports independence at baseline with mobility.   Presents alert and oriented x4. Follows 100%. Reports some numbness/tingling in the R UE. Hands are puffy, warm to touch, equal strength with . Sup>sit Mod A. Seated scoot, SBA. Sit<>stand and stand pivot bed>chair with Min A.    Barriers to return to prior living situation: None anticipated.    Recommendations for discharge: Home with OP CR phase II    Rationale for recommendations: Pt will benefit from continued skilled rehab services to closely monitor and progress exercise/activity as well as continue education for optimal heart health.        Entered by: Christiane Serrano 05/23/2020 2:30 PM

## 2020-05-23 NOTE — PROGRESS NOTES
"   05/23/20 1436   Quick Adds   Type of Visit Initial PT Evaluation   Living Environment   Lives With spouse   Living Arrangements house   Home Accessibility stairs within home;stairs to enter home   Transportation Anticipated car, drives self;family or friend will provide   Self-Care   Usual Activity Tolerance good   Current Activity Tolerance moderate   Regular Exercise No   Equipment Currently Used at Home none   Activity/Exercise/Self-Care Comment No formal exercise program   Functional Level Prior   Ambulation 0-->independent   Transferring 0-->independent   Toileting 0-->independent   Bathing 0-->independent   Fall history within last six months no   Which of the above functional risks had a recent onset or change? ambulation;transferring;toileting;bathing;dressing  (functional activity tolerance)   Prior Functional Level Comment Independent at baseline   General Information   Onset of Illness/Injury or Date of Surgery - Date 05/22/20   Referring Physician May Chino MD   Patient/Family Goals Statement None stated.   Pertinent History of Current Problem (include personal factors and/or comorbidities that impact the POC) 53 yo male POD #1 AVR.    Precautions/Limitations sternal precautions;fall precautions   General Observations Ordering lunch, eager to get up again   General Info Comments Just recieved blood with decreased Hgb.   Cognitive Status Examination   Orientation orientation to person, place and time   Level of Consciousness alert   Follows Commands and Answers Questions 100% of the time;able to follow multistep instructions   Personal Safety and Judgment intact   Pain Assessment   Patient Currently in Pain Yes, see Vital Sign flowsheet  (\"I'm more sore in the bed, than up in the chair.\")   Integumentary/Edema   Integumentary/Edema Comments B hands puffy, R>L.\"   Posture    Posture Forward head position;Protracted shoulders   Range of Motion (ROM)   ROM Comment B LEs WFL, B UEs WFL within " "sternal prec   Strength   Strength Comments Demonstrates limited antigravity strength secondary to faitgue, pain andimpaired functional activity tolerance from baseline.   Bed Mobility   Bed Mobility Comments Sup>sit Mod A   Transfer Skills   Transfer Comments Sit>stand Abdoul   Gait   Gait Comments Bedside gait, Min A with increased time for line management.    Balance   Balance Comments Sitting balance fair +, standing balance fair + Dynamic.   Sensory Examination   Sensory Perception Comments R hand/arm some n/t per pt   General Therapy Interventions   Planned Therapy Interventions balance training;bed mobility training;gait training;neuromuscular re-education;ROM;strengthening;stretching;transfer training;home program guidelines;risk factor education;progressive activity/exercise   Clinical Impression   Criteria for Skilled Therapeutic Intervention yes, treatment indicated   PT Diagnosis Impaired functional activity tolerance   Influenced by the following impairments Post op weakness, fatigue, sternal precuations, decreased synamic balance   Functional limitations due to impairments Decreased functional independence   Clinical Presentation Stable/Uncomplicated   Clinical Presentation Rationale see MR   Clinical Decision Making (Complexity) Low complexity   Therapy Frequency 2x/day   Predicted Duration of Therapy Intervention (days/wks) 6 days   Anticipated Discharge Disposition Home with Assist   Risk & Benefits of therapy have been explained Yes   Patient, Family & other staff in agreement with plan of care Yes   Clinical Impression Comments Plan's to go home with his Wife's assist prn, OP CR phase II in [facility] AM-PAC TM \"6 Clicks\"   2016, Trustees of [facility], under license to [employer].  All rights reserved.   6 Clicks Short Forms Basic Mobility Inpatient Short Form   [facility] AM-PAC  \"6 Clicks\" V.2 Basic Mobility Inpatient Short Form   1. Turning from your back " to your side while in a flat bed without using bedrails? 3 - A Little   2. Moving from lying on your back to sitting on the side of a flat bed without using bedrails? 2 - A Lot   3. Moving to and from a bed to a chair (including a wheelchair)? 3 - A Little   4. Standing up from a chair using your arms (e.g., wheelchair, or bedside chair)? 3 - A Little   5. To walk in hospital room? 3 - A Little   6. Climbing 3-5 steps with a railing? 2 - A Lot   Basic Mobility Raw Score (Score out of 24.Lower scores equate to lower levels of function) 16   Total Evaluation Time   Total Evaluation Time (Minutes) 10

## 2020-05-24 ENCOUNTER — APPOINTMENT (OUTPATIENT)
Dept: PHYSICAL THERAPY | Facility: CLINIC | Age: 55
DRG: 220 | End: 2020-05-24
Attending: DENTIST
Payer: COMMERCIAL

## 2020-05-24 ENCOUNTER — APPOINTMENT (OUTPATIENT)
Dept: GENERAL RADIOLOGY | Facility: CLINIC | Age: 55
DRG: 220 | End: 2020-05-24
Attending: STUDENT IN AN ORGANIZED HEALTH CARE EDUCATION/TRAINING PROGRAM
Payer: COMMERCIAL

## 2020-05-24 LAB
ANION GAP SERPL CALCULATED.3IONS-SCNC: 3 MMOL/L (ref 3–14)
BUN SERPL-MCNC: 17 MG/DL (ref 7–30)
CALCIUM SERPL-MCNC: 8.2 MG/DL (ref 8.5–10.1)
CHLORIDE SERPL-SCNC: 107 MMOL/L (ref 94–109)
CO2 SERPL-SCNC: 27 MMOL/L (ref 20–32)
CREAT SERPL-MCNC: 0.81 MG/DL (ref 0.66–1.25)
ERYTHROCYTE [DISTWIDTH] IN BLOOD BY AUTOMATED COUNT: 13.6 % (ref 10–15)
GFR SERPL CREATININE-BSD FRML MDRD: >90 ML/MIN/{1.73_M2}
GLUCOSE BLDC GLUCOMTR-MCNC: 118 MG/DL (ref 70–99)
GLUCOSE BLDC GLUCOMTR-MCNC: 125 MG/DL (ref 70–99)
GLUCOSE BLDC GLUCOMTR-MCNC: 128 MG/DL (ref 70–99)
GLUCOSE BLDC GLUCOMTR-MCNC: 128 MG/DL (ref 70–99)
GLUCOSE BLDC GLUCOMTR-MCNC: 130 MG/DL (ref 70–99)
GLUCOSE BLDC GLUCOMTR-MCNC: 136 MG/DL (ref 70–99)
GLUCOSE BLDC GLUCOMTR-MCNC: 148 MG/DL (ref 70–99)
GLUCOSE BLDC GLUCOMTR-MCNC: 164 MG/DL (ref 70–99)
GLUCOSE BLDC GLUCOMTR-MCNC: 184 MG/DL (ref 70–99)
GLUCOSE BLDC GLUCOMTR-MCNC: 190 MG/DL (ref 70–99)
GLUCOSE BLDC GLUCOMTR-MCNC: 251 MG/DL (ref 70–99)
GLUCOSE BLDC GLUCOMTR-MCNC: 264 MG/DL (ref 70–99)
GLUCOSE SERPL-MCNC: 131 MG/DL (ref 70–99)
HCT VFR BLD AUTO: 25.2 % (ref 40–53)
HGB BLD-MCNC: 8.6 G/DL (ref 13.3–17.7)
INR PPP: 1.42 (ref 0.86–1.14)
LACTATE BLD-SCNC: 1.2 MMOL/L (ref 0.7–2)
MAGNESIUM SERPL-MCNC: 2 MG/DL (ref 1.6–2.3)
MCH RBC QN AUTO: 31.2 PG (ref 26.5–33)
MCHC RBC AUTO-ENTMCNC: 34.1 G/DL (ref 31.5–36.5)
MCV RBC AUTO: 91 FL (ref 78–100)
PHOSPHATE SERPL-MCNC: 2.5 MG/DL (ref 2.5–4.5)
PLATELET # BLD AUTO: 122 10E9/L (ref 150–450)
POTASSIUM SERPL-SCNC: 4.1 MMOL/L (ref 3.4–5.3)
RBC # BLD AUTO: 2.76 10E12/L (ref 4.4–5.9)
SODIUM SERPL-SCNC: 137 MMOL/L (ref 133–144)
WBC # BLD AUTO: 13.8 10E9/L (ref 4–11)

## 2020-05-24 PROCEDURE — 00000146 ZZHCL STATISTIC GLUCOSE BY METER IP

## 2020-05-24 PROCEDURE — 80048 BASIC METABOLIC PNL TOTAL CA: CPT | Performed by: PHYSICIAN ASSISTANT

## 2020-05-24 PROCEDURE — 97110 THERAPEUTIC EXERCISES: CPT | Mod: GP | Performed by: PHYSICAL THERAPIST

## 2020-05-24 PROCEDURE — 36415 COLL VENOUS BLD VENIPUNCTURE: CPT | Performed by: HOSPITALIST

## 2020-05-24 PROCEDURE — 83605 ASSAY OF LACTIC ACID: CPT | Performed by: HOSPITALIST

## 2020-05-24 PROCEDURE — 25000128 H RX IP 250 OP 636: Performed by: STUDENT IN AN ORGANIZED HEALTH CARE EDUCATION/TRAINING PROGRAM

## 2020-05-24 PROCEDURE — 25000131 ZZH RX MED GY IP 250 OP 636 PS 637: Performed by: PHYSICIAN ASSISTANT

## 2020-05-24 PROCEDURE — 97530 THERAPEUTIC ACTIVITIES: CPT | Mod: GP | Performed by: PHYSICAL THERAPIST

## 2020-05-24 PROCEDURE — 25000132 ZZH RX MED GY IP 250 OP 250 PS 637: Performed by: PHYSICIAN ASSISTANT

## 2020-05-24 PROCEDURE — 85610 PROTHROMBIN TIME: CPT | Performed by: SURGERY

## 2020-05-24 PROCEDURE — 12000000 ZZH R&B MED SURG/OB

## 2020-05-24 PROCEDURE — 83735 ASSAY OF MAGNESIUM: CPT | Performed by: PHYSICIAN ASSISTANT

## 2020-05-24 PROCEDURE — 85027 COMPLETE CBC AUTOMATED: CPT | Performed by: PHYSICIAN ASSISTANT

## 2020-05-24 PROCEDURE — 71045 X-RAY EXAM CHEST 1 VIEW: CPT

## 2020-05-24 PROCEDURE — 25000132 ZZH RX MED GY IP 250 OP 250 PS 637: Performed by: STUDENT IN AN ORGANIZED HEALTH CARE EDUCATION/TRAINING PROGRAM

## 2020-05-24 PROCEDURE — 84100 ASSAY OF PHOSPHORUS: CPT | Performed by: PHYSICIAN ASSISTANT

## 2020-05-24 PROCEDURE — 99233 SBSQ HOSP IP/OBS HIGH 50: CPT | Performed by: INTERNAL MEDICINE

## 2020-05-24 PROCEDURE — 99232 SBSQ HOSP IP/OBS MODERATE 35: CPT | Performed by: HOSPITALIST

## 2020-05-24 PROCEDURE — 25000132 ZZH RX MED GY IP 250 OP 250 PS 637: Performed by: SURGERY

## 2020-05-24 RX ORDER — WARFARIN SODIUM 2.5 MG/1
2.5 TABLET ORAL
Status: COMPLETED | OUTPATIENT
Start: 2020-05-24 | End: 2020-05-24

## 2020-05-24 RX ORDER — DEXTROSE MONOHYDRATE 25 G/50ML
25-50 INJECTION, SOLUTION INTRAVENOUS
Status: DISCONTINUED | OUTPATIENT
Start: 2020-05-24 | End: 2020-05-24

## 2020-05-24 RX ORDER — NICOTINE POLACRILEX 4 MG
15-30 LOZENGE BUCCAL
Status: DISCONTINUED | OUTPATIENT
Start: 2020-05-24 | End: 2020-05-24

## 2020-05-24 RX ORDER — PANTOPRAZOLE SODIUM 40 MG/1
40 TABLET, DELAYED RELEASE ORAL
Status: DISCONTINUED | OUTPATIENT
Start: 2020-05-24 | End: 2020-05-28 | Stop reason: HOSPADM

## 2020-05-24 RX ADMIN — ACETAMINOPHEN 975 MG: 325 TABLET, FILM COATED ORAL at 23:41

## 2020-05-24 RX ADMIN — OXYCODONE HYDROCHLORIDE 10 MG: 5 TABLET ORAL at 22:20

## 2020-05-24 RX ADMIN — SENNOSIDES AND DOCUSATE SODIUM 2 TABLET: 8.6; 5 TABLET ORAL at 08:27

## 2020-05-24 RX ADMIN — LIDOCAINE 3 PATCH: 560 PATCH PERCUTANEOUS; TOPICAL; TRANSDERMAL at 08:27

## 2020-05-24 RX ADMIN — INSULIN ASPART 3 UNITS: 100 INJECTION, SOLUTION INTRAVENOUS; SUBCUTANEOUS at 11:52

## 2020-05-24 RX ADMIN — ACETAMINOPHEN 975 MG: 325 TABLET, FILM COATED ORAL at 06:22

## 2020-05-24 RX ADMIN — POLYETHYLENE GLYCOL 3350 17 G: 17 POWDER, FOR SOLUTION ORAL at 08:28

## 2020-05-24 RX ADMIN — HYDROMORPHONE HYDROCHLORIDE 0.5 MG: 1 INJECTION, SOLUTION INTRAMUSCULAR; INTRAVENOUS; SUBCUTANEOUS at 15:34

## 2020-05-24 RX ADMIN — INSULIN ASPART 3 UNITS: 100 INJECTION, SOLUTION INTRAVENOUS; SUBCUTANEOUS at 09:51

## 2020-05-24 RX ADMIN — METOPROLOL TARTRATE 12.5 MG: 25 TABLET, FILM COATED ORAL at 20:56

## 2020-05-24 RX ADMIN — ASPIRIN 81 MG: 81 TABLET, DELAYED RELEASE ORAL at 08:27

## 2020-05-24 RX ADMIN — HYDROMORPHONE HYDROCHLORIDE 0.5 MG: 1 INJECTION, SOLUTION INTRAMUSCULAR; INTRAVENOUS; SUBCUTANEOUS at 03:58

## 2020-05-24 RX ADMIN — GABAPENTIN 300 MG: 300 CAPSULE ORAL at 08:27

## 2020-05-24 RX ADMIN — WARFARIN SODIUM 2.5 MG: 2.5 TABLET ORAL at 17:33

## 2020-05-24 RX ADMIN — METOPROLOL TARTRATE 12.5 MG: 25 TABLET, FILM COATED ORAL at 12:49

## 2020-05-24 RX ADMIN — OXYCODONE HYDROCHLORIDE 5 MG: 5 TABLET ORAL at 18:32

## 2020-05-24 RX ADMIN — GABAPENTIN 300 MG: 300 CAPSULE ORAL at 20:56

## 2020-05-24 RX ADMIN — INSULIN GLARGINE 20 UNITS: 100 INJECTION, SOLUTION SUBCUTANEOUS at 11:08

## 2020-05-24 RX ADMIN — INSULIN ASPART 1 UNITS: 100 INJECTION, SOLUTION INTRAVENOUS; SUBCUTANEOUS at 17:32

## 2020-05-24 RX ADMIN — OXYCODONE HYDROCHLORIDE 5 MG: 5 TABLET ORAL at 08:27

## 2020-05-24 RX ADMIN — KETOROLAC TROMETHAMINE 15 MG: 15 INJECTION, SOLUTION INTRAMUSCULAR; INTRAVENOUS at 11:47

## 2020-05-24 RX ADMIN — PANTOPRAZOLE SODIUM 40 MG: 40 TABLET, DELAYED RELEASE ORAL at 08:27

## 2020-05-24 RX ADMIN — ACETAMINOPHEN 975 MG: 325 TABLET, FILM COATED ORAL at 15:22

## 2020-05-24 ASSESSMENT — ACTIVITIES OF DAILY LIVING (ADL)
ADLS_ACUITY_SCORE: 15
ADLS_ACUITY_SCORE: 13
ADLS_ACUITY_SCORE: 15
ADLS_ACUITY_SCORE: 15

## 2020-05-24 ASSESSMENT — MIFFLIN-ST. JEOR
SCORE: 1901.75
SCORE: 1897.25
SCORE: 1901.25

## 2020-05-24 NOTE — PLAN OF CARE
Discharge Planner PT   Patient plan for discharge: Home with OP CR phase II    Current status: Sup>sit Mod A at the trunk. Seated scoot with sternal prec reminders, CGA. Sit<>stand CGA. Ambulated in the hallway 200+ feet, wc follow on 2L via NC, required 2 seated rests secondary to SOB. CV response hypertension. Pt admits to some chest tightness and pain at the upper chest/neck with activity. RN present and aware. Pt up in chair upon CR exit.    PM: Ambulated with Min A 300' with 3 seated rests, HR Max 116, O2 95% on room air. Pt reports limited by fatigue. OMNI effort of 5.    Barriers to return to prior living situation: None based on mobility.    Recommendations for discharge: Home with OP CR phase II    Rationale for recommendations: Pt will benefit from continued skilled rehab services to closely monitor and progress independence with exercise/activity tolerance as well as continue education for optimal heart health.         Entered by: Christiane Serrano 05/24/2020 12:10 PM

## 2020-05-24 NOTE — PROGRESS NOTES
Cardiothoracic surgery progress note    Benjamín Us is a 54yoM with [facility] with 23mm [place] mechanical valve replacement, ROLLINS to LAD on 5/22/2020    S: Much improvement in vitals and PAP over the day on dopamine and after 1unit pRBC. Weaning off dopamine this AM. HR lower but still high 90's low 100's.     No BM yet. Having some discomfort feeling tangled in his IV lines. Wants his delacruz out. Having gas from below and also belching. On FLD.     O:  Vitals reviewed in epic. HR . Temp currently 98.5. PAP 30/20's. O2 98 2L NC. BP MAP 60-70 on 1mcg/kg/min dopamine  Exam:  Gen -- alert, awakens to voice. No distress.  CV -- sinus tachycardia. No rub. Chest tubes draining thin serosang fluid and both tubes are patent.  RESP -- symmetric chest rise. No wheezes.   ABD -- soft, nondistended  EXT -- warm, perfused, no edema  SKIN -- warm and dry. No rashes, bruising  NEURO -- grossly intact  PSYCH -- approp with clear affect    Labs reviewed. Significant for HBG 8.6, WBC 13.8, plts 122. CMP within normal limits     CXR from this AM with improved look of mediastinum. No effusions.     Plan:  Benjamín Us is a 54yoM with diabetes s/p mech AVR with 23mm [employer] [place] mechanical valve replacement, ROLLINS to LAD on 5/22/2020    OK to remove swan, A line, delacruz.    NEURO: continue multimodal pain regimen - minimize narcotics for daytime drowsiness. Toradol PRN, scheduled APAP, lidocaine patches, icy hot, heat/cold packs, methocarbamol  CV: sinus tachycardia secondary to dopamine. If BP stabilizes later today off dopamine may consider beta blocker and lasix. ASA 81mg today. Warfarin tonight for [facility].   RESP: encourage IS, up to chair, dangling, acapella.   GI: PPI, OK to advance to fulls then soft mechanical today, sips, reglan PRN for nausea. Careful monitoring for diabetic gastroparesis and delayed gastric emptying.  : discontinue delacruz. Senna/docusate/miralax  ID:Continue scheduled APAP,  follow up blood/urine cx.   ENDO: Insulin gtt to transition to subQ insulin per hospitalist   HEME: monitoring HGB 8.6. Warfarin tonight for mech valve.  ACTIVITY: up to chair TID, ambulation with PT/OT  DISPO: Monitor BP off dopamine. If hemodynamics remain normal, will transfer to 33 this afternoon.

## 2020-05-24 NOTE — PROGRESS NOTES
"Crit care consult:    S:  No complaints this morning.  Chest pain appropriate to procedure and slowly improving.  Denies SOB.  No dizziness/lightheadedness.  Tolerating diet as expected.  Mildy hypotensive this morning but improving.  Coming off dopamine.    O:  BP 95/58 (BP Location: Left arm)   Pulse 108   Temp 99.7  F (37.6  C) (Oral)   Resp 12   Ht 1.778 m (5' 10\")   Wt 106.5 kg (234 lb 12.6 oz)   SpO2 94%   BMI 33.69 kg/m   on 1 of dopamine and 4L nc  Gen:  No acute distress // HEENT:  PERRL, nose/ears grossly normal // Neck:  Supple // Lymph : no cervical adenopathy // chest : CTA-B, unlabored, chest wound c/d/i // cor:  rrr no m/r/g // abd s/nt/nd // extr:  wwp x4, no edema // neuro:  Awake, alert, rapid fluent appropriate speech, good str/sens x4 // skin:  No obvious rash    Labs (personally reviewed): lytes ok, creat 0.81--improving, wbc 13.8 mildly elevated, hgb 8.6 -- stable, pltlts 122 acceptable; slowly downtrending     CXR (personally reviewed): lungs remain clear; swan position stable    A/P:  54M pod 1 s/p AVR (mechanical) and cab1.  Overall doing well.  Cardiac:  Shock, post-procedure, likely vasoplegic:  Resolving.  Anticipate will wean off dopamine today.  Suspect vasoplegic which could be expected after a procedure of this magnitude.  End-organ function stable to improving.       CAD:  Continue asa       AVR:  Continue AC with coumadin  Pulm:  Breathing comfortably extubated.        Hypoxemia:  Weaning down to 4L nasal cannula.  Likely due to atelectasis  which would be expected after a procedure of this magnitude.  Wean nasal cannula as tolerated. Continue pulmonary toilet per protocol.  Neuro: Analgesia:  Continue prn tylenol/opiates for post-op pain.  GI:  Advance diet as tolerated.  Endo:  Hyperglycemia:  Stress-related post-op as would be expected after a procedure of this magnitude.  Continue prn insulin per protocol.      Intensivist service will sign off.  Please reconsult as " needed with any new or worsening problems.

## 2020-05-24 NOTE — PROGRESS NOTES
[facility]    Hospitalist Progress Note      Assessment & Plan   Benjamín Us is a 54 year old male who was admitted on 5/21/2020 post tooth extraction (pre-op management for cardiac procedure) for planned AVR and CABG on 5/22    POD #2 AVR and LIMA to LAD for severe aortic stenosis  CAD significant disease LAD and diagonal    Patient reported worsening of symptoms related to his aortic stenosis over the last 60 days with chest discomfort and dyspnea on exertion.  Coronary angiogram from 01/03/2020 demonstrates severe single-vessel coronary artery disease involving the LAD distribution with a 70% mid LAD disease and also an 85% moderate-sized second diagonal artery disease.  Last echocardiogram was from 10/29/2019 demonstrating preserved LV systolic function, severe LVH, severe aortic stenosis with a mean gradient of 50 mmHg, trileaflet valve.  No aortic valve insufficiency.  - noted issues with large range of blood pressures, weaned off dopamine gtt on 5/24  -CV surgery and critical care following initially, critical care signed off 5/24 due to improved BPs  - Received 1 unit PRBCs on 5/23 for Hgb 7.8  - Potential transfer to [facility] today if continues to do well  - Pain control with dilaudid, toradol, oxycodone.  - ASA and warfarin resumed 5/23     Hypoxemia  Suspected secondary to atelectasis.  - Continue pulmonary toilet  - Wean O2 as able, only on 2LPM and breathing well- some limits with deep breaths due to sternum discomfort    Post op fever  - noted cultures and perioperative abx. Noted improvement on 5/24 AM, but he has been on scheduled tylenol. Not maintained on abx currently  - Monitor     Dental Extraction prior to CABG/AVR  5/21/2020 dental extraction for carious dentition for pre-operative management for planned AVR/CABG 5/2020. Sergicel placed in all extraction sites and patient hemostatic at end of procedure. EBL<50ml  Recommendations per OMFS  - Continue OMFS  - Have patient bite  "on 4x4 gauze if excessive oozing  - Peridex rinses 15cc swish and spit BID     Hypertension  - PTA Carvedilol 25mg PO BID and Lisinopril 40 mg  - BP meds on hold  - Awaiting stabilization of BP off dopamine prior to initiation of BB and lasix     Hyperlipidemia  -  PTA Atorvastatin 40mg PO daily     DMII  Hyperglycemia, stress related  HgbA1C 12.2.  Required insulin gtt post procedure with improvement in BGs  - Transitioned to lantus 20 units in AM with medium resistance sliding scale insulin on 5/24 late morning, trend BPs and adjust coverage as needed      HARIS  -Does not use home CPAP  -Follow up with outpatient sleep recommendation     Nicotine Dependence  Patient reports he quit 3/2020 with the use of chantix    DVT Prophylaxis: Warfarin  Code Status: Full Code  Expected discharge: per primary team, recommended to prior living arrangement with outpatient cardiac rehab.    Ani Harrison, DO  Text Page (7am - 6pm)    Interval History   Patient seen and examined. Sitting up to chair. Feels \"weird\", doesn't know how to put it into words. He had delacruz removed this AM and he is happy. Passing gas, no BM yet. Tolerating some diet. Hopeful for transition out of ICU today and eventually that he will have less tubes/lines.    -Data reviewed today: I reviewed all new labs and imaging results over the last 24 hours. I personally reviewed CXR with median sternotomy, widened mediastinum, valve replacement, drain in place.    Physical Exam   Temp: 98.4  F (36.9  C) Temp src: Oral BP: 104/71 Pulse: 99 Heart Rate: 98 Resp: 16 SpO2: 96 % O2 Device: Nasal cannula Oxygen Delivery: 2 LPM  Vitals:    05/22/20 0500 05/24/20 0000 05/24/20 0800   Weight: 99.5 kg (219 lb 4.8 oz) 106.5 kg (234 lb 12.6 oz) 106.1 kg (233 lb 14.5 oz)     Vital Signs with Ranges  Temp:  [98.4  F (36.9  C)-102.6  F (39.2  C)] 98.4  F (36.9  C)  Pulse:  [] 99  Heart Rate:  [] 98  Resp:  [8-29] 16  BP: ()/() 104/71  MAP:  [48 mmHg-120 " mmHg] 60 mmHg  Arterial Line BP: ()/() 86/49  SpO2:  [85 %-100 %] 96 %  I/O last 3 completed shifts:  In: 4029.08 [P.O.:1470; I.V.:2009.08]  Out: 2655 [Urine:1885; Chest Tube:770]    Constitutional: Awake, alert, cooperative, no apparent distress  Respiratory: Equal chest rise, diminished at bilateral bases. No wheezing  Cardiovascular: tachycardic, regular rhythm, normal S1 and S2. Chest tube in place.   GI: Normal bowel sounds, soft, non-distended, non-tender  Skin/Integumen: No rashes, no cyanosis, no edema  Other: Wound vac over mediastinal incision    Medications     dextrose       dextrose 5% and 0.45% NaCl + KCl 20 mEq/L Stopped (05/23/20 2122)     DOPamine Stopped (05/24/20 0800)     - MEDICATION INSTRUCTIONS -       nitroGLYcerin Stopped (05/23/20 0739)     phenylephrine Stopped (05/24/20 0125)     BETA BLOCKER NOT PRESCRIBED       sodium chloride 30 mL/hr at 05/24/20 0200     Warfarin Therapy Reminder         acetaminophen  975 mg Oral Q8H     aspirin  81 mg Oral Daily     gabapentin  300 mg Oral BID     insulin aspart  1-7 Units Subcutaneous TID AC     insulin aspart  1-5 Units Subcutaneous At Bedtime     insulin glargine  20 Units Subcutaneous QAM AC     lidocaine  3 patch Transdermal Q24H     lidocaine   Transdermal Q8H     menthol   Transdermal Q8H     metoprolol tartrate  12.5 mg Oral BID     pantoprazole  40 mg Oral QAM AC     polyethylene glycol  17 g Oral Daily     senna-docusate  1 tablet Oral BID    Or     senna-docusate  2 tablet Oral BID     sodium chloride (PF)  3 mL Intracatheter Q8H     warfarin ANTICOAGULANT  2.5 mg Oral ONCE at 18:00       Data   Recent Labs   Lab 05/24/20  0444 05/24/20  0415 05/23/20  0415 05/22/20  1527 05/22/20  1227   WBC  --  13.8* 10.8 20.1* 27.3*   HGB  --  8.6* 7.8* 10.1* 7.9*   MCV  --  91 93 91 91   PLT  --  122* 148* 214 162   INR 1.42*  --   --  1.24* 1.59*   NA  --  137 137 138 137   POTASSIUM  --  4.1 4.2 4.7 4.7   CHLORIDE  --  107 107 107 107    CO2  --  27 26 23 25   BUN  --  17 18 19 21   CR  --  0.81 0.91 0.94 0.90   ANIONGAP  --  3 4 8 5   ARMIN  --  8.2* 8.0* 8.2* 8.4*   GLC  --  131* 144* 219* 186*   ALBUMIN  --   --  3.7 3.1*  --    PROTTOTAL  --   --  6.0* 6.0*  --    BILITOTAL  --   --  0.9 0.6  --    ALKPHOS  --   --  50 74  --    ALT  --   --  19 23  --    AST  --   --  46* 45  --        Recent Results (from the past 24 hour(s))   XR Chest Port 1 View    Narrative    EXAM: XR CHEST PORT 1 VW  LOCATION: [facility]  DATE/TIME: 5/24/2020 5:51 AM    INDICATION: Status aVR  COMPARISON: 05/23/2020      Impression    IMPRESSION: Cardiomediastinal enlargement. Median sternotomy and cardiac valve replacement. Mediastinal drain and [place]-Sid catheter. Minimal atelectasis left lung base.

## 2020-05-24 NOTE — PLAN OF CARE
Patient's heart rate remains high; Blood pressure occasionally difficult to control WDL, either too low after opioid pain medication or too high due to pain. Arterial line by the end of shift not functional. Patient slightly confused this AM after pain medication given at 4am, no further opioid medication given.     Problem: Bleeding (Surgery Nonspecified)  Goal: Absence of Bleeding  5/24/2020 0748 by Zachery Quintana, RN  Outcome: Improving  5/23/2020 1849 by Simone Ramos RN  Outcome: No Change     Problem: Bowel Elimination Impaired (Surgery Nonspecified)  Goal: Effective Bowel Elimination  5/24/2020 0748 by Zachery Quintana RN  Outcome: Improving  5/23/2020 1849 by Simone Ramos RN  Outcome: No Change     Problem: Infection (Surgery Nonspecified)  Goal: Absence of Infection Signs/Symptoms  5/24/2020 0748 by Zachery Quintana RN  Outcome: Improving  5/23/2020 1849 by Simone Ramos RN  Outcome: No Change     Problem: Ongoing Anesthesia Effects (Surgery Nonspecified)  Goal: Anesthesia/Sedation Recovery  5/24/2020 0748 by Zachery Quintana RN  Outcome: Improving  5/23/2020 1849 by Simone Ramos RN  Outcome: No Change     Problem: Pain (Surgery Nonspecified)  Goal: Acceptable Pain Control  5/24/2020 0748 by Zachery Quintana RN  Outcome: Improving  5/23/2020 1849 by Simone Ramos RN  Outcome: No Change     Problem: Postoperative Nausea and Vomiting (Surgery Nonspecified)  Goal: Nausea and Vomiting Relief  5/24/2020 0748 by Zachery Quintana RN  Outcome: Improving  5/23/2020 1849 by Simone Ramos RN  Outcome: No Change     Problem: Postoperative Urinary Retention (Surgery Nonspecified)  Goal: Effective Urinary Elimination  5/24/2020 0748 by Zachery Quintana RN  Outcome: Improving  5/23/2020 1849 by Simone Ramos RN  Outcome: No Change     Problem: Restraint for Non-Violent/Non-Self-Destructive Behavior  Goal: Prevent/Manage Potential Problems  Description: Maintain safety of  patient and others during period of restraint.  Promote psychological and physical wellbeing.  Prevent injury to skin and involved body parts.  Promote nutrition, hydration, and elimination.  5/24/2020 0748 by Zachery Quintana RN  Outcome: Improving  5/23/2020 1849 by Simone Ramos RN  Outcome: No Change     Problem: Activity Intolerance (Cardiovascular Surgery)  Goal: Improved Activity Tolerance  5/24/2020 0748 by Zachery Quintana RN  Outcome: Improving  5/23/2020 1849 by Simone Ramos RN  Outcome: No Change     Problem: Adjustment to Surgery (Cardiovascular Surgery)  Goal: Optimal Coping with Heart Surgery  5/24/2020 0748 by Zahcery Quintana RN  Outcome: Improving  5/23/2020 1849 by Simone Ramos RN  Outcome: No Change     Problem: Bleeding (Cardiovascular Surgery)  Goal: Absence of Bleeding  5/24/2020 0748 by Zachery Quintana RN  Outcome: Improving  5/23/2020 1849 by Simone Ramos RN  Outcome: No Change     Problem: Bowel Elimination Impaired (Cardiovascular Surgery)  Goal: Effective Bowel Elimination  5/24/2020 0748 by Zachery Quintana RN  Outcome: Improving  5/23/2020 1849 by Simone Ramos RN  Outcome: No Change     Problem: Cardiac Function Impaired (Cardiovascular Surgery)  Goal: Effective Cardiac Function  5/24/2020 0748 by Zachery Quintana RN  Outcome: Improving  5/23/2020 1849 by Simone Ramos RN  Outcome: No Change     Problem: Cerebral Tissue Perfusion Risk (Cardiovascular Surgery)  Goal: Effective Cerebral Perfusion  5/24/2020 0748 by Zachery Quintana RN  Outcome: Improving  5/23/2020 1849 by Simone Ramos RN  Outcome: No Change     Problem: Fluid Imbalance (Cardiovascular Surgery)  Goal: Fluid Balance  5/24/2020 0748 by Zachery Quintana RN  Outcome: Improving  5/23/2020 1849 by Simone Ramos RN  Outcome: No Change     Problem: Infection (Cardiovascular Surgery)  Goal: Absence of Infection Signs/Symptoms  5/24/2020 0748 by Zachery Quintana  RN  Outcome: Improving  5/23/2020 1849 by Simone Ramos RN  Outcome: No Change     Problem: Ongoing Anesthesia Effects (Cardiovascular Surgery)  Goal: Anesthesia/Sedation Recovery  5/24/2020 0748 by Zachery Quintana RN  Outcome: Improving  5/23/2020 1849 by Simone Ramos RN  Outcome: No Change     Problem: Pain (Cardiovascular Surgery)  Goal: Acceptable Pain Control  5/24/2020 0748 by Zachery Quintana RN  Outcome: Improving  5/23/2020 1849 by Simone Ramos RN  Outcome: No Change     Problem: Postoperative Nausea and Vomiting (Cardiovascular Surgery)  Goal: Nausea and Vomiting Relief  5/24/2020 0748 by Zachery Quintana RN  Outcome: Improving  5/23/2020 1849 by Simone Ramos RN  Outcome: No Change     Problem: Respiratory Compromise (Cardiovascular Surgery)  Goal: Effective Oxygenation and Ventilation  5/24/2020 0748 by Zachery Quintana RN  Outcome: Improving  5/23/2020 1849 by Simone Ramos RN  Outcome: No Change     Problem: Adult Inpatient Plan of Care  Goal: Plan of Care Review  5/24/2020 0748 by Zachery Quintana RN  Outcome: Improving  Flowsheets (Taken 5/24/2020 0748)  Progress: improving  5/23/2020 1849 by Simone Ramos RN  Outcome: No Change  Goal: Patient-Specific Goal (Individualization)  5/24/2020 0748 by Zachery Quintana RN  Outcome: Improving  5/23/2020 1849 by Simone Ramos RN  Outcome: No Change  Goal: Absence of Hospital-Acquired Illness or Injury  5/24/2020 0748 by Zachery Quintana RN  Outcome: Improving  5/23/2020 1849 by Simone Ramos RN  Outcome: No Change  Goal: Optimal Comfort and Wellbeing  5/24/2020 0748 by Zachery Quintana RN  Outcome: Improving  5/23/2020 1849 by Simone Ramos RN  Outcome: No Change  Goal: Readiness for Transition of Care  5/24/2020 0748 by Zachery Quintana RN  Outcome: Improving  5/23/2020 1849 by Simone Ramos RN  Outcome: No Change  Goal: Rounds/Family Conference  5/24/2020 0748 by Zachery Quintana  RN  Outcome: Improving  5/23/2020 1849 by Simone Ramos, RN  Outcome: No Change

## 2020-05-24 NOTE — PLAN OF CARE
Pt AXOX4, c/o mild pain, prn meds given, ambulated in the hallway with rehab. SR-ST, BP wnl. Pedal pulses palpable, pacer wires capped.    SBAR given to station 33RN, all belongings sent with pt. Pt transferred via wheelchair

## 2020-05-25 ENCOUNTER — APPOINTMENT (OUTPATIENT)
Dept: PHYSICAL THERAPY | Facility: CLINIC | Age: 55
DRG: 220 | End: 2020-05-25
Attending: DENTIST
Payer: COMMERCIAL

## 2020-05-25 ENCOUNTER — APPOINTMENT (OUTPATIENT)
Dept: GENERAL RADIOLOGY | Facility: CLINIC | Age: 55
DRG: 220 | End: 2020-05-25
Attending: PHYSICIAN ASSISTANT
Payer: COMMERCIAL

## 2020-05-25 LAB
ANION GAP SERPL CALCULATED.3IONS-SCNC: 2 MMOL/L (ref 3–14)
BUN SERPL-MCNC: 21 MG/DL (ref 7–30)
CALCIUM SERPL-MCNC: 8.7 MG/DL (ref 8.5–10.1)
CHLORIDE SERPL-SCNC: 105 MMOL/L (ref 94–109)
CO2 SERPL-SCNC: 29 MMOL/L (ref 20–32)
CREAT SERPL-MCNC: 1 MG/DL (ref 0.66–1.25)
ERYTHROCYTE [DISTWIDTH] IN BLOOD BY AUTOMATED COUNT: 13.5 % (ref 10–15)
GFR SERPL CREATININE-BSD FRML MDRD: 84 ML/MIN/{1.73_M2}
GLUCOSE BLDC GLUCOMTR-MCNC: 200 MG/DL (ref 70–99)
GLUCOSE BLDC GLUCOMTR-MCNC: 228 MG/DL (ref 70–99)
GLUCOSE BLDC GLUCOMTR-MCNC: 265 MG/DL (ref 70–99)
GLUCOSE BLDC GLUCOMTR-MCNC: 323 MG/DL (ref 70–99)
GLUCOSE SERPL-MCNC: 224 MG/DL (ref 70–99)
HCT VFR BLD AUTO: 26.1 % (ref 40–53)
HGB BLD-MCNC: 9 G/DL (ref 13.3–17.7)
INR PPP: 1.5 (ref 0.86–1.14)
LACTATE BLD-SCNC: 1.7 MMOL/L (ref 0.7–2)
MAGNESIUM SERPL-MCNC: 2 MG/DL (ref 1.6–2.3)
MCH RBC QN AUTO: 31.6 PG (ref 26.5–33)
MCHC RBC AUTO-ENTMCNC: 34.5 G/DL (ref 31.5–36.5)
MCV RBC AUTO: 92 FL (ref 78–100)
PHOSPHATE SERPL-MCNC: 2.6 MG/DL (ref 2.5–4.5)
PLATELET # BLD AUTO: 154 10E9/L (ref 150–450)
POTASSIUM SERPL-SCNC: 4.3 MMOL/L (ref 3.4–5.3)
RBC # BLD AUTO: 2.85 10E12/L (ref 4.4–5.9)
SODIUM SERPL-SCNC: 136 MMOL/L (ref 133–144)
WBC # BLD AUTO: 12.9 10E9/L (ref 4–11)

## 2020-05-25 PROCEDURE — 12000000 ZZH R&B MED SURG/OB

## 2020-05-25 PROCEDURE — 97110 THERAPEUTIC EXERCISES: CPT | Mod: GP

## 2020-05-25 PROCEDURE — 83605 ASSAY OF LACTIC ACID: CPT | Performed by: HOSPITALIST

## 2020-05-25 PROCEDURE — 99232 SBSQ HOSP IP/OBS MODERATE 35: CPT | Performed by: HOSPITALIST

## 2020-05-25 PROCEDURE — 85610 PROTHROMBIN TIME: CPT | Performed by: PHYSICIAN ASSISTANT

## 2020-05-25 PROCEDURE — 25000128 H RX IP 250 OP 636: Performed by: STUDENT IN AN ORGANIZED HEALTH CARE EDUCATION/TRAINING PROGRAM

## 2020-05-25 PROCEDURE — 84100 ASSAY OF PHOSPHORUS: CPT | Performed by: PHYSICIAN ASSISTANT

## 2020-05-25 PROCEDURE — 25000132 ZZH RX MED GY IP 250 OP 250 PS 637: Performed by: PHYSICIAN ASSISTANT

## 2020-05-25 PROCEDURE — 80048 BASIC METABOLIC PNL TOTAL CA: CPT | Performed by: PHYSICIAN ASSISTANT

## 2020-05-25 PROCEDURE — 83735 ASSAY OF MAGNESIUM: CPT | Performed by: PHYSICIAN ASSISTANT

## 2020-05-25 PROCEDURE — 36415 COLL VENOUS BLD VENIPUNCTURE: CPT | Performed by: HOSPITALIST

## 2020-05-25 PROCEDURE — 25000132 ZZH RX MED GY IP 250 OP 250 PS 637: Performed by: SURGERY

## 2020-05-25 PROCEDURE — 00000146 ZZHCL STATISTIC GLUCOSE BY METER IP

## 2020-05-25 PROCEDURE — 25000132 ZZH RX MED GY IP 250 OP 250 PS 637: Performed by: STUDENT IN AN ORGANIZED HEALTH CARE EDUCATION/TRAINING PROGRAM

## 2020-05-25 PROCEDURE — 97530 THERAPEUTIC ACTIVITIES: CPT | Mod: GP

## 2020-05-25 PROCEDURE — 36415 COLL VENOUS BLD VENIPUNCTURE: CPT | Performed by: PHYSICIAN ASSISTANT

## 2020-05-25 PROCEDURE — 85027 COMPLETE CBC AUTOMATED: CPT | Performed by: PHYSICIAN ASSISTANT

## 2020-05-25 PROCEDURE — 25000131 ZZH RX MED GY IP 250 OP 636 PS 637: Performed by: PHYSICIAN ASSISTANT

## 2020-05-25 PROCEDURE — 71045 X-RAY EXAM CHEST 1 VIEW: CPT

## 2020-05-25 RX ORDER — ACETAMINOPHEN 325 MG/1
975 TABLET ORAL EVERY 8 HOURS
Status: DISCONTINUED | OUTPATIENT
Start: 2020-05-25 | End: 2020-05-25

## 2020-05-25 RX ORDER — METHOCARBAMOL 500 MG/1
500 TABLET, FILM COATED ORAL 4 TIMES DAILY
Status: DISPENSED | OUTPATIENT
Start: 2020-05-25 | End: 2020-05-27

## 2020-05-25 RX ORDER — FUROSEMIDE 10 MG/ML
20 INJECTION INTRAMUSCULAR; INTRAVENOUS EVERY 6 HOURS
Status: COMPLETED | OUTPATIENT
Start: 2020-05-25 | End: 2020-05-25

## 2020-05-25 RX ORDER — METOPROLOL TARTRATE 25 MG/1
25 TABLET, FILM COATED ORAL 2 TIMES DAILY
Status: DISCONTINUED | OUTPATIENT
Start: 2020-05-25 | End: 2020-05-26

## 2020-05-25 RX ORDER — KETOROLAC TROMETHAMINE 15 MG/ML
15 INJECTION, SOLUTION INTRAMUSCULAR; INTRAVENOUS EVERY 6 HOURS
Status: COMPLETED | OUTPATIENT
Start: 2020-05-25 | End: 2020-05-26

## 2020-05-25 RX ORDER — WARFARIN SODIUM 3 MG/1
3 TABLET ORAL
Status: COMPLETED | OUTPATIENT
Start: 2020-05-25 | End: 2020-05-25

## 2020-05-25 RX ADMIN — OXYCODONE HYDROCHLORIDE 10 MG: 5 TABLET ORAL at 20:16

## 2020-05-25 RX ADMIN — POLYETHYLENE GLYCOL 3350 17 G: 17 POWDER, FOR SOLUTION ORAL at 08:07

## 2020-05-25 RX ADMIN — KETOROLAC TROMETHAMINE 15 MG: 15 INJECTION, SOLUTION INTRAMUSCULAR; INTRAVENOUS at 17:54

## 2020-05-25 RX ADMIN — FUROSEMIDE 20 MG: 10 INJECTION, SOLUTION INTRAMUSCULAR; INTRAVENOUS at 10:07

## 2020-05-25 RX ADMIN — LIDOCAINE 3 PATCH: 560 PATCH PERCUTANEOUS; TOPICAL; TRANSDERMAL at 08:13

## 2020-05-25 RX ADMIN — SENNOSIDES AND DOCUSATE SODIUM 2 TABLET: 8.6; 5 TABLET ORAL at 08:06

## 2020-05-25 RX ADMIN — OXYCODONE HYDROCHLORIDE 10 MG: 5 TABLET ORAL at 10:06

## 2020-05-25 RX ADMIN — ACETAMINOPHEN 975 MG: 325 TABLET, FILM COATED ORAL at 08:06

## 2020-05-25 RX ADMIN — OXYCODONE HYDROCHLORIDE 10 MG: 5 TABLET ORAL at 05:22

## 2020-05-25 RX ADMIN — METHOCARBAMOL 500 MG: 500 TABLET, FILM COATED ORAL at 22:13

## 2020-05-25 RX ADMIN — INSULIN GLARGINE 15 UNITS: 100 INJECTION, SOLUTION SUBCUTANEOUS at 13:46

## 2020-05-25 RX ADMIN — INSULIN ASPART 2 UNITS: 100 INJECTION, SOLUTION INTRAVENOUS; SUBCUTANEOUS at 08:12

## 2020-05-25 RX ADMIN — OXYCODONE HYDROCHLORIDE 10 MG: 5 TABLET ORAL at 16:05

## 2020-05-25 RX ADMIN — FUROSEMIDE 20 MG: 10 INJECTION, SOLUTION INTRAMUSCULAR; INTRAVENOUS at 16:06

## 2020-05-25 RX ADMIN — INSULIN ASPART 2 UNITS: 100 INJECTION, SOLUTION INTRAVENOUS; SUBCUTANEOUS at 17:56

## 2020-05-25 RX ADMIN — METOPROLOL TARTRATE 12.5 MG: 25 TABLET, FILM COATED ORAL at 10:06

## 2020-05-25 RX ADMIN — PANTOPRAZOLE SODIUM 40 MG: 40 TABLET, DELAYED RELEASE ORAL at 08:06

## 2020-05-25 RX ADMIN — METHOCARBAMOL 500 MG: 500 TABLET, FILM COATED ORAL at 10:06

## 2020-05-25 RX ADMIN — SENNOSIDES AND DOCUSATE SODIUM 2 TABLET: 8.6; 5 TABLET ORAL at 20:16

## 2020-05-25 RX ADMIN — GABAPENTIN 300 MG: 300 CAPSULE ORAL at 08:06

## 2020-05-25 RX ADMIN — METOPROLOL TARTRATE 25 MG: 25 TABLET, FILM COATED ORAL at 20:16

## 2020-05-25 RX ADMIN — WARFARIN SODIUM 3 MG: 3 TABLET ORAL at 17:54

## 2020-05-25 RX ADMIN — ASPIRIN 81 MG: 81 TABLET, DELAYED RELEASE ORAL at 08:06

## 2020-05-25 RX ADMIN — INSULIN GLARGINE 20 UNITS: 100 INJECTION, SOLUTION SUBCUTANEOUS at 08:13

## 2020-05-25 RX ADMIN — METOPROLOL TARTRATE 12.5 MG: 25 TABLET, FILM COATED ORAL at 08:06

## 2020-05-25 RX ADMIN — METHOCARBAMOL 500 MG: 500 TABLET, FILM COATED ORAL at 17:54

## 2020-05-25 RX ADMIN — INSULIN ASPART 4 UNITS: 100 INJECTION, SOLUTION INTRAVENOUS; SUBCUTANEOUS at 12:18

## 2020-05-25 RX ADMIN — KETOROLAC TROMETHAMINE 15 MG: 15 INJECTION, SOLUTION INTRAMUSCULAR; INTRAVENOUS at 12:17

## 2020-05-25 ASSESSMENT — ACTIVITIES OF DAILY LIVING (ADL)
ADLS_ACUITY_SCORE: 15
ADLS_ACUITY_SCORE: 11
ADLS_ACUITY_SCORE: 11
ADLS_ACUITY_SCORE: 15
ADLS_ACUITY_SCORE: 15
ADLS_ACUITY_SCORE: 11

## 2020-05-25 ASSESSMENT — MIFFLIN-ST. JEOR: SCORE: 1897.25

## 2020-05-25 NOTE — PLAN OF CARE
Pt transferred from ICU this afternoon. BP stable, heart rate tachy in the low 100s. Voiding well in the bathroom. Wound vac in place and two chest tubes in place. Oxycodone given prn for pain control. Pacer wires capped.

## 2020-05-25 NOTE — PLAN OF CARE
Discharge Planner PT   Patient plan for discharge: Home with OP CR phase II    Current status: Sit to/from stand SBA. Pt tolerates 200' x 2 with FWW and SBA, one seated rest break. Pt rates effort as 4/10 on OMNI effort scale. Pt receptive to cardiac surgery education.    Barriers to return to prior living situation: None based on mobility.    Recommendations for discharge: Home with OP CR phase II    Rationale for recommendations: Pt will benefit from continued skilled rehab services to closely monitor and progress independence with exercise/activity tolerance as well as continue education for optimal heart health.         Entered by: Valarie Winters 05/25/2020 10:52 AM

## 2020-05-25 NOTE — PROGRESS NOTES
[facility]    Hospitalist Progress Note      Assessment & Plan   Benjamín Us is a 54 year old male who was admitted on 5/21/2020 post tooth extraction (pre-op management for cardiac procedure) for planned AVR and CABG on 5/22    POD #3 AVR and LIMA to LAD for severe aortic stenosis  CAD significant disease LAD and diagonal    Patient reported worsening of symptoms related to his aortic stenosis over the last 60 days with chest discomfort and dyspnea on exertion.  Coronary angiogram from 01/03/2020 demonstrates severe single-vessel coronary artery disease involving the LAD distribution with a 70% mid LAD disease and also an 85% moderate-sized second diagonal artery disease.  Last echocardiogram was from 10/29/2019 demonstrating preserved LV systolic function, severe LVH, severe aortic stenosis with a mean gradient of 50 mmHg, trileaflet valve.  No aortic valve insufficiency.  - noted issues with large range of blood pressures post-op, weaned off dopamine gtt on 5/24  -CV surgery and critical care following initially, critical care signed off 5/24 due to improved BPs and he was transferred to the floor on 5/24 afternoon  - Received 1 unit PRBCs on 5/23 for Hgb 7.8  - Pain control with icy hot patches, oxycodone, toradol q6h, lidocaine patches  - ASA and warfarin resumed 5/23     Hypoxemia, resolved  Suspected secondary to atelectasis.  - Continue pulmonary toilet  - Weaned off O2 5/24 evening    Post op fever  - noted cultures and perioperative abx. Scheduled tylenol discontinued morning 5/25  - trend for return of fevers     Dental Extraction prior to CABG/AVR  5/21/2020 dental extraction for carious dentition for pre-operative management for planned AVR/CABG 5/2020. Sergicel placed in all extraction sites and patient hemostatic at end of procedure. EBL<50ml  Recommendations per OMFS  - Continue OMFS  - Have patient bite on 4x4 gauze if excessive oozing  - Peridex rinses 15cc swish and spit BID  -  mechanical soft diet to continue     Hypertension  - PTA Carvedilol 25mg PO BID and Lisinopril 40 mg  - Started on 20mg q6h lasix, lopressor 25mg BID on 5/25  - Trend BPs     Hyperlipidemia  -  PTA Atorvastatin 40mg PO daily, not yet resumed     DMII  Hyperglycemia, stress related  HgbA1C 12.2. Indicating he was NOT well controlled prior to admission with diet and exercise off of medications.  Required insulin gtt post procedure with improvement in BGs  - Transitioned to lantus 20 units in AM with medium resistance sliding scale insulin on 5/24 late morning BGs improved to upper 100s by evening  - Added 1 unit per 15g carb insulin aspart with meals 5/25  - Lantus increased by primary team to 35 units in AM starting 5/26 and he was given additional 15 units lantus around 1300  - Continue medium resistance sliding scale insulin  - He will need insulin on discharge     HARIS  -Does not use home CPAP  -Follow up with outpatient sleep recommendation     Nicotine Dependence  Patient reports he quit 3/2020 with the use of chantix    DVT Prophylaxis: Warfarin  Code Status: Full Code  Expected discharge: per primary team, recommended to prior living arrangement with outpatient cardiac rehab.    Ani Harrison, DO  Text Page (7am - 6pm)    Interval History   Patient seen and examined. Watched him ambulate to bathroom. No BM yet, passing gas. Still with chest discomfort from wound vac. Overall feels better with less wires. More sleepy today than on 5/24 when seen. No shortness of breath, but feels he cannot take a full breath.    -Data reviewed today: I reviewed all new labs and imaging results over the last 24 hours. I personally reviewed CXR with sternotomy present, mild atelectasis in left lung base. Still with chest tube.    Physical Exam   Temp: 98.4  F (36.9  C) Temp src: Oral BP: 132/68 Pulse: 95 Heart Rate: 110 Resp: 16 SpO2: 95 % O2 Device: None (Room air)    Vitals:    05/24/20 0800 05/24/20 1620 05/25/20 0558    Weight: 106.1 kg (233 lb 14.5 oz) 106.5 kg (234 lb 14.4 oz) 106.1 kg (233 lb 14.5 oz)     Vital Signs with Ranges  Temp:  [98.4  F (36.9  C)-99.2  F (37.3  C)] 98.4  F (36.9  C)  Pulse:  [] 95  Heart Rate:  [] 110  Resp:  [16-20] 16  BP: (105-138)/(58-87) 132/68  SpO2:  [93 %-97 %] 95 %  I/O last 3 completed shifts:  In: 340 [P.O.:340]  Out: 1155 [Urine:975; Chest Tube:180]    Constitutional: Awake, alert, cooperative, no apparent distress  Respiratory: Equal chest rise, diminished at bilateral bases. No wheezing  Cardiovascular: tachycardic, regular rhythm, normal S1 and S2. Chest tube in place.   GI: Normal bowel sounds, soft, non-distended, non-tender  Skin/Integumen: No rashes, no cyanosis, no edema  Other: Wound vac over mediastinal incision with surrounding lidocaine/icy-hot patches in place    Medications     dextrose       - MEDICATION INSTRUCTIONS -       Warfarin Therapy Reminder         acetaminophen  975 mg Oral Q8H     aspirin  81 mg Oral Daily     furosemide  20 mg Intravenous Q6H     insulin aspart   Subcutaneous TID AC     insulin aspart  1-7 Units Subcutaneous TID AC     insulin aspart  1-5 Units Subcutaneous At Bedtime     [START ON 5/26/2020] insulin glargine  35 Units Subcutaneous QAM AC     ketorolac  15 mg Intravenous Q6H     lidocaine  3 patch Transdermal Q24H     lidocaine   Transdermal Q8H     menthol   Transdermal Q8H     methocarbamol  500 mg Oral 4x Daily     metoprolol tartrate  25 mg Oral BID     pantoprazole  40 mg Oral QAM AC     polyethylene glycol  17 g Oral Daily     senna-docusate  1 tablet Oral BID    Or     senna-docusate  2 tablet Oral BID     sodium chloride (PF)  3 mL Intracatheter Q8H     warfarin ANTICOAGULANT  3 mg Oral ONCE at 18:00       Data   Recent Labs   Lab 05/25/20  0658 05/24/20  0444 05/24/20  0415 05/23/20  0415 05/22/20  1527   WBC 12.9*  --  13.8* 10.8 20.1*   HGB 9.0*  --  8.6* 7.8* 10.1*   MCV 92  --  91 93 91     --  122* 148* 214   INR  1.50* 1.42*  --   --  1.24*     --  137 137 138   POTASSIUM 4.3  --  4.1 4.2 4.7   CHLORIDE 105  --  107 107 107   CO2 29  --  27 26 23   BUN 21  --  17 18 19   CR 1.00  --  0.81 0.91 0.94   ANIONGAP 2*  --  3 4 8   ARMIN 8.7  --  8.2* 8.0* 8.2*   *  --  131* 144* 219*   ALBUMIN  --   --   --  3.7 3.1*   PROTTOTAL  --   --   --  6.0* 6.0*   BILITOTAL  --   --   --  0.9 0.6   ALKPHOS  --   --   --  50 74   ALT  --   --   --  19 23   AST  --   --   --  46* 45       Recent Results (from the past 24 hour(s))   XR Chest Port 1 View    Narrative    CHEST ONE VIEW PORTABLE May 25, 2020 8:05 AM     HISTORY: Follow-up after aortic valve replacement and coronary artery  bypass graft.     COMPARISON: 5/24/2020.      Impression    IMPRESSION: Previously [place]id catheter has been removed. No other  significant interval change. Sternotomy and mediastinal drain. Cardiac  enlargement. Pulmonary vascularity is within normal limits. Mild  atelectasis left lung base. No pneumothorax.    BRANDON ANDRE MD

## 2020-05-25 NOTE — PROGRESS NOTES
Cardiothoracic surgery progress note    Benjamín Us is a 54yoM with diabetes s/p mech AVR with 23mm [place] mechanical valve replacement, ROLLINS to LAD on 5/22/2020    S: Sitting up in chair, no complaints except his wound vac irritation on his chest wall. No BM yet. Having gas from below and also belching.     O:  Vitals reviewed in epic. -109. BP normal limits. On room air.  Exam:  Gen -- alert, awakens to voice. No distress.  CV -- sinus tachycardia. No rub. Chest tubes draining thin serosang fluid and both tubes are patent.  RESP -- symmetric chest rise. No wheezes.   ABD -- soft, nondistended  EXT -- warm, perfused, no edema  SKIN -- warm and dry. No rashes, bruising  NEURO -- grossly intact  PSYCH -- approp with clear affect    Labs reviewed. Significant for HBG 9, WBC 12, plts 154. CMP within normal limits     CXR from this AM with improved look of mediastinum. No effusions.     Plan:  Benjamín Us is a 54yoM with diabetes s/p mech AVR with 23mm [place] mechanical valve replacement, ROLLINS to LAD on 5/22/2020    NEURO: continue multimodal pain regimen - minimize narcotics for daytime drowsiness. Toradol scheduled, scheduled APAP, schedule robaxin, lidocaine patches, icy hot, heat/cold packs  CV: sinus tachycardia. Increased metop to 25mg. Lasix 20 IV BID today. ASA 81mg. Warfarin tonight for mech valve.   RESP: encourage IS, up to chair, walking, acapella.   GI: PPI, carb/diabetic diet, reglan PRN for nausea. Careful monitoring for diabetic gastroparesis and delayed gastric emptying.  : Senna/docusate/miralax.   ID: Continue scheduled APAP, follow up blood/urine cx.   ENDO: SubQ insulin per hospitalist   HEME: HGB 9, monitor. Warfarin tonight for mech valve.  ACTIVITY: up to chair TID, ambulation with PT/OT  TLD: tubes to remain, wires out this AM.  DISPO:Floor, anticipate discharge in 2-3 days pending INR and pain control, chest tube drainage.

## 2020-05-25 NOTE — PLAN OF CARE
VSS, except tachycardia, metoprolol dose increased today. Tele ST. A/Ox 4. Incisions CDI. Pain controlled with oxy/robaxin/toradol/tylenol. Wound vac to sternum. CT to water seal. Pacer wires capped. Up with SBA, walking in hallways. Mod carb diet. Sugars elevated, adjustments made. BS active. Flatus +. Voiding adequately. LS diminished, dyspnea on exertion. IS to 1200. Stoplight tool given to patient. Wife updated. CTM

## 2020-05-25 NOTE — PLAN OF CARE
A/Ox4. A little anxious/restless at times. VSS ex tachycardic. Tele ST. LS diminished. BS hypo, +flatus. Voiding adequately. Sternal incision with wound vac. Chest tube to suction, no crepitus no air leak. Pacers capped. Managing pain with prn oxycodone. Up with sba. Tolerating diet.

## 2020-05-26 ENCOUNTER — APPOINTMENT (OUTPATIENT)
Dept: PHYSICAL THERAPY | Facility: CLINIC | Age: 55
DRG: 220 | End: 2020-05-26
Attending: DENTIST
Payer: COMMERCIAL

## 2020-05-26 ENCOUNTER — APPOINTMENT (OUTPATIENT)
Dept: CARDIOLOGY | Facility: CLINIC | Age: 55
DRG: 220 | End: 2020-05-26
Attending: INTERNAL MEDICINE
Payer: COMMERCIAL

## 2020-05-26 LAB
ANION GAP SERPL CALCULATED.3IONS-SCNC: 6 MMOL/L (ref 3–14)
BASE DEFICIT BLDA-SCNC: 0.3 MMOL/L
BASE DEFICIT BLDA-SCNC: 0.3 MMOL/L
BASE DEFICIT BLDA-SCNC: 0.8 MMOL/L
BASE EXCESS BLDA CALC-SCNC: 0.6 MMOL/L
BASE EXCESS BLDA CALC-SCNC: 0.6 MMOL/L
BASE EXCESS BLDA CALC-SCNC: 0.7 MMOL/L
BASE EXCESS BLDA CALC-SCNC: 0.7 MMOL/L
BASE EXCESS BLDV CALC-SCNC: 1.3 MMOL/L
BUN SERPL-MCNC: 26 MG/DL (ref 7–30)
CA-I BLD-MCNC: 4.2 MG/DL (ref 4.4–5.2)
CA-I BLD-MCNC: 4.3 MG/DL (ref 4.4–5.2)
CA-I BLD-MCNC: 4.3 MG/DL (ref 4.4–5.2)
CA-I BLD-MCNC: 4.4 MG/DL (ref 4.4–5.2)
CA-I BLD-MCNC: 4.4 MG/DL (ref 4.4–5.2)
CA-I BLD-MCNC: 4.7 MG/DL (ref 4.4–5.2)
CA-I BLD-MCNC: 4.7 MG/DL (ref 4.4–5.2)
CA-I BLD-MCNC: 5.2 MG/DL (ref 4.4–5.2)
CALCIUM SERPL-MCNC: 8.6 MG/DL (ref 8.5–10.1)
CHLORIDE SERPL-SCNC: 103 MMOL/L (ref 94–109)
CO2 SERPL-SCNC: 25 MMOL/L (ref 20–32)
COPATH REPORT: NORMAL
CREAT SERPL-MCNC: 0.87 MG/DL (ref 0.66–1.25)
ERYTHROCYTE [DISTWIDTH] IN BLOOD BY AUTOMATED COUNT: 13.6 % (ref 10–15)
GFR SERPL CREATININE-BSD FRML MDRD: >90 ML/MIN/{1.73_M2}
GLUCOSE BLDC GLUCOMTR-MCNC: 108 MG/DL (ref 70–99)
GLUCOSE BLDC GLUCOMTR-MCNC: 156 MG/DL (ref 70–99)
GLUCOSE BLDC GLUCOMTR-MCNC: 178 MG/DL (ref 70–99)
GLUCOSE BLDC GLUCOMTR-MCNC: 204 MG/DL (ref 70–99)
GLUCOSE BLDC GLUCOMTR-MCNC: 244 MG/DL (ref 70–99)
GLUCOSE SERPL-MCNC: 246 MG/DL (ref 70–99)
HCO3 BLD-SCNC: 25 MMOL/L (ref 21–28)
HCO3 BLD-SCNC: 25 MMOL/L (ref 21–28)
HCO3 BLD-SCNC: 26 MMOL/L (ref 21–28)
HCO3 BLD-SCNC: 26 MMOL/L (ref 21–28)
HCO3 BLD-SCNC: 27 MMOL/L (ref 21–28)
HCO3 BLDV-SCNC: 28 MMOL/L (ref 21–28)
HCT VFR BLD AUTO: 24.9 % (ref 40–53)
HGB BLD-MCNC: 8.4 G/DL (ref 13.3–17.7)
INR PPP: 1.45 (ref 0.86–1.14)
INTERPRETATION ECG - MUSE: NORMAL
LACTATE BLD-SCNC: 1 MMOL/L (ref 0.7–2)
LACTATE BLD-SCNC: 1.2 MMOL/L (ref 0.7–2)
LACTATE BLD-SCNC: 1.3 MMOL/L (ref 0.7–2)
LACTATE BLD-SCNC: 1.3 MMOL/L (ref 0.7–2)
LACTATE BLD-SCNC: 1.7 MMOL/L (ref 0.7–2)
LACTATE BLD-SCNC: 1.8 MMOL/L (ref 0.7–2)
MAGNESIUM SERPL-MCNC: 2 MG/DL (ref 1.6–2.3)
MCH RBC QN AUTO: 30.8 PG (ref 26.5–33)
MCHC RBC AUTO-ENTMCNC: 33.7 G/DL (ref 31.5–36.5)
MCV RBC AUTO: 91 FL (ref 78–100)
O2/TOTAL GAS SETTING VFR VENT: 100 %
O2/TOTAL GAS SETTING VFR VENT: 100 %
O2/TOTAL GAS SETTING VFR VENT: 80 %
OXYHGB MFR BLD: >100 % (ref 92–100)
OXYHGB MFR BLDV: 78 %
PCO2 BLD: 40 MM HG (ref 35–45)
PCO2 BLD: 41 MM HG (ref 35–45)
PCO2 BLD: 45 MM HG (ref 35–45)
PCO2 BLD: 50 MM HG (ref 35–45)
PCO2 BLD: 51 MM HG (ref 35–45)
PCO2 BLD: 52 MM HG (ref 35–45)
PCO2 BLD: 54 MM HG (ref 35–45)
PCO2 BLDV: 54 MM HG (ref 40–50)
PH BLD: 7.3 PH (ref 7.35–7.45)
PH BLD: 7.31 PH (ref 7.35–7.45)
PH BLD: 7.33 PH (ref 7.35–7.45)
PH BLD: 7.34 PH (ref 7.35–7.45)
PH BLD: 7.37 PH (ref 7.35–7.45)
PH BLD: 7.39 PH (ref 7.35–7.45)
PH BLD: 7.4 PH (ref 7.35–7.45)
PH BLDV: 7.32 PH (ref 7.32–7.43)
PHOSPHATE SERPL-MCNC: 3.9 MG/DL (ref 2.5–4.5)
PLATELET # BLD AUTO: 213 10E9/L (ref 150–450)
PO2 BLD: 236 MM HG (ref 80–105)
PO2 BLD: 309 MM HG (ref 80–105)
PO2 BLD: 373 MM HG (ref 80–105)
PO2 BLD: 377 MM HG (ref 80–105)
PO2 BLD: 380 MM HG (ref 80–105)
PO2 BLD: 418 MM HG (ref 80–105)
PO2 BLD: 479 MM HG (ref 80–105)
PO2 BLDV: 43 MM HG (ref 25–47)
POTASSIUM SERPL-SCNC: 4 MMOL/L (ref 3.4–5.3)
RBC # BLD AUTO: 2.73 10E12/L (ref 4.4–5.9)
SODIUM SERPL-SCNC: 134 MMOL/L (ref 133–144)
WBC # BLD AUTO: 10 10E9/L (ref 4–11)

## 2020-05-26 PROCEDURE — 36415 COLL VENOUS BLD VENIPUNCTURE: CPT | Performed by: PHYSICIAN ASSISTANT

## 2020-05-26 PROCEDURE — 25000132 ZZH RX MED GY IP 250 OP 250 PS 637

## 2020-05-26 PROCEDURE — 25000128 H RX IP 250 OP 636: Performed by: STUDENT IN AN ORGANIZED HEALTH CARE EDUCATION/TRAINING PROGRAM

## 2020-05-26 PROCEDURE — 85610 PROTHROMBIN TIME: CPT | Performed by: PHYSICIAN ASSISTANT

## 2020-05-26 PROCEDURE — 84100 ASSAY OF PHOSPHORUS: CPT | Performed by: PHYSICIAN ASSISTANT

## 2020-05-26 PROCEDURE — 99222 1ST HOSP IP/OBS MODERATE 55: CPT | Mod: 25 | Performed by: INTERNAL MEDICINE

## 2020-05-26 PROCEDURE — 25000132 ZZH RX MED GY IP 250 OP 250 PS 637: Performed by: INTERNAL MEDICINE

## 2020-05-26 PROCEDURE — 85027 COMPLETE CBC AUTOMATED: CPT | Performed by: STUDENT IN AN ORGANIZED HEALTH CARE EDUCATION/TRAINING PROGRAM

## 2020-05-26 PROCEDURE — 25000132 ZZH RX MED GY IP 250 OP 250 PS 637: Performed by: STUDENT IN AN ORGANIZED HEALTH CARE EDUCATION/TRAINING PROGRAM

## 2020-05-26 PROCEDURE — 12000000 ZZH R&B MED SURG/OB

## 2020-05-26 PROCEDURE — 93325 DOPPLER ECHO COLOR FLOW MAPG: CPT | Mod: 26 | Performed by: INTERNAL MEDICINE

## 2020-05-26 PROCEDURE — 99207 ZZC CDG-MDM COMPONENT: MEETS MODERATE - UP CODED: CPT | Performed by: HOSPITALIST

## 2020-05-26 PROCEDURE — 25500064 ZZH RX 255 OP 636: Performed by: SURGERY

## 2020-05-26 PROCEDURE — 00000146 ZZHCL STATISTIC GLUCOSE BY METER IP

## 2020-05-26 PROCEDURE — 93010 ELECTROCARDIOGRAM REPORT: CPT | Mod: 76 | Performed by: INTERNAL MEDICINE

## 2020-05-26 PROCEDURE — 40000264 ECHOCARDIOGRAM LIMITED

## 2020-05-26 PROCEDURE — 93321 DOPPLER ECHO F-UP/LMTD STD: CPT | Mod: 26 | Performed by: INTERNAL MEDICINE

## 2020-05-26 PROCEDURE — 25000132 ZZH RX MED GY IP 250 OP 250 PS 637: Performed by: HOSPITALIST

## 2020-05-26 PROCEDURE — 99233 SBSQ HOSP IP/OBS HIGH 50: CPT | Performed by: HOSPITALIST

## 2020-05-26 PROCEDURE — 93005 ELECTROCARDIOGRAM TRACING: CPT

## 2020-05-26 PROCEDURE — 83735 ASSAY OF MAGNESIUM: CPT | Performed by: PHYSICIAN ASSISTANT

## 2020-05-26 PROCEDURE — 25000131 ZZH RX MED GY IP 250 OP 636 PS 637: Performed by: PHYSICIAN ASSISTANT

## 2020-05-26 PROCEDURE — 25000125 ZZHC RX 250: Performed by: STUDENT IN AN ORGANIZED HEALTH CARE EDUCATION/TRAINING PROGRAM

## 2020-05-26 PROCEDURE — 25000132 ZZH RX MED GY IP 250 OP 250 PS 637: Performed by: PHYSICIAN ASSISTANT

## 2020-05-26 PROCEDURE — 97530 THERAPEUTIC ACTIVITIES: CPT | Mod: GP

## 2020-05-26 PROCEDURE — 93308 TTE F-UP OR LMTD: CPT | Mod: 26 | Performed by: INTERNAL MEDICINE

## 2020-05-26 PROCEDURE — 25000125 ZZHC RX 250

## 2020-05-26 PROCEDURE — 36415 COLL VENOUS BLD VENIPUNCTURE: CPT | Performed by: STUDENT IN AN ORGANIZED HEALTH CARE EDUCATION/TRAINING PROGRAM

## 2020-05-26 PROCEDURE — 80048 BASIC METABOLIC PNL TOTAL CA: CPT | Performed by: STUDENT IN AN ORGANIZED HEALTH CARE EDUCATION/TRAINING PROGRAM

## 2020-05-26 PROCEDURE — 97110 THERAPEUTIC EXERCISES: CPT | Mod: GP

## 2020-05-26 PROCEDURE — 25000128 H RX IP 250 OP 636: Performed by: PHYSICIAN ASSISTANT

## 2020-05-26 RX ORDER — METOPROLOL TARTRATE 50 MG
50 TABLET ORAL 2 TIMES DAILY
Status: DISCONTINUED | OUTPATIENT
Start: 2020-05-26 | End: 2020-05-26

## 2020-05-26 RX ORDER — METOPROLOL TARTRATE 1 MG/ML
5 INJECTION, SOLUTION INTRAVENOUS EVERY 5 MIN PRN
Status: COMPLETED | OUTPATIENT
Start: 2020-05-26 | End: 2020-05-26

## 2020-05-26 RX ORDER — WARFARIN SODIUM 5 MG/1
5 TABLET ORAL
Status: COMPLETED | OUTPATIENT
Start: 2020-05-26 | End: 2020-05-26

## 2020-05-26 RX ORDER — AMIODARONE HYDROCHLORIDE 200 MG/1
400 TABLET ORAL 2 TIMES DAILY
Status: DISCONTINUED | OUTPATIENT
Start: 2020-05-26 | End: 2020-05-28 | Stop reason: HOSPADM

## 2020-05-26 RX ORDER — FUROSEMIDE 10 MG/ML
20 INJECTION INTRAMUSCULAR; INTRAVENOUS ONCE
Status: COMPLETED | OUTPATIENT
Start: 2020-05-26 | End: 2020-05-26

## 2020-05-26 RX ORDER — MAGNESIUM CARB/ALUMINUM HYDROX 105-160MG
296 TABLET,CHEWABLE ORAL ONCE
Status: COMPLETED | OUTPATIENT
Start: 2020-05-26 | End: 2020-05-26

## 2020-05-26 RX ORDER — METOPROLOL TARTRATE 25 MG/1
25 TABLET, FILM COATED ORAL ONCE
Status: COMPLETED | OUTPATIENT
Start: 2020-05-26 | End: 2020-05-26

## 2020-05-26 RX ORDER — METOPROLOL TARTRATE 1 MG/ML
INJECTION, SOLUTION INTRAVENOUS
Status: COMPLETED
Start: 2020-05-26 | End: 2020-05-26

## 2020-05-26 RX ADMIN — KETOROLAC TROMETHAMINE 15 MG: 15 INJECTION, SOLUTION INTRAMUSCULAR; INTRAVENOUS at 06:24

## 2020-05-26 RX ADMIN — PANTOPRAZOLE SODIUM 40 MG: 40 TABLET, DELAYED RELEASE ORAL at 06:24

## 2020-05-26 RX ADMIN — FUROSEMIDE 20 MG: 10 INJECTION, SOLUTION INTRAMUSCULAR; INTRAVENOUS at 17:42

## 2020-05-26 RX ADMIN — OXYCODONE HYDROCHLORIDE 10 MG: 5 TABLET ORAL at 15:46

## 2020-05-26 RX ADMIN — INSULIN GLARGINE 35 UNITS: 100 INJECTION, SOLUTION SUBCUTANEOUS at 08:56

## 2020-05-26 RX ADMIN — METOPROLOL TARTRATE 5 MG: 5 INJECTION INTRAVENOUS at 02:05

## 2020-05-26 RX ADMIN — METOPROLOL TARTRATE 25 MG: 25 TABLET, FILM COATED ORAL at 01:21

## 2020-05-26 RX ADMIN — SENNOSIDES AND DOCUSATE SODIUM 2 TABLET: 8.6; 5 TABLET ORAL at 10:28

## 2020-05-26 RX ADMIN — OXYCODONE HYDROCHLORIDE 10 MG: 5 TABLET ORAL at 05:41

## 2020-05-26 RX ADMIN — WARFARIN SODIUM 5 MG: 5 TABLET ORAL at 17:42

## 2020-05-26 RX ADMIN — AMIODARONE HYDROCHLORIDE 400 MG: 200 TABLET ORAL at 20:53

## 2020-05-26 RX ADMIN — METOPROLOL TARTRATE 5 MG: 5 INJECTION INTRAVENOUS at 01:36

## 2020-05-26 RX ADMIN — SENNOSIDES AND DOCUSATE SODIUM 2 TABLET: 8.6; 5 TABLET ORAL at 20:53

## 2020-05-26 RX ADMIN — AMIODARONE HYDROCHLORIDE 400 MG: 200 TABLET ORAL at 11:41

## 2020-05-26 RX ADMIN — METOPROLOL TARTRATE 5 MG: 5 INJECTION INTRAVENOUS at 01:22

## 2020-05-26 RX ADMIN — KETOROLAC TROMETHAMINE 15 MG: 15 INJECTION, SOLUTION INTRAMUSCULAR; INTRAVENOUS at 01:31

## 2020-05-26 RX ADMIN — METOPROLOL TARTRATE 50 MG: 50 TABLET, FILM COATED ORAL at 08:43

## 2020-05-26 RX ADMIN — INSULIN ASPART 3 UNITS: 100 INJECTION, SOLUTION INTRAVENOUS; SUBCUTANEOUS at 08:58

## 2020-05-26 RX ADMIN — METHOCARBAMOL 500 MG: 500 TABLET, FILM COATED ORAL at 13:28

## 2020-05-26 RX ADMIN — POLYETHYLENE GLYCOL 3350 17 G: 17 POWDER, FOR SOLUTION ORAL at 10:28

## 2020-05-26 RX ADMIN — METHOCARBAMOL 500 MG: 500 TABLET, FILM COATED ORAL at 20:53

## 2020-05-26 RX ADMIN — MAGNESIUM CITRATE 296 ML: 1.75 LIQUID ORAL at 08:43

## 2020-05-26 RX ADMIN — OXYCODONE HYDROCHLORIDE 10 MG: 5 TABLET ORAL at 21:54

## 2020-05-26 RX ADMIN — METOPROLOL TARTRATE 12.5 MG: 25 TABLET, FILM COATED ORAL at 20:53

## 2020-05-26 RX ADMIN — HUMAN ALBUMIN MICROSPHERES AND PERFLUTREN 9 ML: 10; .22 INJECTION, SOLUTION INTRAVENOUS at 12:48

## 2020-05-26 RX ADMIN — OXYCODONE HYDROCHLORIDE 10 MG: 5 TABLET ORAL at 11:46

## 2020-05-26 RX ADMIN — METHOCARBAMOL 500 MG: 500 TABLET, FILM COATED ORAL at 08:43

## 2020-05-26 RX ADMIN — ASPIRIN 81 MG: 81 TABLET, DELAYED RELEASE ORAL at 08:43

## 2020-05-26 ASSESSMENT — ACTIVITIES OF DAILY LIVING (ADL)
ADLS_ACUITY_SCORE: 13
ADLS_ACUITY_SCORE: 15
ADLS_ACUITY_SCORE: 11
ADLS_ACUITY_SCORE: 15
ADLS_ACUITY_SCORE: 11
ADLS_ACUITY_SCORE: 11

## 2020-05-26 ASSESSMENT — MIFFLIN-ST. JEOR
SCORE: 1896.31
SCORE: 1992.25

## 2020-05-26 NOTE — PROGRESS NOTES
[facility]    Hospitalist Progress Note      Assessment & Plan   Benjamín Us is a 54 year old male who was admitted on 5/21/2020 post tooth extraction (pre-op management for cardiac procedure) for planned AVR and CABG on 5/22    POD #4 AVR and LIMA to LAD for severe aortic stenosis  CAD significant disease LAD and diagonal    Patient reported worsening of symptoms related to his aortic stenosis over the last 60 days with chest discomfort and dyspnea on exertion.  Coronary angiogram from 01/03/2020 demonstrates severe single-vessel coronary artery disease involving the LAD distribution with a 70% mid LAD disease and also an 85% moderate-sized second diagonal artery disease.  Last echocardiogram was from 10/29/2019 demonstrating preserved LV systolic function, severe LVH, severe aortic stenosis with a mean gradient of 50 mmHg, trileaflet valve.  No aortic valve insufficiency.  - noted issues with large range of blood pressures post-op, weaned off dopamine gtt on 5/24  -CV surgery and critical care following initially, critical care signed off 5/24 due to improved BPs and he was transferred to the floor on 5/24 afternoon  - Received 1 unit PRBCs on 5/23 for Hgb 7.8  - Pain control with icy hot patches, oxycodone, lidocaine patches  - ASA and warfarin resumed 5/23  - Metoprolol tartrate increased to 50mg BID (added as 25mg BID on 5/25)  - Received 2 doses IV lasix on 5/25 for pulm vascular congestion  - Continue bowel regimen  - Adding amiodarone PO x1mo (400mg PO BID x1 week, 200mg BID x1 week, then 200mg daily)    Tachycardia, atrial flutter  Beta blocker started 5/25, but noted to have HR up to 150s looked like atrial flutter, received additional 25mg PO metoprolol once, with 5mg IV metoprolol x3 given over period of 45 minutes with subsequent return to sinus rhythm with rates in the 90s. Amiodarone ordered by [facility], but not started  - Cardiology consulted by overnight covering physician  -  metoprolol tartrate increased to 50mg BID  - Amiodarone added  - Ziopatch on discharge, one month follow up with EP     Hypoxemia, resolved  Suspected secondary to atelectasis.  - Continue pulmonary toilet  - Weaned off O2 5/24 evening    Post op fever  - noted cultures and perioperative abx. Scheduled tylenol discontinued morning 5/25. Tmax 99.5 since then  - Continue to monitor     Dental Extraction prior to CABG/AVR  5/21/2020 dental extraction for carious dentition for pre-operative management for planned AVR/CABG 5/2020. Sergicel placed in all extraction sites and patient hemostatic at end of procedure. EBL<50ml  Recommendations per OMFS  - Continue OMFS  - Have patient bite on 4x4 gauze if excessive oozing  - Peridex rinses 15cc swish and spit BID  - mechanical soft diet to continue     Hypertension  - PTA Carvedilol 25mg PO BID and Lisinopril 40 mg  -  lopressor 25mg BID on 5/25, increased to 50mg BID on 5/26 (see above)  - Trend BPs     Hyperlipidemia  -  PTA Atorvastatin 40mg PO daily, not yet resumed     DMII, uncontrolled  Hyperglycemia, stress related  HgbA1C 12.2. Indicating he was NOT well controlled prior to admission with diet and exercise off of medications.  Required insulin gtt post procedure with improvement in BGs  - Transitioned to lantus 20 units in AM with medium resistance sliding scale insulin on 5/24, BGs improved to upper 100s by evening  - 1 unit per 10g carb insulin aspart with meals 5/26  - Lantus 35 units in AM added 5/25  - Increased to high resistance sliding scale insulin  - trend BGs  - He will need insulin on discharge     HARIS  -Does not use home CPAP  -Follow up with outpatient sleep recommendation     Nicotine Dependence  Patient reports he quit 3/2020 with the use of chantix    DVT Prophylaxis: Warfarin  Code Status: Full Code  Expected discharge: per primary team, recommended to prior living arrangement with outpatient cardiac rehab.    Ani Harrison, DO  Text Page (7am -  6pm)    Interval History   Patient seen and examined. Issues with rapid heart rate overnight and during morning, which he could feel. Feels better today, but still has a lot of discomfort from wound vac. No shortness of breath, nausea. Still no BM.    -Data reviewed today: I reviewed all new labs and imaging results over the last 24 hours. I personally reviewed EKG x1 overnight and again this AM with atrial flutter.    Physical Exam   Temp: 98.7  F (37.1  C) Temp src: Oral BP: 115/72 Pulse: 150 Heart Rate: 95 Resp: 15 SpO2: 94 % O2 Device: None (Room air)    Vitals:    05/25/20 0558 05/26/20 0612 05/26/20 0912   Weight: 106.1 kg (233 lb 14.5 oz) 115.6 kg (254 lb 13.6 oz) 106 kg (233 lb 11.2 oz)     Vital Signs with Ranges  Temp:  [98.1  F (36.7  C)-99.6  F (37.6  C)] 98.7  F (37.1  C)  Pulse:  [] 150  Heart Rate:  [] 95  Resp:  [15-20] 15  BP: (111-134)/(56-75) 115/72  SpO2:  [92 %-97 %] 94 %  I/O last 3 completed shifts:  In: 820 [P.O.:820]  Out: 810 [Urine:650; Chest Tube:160]    Constitutional: Awake, alert, cooperative, no apparent distress  Respiratory: Equal chest rise, diminished at bilateral bases. No wheezing  Cardiovascular: tachycardic, regular rhythm, normal S1 and S2. Chest tube in place.   GI: Normal bowel sounds, soft, non-distended, non-tender  Skin/Integumen: No rashes, no cyanosis, no edema  Other: Wound vac over mediastinal incision    Medications     amiodarone       dextrose       - MEDICATION INSTRUCTIONS -       Warfarin Therapy Reminder         aspirin  81 mg Oral Daily     insulin aspart  1-10 Units Subcutaneous TID AC     insulin aspart  1-7 Units Subcutaneous At Bedtime     insulin aspart   Subcutaneous TID AC     insulin glargine  35 Units Subcutaneous QAM AC     lidocaine  3 patch Transdermal Q24H     lidocaine   Transdermal Q8H     menthol   Transdermal Q8H     methocarbamol  500 mg Oral 4x Daily     metoprolol tartrate  50 mg Oral BID     pantoprazole  40 mg Oral QAM AC      polyethylene glycol  17 g Oral Daily     senna-docusate  1 tablet Oral BID    Or     senna-docusate  2 tablet Oral BID     sodium chloride (PF)  3 mL Intracatheter Q8H       Data   Recent Labs   Lab 05/26/20  0840 05/25/20  0658 05/24/20  0444 05/24/20  0415 05/23/20  0415 05/22/20  1527   WBC 10.0 12.9*  --  13.8* 10.8 20.1*   HGB 8.4* 9.0*  --  8.6* 7.8* 10.1*   MCV 91 92  --  91 93 91    154  --  122* 148* 214   INR 1.45* 1.50* 1.42*  --   --  1.24*    136  --  137 137 138   POTASSIUM 4.0 4.3  --  4.1 4.2 4.7   CHLORIDE 103 105  --  107 107 107   CO2 25 29  --  27 26 23   BUN 26 21  --  17 18 19   CR 0.87 1.00  --  0.81 0.91 0.94   ANIONGAP 6 2*  --  3 4 8   ARMIN 8.6 8.7  --  8.2* 8.0* 8.2*   * 224*  --  131* 144* 219*   ALBUMIN  --   --   --   --  3.7 3.1*   PROTTOTAL  --   --   --   --  6.0* 6.0*   BILITOTAL  --   --   --   --  0.9 0.6   ALKPHOS  --   --   --   --  50 74   ALT  --   --   --   --  19 23   AST  --   --   --   --  46* 45       No results found for this or any previous visit (from the past 24 hour(s)).

## 2020-05-26 NOTE — PROGRESS NOTES
[facility]    Cardiology Consultation     Date of Admission:  5/21/2020    Assessment & Plan     54 year old male [facility] 23mm [employer] [place] mechanical valve replacement, ROLLINS to LAD on 5/22/2020, c/b development of shock requiring dopamine temporarily, found to be in 2:1 atrial flutter. Converted to normal sinus rhythm spontaneously.    Plan:    1. Limited echocardiogram   2. Can start oral amiodarone for post-operative Aflutter until EP follow up (3-4 weeks): 400 mg po bid x 7 days then 200 mg po bid x 7 days then 200 mg daily   3. Place [employer] 14 day monitor at discharge (order placed)  4. One month follow up EP (order placed)  5. Continue oral anticoagulation   6. Recommend metoprolol 12.5 mg bid while on amiodarone (decrease from 50 mg bid)    Josephine Badillo MD    Code Status    Full Code    Reason for Consult   Atrial flutter     Primary Care Physician   *Axel Roberson    Chief Complaint   Tachycardia    History of Present Illness     This is an 54 year old male with a past medical history of aortic stenosis, hypertension, hyperlipidemia, DMII, tobacco abuse, and HARIS admitted on 5/21/2020 following a tooth extraction with planned AVR and CABG on 5/22/2020. Underwent surgery successfully with no perioperative complications and is s/p Aortic valve replacement with a 23 mm [employer] [place] mechanical valve, coronary artery bypass grafting x 1 with left internal mammary artery to the left anterior descending. Was transferred to ICU hemodynamically stable then post operatively developed fever, and vasoplegic shock requiring neosynephrine and dopamine. He was in sinus tachycardia on 5/23 and dopamine weaned off 5/24. Beta blocker started 5/25 at 50 mg bid with additional PRN IV doses administered. Amidoarone was ordered but never started once the AFL converted to NSR. Cardiology consulted for management.     ROS negative for chest pain, fevers, palpitations,  sob, dizziness, LE swelling.       Past Medical History   I have reviewed this patient's medical history and updated it with pertinent information if needed.   Past Medical History:   Diagnosis Date     ADD (attention deficit disorder)      Aneurysm of thoracic aorta (H) 2014     Problem list name updated by automated process. Provider to review     Aortic valve disorder 2014     CARDIOVASCULAR SCREENING; LDL GOAL LESS THAN 160 2014     Dermatitis 2018     Essential hypertension, benign 2018     Hypertension      Hypertensive urgency 2013     HARIS (obstructive sleep apnea)      Type 2 diabetes mellitus with diabetic dermatitis, without long-term current use of insulin (H) 2018       Past Surgical History   I have reviewed this patient's surgical history and updated it with pertinent information if needed.  Past Surgical History:   Procedure Laterality Date     APPENDECTOMY       BYPASS GRAFT ARTERY CORONARY N/A 2020    Procedure: CORONARY ARTERY BYPASS GRAFT X 1  WITH LEFT LEG  ENDOSCOPIC VEIN HARVEST, ON PUMP WITH LEONA. WOUND VAC PLACEMENT. LIMA-LAD;  Surgeon: Aimee Rose MD;  Location:  OR     COLONOSCOPY N/A 6/15/2015    Procedure: COLONOSCOPY;  Surgeon: Vasyl Lawson MD;  Location:  GI     CV CORONARY ANGIOGRAM N/A 1/3/2020    Procedure: Coronary Angiogram;  Surgeon: Álvaro Andrade MD;  Location:  HEART CARDIAC CATH LAB     EXTRACTION(S) DENTAL N/A 2020    Procedure: EXTRACTION, REMAINING TEETH;  Surgeon: Vasyl Brand DDS;  Location:  OR     REPLACE VALVE AORTIC N/A 2020    Procedure: AORTIC VALVE REPLACEMENT WITH REGENT MECHANICAL VALVE SIZE 23 MM.;  Surgeon: Aimee Rose MD;  Location:  OR       Prior to Admission Medications   Prior to Admission Medications   Prescriptions Last Dose Informant Patient Reported? Taking?   amphetamine-dextroamphetamine (ADDERALL) 20 MG tablet 2020 at 0800  Yes Yes   Si mg  3 times daily    aspirin (ASPIRIN) 81 MG EC tablet 5/20/2020 at 0800  Yes Yes   Sig: Take 81 mg by mouth 2 times daily   atorvastatin (LIPITOR) 40 MG tablet 5/20/2020 at 1600  No Yes   Sig: Take 1 tablet (40 mg) by mouth daily   carvedilol (COREG) 25 MG tablet 5/20/2020 at 1600  No Yes   Sig: Take 1 tablet (25 mg) by mouth 2 times daily (with meals)   lisinopril (PRINIVIL/ZESTRIL) 40 MG tablet 5/21/2020 at 0600  No Yes   Sig: Take 1 tablet (40 mg) by mouth daily   varenicline (CHANTIX) 1 MG tablet 5/20/2020 at Unknown time  Yes Yes   Sig: Take 1 mg by mouth 2 times daily      Facility-Administered Medications: None     Allergies   Allergies   Allergen Reactions     Penicillins Nausea and Vomiting     Zithromax [Azithromycin Dihydrate]      hives       Social History   I have reviewed this patient's social history and updated it with pertinent information if needed. Benjamín SCRUGGS Caseyalex  reports that he quit smoking about 7 weeks ago. His smoking use included cigarettes. He smoked 1.00 pack per day. He has never used smokeless tobacco. He reports that he does not drink alcohol or use drugs.    Family History   I have reviewed this patient's family history and updated it with pertinent information if needed.   Family History   Problem Relation Age of Onset     Hypertension Mother      Breast Cancer Mother      Diabetes Father      Diabetes Brother      Thyroid Disease Sister      Colon Cancer No family hx of        Review of Systems   The 10 point Review of Systems is negative other than noted in the HPI or here.     Physical Exam   Temp: 98.7  F (37.1  C) Temp src: Oral BP: 115/72 Pulse: 150 Heart Rate: 95 Resp: 16 SpO2: 94 % O2 Device: None (Room air)    Vital Signs with Ranges  Temp:  [98.1  F (36.7  C)-99.6  F (37.6  C)] 98.7  F (37.1  C)  Pulse:  [] 150  Heart Rate:  [] 95  Resp:  [16-20] 16  BP: (111-134)/(56-75) 115/72  SpO2:  [92 %-97 %] 94 %  233 lbs 11.2 oz    Constitutional: Awake, alert, cooperative,  no apparent distress.  Eyes: Conjunctiva and pupils examined and normal.  HEENT: Moist mucous membranes, normal dentition.  Respiratory: Clear to auscultation bilaterally, no crackles or wheezing.  Cardiovascular: Regular rate and rhythm, normal S1 and S2, and no murmur noted.  GI: Soft, non-distended, non-tender, normal bowel sounds.  Lymph/Hematologic: No anterior cervical or supraclavicular adenopathy.  Skin: No rashes, no cyanosis, no edema.  Musculoskeletal: No joint swelling, erythema or tenderness.  Neurologic: Cranial nerves 2-12 intact, normal strength and sensation.  Psychiatric: Alert, oriented to person, place and time, no obvious anxiety or depression.     [facility]

## 2020-05-26 NOTE — PLAN OF CARE
Alert and oriented x4. Vital signs stable on room air. Up independently. Tolerating mod carb/mechanical soft diet. Lung sounds with expiratory wheezes in RUL; clear everywhere else. Bowel sounds active, + flatus. No BM since 5/20 - mag citrate, miralax and senna given -  no BM this shift. Adequate urine output. Sternal incisions ADELITA, WDL. Left groin and knee incisions ADELITA, ecchymotic. Wound vac and CT removed today at 1100. Pain managed with PRN oxycodone. Denies nausea. Tele sinus rhythm.

## 2020-05-26 NOTE — PROGRESS NOTES
I was paged for palpitations and racing heart beat in the setting of progressive tachycardia tonight; EKG 30 minutes ago showed sinus tachycardia rate 108; now per nursing staff the monitor shows increases up to 150. He denies dyspnea and nursing staff are treating his post operative pain. Current . It seems he was started on PO Metoprolol earlier today 25 mg BID and the last dose was about 6 hours ago; will carefully order a one time dose of 25 mg PO Metoprolol and plan to continue the underlying exacerbating conditions such as pain.

## 2020-05-26 NOTE — PLAN OF CARE
A+O VSS ex tachycardic. LS diminished. Tele ST. CT to water seal, no air leak, dressing CDI. Sternotomy and LLE harvest site ADELITA with skin glue. Bowels active, flatus+, no BM. Voiding adequately. Up SBA. Oxy and scheduled meds for pain.

## 2020-05-26 NOTE — PROVIDER NOTIFICATION
Dr. Chino re-paged for persistent sinus tachycardia. Order for amiodarone bolus and drip received.

## 2020-05-26 NOTE — PROVIDER NOTIFICATION
Per Dr. Gallo, hold off on starting amiodarone bolus and gtt unless pt converts back into atrial flutter and sustains for 15 minutes.

## 2020-05-26 NOTE — PLAN OF CARE
Discharge Planner PT   Patient plan for discharge: Home with OP CR phase II    Current status: Pt performs bed mobility and transfers independently. Pt tolerates ambulating 12 minutes in hallway with FWW with one standing rest break, please see VS flow sheet for CV response to activity. Pt rates effort as 4/10 on OMNI effort scale.     Barriers to return to prior living situation: None based on mobility.    Recommendations for discharge: Home with OP CR phase II    Rationale for recommendations: Pt will benefit from continued skilled rehab services to closely monitor and progress independence with exercise/activity tolerance as well as continue education for optimal heart health.         Entered by: Valarie Winters 05/26/2020 11:25 AM       PM Session: Pt tolerated 10 minutes on treadmill at 0.8-1.0MPH, normal CV response to activity, OMNI effort scale 4-5/10.

## 2020-05-26 NOTE — PROGRESS NOTES
[facility]  Cardiovascular and Thoracic Surgery Daily Note          Assessment and Plan:   POD#4 s/p Aortic valve replacement with a 23 mm [employer] [place] mechanical valve, coronary artery bypass grafting x 1 with left internal mammary artery to the left anterior descending, endoscopic vein harvest from the left lower extremity, intraoperative LEONA by Dr. Aimee Rose  -CVS: HR: 90s-150s. SBP: 110s-120s. Pre op EF: 60-65%. ASA, BB, statin. Went into a flutter overnight and early this am. Converted spontaneously to NSR. Will start on oral amiodarone 400mg BID today. Chest tubes and wound vac removed this am. Pacer wires had been removed previously. Will repeat CXR tomorrow. Will need lifelong anticoagulation with coumadin for mechanical aortic valve with INR goal 2-3. INR: 1.45 today  -Resp: extubated within protocol. Saturating well on room air. Continue to encourage IS, cough, deep breathing, ambulation  -Neuro:  Grossly intact. Pain controlled.   -Renal: up about 5kg from preoperative weight. Cr: 0.87. Will give 20mg IV lasix today and monitor response  -GI:  Tolerating diet. No BM. +flatus. Continue bowel regimen  -:  Voiding well on own.   -Endo: pre op A1c: 12.2. Completed insulin gtt per protocol. Transitioned to insulin regimen per hospitalist team following for post operative glycemic control.   -FEN: replace electrolytes as needed. Na: 134. K: 4.0  Orders Placed This Encounter      Combination Diet 2774-7300 Calories: Moderate Consistent CHO (4-6 CHO units/meal); Mechanical/Dental Soft Diet    -ID: Temp (24hrs), Av.7  F (37.1  C), Min:98.1  F (36.7  C), Max:99.6  F (37.6  C)  WBC: 10.0. Completed perioperative abx. Leukocytosis likely related to stress from surgery. Will continue to monitor.   -Heme: Hgb: 9.5. plt: 296. Acute blood loss anemia and thrombocytopenia related to surgery  -Proph: PCD, ASA, BB, statin, PPI, sub q heparin  -Dispo: St. 33. Continue therapies. Chest tubes and  "wound vac removed today. Repeat CXR tomorrow. Needs BM. Continue to encourage IS, cough, deep breathing, ambulation. Plan for discharge to home in 1-2 days if appropriate.           Interval History:   Overnight pt went into tachyarrhythmia, looks to be atrial flutter 2:1. He converted spontaneously. He reports some palpitations but otherwise no symptoms. Pain controlled. Tolerating diet. No BM.          Medications:       amiodarone  400 mg Oral BID     aspirin  81 mg Oral Daily     insulin aspart  1-10 Units Subcutaneous TID AC     insulin aspart  1-7 Units Subcutaneous At Bedtime     insulin aspart   Subcutaneous TID AC     insulin glargine  35 Units Subcutaneous QAM AC     lidocaine  3 patch Transdermal Q24H     lidocaine   Transdermal Q8H     menthol   Transdermal Q8H     methocarbamol  500 mg Oral 4x Daily     metoprolol tartrate  50 mg Oral BID     pantoprazole  40 mg Oral QAM AC     polyethylene glycol  17 g Oral Daily     senna-docusate  1 tablet Oral BID    Or     senna-docusate  2 tablet Oral BID     sodium chloride (PF)  3 mL Intracatheter Q8H     warfarin ANTICOAGULANT  5 mg Oral ONCE at 18:00     dextrose, glucose **OR** dextrose **OR** glucagon, hydrALAZINE, lidocaine 4%, lidocaine (buffered or not buffered), magnesium sulfate, - MEDICATION INSTRUCTIONS -, melatonin, menthol **AND** menthol, metoclopramide **OR** metoclopramide, naloxone, ondansetron **OR** ondansetron, oxyCODONE, potassium chloride, potassium chloride with lidocaine, potassium chloride, potassium chloride, potassium chloride, potassium phosphate (KPHOS) in D5W IV, potassium phosphate (KPHOS) in D5W IV, potassium phosphate (KPHOS) in D5W IV, potassium phosphate (KPHOS) in D5W IV, sodium chloride (PF), Warfarin Therapy Reminder          Physical Exam:   Vitals were reviewed  Blood pressure 136/70, pulse 87, temperature 98.4  F (36.9  C), temperature source Oral, resp. rate 14, height 1.778 m (5' 10\"), weight 106 kg (233 lb 11.2 oz), " SpO2 98 %.  Rhythm: NSR    Lungs: diminished bases    Cardiovascular: RRR normal s1 and s2    Abdomen: soft NTND    Extremeties: minimal edema    Incision: CDI    CT: n/a    Weight:   Vitals:    05/24/20 0800 05/24/20 1620 05/25/20 0558 05/26/20 0612   Weight: 106.1 kg (233 lb 14.5 oz) 106.5 kg (234 lb 14.4 oz) 106.1 kg (233 lb 14.5 oz) 115.6 kg (254 lb 13.6 oz)    05/26/20 0912   Weight: 106 kg (233 lb 11.2 oz)            Data:   Labs:   Lab Results   Component Value Date    WBC 10.0 05/26/2020     Lab Results   Component Value Date    RBC 2.73 05/26/2020     Lab Results   Component Value Date    HGB 8.4 05/26/2020     Lab Results   Component Value Date    HCT 24.9 05/26/2020     No components found for: MCT  Lab Results   Component Value Date    MCV 91 05/26/2020     Lab Results   Component Value Date    MCH 30.8 05/26/2020     Lab Results   Component Value Date    MCHC 33.7 05/26/2020     Lab Results   Component Value Date    RDW 13.6 05/26/2020     Lab Results   Component Value Date     05/26/2020       Last Basic Metabolic Panel:  Lab Results   Component Value Date     05/26/2020      Lab Results   Component Value Date    POTASSIUM 4.0 05/26/2020     Lab Results   Component Value Date    CHLORIDE 103 05/26/2020     Lab Results   Component Value Date    ARMIN 8.6 05/26/2020     Lab Results   Component Value Date    CO2 25 05/26/2020     Lab Results   Component Value Date    BUN 26 05/26/2020     Lab Results   Component Value Date    CR 0.87 05/26/2020     Lab Results   Component Value Date     05/26/2020       CXR: 5/25/2020    IMPRESSION: Previously Vanderbilt-Sid catheter has been removed. No other  significant interval change. Sternotomy and mediastinal drain. Cardiac  enlargement. Pulmonary vascularity is within normal limits. Mild  atelectasis left lung base. No pneumothorax.    Mag Richards PA-C  CV Surgery  Pager # [phone]

## 2020-05-26 NOTE — PROGRESS NOTES
Paged for recurrent tachycardia to 150; CVS also paged and ordered Amiodarone. EKG repeated and this one shows atrial flutter. Before Amiodarone was started, he converted back to NSR rate in the 90's, where his heart rates currently remain. Cardiology consulted for further evaluation.

## 2020-05-26 NOTE — PROGRESS NOTES
"NUTRITION ASSESSMENT      REASON FOR ASSESSMENT:  Cardiac Surgery Nutrition Consult    CURRENT DIET / INTAKE:  Diet: Moderate CHO, Mechanical Soft    Chart reviewed  Spoke briefly with pt this morning - \"they are coming to get me for something\"  Notes that his appetite improves daily  Ate breakfast, but doesn't really love the food  Breakfast order - omelet, mashed potatoes, 2 bowls huong, OJ    ANTHROPOMETRICS:   Ht: 5'10\"  Wt: (5/22) 99.5 kg  BMI: 31.4  IBW: 75.5 kg  Weight Status: Obesity Grade I BMI 30-34.9  %IBW: 132%    MALNUTRITION:  Patient does not meet two of the following criteria necessary for diagnosing malnutrition:  significant weight loss, reduced intake, subcutaneous fat loss, muscle loss or fluid retention. Nutrition Focused Physical Assessment (NFPA) not appropriate at this time.    NUTRITION DIAGNOSIS:   Increased nutrient needs (protein) R/t higher demand for healing AEB pt is s/p AVR/CABG on 5/22/20    INTERVENTIONS:    Nutrition Prescription:  Moderate CHO, mechanical soft    Implementation:  Nutrition Education (Content):  Discussed the importance of adequate nutrition post-op for healing and energy, emphasizing protein foods, and encouraged patient to order a protein food at each meal.  Will send a chocolate Boost Glucose Control at 10am and 2pm for added protein    Goals:  Patient to consume ~75% at meals in the next 3 - 5 days    Follow Up/Monitoring (InPatient):  Food and Fluid intake -  Monitor for adequacy    Follow Up/Monitoring (OutPatient):  Patient will participate in out-patient cardiac rehab and attend nutrition classes during the program    "

## 2020-05-26 NOTE — PROVIDER NOTIFICATION
Dr. Chino paged for sinus tachycardia in the 150s-160s. 5mg IV metoprolol q5 min ordered on top of the one time PO dose ordered by hospitalist

## 2020-05-26 NOTE — OP NOTE
Lara Barnett is a 67 year old female patient.  Patient Active Problem List   Diagnosis   • Alcoholic cirrhosis of liver (CMS/HCC)   • Anemia in chronic kidney disease, on chronic dialysis (CMS/HCC)   • Arthritis   • Pancreatitis   • Ascites   • Brain mass   • Cirrhosis of liver (CMS/HCC)   • Class 2 obesity due to excess calories with serious comorbidity and body mass index (BMI) of 39.0 to 39.9 in adult   • ESRD (end stage renal disease) on dialysis (CMS/HCC)   • Esophageal reflux   • Hep C w/o coma, chronic (CMS/HCC)   • PVD (peripheral vascular disease) (CMS/HCC)   • Primary osteoarthritis involving multiple joints   • Nontraumatic acute hemorrhage of basal ganglia (CMS/HCC)   • Intraventricular hemorrhage (CMS/HCC)   • SAH (subarachnoid hemorrhage) (CMS/HCC)   • Seizure (CMS/HCC)   • HA (headache)   • HTN (hypertension)   • HLD (hyperlipidemia)   • CAD (coronary artery disease)   • ESRD (end stage renal disease) (CMS/HCC)   • Alcoholic cirrhosis (CMS/HCC)   • Debility   • Lung nodule   • Thyroid nodule     Past Medical History:   Diagnosis Date   • Arthritis    • Brain mass    • Calcium pyrophosphate deposition disease    • Carpal tunnel syndrome    • Cerebral amyloid angiopathy (CMS/HCC)    • Cerebral infarction (CMS/HCC)    • Chronic kidney disease     dialysis M, W, F   • Cirrhosis (CMS/HCC)    • Constipation    • COPD (chronic obstructive pulmonary disease) (CMS/HCC)    • Dialysis AV fistula infection (CMS/HCC)    • DJD (degenerative joint disease)    • Essential (primary) hypertension    • Fracture    • Gastroesophageal reflux disease    • Hepatitis C carrier (CMS/HCC)    • Liver disease    • Myocardial infarction (CMS/HCC)    • Obesity    • Onychomycosis    • Osteoporosis    • Pancreatitis    • Renal mass dial   • Sleep apnea    • Thyroid condition    • Wound abscess      Current Facility-Administered Medications   Medication Dose Route Frequency Provider Last Rate Last Dose   • sodium chloride (NORMAL  Patient name: Benjamín Us  MRN: [id number]  Date of Surgery: 5/21/20     Preoperative Diagnosis:   Carious dentition, caries extend into dentin, Chronic apical periodontitis. Dental clearance for aortic valve replacement     Postoperative Diagnosis:  Carious dentition, caries extend into dentin, Chronic apical periodontitis. Dental clearance for aortic valve replacement     Procedure Performed:  1f) Extraction of teeth #2,3,4,5,6,7,8,9,10,11,14,15,18,19,20,21,22,23,24,25,26,27,28,29,30 and 31      Surgeon: Vasyl Brand DDS, MD     Anesthesiologist: Luís Brizuela MD     Anesthesia: General with nasal intubation     Blood loss: 30ml     Indication:     53 yo male with symptomatic aortic insufficiency due to aortic stenosis, CAD, and overall carious and chronically infected dentition. Patient was taken to the OR for dental clearance prior to Aortic Valve Replacement and Coronary Artery Bypass Graft.      Procedure in detail:  The patient's identity and planned procedure were verified in the preoperative holding area. The consent was signed and confirmed the patient was then transferred to the OR and safely transferred from the stretched to the operating room table in supine position. All appropriate leads and monitors were placed and padded and secured appropriately. The patient was pre-oxygenated for 10 minutes prior to induction intubation with a nasal endotracheal tube via the right naris. Placement of the tube was verified followed by securing the tube with appropriate head wrap and tape by the anesthesia team.      The patient was prepped and draped in normal sterile manner followed by a time out. A throat pack was placed and the patient was then given 14cc of 1% lidocaine with 1:[id number] epinephrine via bilateral MONICA and blocks and local infiltration of the maxilla and mandible. A bite block was placed in the left arch followed by use of 15 blade to make a crestal incision to remove heavy gingival overgrowth to  SALINE) 0.9 % bolus 500 mL  500 mL Intravenous PRN Maria Del Rosario Galarza MD       • HYDROcodone-acetaminophen (NORCO) 5-325 MG per tablet 1 tablet  1 tablet Oral Q4H PRN Maria Del Rosario Galarza MD       • albumin human (SPA) 25 % injection 12.5 g  12.5 g Intravenous PRN Maria Del Rosario Galarza MD       • acetaminophen (TYLENOL) tablet 650 mg  650 mg Oral Q4H PRN Maria Del Rosario Galarza MD        Or   • acetaminophen (TYLENOL) 160 MG/5ML solution 650 mg  650 mg Per NG tube Q4H PRN Maria Del Rosario Glaarza MD        Or   • acetaminophen (TYLENOL) suppository 650 mg  650 mg Rectal Q4H PRN Maria Del Rosario Galarza MD       • docusate sodium (ENEMEEZ MINI) 283 MG rectal enema 283 mg  283 mg Rectal Daily PRN Maria Del Rosario Galarza MD       • amLODIPine (NORVASC) tablet 10 mg  10 mg Oral Daily Maria Del Rosario Galarza MD   10 mg at 03/21/20 0835   • B complex-vitamin C-folic acid (NEPHRO-HETAL) tablet 0.8 mg  1 tablet Oral Daily Maria Del Rosario Galarza MD   0.8 mg at 03/21/20 0835   • butalbital-APAP-caffeine (FIORICET) -40 MG tablet 1 tablet  1 tablet Oral Q4H PRN Maria Del Rosario Galarza MD   1 tablet at 03/21/20 0545   • cyproheptadine (PERIACTIN) tablet 2-4 mg  2-4 mg Oral TID PRN Maria Del Rosario Galarza MD       • epoetin naya-epbx (RETACRIT) 87487 UNIT/ML injection 10,000 Units  10,000 Units Subcutaneous Once per day on Mon Wed Fri Maria Del Rosario Galarza MD   10,000 Units at 03/20/20 2043   • gabapentin (NEURONTIN) capsule 100 mg  100 mg Oral TID Maria Del Rosario Galarza MD   100 mg at 03/21/20 0835   • heparin (porcine) injection 5,000 Units  5,000 Units Subcutaneous 3 times per day Maria Del Rosario Galarza MD   5,000 Units at 03/21/20 0545   • hydrALAZINE (APRESOLINE) tablet 50 mg  50 mg Oral 3 times per day Maria Del Rosario Galarza MD   50 mg at 03/21/20 0545   • labetalol (NORMODYNE) tablet 100 mg  100 mg Oral 2 times per day Maria Del Rosario Galarza MD   100 mg at 03/21/20 0835   • pantoprazole (PROTONIX) EC tablet 40 mg  40 mg Oral [facility] Maria Del Rosario Galarza MD   40 mg at 03/21/20 0514   • polyethylene glycol (MIRALAX) packet 17 g  17 g  expose the heavily decayed teeth #3-11. A full thickness mucoperiosteal flap was developed followed by use of Ronguer forceps to surgically remove abundant excess granulation tissue and bone for removal of teeth #3-5, teeth #6-11 required mostly soft tissue removal followed by delivery with yola forceps. Electrocautery used for better control of hemostasis and adequate visualization of remaining teeth. Extraction sites curetted, sharp edges recontoured followed by copious irrigation with normal saline solution. Surgicel placed in each extraction site followed by running-lock 3-0 chromic gut suture. Attention was then turned to the right mandible with use of 15 blade to make a crestal incision to remove heavy gingival overgrowth to expose the heavily decayed teeth #28-31 with the exception of tooth #30. A full thickness mucoperiosteal flap was developed followed by use of Ronguer forceps to surgically remove abundant excess granulation tissue and bone for removal of teeth #28-30, teeth #22-27 required mostly soft tissue removal followed by delivery with yola forceps and #31 was removed using small molt. Electrocautery used for better control of hemostasis and adequate visualization of remaining teeth. Extraction sites curetted, sharp edges recontoured followed by copious irrigation with normal saline solution. Surgicel placed in each extraction site followed by running-lock 3-0 chromic gut suture.    Attention was then turned to the left maxilla with use of 15 blade to make a crestal incision to remove heavy gingival overgrowth to expose the heavily decayed teeth #14-15. A full thickness mucoperiosteal flap was developed followed by use of Ronguer forceps to surgically remove abundant excess granulation tissue and bone for removal of teeth #14-15. Electrocautery used for better control of hemostasis and adequate visualization of remaining teeth. Extraction sites curetted, sharp edges recontoured followed by copious  Oral BID PRN Maria Del Rosario Galarza MD       • senna (SENOKOT) 17.2 mg  2 tablet Oral BID PRN Maria Del Rosario Galarza MD       • sevelamer carbonate (RENVELA) tablet 2,400 mg  2,400 mg Oral TID WC Maria Del Rosario Galarza MD   2,400 mg at 03/21/20 0836   • pneumococcal 23-valent vaccine (PNEUMOVAX 23) injection 0.5 mL  0.5 mL Intramuscular Once Maria Del Rosario Galarza MD       • bisacodyl (DULCOLAX) suppository 10 mg  10 mg Rectal Daily PRN Maria Del Rosario Galarza MD         ALLERGIES:  No Known Allergies  Active Problems:    * No active hospital problems. *    Blood pressure 127/65, pulse 80, temperature 97.7 °F (36.5 °C), temperature source Oral, resp. rate 16, height 5' 5\" (1.651 m), weight 99.1 kg, SpO2 (!) 19 %.    Subjective:  Symptoms:  Improved.  She reports malaise, weakness and headache.  No shortness of breath, cough, chest pain, chest pressure, anorexia, diarrhea or anxiety.  (No new complaints ).    Diet:  Adequate intake.  No nausea or vomiting.    Activity level: Impaired due to pain.    Pain:  She complains of pain that is moderate.  She reports pain is improving.  Pain is partially controlled and requiring pain medication.      Objective:  General Appearance:  Comfortable, well-appearing, in no acute distress and in pain ( no pain behavior at rest still c/o headache ).    Vital signs: (most recent): Blood pressure 127/65, pulse 80, temperature 97.7 °F (36.5 °C), temperature source Oral, resp. rate 16, height 5' 5\" (1.651 m), weight 99.1 kg, SpO2 (!) 19 %.  Vital signs are normal.    Output: Producing urine.    HEENT: Normal HEENT exam.    Lungs:  Normal effort and normal respiratory rate.  She is not in respiratory distress.    Heart: Normal rate.  Regular rhythm.    Chest: Symmetric chest wall expansion. No chest wall tenderness.    Abdomen: Abdomen is soft.    Extremities: Normal range of motion.    Neurological: Patient is alert and oriented to person, place and time.  (Generalized weakness ).    Skin:  Warm and dry.   irrigation with normal saline solution. Surgicel placed in each extraction site followed by running-lock 3-0 chromic gut suture. Attention was then turned to the left mandible with use of 15 blade to make a crestal incision to remove heavy gingival overgrowth to expose the heavily decayed teeth #18-21. A full thickness mucoperiosteal flap was developed followed by use of Ronguer forceps to surgically remove abundant excess granulation tissue and bone for removal of teeth #18-21. Electrocautery used for better control of hemostasis and adequate visualization of remaining teeth. Extraction sites curetted, sharp edges recontoured followed by copious irrigation with normal saline solution. Surgicel placed in each extraction site followed by running-lock 3-0 chromic gut suture. The patient was hemostatic at the end of the dental extractions.     The oral cavity was cleaned with copious irrigation with normal saline solution followed by suctioning with a yankauer suction. The throat pack was then removed after thorough exam showing no oral debri. The oropharynx was suctioned followed by passing an OG tube to decompress the stomach with appreciable return of stomach contents. Two 4x4 gauze was placed with over the extraction sites with nugauze attached to assist with quick removal if needed. The patient was safely extubated and transferred the to PACU in stable condition. A sign out was given to the nursing staff and was admitted to hospitalist service in preparation for aortic valve replacement and CABG procedure on 5/22/20     Findings: Generalized carious dentition with appreciable chronic granulation tissue. No purulence or acute swelling.     Draings: None     Specimens: None    Complications: None     Vasyl Brand DDS, MD         Assessment:    Condition: In stable condition.  Improving.         Transferred to rehab 3/20     Continues to complain of headache although slowly improving (denies headache this morning)    Scores pain at 0-9/10 with goal of 2    Still with aching right sided head pain   Medication use decreasing     Scheduled    Gabapentin 100 mg 3 times daily     Prn     apap none used for 48 hours   fioricet times 2 yesterday and one today      Periactin 2 mg times 2 yesterday     norco 5 mg times 0 yesterday and none today         No iv opioids available   No request to change med     No side effects from med        A     ICH   Sah   Seizure disorder  Ha   htn   Cad   esrd   Alcoholic liver disease   Obesity   Hep c   pvd     p      D/w pt     Continue current med     Assess daily   No change in current med       Josh Burnett MD  3/21/2020

## 2020-05-27 ENCOUNTER — APPOINTMENT (OUTPATIENT)
Dept: GENERAL RADIOLOGY | Facility: CLINIC | Age: 55
DRG: 220 | End: 2020-05-27
Attending: PHYSICIAN ASSISTANT
Payer: COMMERCIAL

## 2020-05-27 ENCOUNTER — APPOINTMENT (OUTPATIENT)
Dept: PHYSICAL THERAPY | Facility: CLINIC | Age: 55
DRG: 220 | End: 2020-05-27
Attending: DENTIST
Payer: COMMERCIAL

## 2020-05-27 LAB
ALBUMIN UR-MCNC: 30 MG/DL
ANION GAP SERPL CALCULATED.3IONS-SCNC: 5 MMOL/L (ref 3–14)
APPEARANCE UR: CLEAR
BILIRUB UR QL STRIP: NEGATIVE
BUN SERPL-MCNC: 23 MG/DL (ref 7–30)
CALCIUM SERPL-MCNC: 8.7 MG/DL (ref 8.5–10.1)
CHLORIDE SERPL-SCNC: 100 MMOL/L (ref 94–109)
CO2 SERPL-SCNC: 29 MMOL/L (ref 20–32)
COLOR UR AUTO: YELLOW
CREAT SERPL-MCNC: 0.99 MG/DL (ref 0.66–1.25)
ERYTHROCYTE [DISTWIDTH] IN BLOOD BY AUTOMATED COUNT: 13.5 % (ref 10–15)
ERYTHROCYTE [DISTWIDTH] IN BLOOD BY AUTOMATED COUNT: 13.6 % (ref 10–15)
GFR SERPL CREATININE-BSD FRML MDRD: 84 ML/MIN/{1.73_M2}
GLUCOSE BLDC GLUCOMTR-MCNC: 102 MG/DL (ref 70–99)
GLUCOSE BLDC GLUCOMTR-MCNC: 107 MG/DL (ref 70–99)
GLUCOSE BLDC GLUCOMTR-MCNC: 177 MG/DL (ref 70–99)
GLUCOSE BLDC GLUCOMTR-MCNC: 194 MG/DL (ref 70–99)
GLUCOSE BLDC GLUCOMTR-MCNC: 62 MG/DL (ref 70–99)
GLUCOSE SERPL-MCNC: 102 MG/DL (ref 70–99)
GLUCOSE UR STRIP-MCNC: NEGATIVE MG/DL
HCT VFR BLD AUTO: 24.3 % (ref 40–53)
HCT VFR BLD AUTO: 25.7 % (ref 40–53)
HGB BLD-MCNC: 8.2 G/DL (ref 13.3–17.7)
HGB BLD-MCNC: 8.6 G/DL (ref 13.3–17.7)
HGB UR QL STRIP: ABNORMAL
HYALINE CASTS #/AREA URNS LPF: 3 /LPF (ref 0–2)
INR PPP: 1.72 (ref 0.86–1.14)
KETONES UR STRIP-MCNC: NEGATIVE MG/DL
LEUKOCYTE ESTERASE UR QL STRIP: NEGATIVE
LMWH PPP CHRO-ACNC: <0.1 IU/ML
MAGNESIUM SERPL-MCNC: 2.3 MG/DL (ref 1.6–2.3)
MCH RBC QN AUTO: 31.3 PG (ref 26.5–33)
MCH RBC QN AUTO: 31.3 PG (ref 26.5–33)
MCHC RBC AUTO-ENTMCNC: 33.5 G/DL (ref 31.5–36.5)
MCHC RBC AUTO-ENTMCNC: 33.7 G/DL (ref 31.5–36.5)
MCV RBC AUTO: 93 FL (ref 78–100)
MCV RBC AUTO: 94 FL (ref 78–100)
MUCOUS THREADS #/AREA URNS LPF: PRESENT /LPF
NITRATE UR QL: NEGATIVE
PH UR STRIP: 5.5 PH (ref 5–7)
PHOSPHATE SERPL-MCNC: 4.2 MG/DL (ref 2.5–4.5)
PLATELET # BLD AUTO: 245 10E9/L (ref 150–450)
PLATELET # BLD AUTO: 302 10E9/L (ref 150–450)
POTASSIUM SERPL-SCNC: 3.8 MMOL/L (ref 3.4–5.3)
RBC # BLD AUTO: 2.62 10E12/L (ref 4.4–5.9)
RBC # BLD AUTO: 2.75 10E12/L (ref 4.4–5.9)
RBC #/AREA URNS AUTO: 9 /HPF (ref 0–2)
SODIUM SERPL-SCNC: 134 MMOL/L (ref 133–144)
SOURCE: ABNORMAL
SP GR UR STRIP: 1.02 (ref 1–1.03)
SQUAMOUS #/AREA URNS AUTO: <1 /HPF (ref 0–1)
UROBILINOGEN UR STRIP-MCNC: NORMAL MG/DL (ref 0–2)
WBC # BLD AUTO: 11.2 10E9/L (ref 4–11)
WBC # BLD AUTO: 13 10E9/L (ref 4–11)
WBC #/AREA URNS AUTO: 3 /HPF (ref 0–5)

## 2020-05-27 PROCEDURE — 36415 COLL VENOUS BLD VENIPUNCTURE: CPT | Performed by: SURGERY

## 2020-05-27 PROCEDURE — 97530 THERAPEUTIC ACTIVITIES: CPT | Mod: GP | Performed by: PHYSICAL THERAPIST

## 2020-05-27 PROCEDURE — 25000132 ZZH RX MED GY IP 250 OP 250 PS 637: Performed by: STUDENT IN AN ORGANIZED HEALTH CARE EDUCATION/TRAINING PROGRAM

## 2020-05-27 PROCEDURE — 25000132 ZZH RX MED GY IP 250 OP 250 PS 637: Performed by: INTERNAL MEDICINE

## 2020-05-27 PROCEDURE — 83735 ASSAY OF MAGNESIUM: CPT | Performed by: PHYSICIAN ASSISTANT

## 2020-05-27 PROCEDURE — 12000000 ZZH R&B MED SURG/OB

## 2020-05-27 PROCEDURE — 25000131 ZZH RX MED GY IP 250 OP 636 PS 637: Performed by: PHYSICIAN ASSISTANT

## 2020-05-27 PROCEDURE — 80048 BASIC METABOLIC PNL TOTAL CA: CPT | Performed by: PHYSICIAN ASSISTANT

## 2020-05-27 PROCEDURE — 71046 X-RAY EXAM CHEST 2 VIEWS: CPT

## 2020-05-27 PROCEDURE — 84100 ASSAY OF PHOSPHORUS: CPT | Performed by: PHYSICIAN ASSISTANT

## 2020-05-27 PROCEDURE — 99232 SBSQ HOSP IP/OBS MODERATE 35: CPT | Performed by: INTERNAL MEDICINE

## 2020-05-27 PROCEDURE — 97110 THERAPEUTIC EXERCISES: CPT | Mod: GP | Performed by: PHYSICAL THERAPIST

## 2020-05-27 PROCEDURE — 85610 PROTHROMBIN TIME: CPT | Performed by: PHYSICIAN ASSISTANT

## 2020-05-27 PROCEDURE — 85027 COMPLETE CBC AUTOMATED: CPT | Performed by: PHYSICIAN ASSISTANT

## 2020-05-27 PROCEDURE — 25000128 H RX IP 250 OP 636

## 2020-05-27 PROCEDURE — 36415 COLL VENOUS BLD VENIPUNCTURE: CPT | Performed by: PHYSICIAN ASSISTANT

## 2020-05-27 PROCEDURE — 00000146 ZZHCL STATISTIC GLUCOSE BY METER IP

## 2020-05-27 PROCEDURE — 25000132 ZZH RX MED GY IP 250 OP 250 PS 637: Performed by: PHYSICIAN ASSISTANT

## 2020-05-27 PROCEDURE — 25000132 ZZH RX MED GY IP 250 OP 250 PS 637

## 2020-05-27 PROCEDURE — 85520 HEPARIN ASSAY: CPT | Performed by: SURGERY

## 2020-05-27 PROCEDURE — 81001 URINALYSIS AUTO W/SCOPE: CPT | Performed by: PHYSICIAN ASSISTANT

## 2020-05-27 PROCEDURE — 25000128 H RX IP 250 OP 636: Performed by: PHYSICIAN ASSISTANT

## 2020-05-27 RX ORDER — WARFARIN SODIUM 5 MG/1
5 TABLET ORAL
Status: COMPLETED | OUTPATIENT
Start: 2020-05-27 | End: 2020-05-27

## 2020-05-27 RX ORDER — POTASSIUM CHLORIDE 29.8 MG/ML
20 INJECTION INTRAVENOUS
Status: DISCONTINUED | OUTPATIENT
Start: 2020-05-27 | End: 2020-05-28 | Stop reason: HOSPADM

## 2020-05-27 RX ORDER — GLUCOSAMINE HCL/CHONDROITIN SU 500-400 MG
CAPSULE ORAL
Qty: 100 EACH | Refills: 3 | Status: SHIPPED | OUTPATIENT
Start: 2020-05-27

## 2020-05-27 RX ORDER — POTASSIUM CHLORIDE 7.45 MG/ML
10 INJECTION INTRAVENOUS
Status: DISCONTINUED | OUTPATIENT
Start: 2020-05-27 | End: 2020-05-28 | Stop reason: HOSPADM

## 2020-05-27 RX ORDER — POTASSIUM CL/LIDO/0.9 % NACL 10MEQ/0.1L
10 INTRAVENOUS SOLUTION, PIGGYBACK (ML) INTRAVENOUS
Status: DISCONTINUED | OUTPATIENT
Start: 2020-05-27 | End: 2020-05-28 | Stop reason: HOSPADM

## 2020-05-27 RX ORDER — LANCETS
EACH MISCELLANEOUS
Qty: 100 EACH | Refills: 3 | Status: SHIPPED | OUTPATIENT
Start: 2020-05-27

## 2020-05-27 RX ORDER — POTASSIUM CHLORIDE 1.5 G/1.58G
20-40 POWDER, FOR SOLUTION ORAL
Status: DISCONTINUED | OUTPATIENT
Start: 2020-05-27 | End: 2020-05-28 | Stop reason: HOSPADM

## 2020-05-27 RX ORDER — POTASSIUM CHLORIDE 1500 MG/1
20-40 TABLET, EXTENDED RELEASE ORAL
Status: DISCONTINUED | OUTPATIENT
Start: 2020-05-27 | End: 2020-05-28 | Stop reason: HOSPADM

## 2020-05-27 RX ORDER — FUROSEMIDE 10 MG/ML
20 INJECTION INTRAMUSCULAR; INTRAVENOUS ONCE
Status: COMPLETED | OUTPATIENT
Start: 2020-05-27 | End: 2020-05-27

## 2020-05-27 RX ORDER — ATORVASTATIN CALCIUM 40 MG/1
40 TABLET, FILM COATED ORAL DAILY
Status: DISCONTINUED | OUTPATIENT
Start: 2020-05-27 | End: 2020-05-28 | Stop reason: HOSPADM

## 2020-05-27 RX ORDER — ACETAMINOPHEN 325 MG/1
650 TABLET ORAL EVERY 6 HOURS PRN
Status: DISCONTINUED | OUTPATIENT
Start: 2020-05-27 | End: 2020-05-28 | Stop reason: HOSPADM

## 2020-05-27 RX ORDER — HEPARIN SODIUM 10000 [USP'U]/100ML
1450 INJECTION, SOLUTION INTRAVENOUS CONTINUOUS
Status: DISCONTINUED | OUTPATIENT
Start: 2020-05-27 | End: 2020-05-28 | Stop reason: HOSPADM

## 2020-05-27 RX ADMIN — FUROSEMIDE 20 MG: 10 INJECTION, SOLUTION INTRAMUSCULAR; INTRAVENOUS at 18:04

## 2020-05-27 RX ADMIN — AMIODARONE HYDROCHLORIDE 400 MG: 200 TABLET ORAL at 08:11

## 2020-05-27 RX ADMIN — METHOCARBAMOL 500 MG: 500 TABLET, FILM COATED ORAL at 01:13

## 2020-05-27 RX ADMIN — ACETAMINOPHEN 650 MG: 325 TABLET, FILM COATED ORAL at 18:04

## 2020-05-27 RX ADMIN — POTASSIUM CHLORIDE 20 MEQ: 1500 TABLET, EXTENDED RELEASE ORAL at 18:04

## 2020-05-27 RX ADMIN — INSULIN GLARGINE 35 UNITS: 100 INJECTION, SOLUTION SUBCUTANEOUS at 08:11

## 2020-05-27 RX ADMIN — PANTOPRAZOLE SODIUM 40 MG: 40 TABLET, DELAYED RELEASE ORAL at 07:13

## 2020-05-27 RX ADMIN — DOCUSATE SODIUM 50 MG AND SENNOSIDES 8.6 MG 1 TABLET: 8.6; 5 TABLET, FILM COATED ORAL at 21:06

## 2020-05-27 RX ADMIN — ASPIRIN 81 MG: 81 TABLET, DELAYED RELEASE ORAL at 08:11

## 2020-05-27 RX ADMIN — OXYCODONE HYDROCHLORIDE 5 MG: 5 TABLET ORAL at 08:12

## 2020-05-27 RX ADMIN — OXYCODONE HYDROCHLORIDE 10 MG: 5 TABLET ORAL at 12:52

## 2020-05-27 RX ADMIN — SENNOSIDES AND DOCUSATE SODIUM 2 TABLET: 8.6; 5 TABLET ORAL at 08:11

## 2020-05-27 RX ADMIN — AMIODARONE HYDROCHLORIDE 400 MG: 200 TABLET ORAL at 21:07

## 2020-05-27 RX ADMIN — ATORVASTATIN CALCIUM 40 MG: 40 TABLET, FILM COATED ORAL at 14:18

## 2020-05-27 RX ADMIN — Medication 2000 UNITS: at 21:41

## 2020-05-27 RX ADMIN — OXYCODONE HYDROCHLORIDE 10 MG: 5 TABLET ORAL at 01:13

## 2020-05-27 RX ADMIN — METOPROLOL TARTRATE 12.5 MG: 25 TABLET, FILM COATED ORAL at 08:12

## 2020-05-27 RX ADMIN — HEPARIN SODIUM 1050 UNITS/HR: 10000 INJECTION, SOLUTION INTRAVENOUS at 14:13

## 2020-05-27 RX ADMIN — WARFARIN SODIUM 5 MG: 5 TABLET ORAL at 17:00

## 2020-05-27 RX ADMIN — METOPROLOL TARTRATE 12.5 MG: 25 TABLET, FILM COATED ORAL at 21:06

## 2020-05-27 RX ADMIN — OXYCODONE HYDROCHLORIDE 10 MG: 5 TABLET ORAL at 21:11

## 2020-05-27 RX ADMIN — OXYCODONE HYDROCHLORIDE 10 MG: 5 TABLET ORAL at 17:00

## 2020-05-27 ASSESSMENT — ACTIVITIES OF DAILY LIVING (ADL)
ADLS_ACUITY_SCORE: 15
ADLS_ACUITY_SCORE: 13
ADLS_ACUITY_SCORE: 15
ADLS_ACUITY_SCORE: 15

## 2020-05-27 ASSESSMENT — MIFFLIN-ST. JEOR: SCORE: 1883.25

## 2020-05-27 NOTE — PROGRESS NOTES
[facility]  Cardiovascular and Thoracic Surgery Daily Note          Assessment and Plan:   POD#5 s/p Aortic valve replacement with a 23 mm [employer] [place] mechanical valve, coronary artery bypass grafting x 1 with left internal mammary artery to the left anterior descending, endoscopic vein harvest from the left lower extremity, intraoperative LEONA by Dr. Aimee Rose  -CVS: HR: 70s-90s. SBP: 100s-130s. Pre op EF: 60-65%. ASA, BB, statin. Went into a flutter on POD#4, converted spontaneously and amiodarone PO 400mg BID was added. Remains in NSR today. Chest tubes and wound vac removed yesterday without issue. CXR this am with stable post operative changes. Will need lifelong anticoagulation with coumadin for mechanical aortic valve with INR goal 2-3. INR: 1.72 today, will start heparin gtt today.   -Resp: Extubated within protocol. Saturating well on room air. Continue to encourage IS, cough, deep breathing, ambulation.   -Neuro:  Grossly intact. Pain controlled.   -Renal: up about 3kg from preoperative weight. Cr: 0.99. Will repeat lasix again today and monitor response  -GI:  Tolerating diet. +BM. Continue bowel regimen  -:  Voiding well on own  -Endo: pre op a1c: 12.2. Completed insulin gtt per protocol. Transitioned to insulin regimen per hospitalist team following for post operative glycemic control.   -FEN: replace electrolytes as needed. Na: 134. K: 3.8.   Orders Placed This Encounter      Combination Diet 2856-6008 Calories: Moderate Consistent CHO (4-6 CHO units/meal); Mechanical/Dental Soft Diet    -ID: Temp (24hrs), Av.9  F (37.2  C), Min:98.4  F (36.9  C), Max:99.5  F (37.5  C)  WBC: 11.2. Completed perioperative abx. Leukocytosis likely related to stress from surgery. Will continue to monitor.   -Heme: Hgb: 8.2. plt: 245 Acute blood loss anemia and thrombocytopenia related to surgery. Will need lifelong anticoagulation with coumadin for INR goal 2-3. INR: 1.72 today. Will start  "heparin gtt today.    -Proph: PCD, ASA, BB, statin, PPI, sub q heparin  -Dispo: St[address]. Continue therapies. Continue to encourage IS, cough, deep breathing, ambulation. Start heparin gtt today. Will be able to discharge once INR closer to goal.           Interval History:   No acute events overnight. Saturating well on room air. Pain controlled. Tolerating diet. +BM.           Medications:       amiodarone  400 mg Oral BID     aspirin  81 mg Oral Daily     insulin aspart  1-10 Units Subcutaneous TID AC     insulin aspart  1-7 Units Subcutaneous At Bedtime     insulin aspart   Subcutaneous TID AC     insulin glargine  35 Units Subcutaneous QAM AC     lidocaine  3 patch Transdermal Q24H     lidocaine   Transdermal Q8H     menthol   Transdermal Q8H     metoprolol tartrate  12.5 mg Oral BID     pantoprazole  40 mg Oral QAM AC     polyethylene glycol  17 g Oral Daily     senna-docusate  1 tablet Oral BID    Or     senna-docusate  2 tablet Oral BID     sodium chloride (PF)  3 mL Intracatheter Q8H     warfarin ANTICOAGULANT  5 mg Oral ONCE at 18:00     dextrose, glucose **OR** dextrose **OR** glucagon, hydrALAZINE, lidocaine 4%, lidocaine (buffered or not buffered), magnesium sulfate, - MEDICATION INSTRUCTIONS -, melatonin, menthol **AND** menthol, metoclopramide **OR** metoclopramide, naloxone, ondansetron **OR** ondansetron, oxyCODONE, potassium chloride, potassium chloride with lidocaine, potassium chloride, potassium chloride, potassium chloride, potassium phosphate (KPHOS) in D5W IV, potassium phosphate (KPHOS) in D5W IV, potassium phosphate (KPHOS) in D5W IV, potassium phosphate (KPHOS) in D5W IV, sodium chloride (PF), Warfarin Therapy Reminder          Physical Exam:   Vitals were reviewed  Blood pressure 106/61, pulse 72, temperature 99  F (37.2  C), temperature source Oral, resp. rate 15, height 1.778 m (5' 10\"), weight 104.7 kg (230 lb 13.2 oz), SpO2 97 %.  Rhythm: NSR    Lungs: diminished " bases    Cardiovascular: RRR normal s1 and s2    Abdomen: soft NTND    Extremeties: minimal edema    Incision: CDI    CT:n/a    Weight:   Vitals:    05/24/20 1620 05/25/20 0558 05/26/20 0612 05/26/20 0912   Weight: 106.5 kg (234 lb 14.4 oz) 106.1 kg (233 lb 14.5 oz) 115.6 kg (254 lb 13.6 oz) 106 kg (233 lb 11.2 oz)    05/27/20 0629   Weight: 104.7 kg (230 lb 13.2 oz)            Data:   Labs:   Lab Results   Component Value Date    WBC 11.2 05/27/2020     Lab Results   Component Value Date    RBC 2.62 05/27/2020     Lab Results   Component Value Date    HGB 8.2 05/27/2020     Lab Results   Component Value Date    HCT 24.3 05/27/2020     No components found for: MCT  Lab Results   Component Value Date    MCV 93 05/27/2020     Lab Results   Component Value Date    MCH 31.3 05/27/2020     Lab Results   Component Value Date    MCHC 33.7 05/27/2020     Lab Results   Component Value Date    RDW 13.6 05/27/2020     Lab Results   Component Value Date     05/27/2020       Last Basic Metabolic Panel:  Lab Results   Component Value Date     05/27/2020      Lab Results   Component Value Date    POTASSIUM 3.8 05/27/2020     Lab Results   Component Value Date    CHLORIDE 100 05/27/2020     Lab Results   Component Value Date    ARMIN 8.7 05/27/2020     Lab Results   Component Value Date    CO2 29 05/27/2020     Lab Results   Component Value Date    BUN 23 05/27/2020     Lab Results   Component Value Date    CR 0.99 05/27/2020     Lab Results   Component Value Date     05/27/2020       CXR: Final read pending-- clinical read-- stable post operative changes. No pneumothorax or pleural effusion visualized.     Mag Richards PA-C  [facility]  Pager # [phone]

## 2020-05-27 NOTE — PLAN OF CARE
"Discharge Planner PT   Patient plan for discharge: Home with OP CR phase II    Current status: Independent with mobility, recalls precautions and demonstrates with mobility. Tolerated 11 minutes on the TM, only able to go .8mph today secondary to fatigue and feeling \"light headed.\" Hypotensive response to exercise 99/48 post activity, HR 89, pre act 117/60 HR 80. Rates self at 5/10 on the OMNI scale of effort.    Barriers to return to prior living situation: None based on mobility.     Recommendations for discharge: Home with OP CR phase II     Rationale for recommendations: Pt will benefit from continued skilled rehab services to closely monitor and progress independence with exercise/activity tolerance as well as continue education for optimal heart health.          Entered by: Christiane Serrano 05/27/2020 9:55 AM       "

## 2020-05-27 NOTE — PLAN OF CARE
A+Ox4 VSS on room air. LS clear. Tele NSR. Sternotomy and LLE harvest incisions ADELITA with liquid bandage. CT removal site dressing CDI. Bowels active, flatus+, no BM. Voiding adequately. Oxy and robaxin for pain. Up independently on room. Tolerating CHO diet.

## 2020-05-27 NOTE — PROGRESS NOTES
[facility]    Medicine Progress Note - Hospitalist Service       Date of Admission:  5/21/2020  Assessment & Plan   Benjamín Us is a 54 year old male who was admitted on 5/21/2020 post tooth extraction (pre-op management for cardiac procedure) for planned AVR and CABG on 5/22     Severe aortic stenosis s/p mechanical AVR   Coronary artery disease s/p 1v CABG LIMA to LAD   Hyperlipidemia   Surgery performed as scheduled 5/22/20.  ml.   - Routine post-operative cares per CV surgery service   - Continue aspirin, warfarin, atorvastatin     Atrial flutter, post-operative  Noted to have HR up to 150s appearing like atrial flutter  - Cardiology consulted  - Amiodarone load started with metoprolol dose decreased   - 14 day Ziopatch on discharge and one month follow up with EP recommended by cardiology      Hypoxemia, resolved  Suspected secondary to atelectasis.  - Continue pulmonary toilet  - On room air     Post op fever  Afebrile since 5/24 AM.   - Blood cultures NGTD  - Monitor fever curve      Dental Extraction prior to CABG/AVR  5/21/2020 dental extraction for carious dentition for pre-operative management for planned AVR/CABG 5/2020. Sergicel placed in all extraction sites and patient hemostatic at end of procedure. EBL<50ml  Recommendations per OMFS:  - Continue OMFS  - Have patient bite on 4x4 gauze if excessive oozing  - Peridex rinses 15cc swish and spit BID  - Mechanical soft diet     Hypertension  - Blood pressures well controlled  - Continue metoprolol as above      Diabetes mellitus, type 2, uncontrolled    Stress hyperglycemia   HgbA1c 12.2% 5/2020. Prior to admission attempting to control with diet and exercise.  - Continue glargine 35 units in AM, aspart 1 unit per 10 g carbohydrates with meals, high sliding scale insulin   - Start metformin on discharge  - Glucometer ordered in anticipation of discharge for patient training with RN      HARIS  - Does not use home CPAP  - Follow up  with outpatient sleep recommended     Nicotine Dependence  Patient reports he quit 3/2020.    Diet: Combination Diet 5422-5699 Calories: Moderate Consistent CHO (4-6 CHO units/meal); Mechanical/Dental Soft Diet  Snacks/Supplements Adult: Other; chocolate Boost Glucose Control at 10am and 2pm  (RD); Between Meals    DVT Prophylaxis: Heparin drip + warfarin   Bernal Catheter: not present  Code Status: Full Code      Disposition Plan   Expected discharge: Per primary service  Entered: Issac Sears MD 05/27/2020, 3:00 PM       The patient's care was discussed with the Bedside Nurse and Patient.     Issac Sears MD  Hospitalist Service  [facility]    ______________________________________________________________________    Interval History   No acute events overnight. Denies any new symptoms. Just worked with cardiac rehab. Tolerating diet.     Data reviewed today: I reviewed all medications, new labs and imaging results over the last 24 hours. I personally reviewed no images or EKG's today.    Physical Exam   Vital Signs: Temp: 98.6  F (37  C) Temp src: Oral BP: 112/66 Pulse: 77 Heart Rate: 82 Resp: 15 SpO2: 98 % O2 Device: None (Room air)    Weight: 230 lbs 13.15 oz    Constitutional: Well-appearing, NAD  Respiratory: Clear to auscultation bilaterally, good air movement bilaterally  Cardiovascular: RRR, II/VI systolic murmur with valve click. Bilateral lower extremity edema.   GI: Soft, non-tender, non-distended.    Skin/Integumen: Warm, dry  Other:       Data   Recent Labs   Lab 05/27/20  1240 05/27/20  0651 05/26/20  0840 05/25/20  0658  05/23/20  0415 05/22/20  1527   WBC 13.0* 11.2* 10.0 12.9*   < > 10.8 20.1*   HGB 8.6* 8.2* 8.4* 9.0*   < > 7.8* 10.1*   MCV 94 93 91 92   < > 93 91    245 213 154   < > 148* 214   INR  --  1.72* 1.45* 1.50*   < >  --  1.24*   NA  --  134 134 136   < > 137 138   POTASSIUM  --  3.8 4.0 4.3   < > 4.2 4.7   CHLORIDE  --  100 103 105   < > 107 107   CO2  --   29 25 29   < > 26 23   BUN  --  23 26 21   < > 18 19   CR  --  0.99 0.87 1.00   < > 0.91 0.94   ANIONGAP  --  5 6 2*   < > 4 8   ARMIN  --  8.7 8.6 8.7   < > 8.0* 8.2*   GLC  --  102* 246* 224*   < > 144* 219*   ALBUMIN  --   --   --   --   --  3.7 3.1*   PROTTOTAL  --   --   --   --   --  6.0* 6.0*   BILITOTAL  --   --   --   --   --  0.9 0.6   ALKPHOS  --   --   --   --   --  50 74   ALT  --   --   --   --   --  19 23   AST  --   --   --   --   --  46* 45    < > = values in this interval not displayed.       Recent Results (from the past 24 hour(s))   XR Chest 2 Views    Narrative    XR CHEST 2 VW 5/27/2020 12:01 PM    HISTORY: s/p chest tube removal    COMPARISON: 5/25/2020      Impression    IMPRESSION: Mediastinal chest tube has been removed. No consolidation.  No pleural effusions. No pneumothorax. Unchanged cardiac size. Median  sternotomy. Aortic valve replacement.    AMOS BRIGGS MD     Medications     dextrose       HEParin 1,050 Units/hr (05/27/20 1413)     - MEDICATION INSTRUCTIONS -       Warfarin Therapy Reminder         amiodarone  400 mg Oral BID     aspirin  81 mg Oral Daily     atorvastatin  40 mg Oral Daily     insulin aspart  1-10 Units Subcutaneous TID AC     insulin aspart  1-7 Units Subcutaneous At Bedtime     insulin aspart   Subcutaneous TID AC     insulin glargine  35 Units Subcutaneous QAM AC     lidocaine  3 patch Transdermal Q24H     lidocaine   Transdermal Q8H     menthol   Transdermal Q8H     metoprolol tartrate  12.5 mg Oral BID     pantoprazole  40 mg Oral QAM AC     polyethylene glycol  17 g Oral Daily     senna-docusate  1 tablet Oral BID    Or     senna-docusate  2 tablet Oral BID     sodium chloride (PF)  3 mL Intracatheter Q8H     warfarin ANTICOAGULANT  5 mg Oral ONCE at 18:00

## 2020-05-27 NOTE — PLAN OF CARE
Alert and oriented x4. Vital signs stable on room air. Up independently. Tolerating mechanical soft diet. Lung sounds clear. Bowel sounds active, + flatus, BM today. Adequate urine output. Sternal incision and previous CT sites ADELIAT, WDL. Left groin and knee incisions ADELITA, ecchymotic. Trace edema to BLE. Pain managed with PRN oxycodone and tylenol. Denies nausea. Tele sinus rhythm. Potassium replaced per protocol, recheck in AM. Showered this shift. Diabetic education begun.

## 2020-05-28 ENCOUNTER — TELEPHONE (OUTPATIENT)
Dept: CARDIOLOGY | Facility: CLINIC | Age: 55
End: 2020-05-28

## 2020-05-28 ENCOUNTER — TELEPHONE (OUTPATIENT)
Dept: FAMILY MEDICINE | Facility: CLINIC | Age: 55
End: 2020-05-28

## 2020-05-28 ENCOUNTER — APPOINTMENT (OUTPATIENT)
Dept: PHYSICAL THERAPY | Facility: CLINIC | Age: 55
DRG: 220 | End: 2020-05-28
Attending: DENTIST
Payer: COMMERCIAL

## 2020-05-28 VITALS
DIASTOLIC BLOOD PRESSURE: 63 MMHG | WEIGHT: 233.69 LBS | RESPIRATION RATE: 15 BRPM | SYSTOLIC BLOOD PRESSURE: 104 MMHG | BODY MASS INDEX: 33.46 KG/M2 | HEIGHT: 70 IN | OXYGEN SATURATION: 94 % | HEART RATE: 77 BPM | TEMPERATURE: 97.9 F

## 2020-05-28 DIAGNOSIS — Z95.1 S/P CABG (CORONARY ARTERY BYPASS GRAFT): ICD-10-CM

## 2020-05-28 DIAGNOSIS — Z95.2 S/P AVR (AORTIC VALVE REPLACEMENT): Primary | ICD-10-CM

## 2020-05-28 PROBLEM — D62 ANEMIA DUE TO BLOOD LOSS, ACUTE: Status: ACTIVE | Noted: 2020-05-28

## 2020-05-28 PROBLEM — E87.70 FLUID OVERLOAD: Status: ACTIVE | Noted: 2020-05-28

## 2020-05-28 PROBLEM — E11.620 TYPE 2 DIABETES MELLITUS WITH DIABETIC DERMATITIS, WITHOUT LONG-TERM CURRENT USE OF INSULIN (H): Status: ACTIVE | Noted: 2018-09-14

## 2020-05-28 LAB
ANION GAP SERPL CALCULATED.3IONS-SCNC: 1 MMOL/L (ref 3–14)
BACTERIA SPEC CULT: NO GROWTH
BACTERIA SPEC CULT: NO GROWTH
BUN SERPL-MCNC: 26 MG/DL (ref 7–30)
CALCIUM SERPL-MCNC: 8.9 MG/DL (ref 8.5–10.1)
CHLORIDE SERPL-SCNC: 100 MMOL/L (ref 94–109)
CO2 SERPL-SCNC: 32 MMOL/L (ref 20–32)
CREAT SERPL-MCNC: 1.08 MG/DL (ref 0.66–1.25)
ERYTHROCYTE [DISTWIDTH] IN BLOOD BY AUTOMATED COUNT: 13.4 % (ref 10–15)
GFR SERPL CREATININE-BSD FRML MDRD: 76 ML/MIN/{1.73_M2}
GLUCOSE BLDC GLUCOMTR-MCNC: 108 MG/DL (ref 70–99)
GLUCOSE BLDC GLUCOMTR-MCNC: 115 MG/DL (ref 70–99)
GLUCOSE BLDC GLUCOMTR-MCNC: 190 MG/DL (ref 70–99)
GLUCOSE SERPL-MCNC: 158 MG/DL (ref 70–99)
HCT VFR BLD AUTO: 26.3 % (ref 40–53)
HGB BLD-MCNC: 8.7 G/DL (ref 13.3–17.7)
INR PPP: 1.86 (ref 0.86–1.14)
INTERPRETATION ECG - MUSE: NORMAL
LMWH PPP CHRO-ACNC: 0.26 IU/ML
LMWH PPP CHRO-ACNC: <0.1 IU/ML
MAGNESIUM SERPL-MCNC: 2.3 MG/DL (ref 1.6–2.3)
MCH RBC QN AUTO: 31 PG (ref 26.5–33)
MCHC RBC AUTO-ENTMCNC: 33.1 G/DL (ref 31.5–36.5)
MCV RBC AUTO: 94 FL (ref 78–100)
PHOSPHATE SERPL-MCNC: 3.6 MG/DL (ref 2.5–4.5)
PLATELET # BLD AUTO: 319 10E9/L (ref 150–450)
POTASSIUM SERPL-SCNC: 4.4 MMOL/L (ref 3.4–5.3)
RBC # BLD AUTO: 2.81 10E12/L (ref 4.4–5.9)
SODIUM SERPL-SCNC: 133 MMOL/L (ref 133–144)
SPECIMEN SOURCE: NORMAL
SPECIMEN SOURCE: NORMAL
WBC # BLD AUTO: 11.8 10E9/L (ref 4–11)

## 2020-05-28 PROCEDURE — 85610 PROTHROMBIN TIME: CPT | Performed by: SURGERY

## 2020-05-28 PROCEDURE — 85520 HEPARIN ASSAY: CPT | Performed by: SURGERY

## 2020-05-28 PROCEDURE — 97530 THERAPEUTIC ACTIVITIES: CPT | Mod: GP | Performed by: PHYSICAL THERAPIST

## 2020-05-28 PROCEDURE — 25000132 ZZH RX MED GY IP 250 OP 250 PS 637: Performed by: PHYSICIAN ASSISTANT

## 2020-05-28 PROCEDURE — 83735 ASSAY OF MAGNESIUM: CPT | Performed by: PHYSICIAN ASSISTANT

## 2020-05-28 PROCEDURE — 84100 ASSAY OF PHOSPHORUS: CPT | Performed by: PHYSICIAN ASSISTANT

## 2020-05-28 PROCEDURE — 97110 THERAPEUTIC EXERCISES: CPT | Mod: GP | Performed by: PHYSICAL THERAPIST

## 2020-05-28 PROCEDURE — 36415 COLL VENOUS BLD VENIPUNCTURE: CPT | Performed by: SURGERY

## 2020-05-28 PROCEDURE — 25000132 ZZH RX MED GY IP 250 OP 250 PS 637: Performed by: INTERNAL MEDICINE

## 2020-05-28 PROCEDURE — 00000146 ZZHCL STATISTIC GLUCOSE BY METER IP

## 2020-05-28 PROCEDURE — 85027 COMPLETE CBC AUTOMATED: CPT | Performed by: PHYSICIAN ASSISTANT

## 2020-05-28 PROCEDURE — 36415 COLL VENOUS BLD VENIPUNCTURE: CPT | Performed by: PHYSICIAN ASSISTANT

## 2020-05-28 PROCEDURE — 99232 SBSQ HOSP IP/OBS MODERATE 35: CPT | Performed by: INTERNAL MEDICINE

## 2020-05-28 PROCEDURE — 25000128 H RX IP 250 OP 636: Performed by: SURGERY

## 2020-05-28 PROCEDURE — 80048 BASIC METABOLIC PNL TOTAL CA: CPT | Performed by: PHYSICIAN ASSISTANT

## 2020-05-28 PROCEDURE — 25000132 ZZH RX MED GY IP 250 OP 250 PS 637: Performed by: DENTIST

## 2020-05-28 PROCEDURE — 25000131 ZZH RX MED GY IP 250 OP 636 PS 637: Performed by: PHYSICIAN ASSISTANT

## 2020-05-28 RX ORDER — FUROSEMIDE 40 MG
40 TABLET ORAL DAILY
Qty: 5 TABLET | Refills: 0 | Status: SHIPPED | OUTPATIENT
Start: 2020-05-28 | End: 2020-08-21

## 2020-05-28 RX ORDER — AMIODARONE HYDROCHLORIDE 400 MG/1
400 TABLET ORAL 2 TIMES DAILY
Qty: 8 TABLET | Refills: 0 | Status: SHIPPED | OUTPATIENT
Start: 2020-05-28 | End: 2020-06-01 | Stop reason: DRUGHIGH

## 2020-05-28 RX ORDER — AMIODARONE HYDROCHLORIDE 200 MG/1
200 TABLET ORAL DAILY
Qty: 30 TABLET | Refills: 0 | Status: SHIPPED | OUTPATIENT
Start: 2020-06-10 | End: 2020-08-27

## 2020-05-28 RX ORDER — POTASSIUM CHLORIDE 1500 MG/1
20 TABLET, EXTENDED RELEASE ORAL DAILY
Qty: 5 TABLET | Refills: 0 | Status: SHIPPED | OUTPATIENT
Start: 2020-05-28 | End: 2020-06-15

## 2020-05-28 RX ORDER — CHLORHEXIDINE GLUCONATE ORAL RINSE 1.2 MG/ML
15 SOLUTION DENTAL 2 TIMES DAILY
Status: DISCONTINUED | OUTPATIENT
Start: 2020-05-28 | End: 2020-05-28 | Stop reason: HOSPADM

## 2020-05-28 RX ORDER — AMIODARONE HYDROCHLORIDE 200 MG/1
200 TABLET ORAL 2 TIMES DAILY
Qty: 14 TABLET | Refills: 0 | Status: SHIPPED | OUTPATIENT
Start: 2020-06-01 | End: 2020-05-28

## 2020-05-28 RX ORDER — AMOXICILLIN 250 MG
1 CAPSULE ORAL 2 TIMES DAILY PRN
Qty: 30 TABLET | Refills: 0 | Status: SHIPPED | OUTPATIENT
Start: 2020-05-28 | End: 2020-06-15

## 2020-05-28 RX ORDER — OXYCODONE HYDROCHLORIDE 5 MG/1
5 TABLET ORAL EVERY 4 HOURS PRN
Qty: 60 TABLET | Refills: 0 | Status: SHIPPED | OUTPATIENT
Start: 2020-05-28 | End: 2020-05-28

## 2020-05-28 RX ORDER — ACETAMINOPHEN 325 MG/1
650 TABLET ORAL EVERY 6 HOURS PRN
Qty: 1 BOTTLE | Refills: 0 | Status: SHIPPED | OUTPATIENT
Start: 2020-05-28

## 2020-05-28 RX ORDER — METOPROLOL TARTRATE 25 MG/1
12.5 TABLET, FILM COATED ORAL 2 TIMES DAILY
Qty: 30 TABLET | Refills: 3 | Status: SHIPPED | OUTPATIENT
Start: 2020-05-28 | End: 2020-07-16

## 2020-05-28 RX ORDER — AMIODARONE HYDROCHLORIDE 200 MG/1
200 TABLET ORAL DAILY
Qty: 30 TABLET | Refills: 0 | Status: SHIPPED | OUTPATIENT
Start: 2020-06-09 | End: 2020-05-28

## 2020-05-28 RX ORDER — WARFARIN SODIUM 5 MG/1
5 TABLET ORAL DAILY
Qty: 30 TABLET | Refills: 3 | Status: SHIPPED | OUTPATIENT
Start: 2020-05-28 | End: 2020-07-16

## 2020-05-28 RX ORDER — OXYCODONE HYDROCHLORIDE 5 MG/1
5 TABLET ORAL EVERY 4 HOURS PRN
Qty: 60 TABLET | Refills: 0 | Status: SHIPPED | OUTPATIENT
Start: 2020-05-28 | End: 2020-06-03

## 2020-05-28 RX ORDER — WARFARIN SODIUM 5 MG/1
5 TABLET ORAL
Status: DISCONTINUED | OUTPATIENT
Start: 2020-05-28 | End: 2020-05-28 | Stop reason: HOSPADM

## 2020-05-28 RX ORDER — POLYETHYLENE GLYCOL 3350 17 G/17G
17 POWDER, FOR SOLUTION ORAL DAILY PRN
Qty: 14 PACKET | Refills: 0 | Status: SHIPPED | OUTPATIENT
Start: 2020-05-28 | End: 2020-06-15

## 2020-05-28 RX ORDER — PANTOPRAZOLE SODIUM 40 MG/1
40 TABLET, DELAYED RELEASE ORAL
Qty: 14 TABLET | Refills: 0 | Status: SHIPPED | OUTPATIENT
Start: 2020-05-29 | End: 2020-06-15

## 2020-05-28 RX ORDER — AMIODARONE HYDROCHLORIDE 200 MG/1
200 TABLET ORAL 2 TIMES DAILY
Qty: 14 TABLET | Refills: 0 | Status: SHIPPED | OUTPATIENT
Start: 2020-06-02 | End: 2020-06-01 | Stop reason: DRUGHIGH

## 2020-05-28 RX ORDER — METHOCARBAMOL 500 MG/1
500 TABLET, FILM COATED ORAL 4 TIMES DAILY PRN
Qty: 60 TABLET | Refills: 0 | Status: SHIPPED | OUTPATIENT
Start: 2020-05-28 | End: 2020-08-27

## 2020-05-28 RX ADMIN — INSULIN GLARGINE 35 UNITS: 100 INJECTION, SOLUTION SUBCUTANEOUS at 08:29

## 2020-05-28 RX ADMIN — ATORVASTATIN CALCIUM 40 MG: 40 TABLET, FILM COATED ORAL at 08:16

## 2020-05-28 RX ADMIN — AMIODARONE HYDROCHLORIDE 400 MG: 200 TABLET ORAL at 08:16

## 2020-05-28 RX ADMIN — OXYCODONE HYDROCHLORIDE 10 MG: 5 TABLET ORAL at 02:00

## 2020-05-28 RX ADMIN — OXYCODONE HYDROCHLORIDE 10 MG: 5 TABLET ORAL at 15:40

## 2020-05-28 RX ADMIN — POLYETHYLENE GLYCOL 3350 17 G: 17 POWDER, FOR SOLUTION ORAL at 08:16

## 2020-05-28 RX ADMIN — HEPARIN SODIUM 1450 UNITS/HR: 10000 INJECTION, SOLUTION INTRAVENOUS at 06:17

## 2020-05-28 RX ADMIN — CHLORHEXIDINE GLUCONATE 15 ML: 1.2 RINSE ORAL at 12:35

## 2020-05-28 RX ADMIN — PANTOPRAZOLE SODIUM 40 MG: 40 TABLET, DELAYED RELEASE ORAL at 06:27

## 2020-05-28 RX ADMIN — ASPIRIN 81 MG: 81 TABLET, DELAYED RELEASE ORAL at 08:16

## 2020-05-28 RX ADMIN — ACETAMINOPHEN 650 MG: 325 TABLET, FILM COATED ORAL at 15:39

## 2020-05-28 RX ADMIN — OXYCODONE HYDROCHLORIDE 10 MG: 5 TABLET ORAL at 08:29

## 2020-05-28 RX ADMIN — METOPROLOL TARTRATE 12.5 MG: 25 TABLET, FILM COATED ORAL at 08:16

## 2020-05-28 RX ADMIN — ACETAMINOPHEN 650 MG: 325 TABLET, FILM COATED ORAL at 08:29

## 2020-05-28 RX ADMIN — SENNOSIDES AND DOCUSATE SODIUM 2 TABLET: 8.6; 5 TABLET ORAL at 08:16

## 2020-05-28 ASSESSMENT — MIFFLIN-ST. JEOR: SCORE: 1896.25

## 2020-05-28 ASSESSMENT — ACTIVITIES OF DAILY LIVING (ADL)
ADLS_ACUITY_SCORE: 13

## 2020-05-28 NOTE — PLAN OF CARE
Alert and oriented x4. Vital signs stable on room air. Up independently. Tolerating mechanical soft/mod carb diet. Lung sounds clearr. Bowel sounds active, + flatus, BM yesterday. Adequate urine output. Sternal, left groin, and left knee incisions ADELITA, ecchymotic. Trace edema to BLE. Pain managed with PRN tylenol and oxycodone. Denies nausea. Tele sinus rhythm with a right BBB. Discharge teaching with patient and wife complete, questions answered, and medications reviewed. Discharging to home via transportation from wife.

## 2020-05-28 NOTE — PLAN OF CARE
Discharge Planner PT   Patient plan for discharge: Home with spouse and Phase II OP CR     Current status: IND with mobility, recalls precautions and demonstrates understanding with mobility. Tolerated 13.5 minutes on the TM at 0.8mph and up/down 5 steps, OMNI 6/10, denies adverse symptoms. HR briefly up to 153 once and 121 once (?tele as frequent noisy signal) but primarily 90s-100s, pt asymptomatic. Reviewed safe return to activities, personal CV risk factors, and HEP.     Barriers to return to prior living situation: None based on mobility.     Recommendations for discharge: Home with Phase II OP CR     Rationale for recommendations: Pt will benefit from Phase II OP CR to safely progress functional activity tolerance with monitored exercise and continue cardiac health education.       Entered by: Mariluz Marquez 05/28/2020 9:46 AM       Cardiac Rehab Discharge Summary    Reason for therapy discharge:    Discharged to home with Phase II OP CR    Progress towards therapy goal(s). See goals on Care Plan in [facility] electronic health record for goal details.  Goals partially met.  Barriers to achieving goals:   discharge from facility.    Therapy recommendation(s):    Continued therapy is recommended.  Rationale/Recommendations:  Pt will benefit from Phase II OP CR to safely progress functional activity tolerance with monitored exercise and continue cardiac health education..

## 2020-05-28 NOTE — DISCHARGE SUMMARY
"Discharge Summary    Benjamín Us MRN# [id number]   YOB: 1965 Age: 54 year old     Date of Admission:  5/21/2020  Date of Discharge:  5/28/2020  Admitting Physician:  Aimee Rose MD  Discharge Physician:  Aimee Rose,*  Discharging Service:  Cardiovascular and Thoracic Surgery     Home clinic:   [address] [place] [address]      Primary Phone: [phone] Primary Fax: [phone]       Primary Provider: Axel Roberson          Admission Diagnoses:   Dental caries limited to enamel [K02.9]  Abscessed tooth [K04.7]          Discharge Diagnosis:   Patient Active Problem List   Diagnosis     Hypertensive urgency     CARDIOVASCULAR SCREENING; LDL GOAL LESS THAN 160     Aortic valve disorder     Aneurysm of thoracic aorta (H)     Dermatitis     Essential hypertension, benign     Type 2 diabetes mellitus with diabetic dermatitis, without long-term current use of insulin (H)     Aortic valve stenosis, etiology of cardiac valve disease unspecified     Obstructive sleep apnea syndrome     Tobacco abuse     CAD (coronary artery disease)     Aortic stenosis     Aortic insufficiency with aortic stenosis     Aortic valve disease     S/P CABG (coronary artery bypass graft)     Anemia due to blood loss, acute     Fluid overload                Discharge Disposition:   Discharged to home           Condition on Discharge:   Discharge condition: Stable   Discharge vitals: Blood pressure 113/72, pulse 78, temperature 99.7  F (37.6  C), temperature source Oral, resp. rate 15, height 1.778 m (5' 10\"), weight 106 kg (233 lb 11 oz), SpO2 98 %.     Code status on discharge: Full Code           Procedures:   On 5/22/2020, Benjamín Us underwent successful Aortic valve replacement with a 23 mm [employer] [place] mechanical valve, coronary artery bypass grafting x 1 with left internal mammary artery to the left anterior descending, endoscopic vein harvest from the left lower extremity, " intraoperative LEONA by Dr. Aimee Rose          Medications Prior to Admission:     Medications Prior to Admission   Medication Sig Dispense Refill Last Dose     amphetamine-dextroamphetamine (ADDERALL) 20 MG tablet 20 mg 3 times daily    5/21/2020 at 0800     atorvastatin (LIPITOR) 40 MG tablet Take 1 tablet (40 mg) by mouth daily 90 tablet 1 5/20/2020 at 1600     varenicline (CHANTIX) 1 MG tablet Take 1 mg by mouth 2 times daily   5/20/2020 at Unknown time     [DISCONTINUED] aspirin (ASPIRIN) 81 MG EC tablet Take 81 mg by mouth 2 times daily   5/20/2020 at 0800     [DISCONTINUED] carvedilol (COREG) 25 MG tablet Take 1 tablet (25 mg) by mouth 2 times daily (with meals) 180 tablet 3 5/20/2020 at 1600     [DISCONTINUED] lisinopril (PRINIVIL/ZESTRIL) 40 MG tablet Take 1 tablet (40 mg) by mouth daily 90 tablet 3 5/21/2020 at 0600             Discharge Medications:     Current Discharge Medication List      START taking these medications    Details   acetaminophen (TYLENOL) 325 MG tablet Take 2 tablets (650 mg) by mouth every 6 hours as needed for mild pain or fever  Qty: 1 Bottle, Refills: 0    Associated Diagnoses: S/P CABG (coronary artery bypass graft)      alcohol swab prep pads Use to swab area of injection/víctor as directed.  Qty: 100 each, Refills: 3    Associated Diagnoses: Type 2 diabetes mellitus with other specified complication, without long-term current use of insulin (H)      !! amiodarone (PACERONE) 200 MG tablet Take 1 tablet (200 mg) by mouth 2 times daily for 7 days  Qty: 14 tablet, Refills: 0    Associated Diagnoses: S/P CABG (coronary artery bypass graft)      !! amiodarone (PACERONE) 200 MG tablet Take 1 tablet (200 mg) by mouth daily  Qty: 30 tablet, Refills: 0    Associated Diagnoses: S/P CABG (coronary artery bypass graft)      !! amiodarone (PACERONE) 400 MG tablet Take 1 tablet (400 mg) by mouth 2 times daily for 4 days  Qty: 8 tablet, Refills: 0    Comments: Continue 400mg BID for 7 days  then decrease to 200mg BID, then decrease to 200mg daily until follow up with Dr. Villanueva  Associated Diagnoses: S/P CABG (coronary artery bypass graft)      blood glucose (NO BRAND SPECIFIED) test strip Use to test blood sugar 4 times daily or as directed.  Qty: 100 strip, Refills: 6    Comments: To accompany: glucometer per insurance.  Associated Diagnoses: Type 2 diabetes mellitus with other specified complication, without long-term current use of insulin (H)      blood glucose calibration (NO BRAND SPECIFIED) solution Use to calibrate blood glucose monitor as needed as directed.  Qty: 1 Bottle, Refills: 3    Comments: To accompany: Glucometer per insurance.  Associated Diagnoses: Type 2 diabetes mellitus with other specified complication, without long-term current use of insulin (H)      blood glucose monitoring (NO BRAND SPECIFIED) meter device kit Use to test blood sugar 4 times daily or as directed.  Qty: 1 kit, Refills: 0    Comments: Preferred blood glucose meter OR supplies to accompany: glucometer per insurance  Associated Diagnoses: Type 2 diabetes mellitus with other specified complication, without long-term current use of insulin (H)      furosemide (LASIX) 40 MG tablet Take 1 tablet (40 mg) by mouth daily for 5 days  Qty: 5 tablet, Refills: 0    Associated Diagnoses: S/P CABG (coronary artery bypass graft)      insulin aspart (NOVOLOG PEN) 100 UNIT/ML pen Inject 1 unit per 15 grams of carbohydrates with breakfast, lunch and dinner.  Qty: 12 mL, Refills: 0    Comments: Pharmacy: Please add any necessary supplies (needles, etc) to order.  Associated Diagnoses: Type 2 diabetes mellitus with other specified complication, without long-term current use of insulin (H)      insulin glargine (LANTUS PEN) 100 UNIT/ML pen Inject 30 Units Subcutaneous every morning (before breakfast)  Qty: 9 mL, Refills: 0    Comments: If Lantus is not covered by insurance, may substitute Basaglar at same dose and frequency.   Pharmacy: Please add any necessary supplies (needles, etc) to order.  Associated Diagnoses: Type 2 diabetes mellitus with other specified complication, without long-term current use of insulin (H)      metFORMIN (GLUCOPHAGE) 500 MG tablet Take 500mg twice daily for 1 week, then 500mg in AM and 1000mg in PM for 1 week, then 1000mg twice daily.  Qty: 100 tablet, Refills: 0    Associated Diagnoses: Type 2 diabetes mellitus with other specified complication, without long-term current use of insulin (H)      methocarbamol (ROBAXIN) 500 MG tablet Take 1 tablet (500 mg) by mouth 4 times daily as needed for muscle spasms  Qty: 60 tablet, Refills: 0    Associated Diagnoses: S/P CABG (coronary artery bypass graft)      metoprolol tartrate (LOPRESSOR) 25 MG tablet Take 0.5 tablets (12.5 mg) by mouth 2 times daily  Qty: 30 tablet, Refills: 3    Associated Diagnoses: S/P CABG (coronary artery bypass graft)      oxyCODONE (ROXICODONE) 5 MG tablet Take 1 tablet (5 mg) by mouth every 4 hours as needed for moderate to severe pain  Qty: 60 tablet, Refills: 0    Associated Diagnoses: S/P CABG (coronary artery bypass graft)      pantoprazole (PROTONIX) 40 MG EC tablet Take 1 tablet (40 mg) by mouth every morning (before breakfast) for 14 days  Qty: 14 tablet, Refills: 0    Associated Diagnoses: S/P CABG (coronary artery bypass graft)      polyethylene glycol (MIRALAX) 17 g packet Take 17 g by mouth daily as needed for constipation  Qty: 14 packet, Refills: 0    Associated Diagnoses: S/P CABG (coronary artery bypass graft)      potassium chloride ER (KLOR-CON M) 20 MEQ CR tablet Take 1 tablet (20 mEq) by mouth daily for 5 days  Qty: 5 tablet, Refills: 0    Associated Diagnoses: S/P CABG (coronary artery bypass graft)      senna-docusate (SENOKOT-S/PERICOLACE) 8.6-50 MG tablet Take 1 tablet by mouth 2 times daily as needed for constipation  Qty: 30 tablet, Refills: 0    Associated Diagnoses: S/P CABG (coronary artery bypass graft)       thin (NO BRAND SPECIFIED) lancets Use with lanceting device.  Qty: 100 each, Refills: 3    Comments: To accompany: per insurance.  Associated Diagnoses: Type 2 diabetes mellitus with other specified complication, without long-term current use of insulin (H)      warfarin ANTICOAGULANT (COUMADIN) 5 MG tablet Take 1 tablet (5 mg) by mouth daily  Qty: 30 tablet, Refills: 3    Associated Diagnoses: S/P CABG (coronary artery bypass graft)       !! - Potential duplicate medications found. Please discuss with provider.      CONTINUE these medications which have CHANGED    Details   aspirin (ASPIRIN) 81 MG EC tablet Take 1 tablet (81 mg) by mouth daily    Associated Diagnoses: Type 2 diabetes mellitus with other specified complication, without long-term current use of insulin (H)         CONTINUE these medications which have NOT CHANGED    Details   amphetamine-dextroamphetamine (ADDERALL) 20 MG tablet 20 mg 3 times daily       atorvastatin (LIPITOR) 40 MG tablet Take 1 tablet (40 mg) by mouth daily  Qty: 90 tablet, Refills: 1    Associated Diagnoses: Coronary artery disease involving native coronary artery of native heart without angina pectoris      varenicline (CHANTIX) 1 MG tablet Take 1 mg by mouth 2 times daily         STOP taking these medications       carvedilol (COREG) 25 MG tablet Comments:   Reason for Stopping:         lisinopril (PRINIVIL/ZESTRIL) 40 MG tablet Comments:   Reason for Stopping:                     Consultations:   Nutrition, Intensivist, Hospitalist, cardiology, oral surgery             Brief History of Illness:   Mr. Us is a very pleasant 54-year-old gentleman who was recently found to have severe symptomatic aortic stenosis with a mean gradient of 50 mmHg.  He was also found to have severe single-vessel coronary artery disease involving the LAD and the second diagonal artery.  He was taken to the operating room today for aortic valve replacement with a mechanical valve and concomitant  coronary artery bypass grafting.           Hospital Course:   On 5/22/2020, Benjamín Us underwent successful Aortic valve replacement with a 23 mm [employer]. Gar[employer] [place] mechanical valve, coronary artery bypass grafting x 1 with left internal mammary artery to the left anterior descending, endoscopic vein harvest from the left lower extremity, intraoperative LEONA by Dr. Aimee Rose    He was extubated within 24 hours per protocol and once weaned from hemodynamic stabilizing infusions, he was transferred to the surgical telemetry floor.     He was fluid overloaded and treated with IV and oral diuretics. He is about 5kg above his preoperative weight at discharge and will discharge with 5 day regimen of lasix and potassium. Patient and wife instructed to obtain weight everyday and assess.    He was newly diagnosed this admission with DMII with hemoglobin A1c: 12.2. He was treated with insulin infusion then transitioned to insulin regimen per hospitalist. At discharge, hospitalist has ordered patient to initiate metformin and insulin. Patient and wife encouraged to have follow up with PCP and this has been set up next week.     He has a mechanical aortic valve and will require coumadin lifelong with an INR goal 2-3. At discharge his INR is 1.86 and is deemed safe for him to disconitnue heparin gtt and discharge. His first INR follow up is arranged for tomorrow 5/29. We have asked that the anticoagulation team notify the cardiac surgery team of any subtherapeutic levels less than 1.6.     He had a brief run of atrial flutter on POD#4 and converted spontaneously. He was evaluated by cardiology and started on amiodarone taper.     He has been working well with therapies and is stable for discharge to home with the help of his wife. He has coronary artery disease and will discharge on ASA, BB, statin per protocol. Follow up with INR clinic, PCP, cardiology, electrophysiology and cardiac surgery have all been arranged. Patient  is encouraged to follow up with cardiac rehab within 5 days of discharge.                  Significant Results:   Lab Results   Component Value Date    WBC 11.8 05/28/2020     Lab Results   Component Value Date    RBC 2.81 05/28/2020     Lab Results   Component Value Date    HGB 8.7 05/28/2020     Lab Results   Component Value Date    HCT 26.3 05/28/2020     No components found for: MCT  Lab Results   Component Value Date    MCV 94 05/28/2020     Lab Results   Component Value Date    MCH 31.0 05/28/2020     Lab Results   Component Value Date    MCHC 33.1 05/28/2020     Lab Results   Component Value Date    RDW 13.4 05/28/2020     Lab Results   Component Value Date     05/28/2020       Last Basic Metabolic Panel:  Lab Results   Component Value Date     05/28/2020      Lab Results   Component Value Date    POTASSIUM 4.4 05/28/2020     Lab Results   Component Value Date    CHLORIDE 100 05/28/2020     Lab Results   Component Value Date    ARMIN 8.9 05/28/2020     Lab Results   Component Value Date    CO2 32 05/28/2020     Lab Results   Component Value Date    BUN 26 05/28/2020     Lab Results   Component Value Date    CR 1.08 05/28/2020     Lab Results   Component Value Date     05/28/2020                  Pending Results:   None           Discharge Instructions and Follow-Up:   Discharge diet: Regular   Discharge activity: Daily weights: Call if weight gain 2-3 lbs over 24 hours or if greater than 5 lbs in 1 week.  No lifting more than 10 lbs for 8-12 weeks.  No driving for 1 month.  Call for pain medication refill.  Call for temperature greater than 101.0  Daily shower with antibacterial soap.   Discharge follow-up: Follow-up and recommended labs and tests      *Follow up primary care provider in 7-10 days after discharge in order to review your medication, vital signs, obtain any necessary lab work your doctor may want, and to update them on your hospitalization and medical issues.   *Follow up with  Denise/Mag Cuellar with Dr Rose, heart surgeon, on 6/15/2020 at 1:30pm, this will be a telephone visit unless you need to be seen for incision issues. If you need to be seen, you will be seen at [facility] at [facility] [address]. If any questions or concerns call [phone].  You will see us once at this visit and then if everything is going well you will not need to see us again.  You will follow long term with your cardiologist.   *Follow up with Dr. Villanueva with the electrophysiology team for heart rhythm issues on 6/26/2020 at 1:45pm   *Follow up with Dr Downs, cardiologist, on 8/5/2020 at 10:45am. This is who you will follow with long term about your heart issues. [phone].   *Please schedule outpatient cardiac rehab within 5 days of discharge from TCU, call [phone].    Follow-up and recommended labs and tests     Follow up with primary care provider in 1 week for review of blood sugars and adjustment to insulin regimen.            Outpatient therapy: Cardiac rehab   Home Care agency: None    Supplies and equipment: None   Lines and drains: None    Wound care: Wash incision daily with antibacterial soap   Other instructions: None

## 2020-05-28 NOTE — TELEPHONE ENCOUNTER
Solange also called directly to INR clinic, uncertain how she got internal INR office #.   Lab INR has been scheduled for 05/29 and INR referral placed by hospital.   I placed a corrected INR lab order and will send INR referral to PCP under separate encounter.    Linsey Barrios RN

## 2020-05-28 NOTE — PROGRESS NOTES
[facility]    Medicine Progress Note - Hospitalist Service       Date of Admission:  5/21/2020  Assessment & Plan   Benjamín Us is a 54 year old male who was admitted on 5/21/2020 post tooth extraction (pre-op management for cardiac procedure) for planned AVR and CABG on 5/22     Severe aortic stenosis s/p mechanical AVR   Coronary artery disease s/p 1v CABG LIMA to LAD   Hyperlipidemia   Surgery performed as scheduled 5/22/20.  ml.   - Routine post-operative cares per CV surgery service   - Continue aspirin, warfarin, atorvastatin     Atrial flutter, post-operative  Noted to have HR up to 150s appearing like atrial flutter  - Cardiology consulted  - Amiodarone load started with metoprolol dose decreased   - 14 day Ziopatch on discharge and one month follow up with EP recommended by cardiology      Hypoxemia, resolved  Suspected secondary to atelectasis.  - Continue pulmonary toilet  - On room air     Post-op fever  Afebrile since 5/24 AM.   - Blood cultures NGTD  - Monitor fever curve      Dental extraction prior to CABG/AVR  5/21/2020 dental extraction for carious dentition for pre-operative management for planned AVR/CABG 5/2020. Sergicel placed in all extraction sites and patient hemostatic at end of procedure. EBL<50ml  Recommendations per OMFS:  - Have patient bite on 4x4 gauze if excessive oozing  - Peridex rinses 15cc swish and spit BID  - Mechanical soft diet     Hypertension  - Blood pressures well controlled  - Continue metoprolol as above      Diabetes mellitus, type 2, uncontrolled    Stress hyperglycemia   HgbA1c 12.2% 5/2020. Prior to admission attempting to control with diet and exercise.  - Discharge orders entered for glargine 30 units in AM, aspart 1 unit per 15 g carbohydrates with meals  - Metformin ordered for discharge   - Glucometer ordered in anticipation of discharge  - Discussed monitoring and recording blood sugars and following up with primary care provider for  ongoing adjustments      HARIS  - Does not use home CPAP  - Follow up with outpatient sleep recommended     Nicotine Dependence  Patient reports he quit 3/2020.    Diet: Combination Diet [id number] Calories: Moderate Consistent CHO (4-6 CHO units/meal); Mechanical/Dental Soft Diet  Snacks/Supplements Adult: Other; chocolate Boost Glucose Control at 10am and 2pm  (RD); Between Meals  Diet    DVT Prophylaxis: Warfarin   Bernal Catheter: not present  Code Status: Full Code      Disposition Plan   Expected discharge: Anticipate discharge today per primary service. Diabetic orders and follow-up entered for discharge as above  Entered: Issac Sears MD 05/28/2020, 12:05 PM       The patient's care was discussed with the Bedside Nurse and Patient.     Issac Sears MD  Hospitalist Service  [facility]    ______________________________________________________________________    Interval History   No acute events overnight. Denies any chest pain or shortness of breath. Feels ready for discharge.     Data reviewed today: I reviewed all medications, new labs and imaging results over the last 24 hours. I personally reviewed no images or EKG's today.    Physical Exam   Vital Signs: Temp: 97.9  F (36.6  C) Temp src: Oral BP: 104/63 Pulse: 77 Heart Rate: 76 Resp: 15 SpO2: 94 % O2 Device: None (Room air)    Weight: 233 lbs 11 oz    Constitutional: Well-appearing, NAD  Respiratory: Clear to auscultation bilaterally, good air movement bilaterally  Cardiovascular: RRR, II/VI systolic murmur with valve click. Bilateral lower extremity edema.   GI: Soft, non-tender, non-distended.    Skin/Integumen: Warm, dry  Other:       Data   Recent Labs   Lab 05/28/20  0420 05/27/20  1240 05/27/20  0651 05/26/20  0840  05/23/20  0415 05/22/20  1527   WBC 11.8* 13.0* 11.2* 10.0   < > 10.8 20.1*   HGB 8.7* 8.6* 8.2* 8.4*   < > 7.8* 10.1*   MCV 94 94 93 91   < > 93 91    302 245 213   < > 148* 214   INR 1.86*  --  1.72* 1.45*    < >  --  1.24*     --  134 134   < > 137 138   POTASSIUM 4.4  --  3.8 4.0   < > 4.2 4.7   CHLORIDE 100  --  100 103   < > 107 107   CO2 32  --  29 25   < > 26 23   BUN 26  --  23 26   < > 18 19   CR 1.08  --  0.99 0.87   < > 0.91 0.94   ANIONGAP 1*  --  5 6   < > 4 8   ARMIN 8.9  --  8.7 8.6   < > 8.0* 8.2*   *  --  102* 246*   < > 144* 219*   ALBUMIN  --   --   --   --   --  3.7 3.1*   PROTTOTAL  --   --   --   --   --  6.0* 6.0*   BILITOTAL  --   --   --   --   --  0.9 0.6   ALKPHOS  --   --   --   --   --  50 74   ALT  --   --   --   --   --  19 23   AST  --   --   --   --   --  46* 45    < > = values in this interval not displayed.       No results found for this or any previous visit (from the past 24 hour(s)).  Medications     dextrose       HEParin 1,450 Units/hr (05/28/20 0617)     - MEDICATION INSTRUCTIONS -       Warfarin Therapy Reminder         amiodarone  400 mg Oral BID     aspirin  81 mg Oral Daily     atorvastatin  40 mg Oral Daily     chlorhexidine  15 mL Swish & Spit BID     insulin aspart  1-10 Units Subcutaneous TID AC     insulin aspart  1-7 Units Subcutaneous At Bedtime     insulin aspart   Subcutaneous TID AC     insulin glargine  35 Units Subcutaneous QAM AC     lidocaine  3 patch Transdermal Q24H     lidocaine   Transdermal Q8H     menthol   Transdermal Q8H     metoprolol tartrate  12.5 mg Oral BID     pantoprazole  40 mg Oral QAM AC     polyethylene glycol  17 g Oral Daily     senna-docusate  1 tablet Oral BID    Or     senna-docusate  2 tablet Oral BID     sodium chloride (PF)  3 mL Intracatheter Q8H     warfarin ANTICOAGULANT  5 mg Oral ONCE at 18:00

## 2020-05-28 NOTE — TELEPHONE ENCOUNTER
Has the patient previously taken warfarin? no  If yes, for what indication? Started Warfarin on 05/23/20 for Mechanical AVR. Cardiology would like PCP's office to follow INR.    Does the patient have any of the following indications for a higher range of 2.5-3.5:    Mitral position mechanical valve? no    Carline-Shiley, Ball and Cage or Monoleaflet valve (regardless of position) no    Other (if yes, please explain) no      Thank you,  Linsey Barrios RN

## 2020-05-28 NOTE — PLAN OF CARE
Neuro: Aox4.     Respiratory: RA. Clear LS.     Cardiac:  Audible valve click. BLE trace edema.     GI/: Bowel sounds hypoactive x4 quads. BM 05/27. Adequate urine OP.     Skin: Generalized dusky, pale.     Pain: Attributed to oral incisional pain; refer to MAR, FS.     Mobility: Ind.     Diet: DM; mech soft.    IVF: Heparin.    Plan of Care: Monitor INR, Xa. Bleed risk.    Low BG @ HS; elevated post juice and ice creme intake.      Will continue to monitor.

## 2020-05-28 NOTE — PROGRESS NOTES
CV Surgery    S: No acute events overnight. Saturating well on room air. Pain controlled. Tolerating diet. +BM    O: B/P: 113/72, T: 99.7, P: 78, R: 15  General: NAD  Heart: RRR normal s1 and s2  Lungs: diminished bases  Abd: soft NTND  Sternum:CDI  Extremities: minimal edema    Lab Results   Component Value Date    WBC 11.8 05/28/2020     Lab Results   Component Value Date    RBC 2.81 05/28/2020     Lab Results   Component Value Date    HGB 8.7 05/28/2020     Lab Results   Component Value Date    HCT 26.3 05/28/2020     No components found for: MCT  Lab Results   Component Value Date    MCV 94 05/28/2020     Lab Results   Component Value Date    MCH 31.0 05/28/2020     Lab Results   Component Value Date    MCHC 33.1 05/28/2020     Lab Results   Component Value Date    RDW 13.4 05/28/2020     Lab Results   Component Value Date     05/28/2020       Last Basic Metabolic Panel:  Lab Results   Component Value Date     05/28/2020      Lab Results   Component Value Date    POTASSIUM 4.4 05/28/2020     Lab Results   Component Value Date    CHLORIDE 100 05/28/2020     Lab Results   Component Value Date    ARMIN 8.9 05/28/2020     Lab Results   Component Value Date    CO2 32 05/28/2020     Lab Results   Component Value Date    BUN 26 05/28/2020     Lab Results   Component Value Date    CR 1.08 05/28/2020     Lab Results   Component Value Date     05/28/2020       A/P: POD#6 s/p Aortic valve replacement with a 23 mm [place] mechanical valve, coronary artery bypass grafting x 1 with left internal mammary artery to the left anterior descending, endoscopic vein harvest from the left lower extremity, intraoperative LEONA by Dr. Aimee Rose    Stable for discharge to home today  Follow up with INR clinic being arranged.    Mag Richards PA-C  Pager [phone]

## 2020-05-28 NOTE — PROGRESS NOTES
Care Coordination:    Currently working on scheduled followups including INR  Updated AVS:    INR:  May 29, 2020 11:15 AM CDT   LAB with CR LAB   [facility] ([phone] )   [id number] [id number]     Primary Care:  Jun 02, 2020  1:30 PM CDT   Video Visit with Axel Roberson MD   [facility] ([phone] )       Pt plans to work on changing PCP's in the future but agrees with the appointment above.    Solange Vigil RN, BSN, PHN  [facility] Care Coordination  [facility]   Mobile: [phone]

## 2020-05-28 NOTE — TELEPHONE ENCOUNTER
ANTICOAGULATION  MANAGEMENT: Discharge Review    Benjamín Us chart reviewed for anticoagulation continuity of care    Hospital Admission on 05/21-28 for Mechanical AVR.    Discharge disposition: Home    Results:    Recent labs: (last 7 days)     05/28/20  0420 05/28/20  1133   INR 1.86*  --    AXA <0.10 0.26     Anticoagulation inpatient management:     New start, started Warfarin on 05/23/20    Anticoagulation discharge instructions:     Warfarin dosing: Next INR 05/29/20   Bridging: No   INR goal change: No      Medication changes affecting anticoagulation: Yes: AMIODARONE, Coreg and Lisinopril have been discontinued.    Additional factors affecting anticoagulation: Yes: New start on Warfarin, recent cardiac surgery    Plan     Agree with dosing adjustment on discharge    Patient not contacted, I will call patient on 05/29 after his INR result.  Cardiology prefers PCP office manages INR.    Anticoagulation Calendar updated    Linsey Barrios RN

## 2020-05-28 NOTE — TELEPHONE ENCOUNTER
Solange, Care Coordinator [facility] calling to schedule INR and hospital follow-up.  Solange is putting INR order in.  Checking on who is to get result and address result.  she will update on that information when she gets answer.  Scheduled INR for 5/29/20 11:15 am.  Scheduled video visit with Dr. Roberson 6/2/20 1:30 pm.  He is being discharged today.  New afib.  On  Coumadin.  Routed to INR pool and Dr. Roberson.  Please advise if should be in person.  Mary Riddle RN

## 2020-05-29 ENCOUNTER — TELEPHONE (OUTPATIENT)
Dept: FAMILY MEDICINE | Facility: CLINIC | Age: 55
End: 2020-05-29

## 2020-05-29 ENCOUNTER — ANTICOAGULATION THERAPY VISIT (OUTPATIENT)
Dept: NURSING | Facility: CLINIC | Age: 55
End: 2020-05-29

## 2020-05-29 DIAGNOSIS — Z95.1 S/P CABG (CORONARY ARTERY BYPASS GRAFT): ICD-10-CM

## 2020-05-29 DIAGNOSIS — I35.0 NONRHEUMATIC AORTIC VALVE STENOSIS: ICD-10-CM

## 2020-05-29 DIAGNOSIS — I35.0 AORTIC VALVE STENOSIS, ETIOLOGY OF CARDIAC VALVE DISEASE UNSPECIFIED: Primary | ICD-10-CM

## 2020-05-29 DIAGNOSIS — I35.0 NONRHEUMATIC AORTIC VALVE STENOSIS: Primary | ICD-10-CM

## 2020-05-29 LAB
CAPILLARY BLOOD COLLECTION: NORMAL
INR PPP: 2.1 (ref 0.86–1.14)

## 2020-05-29 PROCEDURE — 85610 PROTHROMBIN TIME: CPT | Performed by: FAMILY MEDICINE

## 2020-05-29 PROCEDURE — 99207 ZZC NO CHARGE NURSE ONLY: CPT

## 2020-05-29 PROCEDURE — 36415 COLL VENOUS BLD VENIPUNCTURE: CPT | Performed by: FAMILY MEDICINE

## 2020-05-29 NOTE — PROGRESS NOTES
ANTICOAGULATION INITIAL CLINIC VISIT    Patient Name:  Benjamín Us  Date:  2020  Referred by: hospital  Contact Type:  Telephone/ with pt and his wife, Michelle    SUBJECTIVE:  Coumadin education was completed today.  Topics covered include:  -Introduction to coumadin  -Proper Administration  -INR Testing  -Sign/Symptoms of Bleeding  -Signs/Symptoms of Clot Formation or Stroke  -Dietary Intake of Vitamin K  -Drug Interactions  -Anticoagulation Identification (bracelet, necklace or wallet card)  -Future Surgery  -Effects of Alcohol, Tobacco, and Exercise on Coumadin    Coumadin Education Booklet and Coumadin Identification Wallet Card were given to the patient.    Patient Findings     Positives:   Change in medications (Warfarin started on 20, Amiodarone started evening of  as follows: 800mg x 4 days; 400mg x 7 days; 200mg QD thereafter), Change in diet/appetite (Pt does not typically eat any greens.  Also, pt is currently eating soft foods only as he just had all of his teeth extracted.), Hospital admission (Hospitalized - for mechanical AVR), Bruising (A bruise on chest near incision, pt had open heart surgery)    Comments:   Warfarin dosing/chart reviewed by DENIZ Flores PharmD        Clinical Outcomes     Negatives:   Major bleeding event, Thromboembolic event, Anticoagulation-related hospital admission, Anticoagulation-related ED visit, Anticoagulation-related fatality    Comments:   Warfarin dosing/chart reviewed by DENIZ Flores PharmD          OBJECTIVE    Recent labs: (last 7 days)     20  1142   INR 2.10*       ASSESSMENT / PLAN  INR assessment THER    Recheck INR In: 3 DAYS    INR Location Clinic      Anticoagulation Summary  As of 2020    INR goal:   2.0-3.0   TTR:   --   INR used for dosin.10 (2020)   Warfarin maintenance plan:   2.5 mg (5 mg x 0.5) every Tue, Sat; 5 mg (5 mg x 1) all other days   Full warfarin instructions:   2.5 mg every Tue, Sat; 5 mg all  other days   Weekly warfarin total:   30 mg   Plan last modified:   Linsey Barrios RN (5/29/2020)   Next INR check:   6/1/2020   Target end date:   Indefinite    Indications    S/P CABG (coronary artery bypass graft) [Z95.1]             Anticoagulation Episode Summary     INR check location:       Preferred lab:       Send INR reminders to:   [facility] [employer] [place]    Comments:         Anticoagulation Care Providers     Provider Role Specialty Phone number    Mag Richards PA-C Referring Physician Assistant [phone]            See the Encounter Report to view Anticoagulation Flowsheet and Dosing Calendar (Go to Encounters tab in chart review, and find the Anticoagulation Therapy Visit)    Dosage adjustment made based on physician directed care plan.    Linsey Barrios RN

## 2020-05-29 NOTE — ADDENDUM NOTE
Addended by: SERGIO SALCEDO on: 5/29/2020 03:00 PM     Modules accepted: Level of Service, SmartSet

## 2020-05-29 NOTE — PROGRESS NOTES
ANTICOAGULATION FOLLOW-UP CLINIC VISIT    Patient Name:  Benjamín Us  Date:  2020  Contact Type:  Telephone/ with pt and his wife, Michelle Martinez read warfarin dosing back to me correctly.    SUBJECTIVE:  Patient Findings     Positives:   Change in medications (Warfarin started on 20, Amiodarone started evening of  as follows: 800mg x 4 days; 400mg x 7 days; 200mg QD thereafter), Change in diet/appetite (Pt does not typically eat any greens.  Also, pt is currently eating soft foods only as he just had all of his teeth extracted.), Hospital admission (Hospitalized - for mechanical AVR), Bruising (A bruise on chest near incision, pt had open heart surgery)    Comments:   Warfarin dosing/chart reviewed by DENIZ Flores PharmD        Clinical Outcomes     Negatives:   Major bleeding event, Thromboembolic event, Anticoagulation-related hospital admission, Anticoagulation-related ED visit, Anticoagulation-related fatality    Comments:   Warfarin dosing/chart reviewed by DENIZ Flores PharmD           OBJECTIVE    Recent labs: (last 7 days)     20  1142   INR 2.10*       ASSESSMENT / PLAN  INR assessment THER    Recheck INR In: 3 DAYS    INR Location Clinic      Anticoagulation Summary  As of 2020    INR goal:   2.0-3.0   TTR:   --   INR used for dosin.10 (2020)   Warfarin maintenance plan:   2.5 mg (5 mg x 0.5) every Tue, Sat; 5 mg (5 mg x 1) all other days   Full warfarin instructions:   2.5 mg every Tue, Sat; 5 mg all other days   Weekly warfarin total:   30 mg   Plan last modified:   Linsey Barrios RN (2020)   Next INR check:   2020   Target end date:   Indefinite    Indications    S/P CABG (coronary artery bypass graft) [Z95.1]             Anticoagulation Episode Summary     INR check location:       Preferred lab:       Send INR reminders to:   [facility]    Comments:         Anticoagulation Care Providers     Provider Role Specialty Phone number     Mag Richards PA-C Referring Physician Assistant [phone]            See the Encounter Report to view Anticoagulation Flowsheet and Dosing Calendar (Go to Encounters tab in chart review, and find the Anticoagulation Therapy Visit)        Linsey Barrios RN

## 2020-05-29 NOTE — PROGRESS NOTES
Chart reviewed with [facility] nurse, initiated inpatient with warfarin, outpatient initiation nomogram is to do 0-15% dose decrease.  Amiodarone load started.  Anticipated INR increase with amiodarone, and 5mg doses not peaking yet.     Recommendation: recheck INR Monday, set maintenance dose at 30mg/week, suggest 2.5m doses set for Sat & Tues.  Assess how amiodarone affects INR, and adjust from there.    Sandra Hodge, PharmD BCACP  Anticoagulation Clinical Pharmacist

## 2020-05-30 ENCOUNTER — HOSPITAL ENCOUNTER (EMERGENCY)
Facility: CLINIC | Age: 55
Discharge: HOME | End: 2020-05-30
Attending: PHYSICIAN ASSISTANT | Admitting: PHYSICIAN ASSISTANT
Payer: COMMERCIAL

## 2020-05-30 VITALS
HEART RATE: 86 BPM | OXYGEN SATURATION: 95 % | BODY MASS INDEX: 32.35 KG/M2 | DIASTOLIC BLOOD PRESSURE: 60 MMHG | TEMPERATURE: 98.5 F | HEIGHT: 70 IN | WEIGHT: 226 LBS | SYSTOLIC BLOOD PRESSURE: 147 MMHG | RESPIRATION RATE: 18 BRPM

## 2020-05-30 DIAGNOSIS — R11.2 NAUSEA AND VOMITING: ICD-10-CM

## 2020-05-30 LAB
ALBUMIN SERPL-MCNC: 3.2 G/DL (ref 3.4–5)
ALP SERPL-CCNC: 127 U/L (ref 40–150)
ALT SERPL W P-5'-P-CCNC: 26 U/L (ref 0–70)
ANION GAP SERPL CALCULATED.3IONS-SCNC: 5 MMOL/L (ref 3–14)
AST SERPL W P-5'-P-CCNC: 27 U/L (ref 0–45)
BASOPHILS # BLD AUTO: 0 10E9/L (ref 0–0.2)
BASOPHILS NFR BLD AUTO: 0.2 %
BILIRUB SERPL-MCNC: 0.5 MG/DL (ref 0.2–1.3)
BUN SERPL-MCNC: 19 MG/DL (ref 7–30)
CALCIUM SERPL-MCNC: 8.8 MG/DL (ref 8.5–10.1)
CHLORIDE SERPL-SCNC: 98 MMOL/L (ref 94–109)
CO2 SERPL-SCNC: 29 MMOL/L (ref 20–32)
CREAT SERPL-MCNC: 1.08 MG/DL (ref 0.66–1.25)
DIFFERENTIAL METHOD BLD: ABNORMAL
EOSINOPHIL # BLD AUTO: 0.5 10E9/L (ref 0–0.7)
EOSINOPHIL NFR BLD AUTO: 3.9 %
ERYTHROCYTE [DISTWIDTH] IN BLOOD BY AUTOMATED COUNT: 13.6 % (ref 10–15)
GFR SERPL CREATININE-BSD FRML MDRD: 76 ML/MIN/{1.73_M2}
GLUCOSE SERPL-MCNC: 169 MG/DL (ref 70–99)
HCT VFR BLD AUTO: 27.1 % (ref 40–53)
HGB BLD-MCNC: 8.9 G/DL (ref 13.3–17.7)
IMM GRANULOCYTES # BLD: 0.1 10E9/L (ref 0–0.4)
IMM GRANULOCYTES NFR BLD: 0.4 %
LIPASE SERPL-CCNC: 79 U/L (ref 73–393)
LYMPHOCYTES # BLD AUTO: 2 10E9/L (ref 0.8–5.3)
LYMPHOCYTES NFR BLD AUTO: 15.7 %
MCH RBC QN AUTO: 31 PG (ref 26.5–33)
MCHC RBC AUTO-ENTMCNC: 32.8 G/DL (ref 31.5–36.5)
MCV RBC AUTO: 94 FL (ref 78–100)
MONOCYTES # BLD AUTO: 1.7 10E9/L (ref 0–1.3)
MONOCYTES NFR BLD AUTO: 13 %
NEUTROPHILS # BLD AUTO: 8.5 10E9/L (ref 1.6–8.3)
NEUTROPHILS NFR BLD AUTO: 66.8 %
NRBC # BLD AUTO: 0 10*3/UL
NRBC BLD AUTO-RTO: 0 /100
PLATELET # BLD AUTO: 471 10E9/L (ref 150–450)
POTASSIUM SERPL-SCNC: 4.5 MMOL/L (ref 3.4–5.3)
PROT SERPL-MCNC: 7.4 G/DL (ref 6.8–8.8)
RBC # BLD AUTO: 2.87 10E12/L (ref 4.4–5.9)
SODIUM SERPL-SCNC: 132 MMOL/L (ref 133–144)
WBC # BLD AUTO: 12.7 10E9/L (ref 4–11)

## 2020-05-30 PROCEDURE — 83690 ASSAY OF LIPASE: CPT | Performed by: PHYSICIAN ASSISTANT

## 2020-05-30 PROCEDURE — 80053 COMPREHEN METABOLIC PANEL: CPT | Performed by: PHYSICIAN ASSISTANT

## 2020-05-30 PROCEDURE — 85025 COMPLETE CBC W/AUTO DIFF WBC: CPT | Performed by: PHYSICIAN ASSISTANT

## 2020-05-30 PROCEDURE — 99284 EMERGENCY DEPT VISIT MOD MDM: CPT | Mod: 25

## 2020-05-30 PROCEDURE — 96374 THER/PROPH/DIAG INJ IV PUSH: CPT

## 2020-05-30 PROCEDURE — 25000128 H RX IP 250 OP 636: Performed by: PHYSICIAN ASSISTANT

## 2020-05-30 RX ORDER — ONDANSETRON 4 MG/1
4 TABLET, ORALLY DISINTEGRATING ORAL EVERY 8 HOURS PRN
Qty: 10 TABLET | Refills: 0 | Status: SHIPPED | OUTPATIENT
Start: 2020-05-30 | End: 2020-06-15

## 2020-05-30 RX ORDER — ONDANSETRON 2 MG/ML
4 INJECTION INTRAMUSCULAR; INTRAVENOUS EVERY 30 MIN PRN
Status: DISCONTINUED | OUTPATIENT
Start: 2020-05-30 | End: 2020-05-30 | Stop reason: HOSPADM

## 2020-05-30 RX ADMIN — ONDANSETRON 4 MG: 2 INJECTION INTRAMUSCULAR; INTRAVENOUS at 11:42

## 2020-05-30 ASSESSMENT — ENCOUNTER SYMPTOMS
CONSTIPATION: 0
NAUSEA: 1
HEMATURIA: 0
DIARRHEA: 0
FREQUENCY: 0
VOMITING: 1
DYSURIA: 0
ABDOMINAL PAIN: 0
DIFFICULTY URINATING: 0
FEVER: 0

## 2020-05-30 ASSESSMENT — MIFFLIN-ST. JEOR: SCORE: 1861.38

## 2020-05-30 NOTE — PROGRESS NOTES
Patient seen with Dr. Guzman    Nausea and vomiting he had this morning has resolved. Patient has had bowel movements since discharging from the hospital and is passing gas.  He did not take his morning medications because he was afraid he would vomit them up.  He denies abdominal pain and reports the nausea and vomiting was new this morning.    Discussed with ED provider  Recommend giving patient his morning medications and making sure he tolerates them without emesis. May need IV zofran.  Will repeat basic chem and cbc  Abdominal xray if concern for abdominal pathology-- will defer to ED provider  If he is able to tolerate medications he can discharge back to home  May benefit from rx for sublingual zofran if discharged home      Mag Richards PA-C  CV surgery  Pager [phone]

## 2020-05-30 NOTE — ED PROVIDER NOTES
History     Chief Complaint:  Nausea & Vomiting      HPI   Benjamín Us is a 54 year old male with a history of thoracic aorta aneurysm, aortic valve disorder, hypertension and type II diabetes mellitus who presents with nausea and vomiting status post CABG and aortic valve replacement 5/22/20. The patient was discharged in stable conditions on 5/28/20 without complications. He has recently been diagnosed with type II diabetes mellitus and has been prescribed many new medications after his surgery including Lipitor, warfarin, lopressor, robaxin, Glucophage, novolog, lasix, aspirin, pacerone, and protonix.      Today, the patient reports he woke up with nausea and vomiting. He reports vomiting twice. He comes in after he is concerned he will not be able to take all his medications due to his vomiting. He denies abdominal pain, changed pain in his chest (he notes some from his recent surgery), exposure to illnesses, history of of abdomen surgery, eating suspicious foods, abnormal bowel movements, fever, and urinary symptoms.    Allergies:  Penicillins  Zithromax    Medications:    Pacerone  Lantus pen  Protonix  Aspirin 81 mg  Novolog pen  Glucophage  Robaxin  Lopressor  Roxicodone  Miralax  Coumadin  Lipitor    Past Medical History:    ADD  Aneurysm of thoracic aorta  Aortic valve disorder  Coronary artery disease   Dermatitis  Hypertension  HARIS  Diabetes mellitus type 2    Past Surgical History:    Appendectomy  Bypass graft artery coronary   Coronary angiogram  Dental extraction  Replace valve aortic    Family History:    Hypertension (Mother)  Breast Cancer (Mother)  Diabetes (Father, Brother)  Thyroid disease (Sister)    Social History:  Smoking status: Former, quit 4/2020  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   Cardiovascular: Negative for chest pain.   Gastrointestinal:  "Positive for nausea and vomiting. Negative for abdominal pain, constipation and diarrhea.   Genitourinary: Negative for decreased urine volume, difficulty urinating, dysuria, frequency, hematuria and urgency.   All other systems reviewed and are negative.    Physical Exam     Patient Vitals for the past 24 hrs:   BP Temp Temp src Pulse Resp SpO2 Height Weight   05/30/20 1010 (!) 147/60 98.5  F (36.9  C) Oral 86 18 95 % 1.778 m (5' 10\") 102.5 kg (226 lb)     Physical Exam  General: Well appearing, well nourished. Normal mood and affect.  Skin: Good turgor, no rash, no unusual bruising or prominent lesions.  HEENT: Head: Normocephalic, atraumatic, no visible masses.   Eyes: Conjunctiva clear.  Cardiac: Normal rate and regular rhythm, no murmur or gallop.   Lungs: Clear to auscultation.  Abdomen: Abdomen soft, non-tender. No rebound tenderness of guarding.   Musculoskeletal: Normal gait and station. No calf tenderness or swelling.   Neurologic: Oriented x 3. GCS: 15.  Psychiatric: Intact recent and remote memory, judgment and insight, normal mood and affect.     Emergency Department Course   Laboratory:  CBC: WBC 12.7 (H), HGB 8.9 (L),  (H)  CMP:  (L), Glucose 169 (H), Albumin 3.2 (L) o/w WNL (Creatinine 1.08)  Lipase: 79    Interventions:  1142: Zofran 4 mg IV    Emergency Department Course:  1118: I spoke to the patient's Cardiology Team.    Past medical records, nursing notes, and vitals reviewed.  1121: I performed an exam of the patient and obtained history, as documented above.  IV inserted and blood drawn.    1204: I rechecked the patient. Explained findings to the patient.    1251: I rechecked the patient. He passed his PO challenge. Findings and plan explained to the Patient. Patient discharged home with instructions regarding supportive care, medications, and reasons to return. The importance of close follow-up was reviewed.     Impression & Plan    Medical Decision Making:  Benjamín Us is a " 54 year old male who presents the ED today for evaluation of nausea and vomiting.  Details of the patient's history can be noted in the HPI.  Differentials considered included gastroenteritis, medication reaction, gallbladder pathology, pancreatitis, bowel obstruction, amongst others.  On my exam, patient was well-appearing.  He had a completely benign abdominal exam.  Basic labs obtained, showing mild leukocytosis at 12.7.  Suspect that this may be due to his vomiting as he has no fever or other infectious symptoms.  Mild anemia also noted, unchanged from recent labs during his hospitalization.  I did discuss his case with his cardiac surgery team.  They did note that his symptoms may be due to medication reaction as he has been started on numerous new medications.  If appearing well, they were comfortable with his discharge.  Patient strongly wishes to be discharged.  He was given Zofran and p.o. challenge completed here.  I did offer to order his home meds prior to discharge to ensure that he could take these without recurrence of symptoms, which he declined strongly.  I advised close follow-up with primary and his cardiac team.  Return for uncontrolled vomiting/inability to take his medications, chest pain, shortness of breath, fevers, new concerns.  I do suspect that the new medications are contributing to his nausea.  All questions were answered.  He was discharged with Zofran.  He was in agreement with the treatment plan as stated above.    Diagnosis:    ICD-10-CM    1. Nausea and vomiting  R11.2      Disposition:  discharged to home    Discharge Medications:  New Prescriptions    ONDANSETRON (ZOFRAN ODT) 4 MG ODT TAB    Take 1 tablet (4 mg) by mouth every 8 hours as needed for nausea or vomiting     Irwin Shultz  5/30/2020    EMERGENCY DEPARTMENT  Irwin RIVREA am serving as a scribe at 11:22 AM on 5/30/2020 to document services personally performed by Key Maria PA based on my  observations and the provider's statements to me.     This was created at least in part with a voice recognition software. Mistakes/typos may be present.      Key Maria, PA  05/30/20 2051

## 2020-05-30 NOTE — DISCHARGE INSTRUCTIONS
Your labs overall look reassuring.  Your vomiting could be due to your new medications.  Begin taking the Zofran to help control the nausea.  If he cannot stop throwing up, cannot take your medications, have abdominal pain, chest pain that is worsening, fevers, he should return for further evaluation.  Contact your cardiac team and let them know how you are doing with your symptoms.

## 2020-05-30 NOTE — ED TRIAGE NOTES
Nauseated and vomiting since yesterday - pt had heart surgery last week discharged from hospital yesterday

## 2020-05-30 NOTE — ED AVS SNAPSHOT
Emergency Department  [address] [facility] [address]  Phone:  [phone]  Fax:  [phone]                                    Benjamín Us   MRN: [id number]    Department:   Emergency Department   Date of Visit:  5/30/2020           After Visit Summary Signature Page    I have received my discharge instructions, and my questions have been answered. I have discussed any challenges I see with this plan with the nurse or doctor.    ..........................................................................................................................................  Patient/Patient Representative Signature      ..........................................................................................................................................  Patient Representative Print Name and Relationship to Patient    ..................................................               ................................................  Date                                   Time    ..........................................................................................................................................  Reviewed by Signature/Title    ...................................................              ..............................................  Date                                               Time          22EPIC Rev 08/18

## 2020-06-01 ENCOUNTER — DOCUMENTATION ONLY (OUTPATIENT)
Dept: FAMILY MEDICINE | Facility: CLINIC | Age: 55
End: 2020-06-01

## 2020-06-01 ENCOUNTER — TELEPHONE (OUTPATIENT)
Dept: OTHER | Facility: CLINIC | Age: 55
End: 2020-06-01

## 2020-06-01 ENCOUNTER — ANTICOAGULATION THERAPY VISIT (OUTPATIENT)
Dept: NURSING | Facility: CLINIC | Age: 55
End: 2020-06-01

## 2020-06-01 DIAGNOSIS — I35.0 NONRHEUMATIC AORTIC VALVE STENOSIS: ICD-10-CM

## 2020-06-01 DIAGNOSIS — Z95.1 S/P CABG (CORONARY ARTERY BYPASS GRAFT): ICD-10-CM

## 2020-06-01 LAB
CAPILLARY BLOOD COLLECTION: NORMAL
INR PPP: 3.7 (ref 0.86–1.14)

## 2020-06-01 PROCEDURE — 99207 ZZC NO CHARGE NURSE ONLY: CPT

## 2020-06-01 PROCEDURE — 85610 PROTHROMBIN TIME: CPT | Performed by: FAMILY MEDICINE

## 2020-06-01 PROCEDURE — 36416 COLLJ CAPILLARY BLOOD SPEC: CPT | Performed by: FAMILY MEDICINE

## 2020-06-01 NOTE — TELEPHONE ENCOUNTER
.48 hour post op call    ACTIVITY  How are you doing with activity? I am staying active here at home.   Are you continuing to use your IS 3-4 times a day and do you have any shortness of breath.I ma doing good. Getting higher everyday.   Are you weighing yourself daily and has it been stable?. I am down 5 lbs from discharge. I still have 2 more days of Lasix.     PAIN  How is your pain, what are you doing for your pain? It is ok. I am taking the oxycodone every 4 hours. Recommended he take tylenol 3 X a day up to 3000 mg /24 hours. Robaxin QID then take Oxycodone for pain greater than a 6.     Are you still doing sternal precautions? Yes    Do you hear any clicking when you are moving or taking a deep breath?  No      INCISION: It is healing well.     ASSISTANCE  Do you have someone at home to help you with your daily activities and transportation needs? Yes my wife      MEDICATIONS  I would like to review your discharge medications and answer any questions you may have.   reviewed, Will discuss with provider his amiodarone dosing due to significant nausea.      Are you on a blood thinner/Coumadin Yes    Who is managing your Coumadin dosing/INR? PCP, I had one on Friday and today. 3.7. Adjustments made.      FOLLOW UP       Scheduled for cardiac rehab? 6/8   Scheduled to see our PA or Surgeon? 6/15  Scheduled to see your cardiologist ? Rich 6/26, Dr Downs 8/5   Scheduled to see your primary care physician? Today  Do you have our contact information? Yes    STOPLIGHT TOOL      Were you happy with your care? Yes except my wife could not visit and she did not get very many updates.   Could we have done anything better? The discharge process was confusing. We did not get a narcotic script, cardiac rehab was not ordered or an INR order placed.

## 2020-06-01 NOTE — PROGRESS NOTES
"Anticoagulation Management    Unable to reach Benjamín today , 2nd call I left VM on wife's cell    Today's INR result of 3.7 is supratherapeutic (goal INR of 2.0-3.0).  Result received from: Clinic Lab    Follow up required to assess for changes     Left message to call tonie Stiles is a new start      Anticoagulation clinic to follow up    Linsey Barrios RN    ANTICOAGULATION FOLLOW-UP CLINIC VISIT    Patient Name:  Benjamín Us  Date:  6/1/2020  Contact Type:  Telephone/ with wife and pt on speaker    SUBJECTIVE:  Patient Findings     Positives:   Change in health (Pt states nausea \"comes and goes\".  Pt's wife states when Benjamín vomited on 05/30, it was 2-3 hours after he had taken his warfarin so medication should have been well absorbed.), Emergency department visit (ER visit on 05/30 with nausea and vomiting, albumin low at 3.2 with mild anemia and leukocytosis of 12.7), Change in medications (Zofran prn nausea, prescribed by ER doctor. Amiodarone was just decreased today to 200mg QD, higher dose and BID dosing discontinued by cardiologist today due to nausea,)    Comments:   Warfarin dosing reviewed by [employer] PharmD        Clinical Outcomes     Negatives:   Major bleeding event, Thromboembolic event, Anticoagulation-related hospital admission, Anticoagulation-related ED visit, Anticoagulation-related fatality    Comments:   Warfarin dosing reviewed by [employer] PharmD           OBJECTIVE    Recent labs: (last 7 days)     06/01/20  1040   INR 3.70*       ASSESSMENT / PLAN  INR assessment SUPRA    Recheck INR In: 4 DAYS    INR Location Clinic    Vit K given? None      Anticoagulation Summary  As of 6/1/2020    INR goal:   2.0-3.0   TTR:   --   INR used for dosing:   3.70! (6/1/2020)   Warfarin maintenance plan:   2.5 mg (5 mg x 0.5) every Tue, Thu, Sat; 5 mg (5 mg x 1) all other days   Full warfarin instructions:   6/1: Hold; Otherwise 2.5 mg every Tue, Thu, Sat; 5 mg all other days   Weekly warfarin total:   " 27.5 mg   Plan last modified:   Linsey Barrios RN (6/1/2020)   Next INR check:   6/5/2020   Priority:   High   Target end date:   Indefinite    Indications    S/P CABG (coronary artery bypass graft) [Z95.1]             Anticoagulation Episode Summary     INR check location:       Preferred lab:       Send INR reminders to:   [facility]    Comments:         Anticoagulation Care Providers     Provider Role Specialty Phone number    Mag Richards PA-C Referring Physician Assistant [phone]            See the Encounter Report to view Anticoagulation Flowsheet and Dosing Calendar (Go to Encounters tab in chart review, and find the Anticoagulation Therapy Visit)        Linsey Barrios RN

## 2020-06-01 NOTE — PROGRESS NOTES
Pre-Visit Planning     Future Appointments   Date Time Provider Department Center   6/2/2020  1:30 PM Axel Roberson MD [facility] CR   6/5/2020 11:00 AM CR LAB CR[facility] CR   6/8/2020  1:00 PM 3, [facility]   6/15/2020  1:30 PM [facility] CARDIOTHORACIC SURGERY, TOMAS THACKER [facility] Owned   6/26/2020  1:45 PM Percy Villanueva MD [facility] PSA CLIN   8/5/2020 10:45 AM Myles Downs MD [facility] PSA CLIN     Arrival Time for this Appointment:  1:30 PM   Appointment Notes for this encounter:   Video- Hospital follow-up    Questionnaires Reviewed/Assigned  Additional questionnaires assigned        Patient preferred phone number: [phone]    Patient contact not needed. Appointment scheduled on 5/28 by CLARENCE Kessler, BECKYN, RN, PHN

## 2020-06-01 NOTE — PROGRESS NOTES
ANTICOAGULATION  MANAGEMENT: Discharge Review    Benjamín SHEBA Us chart reviewed for anticoagulation continuity of care    Emergency room visit on 05/30/20 for nausea and vomiting.    Discharge disposition: Home    Results:    Recent labs: (last 7 days)     05/28/20  1133 05/29/20  1142   INR  --  2.10*   AXA 0.26  --      Anticoagulation inpatient management:     not applicable     Anticoagulation discharge instructions:     Warfarin dosing: home regimen continued   Bridging: No   INR goal change: No      Medication changes affecting anticoagulation: No    Additional factors affecting anticoagulation: Yes: vomited x 2    Plan     Recommend to check INR on scheduled INR for today, 06/01, pt is a new start on warfarin and is taking amiodarone    Patient not contacted, I will call him today after his INR.        Linsey Barrios RN

## 2020-06-02 ENCOUNTER — VIRTUAL VISIT (OUTPATIENT)
Dept: FAMILY MEDICINE | Facility: CLINIC | Age: 55
End: 2020-06-02
Payer: COMMERCIAL

## 2020-06-02 DIAGNOSIS — Z95.1 S/P CABG (CORONARY ARTERY BYPASS GRAFT): ICD-10-CM

## 2020-06-02 DIAGNOSIS — Z95.2 H/O MECHANICAL AORTIC VALVE REPLACEMENT: ICD-10-CM

## 2020-06-02 PROCEDURE — 99214 OFFICE O/P EST MOD 30 MIN: CPT | Mod: 95 | Performed by: FAMILY MEDICINE

## 2020-06-02 NOTE — PROGRESS NOTES
"Benjamín Us is a 54 year old male who is being evaluated via a billable video visit.      The patient has been notified of following: video connection failed at consumer end, convert to telephone visit     \"This video visit will be conducted via a call between you and your physician/provider. We have found that certain health care needs can be provided without the need for an in-person physical exam.  This service lets us provide the care you need with a video conversation.  If a prescription is necessary we can send it directly to your pharmacy.  If lab work is needed we can place an order for that and you can then stop by our lab to have the test done at a later time.    Video visits are billed at different rates depending on your insurance coverage.  Please reach out to your insurance provider with any questions.    If during the course of the call the physician/provider feels a video visit is not appropriate, you will not be charged for this service.\"    Patient has given verbal consent for Video visit? Yes    How would you like to obtain your AVS? [facility]    Patient would like the video invitation sent by: Text to cell phone: [phone]    Will anyone else be joining your video visit? Yes: wife. How would they like to receive their invitation? Text to cell phone: done      Subjective     Benjamín Us is a 54 year old male who presents today via video visit for the following health issues:    HPI  Diabetes Follow-up on insulin now, difficulty with lancets and testing     How often are you checking your blood sugar? Four or more times daily  Blood sugar testing frequency justification:  Uncontrolled diabetes  What time of day are you checking your blood sugars (select all that apply)?  Before and after meals and At bedtime  Have you had any blood sugars above 200?  Yes had acute illness after heart surgery   Have you had any blood sugars below 70?  Yes see above     What symptoms do you notice when your " blood sugar is low?  None    What concerns do you have today about your diabetes? None and Blood sugar is often over 200     Do you have any of these symptoms? (Select all that apply)  Blurry vision and Weight loss    Have you had a diabetic eye exam in the last 12 months? No        BP Readings from Last 2 Encounters:   05/30/20 (!) 147/60   05/28/20 104/63     Hemoglobin A1C (%)   Date Value   05/21/2020 12.2 (H)   01/26/2016 7.2 (H)     LDL Cholesterol Calculated (mg/dL)   Date Value   03/25/2014 135 (H)     LDL Cholesterol Direct (mg/dL)   Date Value   03/25/2016 131 (H)                 How many servings of fruits and vegetables do you eat daily?  2-3    On average, how many sweetened beverages do you drink each day (Examples: soda, juice, sweet tea, etc.  Do NOT count diet or artificially sweetened beverages)?   0    How many days per week do you exercise enough to make your heart beat faster? 3 or less    How many minutes a day do you exercise enough to make your heart beat faster? 20 - 29    How many days per week do you miss taking your medication? 0         Video Start Time:       Hospital Follow-up Visit:    Hospital/Nursing Home/IP Rehab Facility: [facility]  Date of Admission: 5/22/20  Date of Discharge: 5/25/20  Reason(s) for Admission: cardiac surgery aneurm=      Was your hospitalization related to COVID-19? No   Problems taking medications regularly:  None  Medication changes since discharge: None  Problems adhering to non-medication therapy:  None    Summary of hospitalization:  [facility] discharge summary reviewed  Diagnostic Tests/Treatments reviewed.  Follow up needed: CDE  Other Healthcare Providers Involved in Patient s Care:         Care Coordination and Cardiac Rehab, EP Cardiology  Update since discharge: improved.       Post Discharge Medication Reconciliation: discharge medications reconciled, continue medications without change.  Plan of care communicated with  patient and family                Current Outpatient Medications   Medication Sig Dispense Refill     acetaminophen (TYLENOL) 325 MG tablet Take 2 tablets (650 mg) by mouth every 6 hours as needed for mild pain or fever 1 Bottle 0     alcohol swab prep pads Use to swab area of injection/víctor as directed. 100 each 3     [START ON 6/10/2020] amiodarone (PACERONE) 200 MG tablet Take 1 tablet (200 mg) by mouth daily 30 tablet 0     amphetamine-dextroamphetamine (ADDERALL) 20 MG tablet 20 mg 3 times daily        aspirin (ASPIRIN) 81 MG EC tablet Take 1 tablet (81 mg) by mouth daily       atorvastatin (LIPITOR) 40 MG tablet Take 1 tablet (40 mg) by mouth daily 90 tablet 1     blood glucose (NO BRAND SPECIFIED) test strip Use to test blood sugar 4 times daily or as directed. 100 strip 6     blood glucose calibration (NO BRAND SPECIFIED) solution Use to calibrate blood glucose monitor as needed as directed. 1 Bottle 3     blood glucose monitoring (NO BRAND SPECIFIED) meter device kit Use to test blood sugar 4 times daily or as directed. 1 kit 0     furosemide (LASIX) 40 MG tablet Take 1 tablet (40 mg) by mouth daily for 5 days 5 tablet 0     insulin aspart (NOVOLOG PEN) 100 UNIT/ML pen Inject 1 unit per 15 grams of carbohydrates with breakfast, lunch and dinner. 12 mL 0     insulin glargine (LANTUS PEN) 100 UNIT/ML pen Inject 30 Units Subcutaneous every morning (before breakfast) 9 mL 0     metFORMIN (GLUCOPHAGE) 500 MG tablet Take 500mg twice daily for 1 week, then 500mg in AM and 1000mg in PM for 1 week, then 1000mg twice daily. 100 tablet 0     methocarbamol (ROBAXIN) 500 MG tablet Take 1 tablet (500 mg) by mouth 4 times daily as needed for muscle spasms 60 tablet 0     metoprolol tartrate (LOPRESSOR) 25 MG tablet Take 0.5 tablets (12.5 mg) by mouth 2 times daily 30 tablet 3     ondansetron (ZOFRAN ODT) 4 MG ODT tab Take 1 tablet (4 mg) by mouth every 8 hours as needed for nausea or vomiting 10 tablet 0     oxyCODONE  "(ROXICODONE) 5 MG tablet Take 1 tablet (5 mg) by mouth every 4 hours as needed for moderate to severe pain 60 tablet 0     pantoprazole (PROTONIX) 40 MG EC tablet Take 1 tablet (40 mg) by mouth every morning (before breakfast) for 14 days 14 tablet 0     polyethylene glycol (MIRALAX) 17 g packet Take 17 g by mouth daily as needed for constipation 14 packet 0     potassium chloride ER (KLOR-CON M) 20 MEQ CR tablet Take 1 tablet (20 mEq) by mouth daily for 5 days 5 tablet 0     senna-docusate (SENOKOT-S/PERICOLACE) 8.6-50 MG tablet Take 1 tablet by mouth 2 times daily as needed for constipation 30 tablet 0     thin (NO BRAND SPECIFIED) lancets Use with lanceting device. 100 each 3     varenicline (CHANTIX) 1 MG tablet Take 1 mg by mouth 2 times daily       warfarin ANTICOAGULANT (COUMADIN) 5 MG tablet Take 1 tablet (5 mg) by mouth daily 30 tablet 3       Reviewed and updated as needed this visit by Provider         Review of Systems   Constitutional, HEENT, cardiovascular, pulmonary, GI, , musculoskeletal, neuro, skin, endocrine and psych systems are negative, except as otherwise noted.      Objective    There were no vitals taken for this visit.  Estimated body mass index is 32.43 kg/m  as calculated from the following:    Height as of 5/30/20: 1.778 m (5' 10\").    Weight as of 5/30/20: 102.5 kg (226 lb).  Physical Exam     GENERAL: Healthy, alert and no distress  EYES: Eyes grossly normal to inspection.  No discharge or erythema, or obvious scleral/conjunctival abnormalities.  RESP: No audible wheeze, cough, or visible cyanosis.  No visible retractions or increased work of breathing.    SKIN: Visible skin clear. No significant rash, abnormal pigmentation or lesions.  NEURO: Cranial nerves grossly intact.  Mentation and speech appropriate for age.  PSYCH: Mentation appears normal, affect normal/bright, judgement and insight intact, normal speech and appearance well-groomed.      Diagnostic Test Results:  Labs " "reviewed in Epic        Assessment & Plan     1. IDDM (insulin dependent diabetes mellitus) (H)  New therapy  - AMBULATORY ADULT DIABETES EDUCATOR REFERRAL; Future    2. H/O mechanical aortic valve replacement  Recent, new meds, had rhythm issues post op  - AMBULATORY ADULT DIABETES EDUCATOR REFERRAL; Future    3. S/P CABG (coronary artery bypass graft)    - AMBULATORY ADULT DIABETES EDUCATOR REFERRAL; Future     BMI:   Estimated body mass index is 32.43 kg/m  as calculated from the following:    Height as of 5/30/20: 1.778 m (5' 10\").    Weight as of 5/30/20: 102.5 kg (226 lb).   Weight management plan: Discussed healthy diet and exercise guidelines          CDE in the first month,   PCP in the second month or prn   Axel Roberson MD  [facility]      Video-Visit Details    Type of service:  Video Visit    Video End Time:2:01 PM    Originating Location (pt. Location): Home    Distant Location (provider location):  [facility] [place]     Platform used for Video Visit: Doximity    No follow-ups on file.       Axel Roberson MD      "

## 2020-06-03 ENCOUNTER — TELEPHONE (OUTPATIENT)
Dept: FAMILY MEDICINE | Facility: CLINIC | Age: 55
End: 2020-06-03

## 2020-06-03 ENCOUNTER — TELEPHONE (OUTPATIENT)
Dept: OTHER | Facility: CLINIC | Age: 55
End: 2020-06-03

## 2020-06-03 DIAGNOSIS — E11.620 TYPE 2 DIABETES MELLITUS WITH DIABETIC DERMATITIS, WITHOUT LONG-TERM CURRENT USE OF INSULIN (H): Primary | ICD-10-CM

## 2020-06-03 DIAGNOSIS — Z95.1 S/P CABG (CORONARY ARTERY BYPASS GRAFT): ICD-10-CM

## 2020-06-03 RX ORDER — OXYCODONE HYDROCHLORIDE 5 MG/1
5 TABLET ORAL EVERY 4 HOURS PRN
Qty: 40 TABLET | Refills: 0 | Status: SHIPPED | OUTPATIENT
Start: 2020-06-03 | End: 2020-06-15

## 2020-06-03 RX ORDER — LANCING DEVICE/LANCETS
KIT MISCELLANEOUS
Qty: 1 EACH | Refills: 0 | Status: SHIPPED | OUTPATIENT
Start: 2020-06-03

## 2020-06-03 RX ORDER — LANCETS
EACH MISCELLANEOUS
Qty: 102 EACH | Refills: 11 | Status: SHIPPED | OUTPATIENT
Start: 2020-06-03

## 2020-06-03 NOTE — TELEPHONE ENCOUNTER
Patient's wife called stating patient has a new discomfort in his back. Pain med schedule discussed with Tylenol 1000 mg TID, Robaxin QID and Oxycodone for pain greater than a 6 1 tab 4-6 hours with weaning schedule discussed. Refill given.Recommended Salon Pas patches or bio freeze and massage.  She also states he has complaints of blurry vision. Denies field cut or any other neurological abnormalities. Pre op HGB A1C 12. Virtual visit with primary care yesterday. Blood sugars stable 120-140 at home. All questions addressed currently.

## 2020-06-03 NOTE — TELEPHONE ENCOUNTER
Patient's wife calling (no consent to communicate) stating that patient wanted to discuss lancets. Wife stated that patient's fingers are too big and the lancets aren't working. Please call patient at [phone].    Elena Kaur,       Called and spoke to wife and Benjamín, he has a tough time getting blood, will order the accu check lancing device and lancets, fastclicks,  Instructed on how to properly test and prepare his fingers for pricking his fingers.  Checking on the sides of his fingers, milking his fingers, using new needles, warming fingers as well.    They are to call if any further difficulty    Roseline Meza RN/IRLANDA  [place] [facility]

## 2020-06-05 ENCOUNTER — ANTICOAGULATION THERAPY VISIT (OUTPATIENT)
Dept: NURSING | Facility: CLINIC | Age: 55
End: 2020-06-05

## 2020-06-05 DIAGNOSIS — I35.0 NONRHEUMATIC AORTIC VALVE STENOSIS: ICD-10-CM

## 2020-06-05 DIAGNOSIS — Z95.2 HEART VALVE REPLACED: ICD-10-CM

## 2020-06-05 DIAGNOSIS — Z95.1 S/P CABG (CORONARY ARTERY BYPASS GRAFT): ICD-10-CM

## 2020-06-05 LAB
CAPILLARY BLOOD COLLECTION: NORMAL
INR PPP: 2.5 (ref 0.86–1.14)

## 2020-06-05 PROCEDURE — 99207 ZZC NO CHARGE NURSE ONLY: CPT

## 2020-06-05 PROCEDURE — 36416 COLLJ CAPILLARY BLOOD SPEC: CPT | Performed by: FAMILY MEDICINE

## 2020-06-05 PROCEDURE — 85610 PROTHROMBIN TIME: CPT | Performed by: FAMILY MEDICINE

## 2020-06-05 NOTE — PROGRESS NOTES
Anticoagulation Management    Unable to reach Benjamín's wifeMichelle today.    Today's INR result of 2.5 is therapeutic (goal INR of 2.0-3.0).  Result received from: Clinic Lab    Follow up required to confirm warfarin dose taken and assess for changes. Patient can continue with 27.5mg warfarin maintenance dose as set, can recheck INR  or . Also, patient to plan on 2 INR's week of , as well.  Warfarin dosing reviewed by DENIZ Fermin PharmD    Left message to call Maddy and be transferred to Linsey at:[phone]      Anticoagulation clinic to follow up    Linsey Barrios RN    ANTICOAGULATION FOLLOW-UP CLINIC VISIT    Patient Name:  Benjamín Us  Date:  2020  Contact Type:  Telephone/ with pt's wifeMichelle    SUBJECTIVE:  Patient Findings     Positives:   Change in health (Pt feeling well, no more episodes of nausea or vomiting), Change in activity (starting cardiac rehab on 20), Change in medications (Finished 5 days of potassium rx on 20; however, pt does take OTC potassium supplement)        Clinical Outcomes     Negatives:   Major bleeding event, Thromboembolic event, Anticoagulation-related hospital admission, Anticoagulation-related ED visit, Anticoagulation-related fatality           OBJECTIVE    Recent labs: (last 7 days)     20  1103   INR 2.50*       ASSESSMENT / PLAN  INR assessment THER    Recheck INR In: 3 DAYS    INR Location Clinic      Anticoagulation Summary  As of 2020    INR goal:   2.0-3.0   TTR:   --   INR used for dosin.50 (2020)   Warfarin maintenance plan:   2.5 mg (5 mg x 0.5) every Tue, Thu, Sat; 5 mg (5 mg x 1) all other days   Full warfarin instructions:   2.5 mg every Tue, Thu, Sat; 5 mg all other days   Weekly warfarin total:   27.5 mg   Plan last modified:   Linsey Barrios RN (2020)   Next INR check:   2020   Priority:   High   Target end date:   Indefinite    Indications    S/P CABG (coronary artery bypass graft) [Z95.1]  Heart  valve replaced [Z95.2]             Anticoagulation Episode Summary     INR check location:       Preferred lab:       Send INR reminders to:   [facility]    Comments:         Anticoagulation Care Providers     Provider Role Specialty Phone number    Mag Richards PA-C Referring Physician Assistant [phone]            See the Encounter Report to view Anticoagulation Flowsheet and Dosing Calendar (Go to Encounters tab in chart review, and find the Anticoagulation Therapy Visit)        Linsey Barrios RN

## 2020-06-08 ENCOUNTER — TELEPHONE (OUTPATIENT)
Dept: OTHER | Facility: CLINIC | Age: 55
End: 2020-06-08

## 2020-06-08 ENCOUNTER — HOSPITAL ENCOUNTER (OUTPATIENT)
Dept: CARDIAC REHAB | Facility: CLINIC | Age: 55
End: 2020-06-08
Attending: PHYSICIAN ASSISTANT
Payer: COMMERCIAL

## 2020-06-08 ENCOUNTER — ANTICOAGULATION THERAPY VISIT (OUTPATIENT)
Dept: NURSING | Facility: CLINIC | Age: 55
End: 2020-06-08

## 2020-06-08 DIAGNOSIS — I35.0 NONRHEUMATIC AORTIC VALVE STENOSIS: ICD-10-CM

## 2020-06-08 DIAGNOSIS — Z95.1 S/P CABG (CORONARY ARTERY BYPASS GRAFT): ICD-10-CM

## 2020-06-08 DIAGNOSIS — Z95.2 HEART VALVE REPLACED: ICD-10-CM

## 2020-06-08 LAB
CAPILLARY BLOOD COLLECTION: NORMAL
INR PPP: 1.8 (ref 0.86–1.14)

## 2020-06-08 PROCEDURE — 85610 PROTHROMBIN TIME: CPT | Performed by: FAMILY MEDICINE

## 2020-06-08 PROCEDURE — 40000575 ZZH STATISTIC OP CARDIAC VISIT #2

## 2020-06-08 PROCEDURE — 93797 PHYS/QHP OP CAR RHAB WO ECG: CPT | Mod: 59

## 2020-06-08 PROCEDURE — 40000116 ZZH STATISTIC OP CR VISIT

## 2020-06-08 PROCEDURE — 36416 COLLJ CAPILLARY BLOOD SPEC: CPT | Performed by: FAMILY MEDICINE

## 2020-06-08 PROCEDURE — 93798 PHYS/QHP OP CAR RHAB W/ECG: CPT

## 2020-06-08 PROCEDURE — 99207 ZZC NO CHARGE NURSE ONLY: CPT

## 2020-06-08 RX ORDER — OXYCODONE HYDROCHLORIDE 5 MG/1
5 TABLET ORAL EVERY 6 HOURS PRN
Qty: 20 TABLET | Refills: 0 | COMMUNITY
Start: 2020-06-08 | End: 2020-06-15

## 2020-06-08 NOTE — TELEPHONE ENCOUNTER
Patient's wife called stating his pulse is greater than 100 in am when he wakes up. He takes his Metoprolol at 9 am and 6 pm. Instructed to take at 9 am/9pm. Document BP and HR for a few days pre metoprolol and 2 hours post Metoprolol and call if HR > 100 and BP > 140/90. Continuing to need Oxycodone , script left for wife to  in Cardiology clinic. Pain management with tylenol and Robaxin discussed.

## 2020-06-08 NOTE — PROGRESS NOTES
ANTICOAGULATION FOLLOW-UP CLINIC VISIT    Patient Name:  Benjamín Us  Date:  2020  Contact Type:  Telephone/ with pt's wife, Michelle. Michelle sets up Benjamín's medications    SUBJECTIVE:  Patient Findings     Positives:   Missed doses (Intentional hold on  due to supra INR and pt missed 2.5mg on )        Clinical Outcomes     Negatives:   Major bleeding event, Thromboembolic event, Anticoagulation-related hospital admission, Anticoagulation-related ED visit, Anticoagulation-related fatality           OBJECTIVE    Recent labs: (last 7 days)     20  1051   INR 1.80*       ASSESSMENT / PLAN  INR assessment SUB    Recheck INR In: 4 DAYS    INR Location Clinic      Anticoagulation Summary  As of 2020    INR goal:   2.0-3.0   TTR:   41.1 % (1 d)   INR used for dosin.80! (2020)   Warfarin maintenance plan:   2.5 mg (5 mg x 0.5) every Tue, Thu, Sat; 5 mg (5 mg x 1) all other days   Full warfarin instructions:   : 7.5 mg; Otherwise 2.5 mg every Tue, Thu, Sat; 5 mg all other days   Weekly warfarin total:   27.5 mg   Plan last modified:   Linsey Barrios RN (2020)   Next INR check:   2020   Priority:   High   Target end date:   Indefinite    Indications    S/P CABG (coronary artery bypass graft) [Z95.1]  Heart valve replaced [Z95.2]             Anticoagulation Episode Summary     INR check location:       Preferred lab:       Send INR reminders to:   [facility]    Comments:         Anticoagulation Care Providers     Provider Role Specialty Phone number    Mag Richards PA-C Referring Physician Assistant [phone]            See the Encounter Report to view Anticoagulation Flowsheet and Dosing Calendar (Go to Encounters tab in chart review, and find the Anticoagulation Therapy Visit)        Linsey Barrios RN

## 2020-06-11 ENCOUNTER — ANTICOAGULATION THERAPY VISIT (OUTPATIENT)
Dept: NURSING | Facility: CLINIC | Age: 55
End: 2020-06-11

## 2020-06-11 DIAGNOSIS — Z95.2 HEART VALVE REPLACED: ICD-10-CM

## 2020-06-11 DIAGNOSIS — I35.0 NONRHEUMATIC AORTIC VALVE STENOSIS: ICD-10-CM

## 2020-06-11 DIAGNOSIS — Z95.1 S/P CABG (CORONARY ARTERY BYPASS GRAFT): ICD-10-CM

## 2020-06-11 LAB
CAPILLARY BLOOD COLLECTION: NORMAL
INR PPP: 2.1 (ref 0.86–1.14)

## 2020-06-11 PROCEDURE — 36416 COLLJ CAPILLARY BLOOD SPEC: CPT | Performed by: FAMILY MEDICINE

## 2020-06-11 PROCEDURE — 85610 PROTHROMBIN TIME: CPT | Performed by: FAMILY MEDICINE

## 2020-06-11 PROCEDURE — 99207 ZZC NO CHARGE NURSE ONLY: CPT

## 2020-06-11 NOTE — PROGRESS NOTES
ANTICOAGULATION FOLLOW-UP CLINIC VISIT    Patient Name:  Benjamín Us  Date:  2020  Contact Type:  Telephone with patient's wife, Michelle    SUBJECTIVE:  Patient Findings     Comments:   The patient was assessed for diet, medication, and activity level changes, missed or extra doses, bruising or bleeding, with no problem findings.          Clinical Outcomes     Negatives:   Major bleeding event, Thromboembolic event, Anticoagulation-related hospital admission, Anticoagulation-related ED visit, Anticoagulation-related fatality    Comments:   The patient was assessed for diet, medication, and activity level changes, missed or extra doses, bruising or bleeding, with no problem findings.             OBJECTIVE    Recent labs: (last 7 days)     20  1140   INR 2.10*       ASSESSMENT / PLAN  INR assessment THER    Recheck INR In: 4 DAYS    INR Location Clinic      Anticoagulation Summary  As of 2020    INR goal:   2.0-3.0   TTR:   35.9 % (4 d)   INR used for dosin.10 (2020)   Warfarin maintenance plan:   2.5 mg (5 mg x 0.5) every Tue, Thu, Sat; 5 mg (5 mg x 1) all other days   Full warfarin instructions:   2.5 mg every Tue, Thu, Sat; 5 mg all other days   Weekly warfarin total:   27.5 mg   Plan last modified:   Linsey Barrios RN (2020)   Next INR check:   6/15/2020   Priority:   High   Target end date:   Indefinite    Indications    S/P CABG (coronary artery bypass graft) [Z95.1]  Heart valve replaced [Z95.2]             Anticoagulation Episode Summary     INR check location:       Preferred lab:       Send INR reminders to:   [facility]    Comments:         Anticoagulation Care Providers     Provider Role Specialty Phone number    Mag Richards PA-C Referring Physician Assistant [phone]            See the Encounter Report to view Anticoagulation Flowsheet and Dosing Calendar (Go to Encounters tab in chart review, and find the Anticoagulation Therapy Visit)        Linsey DUMONT  CLARENCE Barrios

## 2020-06-12 ENCOUNTER — HOSPITAL ENCOUNTER (OUTPATIENT)
Dept: CARDIAC REHAB | Facility: CLINIC | Age: 55
End: 2020-06-12
Attending: PHYSICIAN ASSISTANT
Payer: COMMERCIAL

## 2020-06-12 PROCEDURE — 40000116 ZZH STATISTIC OP CR VISIT

## 2020-06-12 PROCEDURE — 93798 PHYS/QHP OP CAR RHAB W/ECG: CPT

## 2020-06-15 ENCOUNTER — ANTICOAGULATION THERAPY VISIT (OUTPATIENT)
Dept: NURSING | Facility: CLINIC | Age: 55
End: 2020-06-15

## 2020-06-15 DIAGNOSIS — I35.0 NONRHEUMATIC AORTIC VALVE STENOSIS: ICD-10-CM

## 2020-06-15 DIAGNOSIS — Z95.2 HEART VALVE REPLACED: ICD-10-CM

## 2020-06-15 DIAGNOSIS — Z95.1 S/P CABG (CORONARY ARTERY BYPASS GRAFT): ICD-10-CM

## 2020-06-15 LAB
CAPILLARY BLOOD COLLECTION: NORMAL
INR PPP: 1.9 (ref 0.86–1.14)

## 2020-06-15 PROCEDURE — 36416 COLLJ CAPILLARY BLOOD SPEC: CPT | Performed by: FAMILY MEDICINE

## 2020-06-15 PROCEDURE — 85610 PROTHROMBIN TIME: CPT | Performed by: FAMILY MEDICINE

## 2020-06-15 PROCEDURE — 99207 ZZC NO CHARGE NURSE ONLY: CPT

## 2020-06-15 RX ORDER — POTASSIUM CHLORIDE 1.5 G/1.58G
20 POWDER, FOR SOLUTION ORAL 2 TIMES DAILY
COMMUNITY
Start: 2020-06-15 | End: 2021-01-05

## 2020-06-15 NOTE — PROGRESS NOTES
ANTICOAGULATION FOLLOW-UP CLINIC VISIT    Patient Name:  Benjamín Us  Date:  6/15/2020  Contact Type:  Telephone with wife, Michelle and patient near by    SUBJECTIVE:  Patient Findings     Positives:   Change in activity (cardiac rehab 2 times per week), Change in medications (No longer taking: Oxycodone or Pantoprazole.  ), Bruising (1 bruise on L arm, it is healing)    Comments:   Concurrent use of PANTOPRAZOLE and WARFARIN may result in increased International Normalized Ratio (INR) and prothrombin time.  INR most likely dropped due to discontinuing Pantoprazole, cardiac rehab 2 times per week and healing from heart surgery so weekly warfarin dose increased from 30 to 32.5mg=10% increase, ok per protocol.        Clinical Outcomes     Negatives:   Major bleeding event, Thromboembolic event, Anticoagulation-related hospital admission, Anticoagulation-related ED visit, Anticoagulation-related fatality    Comments:   Concurrent use of PANTOPRAZOLE and WARFARIN may result in increased International Normalized Ratio (INR) and prothrombin time.  INR most likely dropped due to discontinuing Pantoprazole, cardiac rehab 2 times per week and healing from heart surgery so weekly warfarin dose increased from 30 to 32.5mg=10% increase, ok per protocol.           OBJECTIVE    Recent labs: (last 7 days)     06/15/20  1130   INR 1.90*       ASSESSMENT / PLAN  INR assessment SUB    Recheck INR In: 1 WEEK    INR Location Clinic      Anticoagulation Summary  As of 6/15/2020    INR goal:   2.0-3.0   TTR:   42.5 % (1.1 wk)   INR used for dosin.90! (6/15/2020)   Warfarin maintenance plan:   2.5 mg (5 mg x 0.5) every Thu; 5 mg (5 mg x 1) all other days   Full warfarin instructions:   2.5 mg every Thu; 5 mg all other days   Weekly warfarin total:   32.5 mg   Plan last modified:   Linsey Barrios, RN (6/15/2020)   Next INR check:   2020   Priority:   High   Target end date:   Indefinite    Indications    S/P CABG (coronary artery  bypass graft) [Z95.1]  Heart valve replaced [Z95.2]             Anticoagulation Episode Summary     INR check location:       Preferred lab:       Send INR reminders to:   [facility]    Comments:         Anticoagulation Care Providers     Provider Role Specialty Phone number    Mag Richards PA-C Referring Physician Assistant [phone]            See the Encounter Report to view Anticoagulation Flowsheet and Dosing Calendar (Go to Encounters tab in chart review, and find the Anticoagulation Therapy Visit)        Linsey Barrios RN

## 2020-06-16 ENCOUNTER — TELEPHONE (OUTPATIENT)
Dept: OTHER | Facility: CLINIC | Age: 55
End: 2020-06-16

## 2020-06-16 NOTE — TELEPHONE ENCOUNTER
Patient called his HR is continuing to rise to 100-110, regular with /84. Metoprolol increased from 12.5 mg to 25 mg op BID last evening dose. Denies shortness of breath,CP or lightheadedness.  Discussed with Mag Richards. Will watch another 24 hours at 25 mg and pt has a  rehab session tomorrow. Will the reevaluate. Patient states understanding.

## 2020-06-17 ENCOUNTER — HOSPITAL ENCOUNTER (OUTPATIENT)
Dept: CARDIAC REHAB | Facility: CLINIC | Age: 55
End: 2020-06-17
Attending: PHYSICIAN ASSISTANT
Payer: COMMERCIAL

## 2020-06-17 PROCEDURE — 93798 PHYS/QHP OP CAR RHAB W/ECG: CPT | Performed by: REHABILITATION PRACTITIONER

## 2020-06-17 PROCEDURE — 40000116 ZZH STATISTIC OP CR VISIT: Performed by: REHABILITATION PRACTITIONER

## 2020-06-18 DIAGNOSIS — E11.69 TYPE 2 DIABETES MELLITUS WITH OTHER SPECIFIED COMPLICATION, WITHOUT LONG-TERM CURRENT USE OF INSULIN (H): ICD-10-CM

## 2020-06-19 ENCOUNTER — HOSPITAL ENCOUNTER (OUTPATIENT)
Dept: CARDIAC REHAB | Facility: CLINIC | Age: 55
End: 2020-06-19
Attending: PHYSICIAN ASSISTANT
Payer: COMMERCIAL

## 2020-06-19 ENCOUNTER — TELEPHONE (OUTPATIENT)
Dept: FAMILY MEDICINE | Facility: CLINIC | Age: 55
End: 2020-06-19

## 2020-06-19 PROCEDURE — 40000116 ZZH STATISTIC OP CR VISIT

## 2020-06-19 PROCEDURE — 93798 PHYS/QHP OP CAR RHAB W/ECG: CPT

## 2020-06-19 NOTE — TELEPHONE ENCOUNTER
Diabetes Education Scheduling Outreach #1:    Call to patient to schedule. Left message with phone number to call to schedule.    Plan for 2nd outreach attempt within 1 week.    Solange Rivera  [facility]  Diabetes and Nutrition Scheduling

## 2020-06-22 ENCOUNTER — HOSPITAL ENCOUNTER (OUTPATIENT)
Dept: CARDIAC REHAB | Facility: CLINIC | Age: 55
End: 2020-06-22
Attending: PHYSICIAN ASSISTANT
Payer: COMMERCIAL

## 2020-06-22 ENCOUNTER — TELEPHONE (OUTPATIENT)
Dept: OTHER | Facility: CLINIC | Age: 55
End: 2020-06-22

## 2020-06-22 ENCOUNTER — ANTICOAGULATION THERAPY VISIT (OUTPATIENT)
Dept: NURSING | Facility: CLINIC | Age: 55
End: 2020-06-22

## 2020-06-22 DIAGNOSIS — Z95.2 HEART VALVE REPLACED: ICD-10-CM

## 2020-06-22 DIAGNOSIS — I35.0 NONRHEUMATIC AORTIC VALVE STENOSIS: ICD-10-CM

## 2020-06-22 DIAGNOSIS — Z95.1 S/P CABG (CORONARY ARTERY BYPASS GRAFT): ICD-10-CM

## 2020-06-22 LAB
CAPILLARY BLOOD COLLECTION: NORMAL
INR PPP: 2 (ref 0.86–1.14)

## 2020-06-22 PROCEDURE — 99207 ZZC NO CHARGE NURSE ONLY: CPT

## 2020-06-22 PROCEDURE — 85610 PROTHROMBIN TIME: CPT | Performed by: FAMILY MEDICINE

## 2020-06-22 PROCEDURE — 93798 PHYS/QHP OP CAR RHAB W/ECG: CPT | Performed by: REHABILITATION PRACTITIONER

## 2020-06-22 PROCEDURE — 40000116 ZZH STATISTIC OP CR VISIT: Performed by: REHABILITATION PRACTITIONER

## 2020-06-22 PROCEDURE — 36416 COLLJ CAPILLARY BLOOD SPEC: CPT | Performed by: FAMILY MEDICINE

## 2020-06-22 NOTE — PROGRESS NOTES
ANTICOAGULATION FOLLOW-UP CLINIC VISIT    Patient Name:  Benjamín Us  Date:  2020  Contact Type:  Telephone with Michelle, patient's wife.  Patient and wife prefer we call Michelle for INR results/plan.    SUBJECTIVE:  Patient Findings     Positives:   Change in health (Pt has had tremors in bilateral hands x 5 days.  Pt on the phone with [facility] scheduling an appt. with PCP.  Pt's blood sugars have been WNL and cardiology team did not think symptoms were related to cardiac meds or surgery.), Change in activity (cardiac rehab 2 times per week, started on 20)    Comments:   INR trending on low end, increase in activity and probable reduction in inflammation from cardiac surgery 2020 so I increased warfarin maintenance dose 7%.        Clinical Outcomes     Negatives:   Major bleeding event, Thromboembolic event, Anticoagulation-related hospital admission, Anticoagulation-related ED visit, Anticoagulation-related fatality    Comments:   INR trending on low end, increase in activity and probable reduction in inflammation from cardiac surgery 2020 so I increased warfarin maintenance dose 7%.           OBJECTIVE    Recent labs: (last 7 days)     20  1418   INR 2.00*       ASSESSMENT / PLAN  INR assessment THER    Recheck INR In: 1 WEEK    INR Location Clinic      Anticoagulation Summary  As of 2020    INR goal:   2.0-3.0   TTR:   23.1 % (2.1 wk)   INR used for dosin.00 (2020)   Warfarin maintenance plan:   5 mg (5 mg x 1) every day   Full warfarin instructions:   5 mg every day   Weekly warfarin total:   35 mg   Plan last modified:   Linsey Barrios RN (2020)   Next INR check:   2020   Priority:   High   Target end date:   Indefinite    Indications    S/P CABG (coronary artery bypass graft) [Z95.1]  Heart valve replaced [Z95.2]             Anticoagulation Episode Summary     INR check location:       Preferred lab:       Send INR reminders to:   [facility]    Comments:          Anticoagulation Care Providers     Provider Role Specialty Phone number    Mag Richards PA-C Referring Physician Assistant [phone]            See the Encounter Report to view Anticoagulation Flowsheet and Dosing Calendar (Go to Encounters tab in chart review, and find the Anticoagulation Therapy Visit)        Linsey Barrios RN

## 2020-06-22 NOTE — TELEPHONE ENCOUNTER
Patient's wife called stating he has developed a tremor bilaterally. No other abnormalities neurologically.   Blood sugars 130-140 on metformin only. Blood pressure 130-140 sys HR . Cardiac rehab notes without any concerns. Discussed with Denise Jackson PA-C. Medications reviewed. No obvious medication reaction apparent. Recommended follow up with Dr. Roberson and to schedule his diabetic education management. States understanding.

## 2020-06-23 ENCOUNTER — TELEPHONE (OUTPATIENT)
Dept: CARDIOLOGY | Facility: CLINIC | Age: 55
End: 2020-06-23

## 2020-06-23 DIAGNOSIS — I48.92 ATRIAL FLUTTER (H): Primary | ICD-10-CM

## 2020-06-23 NOTE — TELEPHONE ENCOUNTER
Spoke to patient to set up EKG prior to appt (6/26) per Dr Villanueva request.  Patient to come in on 6/25 at 2pm.  Wellness screen completed.  Wellness Screening Tool    Symptom Screening:    Do you have one of the following NEW symptoms:      Fever (subjective or >100.0)?  No    New cough? No    Shortness of breath? No    Chills? No reports he has been having chills since his surgery.  Denies having fever.    New loss of taste or smell? No  Patient reports he has had a metallic taste in mouth since his surgery.    Generalized body aches? No    New persistent headache? No    New sore throat? No    Nausea, vomiting or diarrhea? No    Within the past 3 weeks, have you been exposed to someone with a known positive illness below?      COVID - 19 (known or suspected) No    Chicken pox?  No    Measles? No    Pertussis? No    Patient notified of visitor restriction: Yes  Patient informed to wear a mask: Yes    Patient's appointment status: Patient will be seen in clinic as scheduled on 6/25 for EKG in preparation for virtual visit with Dr Villanueva on 6/26.  CLARENCE Mclaughlin

## 2020-06-24 ENCOUNTER — TELEPHONE (OUTPATIENT)
Dept: CARDIOLOGY | Facility: CLINIC | Age: 55
End: 2020-06-24

## 2020-06-24 NOTE — TELEPHONE ENCOUNTER

## 2020-06-25 DIAGNOSIS — I48.92 ATRIAL FLUTTER (H): ICD-10-CM

## 2020-06-25 PROCEDURE — 93000 ELECTROCARDIOGRAM COMPLETE: CPT | Performed by: INTERNAL MEDICINE

## 2020-06-25 NOTE — PROGRESS NOTES
EKG performed per orders, printed and shown to Michelle Thapa RN.  Patient OK to leave and the EKG will be discussed aduring his virtual visit 6/26/2020.  SHAHAB Pereyra

## 2020-06-26 ENCOUNTER — VIRTUAL VISIT (OUTPATIENT)
Dept: CARDIOLOGY | Facility: CLINIC | Age: 55
End: 2020-06-26
Payer: COMMERCIAL

## 2020-06-26 ENCOUNTER — HOSPITAL ENCOUNTER (OUTPATIENT)
Dept: CARDIAC REHAB | Facility: CLINIC | Age: 55
End: 2020-06-26
Attending: PHYSICIAN ASSISTANT
Payer: COMMERCIAL

## 2020-06-26 ENCOUNTER — OFFICE VISIT (OUTPATIENT)
Dept: FAMILY MEDICINE | Facility: CLINIC | Age: 55
End: 2020-06-26
Payer: COMMERCIAL

## 2020-06-26 VITALS
HEART RATE: 96 BPM | TEMPERATURE: 98.5 F | WEIGHT: 214 LBS | BODY MASS INDEX: 30.71 KG/M2 | DIASTOLIC BLOOD PRESSURE: 85 MMHG | OXYGEN SATURATION: 99 % | SYSTOLIC BLOOD PRESSURE: 139 MMHG

## 2020-06-26 VITALS
SYSTOLIC BLOOD PRESSURE: 125 MMHG | HEART RATE: 90 BPM | DIASTOLIC BLOOD PRESSURE: 81 MMHG | BODY MASS INDEX: 30.42 KG/M2 | WEIGHT: 212 LBS

## 2020-06-26 DIAGNOSIS — L40.9 PSORIASIS: ICD-10-CM

## 2020-06-26 DIAGNOSIS — I48.3 TYPICAL ATRIAL FLUTTER (H): Primary | ICD-10-CM

## 2020-06-26 DIAGNOSIS — Z11.59 NEED FOR HEPATITIS C SCREENING TEST: ICD-10-CM

## 2020-06-26 DIAGNOSIS — E11.8 TYPE 2 DIABETES MELLITUS WITH COMPLICATION, WITHOUT LONG-TERM CURRENT USE OF INSULIN (H): Primary | ICD-10-CM

## 2020-06-26 DIAGNOSIS — Z09 ENCOUNTER FOR FOLLOW-UP FOR AORTIC VALVE REPLACEMENT: ICD-10-CM

## 2020-06-26 DIAGNOSIS — Z95.2 ENCOUNTER FOR FOLLOW-UP FOR AORTIC VALVE REPLACEMENT: ICD-10-CM

## 2020-06-26 LAB
ERYTHROCYTE [DISTWIDTH] IN BLOOD BY AUTOMATED COUNT: 14.3 % (ref 10–15)
HBA1C MFR BLD: 7.5 % (ref 0–5.6)
HCT VFR BLD AUTO: 36.2 % (ref 40–53)
HGB BLD-MCNC: 12 G/DL (ref 13.3–17.7)
MCH RBC QN AUTO: 30.3 PG (ref 26.5–33)
MCHC RBC AUTO-ENTMCNC: 33.1 G/DL (ref 31.5–36.5)
MCV RBC AUTO: 91 FL (ref 78–100)
PLATELET # BLD AUTO: 392 10E9/L (ref 150–450)
RBC # BLD AUTO: 3.96 10E12/L (ref 4.4–5.9)
WBC # BLD AUTO: 12.8 10E9/L (ref 4–11)

## 2020-06-26 PROCEDURE — 36415 COLL VENOUS BLD VENIPUNCTURE: CPT | Performed by: FAMILY MEDICINE

## 2020-06-26 PROCEDURE — 93798 PHYS/QHP OP CAR RHAB W/ECG: CPT

## 2020-06-26 PROCEDURE — 85027 COMPLETE CBC AUTOMATED: CPT | Performed by: FAMILY MEDICINE

## 2020-06-26 PROCEDURE — 99202 OFFICE O/P NEW SF 15 MIN: CPT | Mod: 95 | Performed by: INTERNAL MEDICINE

## 2020-06-26 PROCEDURE — 83036 HEMOGLOBIN GLYCOSYLATED A1C: CPT | Performed by: FAMILY MEDICINE

## 2020-06-26 PROCEDURE — 80053 COMPREHEN METABOLIC PANEL: CPT | Performed by: FAMILY MEDICINE

## 2020-06-26 PROCEDURE — 40000116 ZZH STATISTIC OP CR VISIT

## 2020-06-26 PROCEDURE — 86803 HEPATITIS C AB TEST: CPT | Performed by: FAMILY MEDICINE

## 2020-06-26 PROCEDURE — 99214 OFFICE O/P EST MOD 30 MIN: CPT | Performed by: FAMILY MEDICINE

## 2020-06-26 RX ORDER — BETAMETHASONE DIPROPIONATE 0.5 MG/G
CREAM TOPICAL 2 TIMES DAILY
Qty: 30 G | Refills: 1 | Status: SHIPPED | OUTPATIENT
Start: 2020-06-26 | End: 2020-08-27

## 2020-06-26 ASSESSMENT — ANXIETY QUESTIONNAIRES
1. FEELING NERVOUS, ANXIOUS, OR ON EDGE: NOT AT ALL
7. FEELING AFRAID AS IF SOMETHING AWFUL MIGHT HAPPEN: NOT AT ALL
GAD7 TOTAL SCORE: 7
6. BECOMING EASILY ANNOYED OR IRRITABLE: SEVERAL DAYS
5. BEING SO RESTLESS THAT IT IS HARD TO SIT STILL: NOT AT ALL
2. NOT BEING ABLE TO STOP OR CONTROL WORRYING: NEARLY EVERY DAY
IF YOU CHECKED OFF ANY PROBLEMS ON THIS QUESTIONNAIRE, HOW DIFFICULT HAVE THESE PROBLEMS MADE IT FOR YOU TO DO YOUR WORK, TAKE CARE OF THINGS AT HOME, OR GET ALONG WITH OTHER PEOPLE: NOT DIFFICULT AT ALL
3. WORRYING TOO MUCH ABOUT DIFFERENT THINGS: NEARLY EVERY DAY

## 2020-06-26 ASSESSMENT — PATIENT HEALTH QUESTIONNAIRE - PHQ9
SUM OF ALL RESPONSES TO PHQ QUESTIONS 1-9: 8
5. POOR APPETITE OR OVEREATING: NOT AT ALL

## 2020-06-26 NOTE — PROGRESS NOTES
"Benjamín Us is a 54 year old male who is being evaluated via a billable video visit.      The patient has been notified of following:     \"This video visit will be conducted via a call between you and your physician/provider. We have found that certain health care needs can be provided without the need for an in-person physical exam.  This service lets us provide the care you need with a video conversation.  If a prescription is necessary we can send it directly to your pharmacy.  If lab work is needed we can place an order for that and you can then stop by our lab to have the test done at a later time.    Video visits are billed at different rates depending on your insurance coverage.  Please reach out to your insurance provider with any questions.    If during the course of the call the physician/provider feels a video visit is not appropriate, you will not be charged for this service.\"    Patient has given verbal consent for Video visit? Yes    How would you like to obtain your AVS? Mail a copy  Patient would like the video invitation sent by: Text to cell phone: [phone]  Pt reported wt today 212#, BP-125/81, P-90  SHAHAB March    Will anyone else be joining your video visit? No          PHYSICIAN NOTE:  This visit was completed via video due to COVID-19 precautions.  The patient provided consent for a video visit.  This visit had to be converted to a phone visit because of difficulties with the video boone.  I had the pleasure evaluating Mr. Benjamín Us today.    The patient is a very pleasant 54-year-old male with history of hypertension, dyslipidemia, diabetes mellitus type 2, aortic stenosis who underwent mechanical AVR (23 mm [place] valve) and single-vessel bypass (LIMA to LAD) on 5/22/2020.  Postoperatively he developed typical atrial flutter with 2:1 AV conduction.  There was spontaneous conversion.  The patient was aware of the arrhythmia.  Before his surgery he had " "infrequent brief palpitation lasting less than 1 minute.  No history of sustained events.    At [facility] she was seen by my partner Dr. Badillo and was started on amiodarone.  Since hospital discharge he has only had a couple of episodes of \"chest fluttering\" lasting few seconds each.  He is recovering well after surgery.  He states of no chest pain, orthopnea, PND, lower extremity edema, syncope or near syncope.  He complains of UE tremor since leaving the hospital.  He has also had cramping in his calves.    He lives in [place] with his spouse.  He quit smoking in February 2020.  He does not drink alcohol.      DIAGNOSTIC STUDIES:  Twelve-lead ECG yesterday showed sinus rhythm with ST-T abnormality.      IMPRESSION:  1. Post AVR/CABG atrial arrhythmias.  In >80% of cardiac surgery patients, these arrhythmias are only transient and improved spontaneously after healing takes place.  2. 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  3. Hypertension.  4. Dyslipidemia.  5. Diabetes mellitus type 2.    RECOMMENDATIONS:  1. Stop amiodarone today.  I suspect his tremor will gradually improve.  2. In mid July, hookup a 14-day leadless cardiac monitor.  3. If we document atrial arrhythmias on his cardiac monitor or thereafter we will plan to see him again in the EP clinic and discuss future care.  4. Continue long-term warfarin given mechanical aortic valve.    Thank you for the opportunity to be part of his care.    Pecry Villanueva MD, [facility]        Telephone visit documentation  Start: 1:50 PM  End: 2:10 PM                "

## 2020-06-26 NOTE — PROGRESS NOTES
Subjective     Benjamín Us is a 54 year old male who presents to clinic today for the following health issues:    HPI   Diabetes Follow-up    How often are you checking your blood sugar? Three times daily  Blood sugar testing frequency justification:  Post op heart valve   What time of day are you checking your blood sugars (select all that apply)?  Before and after meals and At bedtime  Have you had any blood sugars above 200?  Yes while in hospital  Have you had any blood sugars below 70?  No    What symptoms do you notice when your blood sugar is low?  None has not occurred    What concerns do you have today about your diabetes? None and Other: questions on meds     Do you have any of these symptoms? (Select all that apply)  Weight loss    Have you had a diabetic eye exam in the last 12 months? No        BP Readings from Last 2 Encounters:   06/26/20 125/81   05/30/20 (!) 147/60     Hemoglobin A1C (%)   Date Value   05/21/2020 12.2 (H)   01/26/2016 7.2 (H)     LDL Cholesterol Calculated (mg/dL)   Date Value   03/25/2014 135 (H)     LDL Cholesterol Direct (mg/dL)   Date Value   03/25/2016 131 (H)                 How many servings of fruits and vegetables do you eat daily?  0-1    On average, how many sweetened beverages do you drink each day (Examples: soda, juice, sweet tea, etc.  Do NOT count diet or artificially sweetened beverages)?   0    How many days per week do you exercise enough to make your heart beat faster? 3 or less    How many minutes a day do you exercise enough to make your heart beat faster? 9 or less    How many days per week do you miss taking your medication? 0      Hospital Follow-up Visit:    Hospital/Nursing Home/IP Rehab Facility: [facility]  Date of Admission: 5/21/20  Date of Discharge: 5/29/20  Reason(s) for Admission: heart bypass      Was your hospitalization related to COVID-19? No   Problems taking medications regularly:  None  Medication changes since discharge:  he stopped the insulin due to lower blood sugars 130-165  Problems adhering to non-medication therapy:  None  Diabetic diet, holding his weight   Summary of hospitalization:  [facility] hospital discharge summary reviewed  Aortic valve and repair aorta   Diagnostic Tests/Treatments reviewed.  Follow up needed: cardiac rehab   Other Healthcare Providers Involved in Patient s Care:         cardiac surgery, [facility] ,   Update since discharge: more energy,        Post Discharge Medication Reconciliation: medication reconcilation previously completed during another office visit.  Plan of care communicated with patient and family              Past Medical History:   Diagnosis Date     ADD (attention deficit disorder)      Aneurysm of thoracic aorta (H) 5/21/2014     Problem list name updated by automated process. Provider to review     Aortic valve disorder 5/21/2014     Atrial flutter (H)     post op AVR and CABG     CAD (coronary artery disease)      CARDIOVASCULAR SCREENING; LDL GOAL LESS THAN 160 2/25/2014     Dermatitis 9/14/2018     Essential hypertension, benign 9/14/2018     Hypertension      Hypertensive urgency 8/8/2013     HARIS (obstructive sleep apnea)      Type 2 diabetes mellitus with diabetic dermatitis, without long-term current use of insulin (H) 9/14/2018       Past Surgical History:   Procedure Laterality Date     APPENDECTOMY       BYPASS GRAFT ARTERY CORONARY N/A 5/22/2020    Procedure: CORONARY ARTERY BYPASS GRAFT X 1  WITH LEFT LEG  ENDOSCOPIC VEIN HARVEST, ON PUMP WITH LEONA. WOUND VAC PLACEMENT. LIMA-LAD;  Surgeon: Aimee Rose MD;  Location:  OR     COLONOSCOPY N/A 6/15/2015    Procedure: COLONOSCOPY;  Surgeon: Vasyl Lawson MD;  Location:  GI     CV CORONARY ANGIOGRAM N/A 1/3/2020    Procedure: Coronary Angiogram;  Surgeon: Álvaro Andrade MD;  Location: [facility] CARDIAC CATH LAB     EXTRACTION(S) DENTAL N/A 5/21/2020    Procedure: EXTRACTION, REMAINING TEETH;  Surgeon:  Vasyl Brand DDS;  Location:  OR     REPLACE VALVE AORTIC N/A 2020    Procedure: AORTIC VALVE REPLACEMENT WITH REGENT MECHANICAL VALVE SIZE 23 MM.;  Surgeon: Aimee Rose MD;  Location:  OR       Family History   Problem Relation Age of Onset     Hypertension Mother      Breast Cancer Mother      Diabetes Father      Diabetes Brother      Thyroid Disease Sister      Colon Cancer No family hx of        Social History     Tobacco Use     Smoking status: Former Smoker     Packs/day: 1.00     Years: 40.00     Pack years: 40.00     Types: Cigarettes     Start date:      Last attempt to quit: 2020     Years since quittin.2     Smokeless tobacco: Never Used   Substance Use Topics     Alcohol use: No     Alcohol/week: 0.0 standard drinks     Comment: 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      Objective    There were no vitals taken for this visit.  There is no height or weight on file to calculate BMI.  Physical Exam   GENERAL: healthy, alert and no distress  EYES: Eyes grossly normal to inspection, PERRL and conjunctivae and sclerae normal  HENT: ear canals and TM's normal, nose and mouth without ulcers or lesions  NECK: no adenopathy, no asymmetry, masses, or scars and thyroid normal to palpation  RESP: lungs clear to auscultation - no rales, rhonchi or wheezes  CV: regular rate and rhythm, normal S1 S2, no S3 or S4, no murmur, click or rub, no peripheral edema and peripheral pulses strong  ABDOMEN: soft, nontender, no  hepatosplenomegaly, no masses and bowel sounds normal  MS: no gross musculoskeletal defects noted, no edema  SKIN: no suspicious lesions or rashes  NEURO: Normal strength and tone, mentation intact and speech normal  PSYCH: mentation appears normal, affect normal/bright    Diagnostic Test Results:  Labs reviewed in Epic  Pending     A1C will be elevated due to perioperative elevation steady improvement on metformin alone     (E11.8) Type 2 diabetes mellitus with complication, without long-term current use of insulin (H)  (primary encounter diagnosis)  Comment:   Plan: Comprehensive metabolic panel, CBC with         platelets, Hemoglobin A1c        Metformin     (Z09,  Z95.2) Encounter for follow-up for aortic valve replacement  Comment:   Plan: off amiodarone now     (Z11.59) Need for hepatitis C screening test  Comment:   Plan: Hepatitis C Screen Reflex to HCV RNA Quant and         Genotype        lifetime    (L40.9) Psoriasis  Comment:   Plan: augmented betamethasone dipropionate         (DIPROLENE-AF) 0.05 % external cream        Forehead, topical treatment

## 2020-06-26 NOTE — LETTER
"6/26/2020    Axel Roberson MD  [id number]  [facility] [address]    RE: Benjamín Us       Dear Colleague,    I had the pleasure of seeing Benjamín Us in the [facility].    Benjamín Us is a 54 year old male who is being evaluated via a billable video visit.      PHYSICIAN NOTE:  This visit was completed via video due to COVID-19 precautions.  The patient provided consent for a video visit.  This visit had to be converted to a phone visit because of difficulties with the video boone.  I had the pleasure evaluating Mr. Benjamín Us today.    The patient is a very pleasant 54-year-old male with history of hypertension, dyslipidemia, diabetes mellitus type 2, aortic stenosis who underwent mechanical AVR (23 mm [place] valve) and single-vessel bypass (LIMA to LAD) on 5/22/2020.  Postoperatively he developed typical atrial flutter with 2:1 AV conduction.  There was spontaneous conversion.  The patient was aware of the arrhythmia.  Before his surgery he had infrequent brief palpitation lasting less than 1 minute.  No history of sustained events.    At [facility] she was seen by my partner Dr. Badillo and was started on amiodarone.  Since hospital discharge he has only had a couple of episodes of \"chest fluttering\" lasting few seconds each.  He is recovering well after surgery.  He states of no chest pain, orthopnea, PND, lower extremity edema, syncope or near syncope.  He complains of UE tremor since leaving the hospital.  He has also had cramping in his calves.    He lives in [place] with his spouse.  He quit smoking in February 2020.  He does not drink alcohol.      DIAGNOSTIC STUDIES:  Twelve-lead ECG yesterday showed sinus rhythm with ST-T abnormality.      IMPRESSION:  1. Post AVR/CABG atrial arrhythmias.  In >80% of cardiac surgery patients, these arrhythmias are only transient and improved spontaneously after healing takes place.  2. Tremor, " likely amiodarone-induced.  3. Hypertension.  4. Dyslipidemia.  5. Diabetes mellitus type 2.    RECOMMENDATIONS:  1. Stop amiodarone today.  I suspect his tremor will gradually improve.  2. In mid July, hookup a 14-day leadless cardiac monitor.  3. If we document atrial arrhythmias on his cardiac monitor or thereafter we will plan to see him again in the EP clinic and discuss future care.  4. Continue long-term warfarin given mechanical aortic valve.    Thank you for the opportunity to be part of his care.      Sincerely,     Percy Villanueva MD     [facility]

## 2020-06-27 ASSESSMENT — ANXIETY QUESTIONNAIRES: GAD7 TOTAL SCORE: 7

## 2020-06-29 ENCOUNTER — HOSPITAL ENCOUNTER (OUTPATIENT)
Dept: CARDIAC REHAB | Facility: CLINIC | Age: 55
End: 2020-06-29
Attending: PHYSICIAN ASSISTANT
Payer: COMMERCIAL

## 2020-06-29 ENCOUNTER — ANTICOAGULATION THERAPY VISIT (OUTPATIENT)
Dept: NURSING | Facility: CLINIC | Age: 55
End: 2020-06-29

## 2020-06-29 DIAGNOSIS — Z95.1 S/P CABG (CORONARY ARTERY BYPASS GRAFT): ICD-10-CM

## 2020-06-29 DIAGNOSIS — Z95.2 HEART VALVE REPLACED: ICD-10-CM

## 2020-06-29 DIAGNOSIS — I35.0 NONRHEUMATIC AORTIC VALVE STENOSIS: ICD-10-CM

## 2020-06-29 LAB
ALBUMIN SERPL-MCNC: 3.8 G/DL (ref 3.4–5)
ALP SERPL-CCNC: 121 U/L (ref 40–150)
ALT SERPL W P-5'-P-CCNC: 35 U/L (ref 0–70)
ANION GAP SERPL CALCULATED.3IONS-SCNC: 8 MMOL/L (ref 3–14)
AST SERPL W P-5'-P-CCNC: 21 U/L (ref 0–45)
BILIRUB SERPL-MCNC: 0.4 MG/DL (ref 0.2–1.3)
BUN SERPL-MCNC: 18 MG/DL (ref 7–30)
CALCIUM SERPL-MCNC: 9.5 MG/DL (ref 8.5–10.1)
CAPILLARY BLOOD COLLECTION: NORMAL
CHLORIDE SERPL-SCNC: 103 MMOL/L (ref 94–109)
CO2 SERPL-SCNC: 24 MMOL/L (ref 20–32)
CREAT SERPL-MCNC: 0.95 MG/DL (ref 0.66–1.25)
GFR SERPL CREATININE-BSD FRML MDRD: 89 ML/MIN/{1.73_M2}
GLUCOSE SERPL-MCNC: 120 MG/DL (ref 70–99)
HCV AB SERPL QL IA: NONREACTIVE
INR PPP: 1.8 (ref 0.86–1.14)
POTASSIUM SERPL-SCNC: 4.7 MMOL/L (ref 3.4–5.3)
PROT SERPL-MCNC: 8.3 G/DL (ref 6.8–8.8)
SODIUM SERPL-SCNC: 135 MMOL/L (ref 133–144)

## 2020-06-29 PROCEDURE — 40000116 ZZH STATISTIC OP CR VISIT

## 2020-06-29 PROCEDURE — 99207 ZZC NO CHARGE NURSE ONLY: CPT

## 2020-06-29 PROCEDURE — 36416 COLLJ CAPILLARY BLOOD SPEC: CPT | Performed by: FAMILY MEDICINE

## 2020-06-29 PROCEDURE — 85610 PROTHROMBIN TIME: CPT | Performed by: FAMILY MEDICINE

## 2020-06-29 PROCEDURE — 93798 PHYS/QHP OP CAR RHAB W/ECG: CPT

## 2020-06-29 NOTE — PROGRESS NOTES
ANTICOAGULATION FOLLOW-UP CLINIC VISIT    Patient Name:  Benjamín Us  Date:  2020  Contact Type:  Telephone with patient's spouse, Michelle    SUBJECTIVE:  Patient Findings     Positives:   Change in health (Pt had virtual visit with cardiologist on 20, tremors thought to be side effect of Amiodarone.  Pt also had clinic visit with PCP on , no changes noted from that visit.), Change in medications (Cardiologist discontinued Amiodarone on 20)    Comments:   Warfarin dosing reviewed by DENIZ Flores PharmD, see her note in this encounter.        Clinical Outcomes     Negatives:   Major bleeding event, Thromboembolic event, Anticoagulation-related hospital admission, Anticoagulation-related ED visit, Anticoagulation-related fatality    Comments:   Warfarin dosing reviewed by DENIZ Flores PharmD, see her note in this encounter.           OBJECTIVE    Recent labs: (last 7 days)     20  1416   INR 1.80*       ASSESSMENT / PLAN  INR assessment SUB    Recheck INR In: 4 DAYS    INR Location Clinic      Anticoagulation Summary  As of 2020    INR goal:   2.0-3.0   TTR:   16.0 % (3.1 wk)   INR used for dosin.80! (2020)   Warfarin maintenance plan:   7.5 mg (5 mg x 1.5) every Mon, Fri; 5 mg (5 mg x 1) all other days   Full warfarin instructions:   7.5 mg every Mon, Fri; 5 mg all other days   Weekly warfarin total:   40 mg   Plan last modified:   Linsey Barrios RN (2020)   Next INR check:   7/3/2020   Priority:   High   Target end date:   Indefinite    Indications    S/P CABG (coronary artery bypass graft) [Z95.1]  Heart valve replaced [Z95.2]             Anticoagulation Episode Summary     INR check location:       Preferred lab:       Send INR reminders to:   [facility]    Comments:         Anticoagulation Care Providers     Provider Role Specialty Phone number    Mag Richards PA-C Referring Physician Assistant [phone]            See the Encounter Report to  view Anticoagulation Flowsheet and Dosing Calendar (Go to Encounters tab in chart review, and find the Anticoagulation Therapy Visit)        Linsey Barrios RN

## 2020-07-01 ENCOUNTER — TELEPHONE (OUTPATIENT)
Dept: FAMILY MEDICINE | Facility: CLINIC | Age: 55
End: 2020-07-01

## 2020-07-01 ENCOUNTER — TELEPHONE (OUTPATIENT)
Dept: CARDIOLOGY | Facility: CLINIC | Age: 55
End: 2020-07-01

## 2020-07-01 NOTE — TELEPHONE ENCOUNTER
Can Patient resume Adderall 20 mg 3 times daily?  Patient's wife states she spoke with Cardiology and they recommend she contact [facility] Team.    Patient had Heart valve replacement and CABG June 22nd.    Please review and advise.  Thank you.  Deepika Barraza RN  Anticoagulation Nurse - [facility] [place]

## 2020-07-01 NOTE — TELEPHONE ENCOUNTER
Michelle wife has been informed. She will contact cardiology and inform.  Deepika Barraza RN  Anticoagulation Nurse - [facility]

## 2020-07-01 NOTE — TELEPHONE ENCOUNTER
No interaction with Adderall and warfarin noted in UpToDate/Lexicomp.      If cardiology has cleared from a heart rate/cardiology perspective, OK to resume wit warfarin, recommend INR recheck in 1-2 weeks max to ensure any changes in heart rate, etc are not affecting blood flow and metabolism of warfarin and therefore affecting INR.    Sandra Hodge, PharmD BCACP  Anticoagulation Clinical Pharmacist

## 2020-07-01 NOTE — TELEPHONE ENCOUNTER
Spouse of patient called to say that Dr. Julio Carr with [facility] would like a statement from his cardiologist that its ok to start patient on Adderall with the cardiac meds that he is on.He underwent mechanical AVR (23 mm [place] valve) and single-vessel bypass (LIMA to LAD) on 5/22/2020.  Postoperatively he developed typical atrial flutter with 2:1 AV conduction.  There was spontaneous conversion.  The patient was aware of the arrhythmia. He saw Dr. Villanueva on 6/26 and at that took him off amiodarone because of a tremor.  Patient is on warfarin. I had spouse call INR to see if there is drug to drug interaction and there is none. Patient is on metoprolol, lasix and lipitor. Patient will see Dr. Downs on 8/5.  I told spouse that I will review with Dr. Downs and if he is ok with him resuming Adderall I will get a letter over to Dr. Carr stating such.

## 2020-07-01 NOTE — TELEPHONE ENCOUNTER
Adderall can increase risk of major adverse cardiovascular events including sudden cardiac death in patients who have structural cardiac abnormalities including coronary disease.  Adderall can also increase risk of arrhythmias and patient has history of atrial flutter and it can also make hypertension worse and patient has history of hypertension.  In fact the medication has a black box warning contraindicating its use in patients with known cardiac issues including coronary disease.  As such I am unable to 'approve' this medication.    Thanks  Myles

## 2020-07-01 NOTE — TELEPHONE ENCOUNTER
Left message for patient's wife Michelle to return a call directly to [facility] line: [phone].   Deepika Barraza, RN, RN

## 2020-07-02 ENCOUNTER — ALLIED HEALTH/NURSE VISIT (OUTPATIENT)
Dept: EDUCATION SERVICES | Facility: CLINIC | Age: 55
End: 2020-07-02
Payer: COMMERCIAL

## 2020-07-02 DIAGNOSIS — E11.620 TYPE 2 DIABETES MELLITUS WITH DIABETIC DERMATITIS, WITHOUT LONG-TERM CURRENT USE OF INSULIN (H): Primary | ICD-10-CM

## 2020-07-02 PROCEDURE — 98968 PH1 ASSMT&MGMT NQHP 21-30: CPT | Performed by: DIETITIAN, REGISTERED

## 2020-07-02 NOTE — LETTER
"    7/2/2020         RE: Benjamín Us  [id number]  [facility] [address]        Dear Colleague,    Thank you for referring your patient, Benjamín Us, to the [facility]. Please see a copy of my visit note below.    Diabetes Self-Management Education & Support    Presents for: Individual review    Patient verbally consented to the telephone visit service today: yes    SUBJECTIVE/OBJECTIVE:  Presents for: Individual review  Accompanied by: Spouse  Diabetes education in the past 24mo: Yes  Focus of Visit: Healthy Eating  Diabetes type: Type 2  Disease course: Improving  How confident are you filling out medical forms by yourself:: Not Assessed  Other concerns:: Other  Cultural Influences/Ethnic Background:  American      Diabetes Symptoms & Complications:  Fatigue: Yes  Polydipsia: No  Polyphagia: No  Polyuria: No  Other: Yes  Symptom course: Stable  Complications assessed today?: No    Patient Problem List and Family Medical History reviewed for relevant medical history, current medical status, and diabetes risk factors.    Vitals:  There were no vitals taken for this visit.  Estimated body mass index is 30.71 kg/m  as calculated from the following:    Height as of 5/30/20: 1.778 m (5' 10\").    Weight as of 6/26/20: 97.1 kg (214 lb).   Last 3 BP:   BP Readings from Last 3 Encounters:   06/26/20 139/85   06/26/20 125/81   05/30/20 (!) 147/60       History   Smoking Status     Former Smoker     Packs/day: 1.00     Years: 40.00     Types: Cigarettes     Start date: 1980     Quit date: 04/2020   Smokeless Tobacco     Never Used       Labs:  Lab Results   Component Value Date    A1C 7.5 06/26/2020     Lab Results   Component Value Date     06/26/2020     Lab Results   Component Value Date     03/25/2016     03/25/2014     HDL Cholesterol   Date Value Ref Range Status   03/25/2014 37 (L) >40 mg/dL Final   ]  GFR Estimate   Date Value Ref Range Status "   06/26/2020 89 >60 mL/min/[1.73_m2] Final     Comment:     Non  GFR Calc  Starting 12/18/2018, serum creatinine based estimated GFR (eGFR) will be   calculated using the Chronic Kidney Disease Epidemiology Collaboration   (CKD-EPI) equation.       GFR Estimate If Black   Date Value Ref Range Status   06/26/2020 >90 >60 mL/min/[1.73_m2] Final     Comment:      GFR Calc  Starting 12/18/2018, serum creatinine based estimated GFR (eGFR) will be   calculated using the Chronic Kidney Disease Epidemiology Collaboration   (CKD-EPI) equation.       Lab Results   Component Value Date    CR 0.95 06/26/2020     No results found for: MICROALBUMIN    Healthy Eating:  Healthy Eating Assessed Today: Yes  Meal planning/habits: None  Meals include: Breakfast, Dinner  Other: recently had all of teeth removed, eating a soft diet, waiting for denture placement in 4-6 weeks  Beverages: Diet soda, Boost  Has patient met with a dietitian in the past?: No    Being Active:  Being Active Assessed Today: Yes  Exercise:: (cardiac rehab)    Monitoring:  Monitoring Assessed Today: Yes  Times checking blood sugar at home (number): 2  Times checking blood sugar at home (per): Day    Fasting BG range over past week: 124-136 mg/dl, had one reading 168 mg/dl  BG range in the afternoon or evening (pre or post meal) 114-159 mg/dl    Taking Medications:  Diabetes Medication(s)     Biguanides       metFORMIN (GLUCOPHAGE) 500 MG tablet    Take 500mg twice daily for 1 week, then 500mg in AM and 1000mg in PM for 1 week, then 1000mg twice daily.          Taking Medication Assessed Today: Yes  Current Treatments: Oral Medication (taken by mouth)  Problems taking diabetes medications regularly?: No  Diabetes medication side effects?: No    Problem Solving:  Problem Solving Assessed Today: Yes  Is the patient at risk for hypoglycemia?: No    Reducing Risks:  Reducing Risks Assessed Today: No    Healthy Coping:  Healthy Coping  "Assessed Today: No  Emotional response to diabetes: Concern for health and well-being, Ready to learn  Stage of change: ACTION (Actively working towards change)  Patient Activation Measure Survey Score:  No flowsheet data found.    Diabetes knowledge and skills assessment:   Patient is knowledgeable in diabetes management concepts related to: Healthy Coping    Patient needs further education on the following diabetes management concepts: Healthy Eating, Monitoring, Taking Medication and Problem Solving    Based on learning assessment above, most appropriate setting for further diabetes education would be: Group class or Individual setting.      INTERVENTION:  Education provided today on:  AADE Self-Care Behaviors:  Healthy Eating: carbohydrate counting, consistency in amount, composition, and timing of food intake, portion control, label reading and ideas for \"Soft\" diet while waiting for dentures  Monitoring: individual blood glucose targets and frequency of monitoring  Taking Medication: action of prescribed medication  Problem Solving: high blood glucose - causes, signs/symptoms, treatment and prevention, low blood glucose - causes, signs/symptoms, treatment and prevention and when to call health care provider    Opportunities for ongoing education and support in diabetes-self management were discussed.    Pt verbalized understanding of concepts discussed and recommendations provided today.       Education Materials Provided:  [place] Understanding Diabetes Booklet, BG Log Sheet and Carbohydrate Counting      ASSESSMENT:  BG values typically in range.  Pt is most concerned about his nutrition intake at this time, had questions about Soft diet (awaiting denture placement), discussed ideas for pureeing foods and also discussed carb counting, heart healthy guidelines.     Patient's most recent   Lab Results   Component Value Date    A1C 7.5 06/26/2020    is not meeting goal of <7.0    PLAN  See Patient Instructions " for co-developed, patient-stated behavior change goals.  AVS printed and provided to patient today. See Follow-Up section for recommended follow-up.    Isaura Valdez RD, CDE  Diabetes     Time Spent: 30 minutes  Encounter Type: Individual    Any diabetes medication dose changes were made via the CDE Protocol and Collaborative Practice Agreement with the patient's primary care provider. A copy of this encounter was shared with the provider.

## 2020-07-02 NOTE — PROGRESS NOTES
"Diabetes Self-Management Education & Support    Presents for: Individual review    Patient verbally consented to the telephone visit service today: yes    SUBJECTIVE/OBJECTIVE:  Presents for: Individual review  Accompanied by: Spouse  Diabetes education in the past 24mo: Yes  Focus of Visit: Healthy Eating  Diabetes type: Type 2  Disease course: Improving  How confident are you filling out medical forms by yourself:: Not Assessed  Other concerns:: Other  Cultural Influences/Ethnic Background:  American      Diabetes Symptoms & Complications:  Fatigue: Yes  Polydipsia: No  Polyphagia: No  Polyuria: No  Other: Yes  Symptom course: Stable  Complications assessed today?: No    Patient Problem List and Family Medical History reviewed for relevant medical history, current medical status, and diabetes risk factors.    Vitals:  There were no vitals taken for this visit.  Estimated body mass index is 30.71 kg/m  as calculated from the following:    Height as of 5/30/20: 1.778 m (5' 10\").    Weight as of 6/26/20: 97.1 kg (214 lb).   Last 3 BP:   BP Readings from Last 3 Encounters:   06/26/20 139/85   06/26/20 125/81   05/30/20 (!) 147/60       History   Smoking Status     Former Smoker     Packs/day: 1.00     Years: 40.00     Types: Cigarettes     Start date: 1980     Quit date: 04/2020   Smokeless Tobacco     Never Used       Labs:  Lab Results   Component Value Date    A1C 7.5 06/26/2020     Lab Results   Component Value Date     06/26/2020     Lab Results   Component Value Date     03/25/2016     03/25/2014     HDL Cholesterol   Date Value Ref Range Status   03/25/2014 37 (L) >40 mg/dL Final   ]  GFR Estimate   Date Value Ref Range Status   06/26/2020 89 >60 mL/min/[1.73_m2] Final     Comment:     Non  GFR Calc  Starting 12/18/2018, serum creatinine based estimated GFR (eGFR) will be   calculated using the Chronic Kidney Disease Epidemiology Collaboration   (CKD-EPI) equation.       GFR " Estimate If Black   Date Value Ref Range Status   06/26/2020 >90 >60 mL/min/[1.73_m2] Final     Comment:      GFR Calc  Starting 12/18/2018, serum creatinine based estimated GFR (eGFR) will be   calculated using the Chronic Kidney Disease Epidemiology Collaboration   (CKD-EPI) equation.       Lab Results   Component Value Date    CR 0.95 06/26/2020     No results found for: MICROALBUMIN    Healthy Eating:  Healthy Eating Assessed Today: Yes  Meal planning/habits: None  Meals include: Breakfast, Dinner  Other: recently had all of teeth removed, eating a soft diet, waiting for denture placement in 4-6 weeks  Beverages: Diet soda, Boost  Has patient met with a dietitian in the past?: No    Being Active:  Being Active Assessed Today: Yes  Exercise:: (cardiac rehab)    Monitoring:  Monitoring Assessed Today: Yes  Times checking blood sugar at home (number): 2  Times checking blood sugar at home (per): Day    Fasting BG range over past week: 124-136 mg/dl, had one reading 168 mg/dl  BG range in the afternoon or evening (pre or post meal) 114-159 mg/dl    Taking Medications:  Diabetes Medication(s)     Biguanides       metFORMIN (GLUCOPHAGE) 500 MG tablet    Take 500mg twice daily for 1 week, then 500mg in AM and 1000mg in PM for 1 week, then 1000mg twice daily.          Taking Medication Assessed Today: Yes  Current Treatments: Oral Medication (taken by mouth)  Problems taking diabetes medications regularly?: No  Diabetes medication side effects?: No    Problem Solving:  Problem Solving Assessed Today: Yes  Is the patient at risk for hypoglycemia?: No    Reducing Risks:  Reducing Risks Assessed Today: No    Healthy Coping:  Healthy Coping Assessed Today: No  Emotional response to diabetes: Concern for health and well-being, Ready to learn  Stage of change: ACTION (Actively working towards change)  Patient Activation Measure Survey Score:  No flowsheet data found.    Diabetes knowledge and skills  "assessment:   Patient is knowledgeable in diabetes management concepts related to: Healthy Coping    Patient needs further education on the following diabetes management concepts: Healthy Eating, Monitoring, Taking Medication and Problem Solving    Based on learning assessment above, most appropriate setting for further diabetes education would be: Group class or Individual setting.      INTERVENTION:  Education provided today on:  AADE Self-Care Behaviors:  Healthy Eating: carbohydrate counting, consistency in amount, composition, and timing of food intake, portion control, label reading and ideas for \"Soft\" diet while waiting for dentures  Monitoring: individual blood glucose targets and frequency of monitoring  Taking Medication: action of prescribed medication  Problem Solving: high blood glucose - causes, signs/symptoms, treatment and prevention, low blood glucose - causes, signs/symptoms, treatment and prevention and when to call health care provider    Opportunities for ongoing education and support in diabetes-self management were discussed.    Pt verbalized understanding of concepts discussed and recommendations provided today.       Education Materials Provided:  [place] Understanding Diabetes Booklet, BG Log Sheet and Carbohydrate Counting      ASSESSMENT:  BG values typically in range.  Pt is most concerned about his nutrition intake at this time, had questions about Soft diet (awaiting denture placement), discussed ideas for pureeing foods and also discussed carb counting, heart healthy guidelines.     Patient's most recent   Lab Results   Component Value Date    A1C 7.5 06/26/2020    is not meeting goal of <7.0    PLAN  See Patient Instructions for co-developed, patient-stated behavior change goals.  AVS printed and provided to patient today. See Follow-Up section for recommended follow-up.    Isaura Valdez RD, CDE  Diabetes     Time Spent: 30 minutes  Encounter Type: " Individual    Any diabetes medication dose changes were made via the CDE Protocol and Collaborative Practice Agreement with the patient's primary care provider. A copy of this encounter was shared with the provider.

## 2020-07-02 NOTE — TELEPHONE ENCOUNTER
I phoned spouse back to inform her that Dr. Downs cannot recommend Adderall due to the black box warning. She expressed disappointment.At her request I faxed his most recent CBC and CMP to [facility] at [phone]. I advised her to call Julio Carr at [facility] to explain the absolute contraindication of him being on Adderall and ask if there is an alternative.She had no further questions for me.

## 2020-07-03 ENCOUNTER — HOSPITAL ENCOUNTER (OUTPATIENT)
Dept: CARDIAC REHAB | Facility: CLINIC | Age: 55
End: 2020-07-03
Attending: PHYSICIAN ASSISTANT
Payer: COMMERCIAL

## 2020-07-03 ENCOUNTER — ANTICOAGULATION THERAPY VISIT (OUTPATIENT)
Dept: NURSING | Facility: CLINIC | Age: 55
End: 2020-07-03

## 2020-07-03 DIAGNOSIS — Z95.1 S/P CABG (CORONARY ARTERY BYPASS GRAFT): ICD-10-CM

## 2020-07-03 DIAGNOSIS — Z95.2 HEART VALVE REPLACED: ICD-10-CM

## 2020-07-03 DIAGNOSIS — I35.0 NONRHEUMATIC AORTIC VALVE STENOSIS: ICD-10-CM

## 2020-07-03 LAB
CAPILLARY BLOOD COLLECTION: NORMAL
INR PPP: 1.9 (ref 0.86–1.14)

## 2020-07-03 PROCEDURE — 93798 PHYS/QHP OP CAR RHAB W/ECG: CPT

## 2020-07-03 PROCEDURE — 85610 PROTHROMBIN TIME: CPT | Performed by: FAMILY MEDICINE

## 2020-07-03 PROCEDURE — 40000116 ZZH STATISTIC OP CR VISIT

## 2020-07-03 PROCEDURE — 99207 ZZC NO CHARGE NURSE ONLY: CPT

## 2020-07-03 PROCEDURE — 36416 COLLJ CAPILLARY BLOOD SPEC: CPT | Performed by: FAMILY MEDICINE

## 2020-07-03 NOTE — PROGRESS NOTES
ANTICOAGULATION FOLLOW-UP CLINIC VISIT    Patient Name:  Benjamín Us  Date:  7/3/2020  Contact Type:  Telephone with Michelle, patient's wife    SUBJECTIVE:  Patient Findings     Positives:   Change in medications (Amiodarone discontinued 20)    Comments:   INR still trending low despite warfarin increase of 14% on 20.  Increasing weekly dose today 6% which is within protocol.        Clinical Outcomes     Negatives:   Major bleeding event, Thromboembolic event, Anticoagulation-related hospital admission, Anticoagulation-related ED visit, Anticoagulation-related fatality    Comments:   INR still trending low despite warfarin increase of 14% on 20.  Increasing weekly dose today 6% which is within protocol.           OBJECTIVE    Recent labs: (last 7 days)     20  1148   INR 1.90*       ASSESSMENT / PLAN  INR assessment SUB    Recheck INR In: 1 WEEK    INR Location Clinic      Anticoagulation Summary  As of 7/3/2020    INR goal:   2.0-3.0   TTR:   13.6 % (3.7 wk)   INR used for dosin.90! (7/3/2020)   Warfarin maintenance plan:   7.5 mg (5 mg x 1.5) every Mon, Wed, Fri; 5 mg (5 mg x 1) all other days   Full warfarin instructions:   7.5 mg every Mon, Wed, Fri; 5 mg all other days   Weekly warfarin total:   42.5 mg   Plan last modified:   Linsey Barrios RN (7/3/2020)   Next INR check:   7/10/2020   Priority:   High   Target end date:   Indefinite    Indications    S/P CABG (coronary artery bypass graft) [Z95.1]  Heart valve replaced [Z95.2]             Anticoagulation Episode Summary     INR check location:       Preferred lab:       Send INR reminders to:   [facility]    Comments:         Anticoagulation Care Providers     Provider Role Specialty Phone number    Mag Richards PA-C Referring Physician Assistant [phone]            See the Encounter Report to view Anticoagulation Flowsheet and Dosing Calendar (Go to Encounters tab in chart review, and find the  Anticoagulation Therapy Visit)        Linsey Barrios RN

## 2020-07-06 ENCOUNTER — HOSPITAL ENCOUNTER (OUTPATIENT)
Dept: CARDIAC REHAB | Facility: CLINIC | Age: 55
End: 2020-07-06
Attending: PHYSICIAN ASSISTANT
Payer: COMMERCIAL

## 2020-07-06 PROCEDURE — 40000116 ZZH STATISTIC OP CR VISIT

## 2020-07-06 PROCEDURE — 93798 PHYS/QHP OP CAR RHAB W/ECG: CPT

## 2020-07-06 NOTE — PATIENT INSTRUCTIONS
Care Plan:  Meal Plan Recommendation: choose heart healthy foods and use  to puree food to desired consistency, aim for no more than 4 carbohydrate choices (60 gms) per meal, and 1-2 choices (15-30 gms) per snack.    Check blood sugars: fasting and 2 hours after the start of meals (supper)  (A1c target: less than 7%, Blood sugar target before meals:  mg/dl, 2 hrs after the start of a meal: less than 180 mg/dl)    Diabetes Support Resources:  Websites: American Diabetes Association: www.diabetes.org    Follow up:  Please call, e-mail,  or send [employer] message with questions, concerns or if follow-up is needed.    Isaura Valdez RD, BARBARA, CDE  Diabetes     [place] [facility]:    For Your Diabetes or Nutrition Education Appointments Call:  [phone]   For Diabetes or Nutrition Related Questions Call or Email:   [phone]  [email or web address]  Fax: [phone]   If you need a medication refill please contact your pharmacy. Please allow 3 business days for your refills to be completed.

## 2020-07-08 ENCOUNTER — HOSPITAL ENCOUNTER (OUTPATIENT)
Dept: CARDIAC REHAB | Facility: CLINIC | Age: 55
End: 2020-07-08
Attending: PHYSICIAN ASSISTANT
Payer: COMMERCIAL

## 2020-07-08 PROCEDURE — 40000116 ZZH STATISTIC OP CR VISIT

## 2020-07-08 PROCEDURE — 93798 PHYS/QHP OP CAR RHAB W/ECG: CPT

## 2020-07-10 ENCOUNTER — ANTICOAGULATION THERAPY VISIT (OUTPATIENT)
Dept: NURSING | Facility: CLINIC | Age: 55
End: 2020-07-10

## 2020-07-10 DIAGNOSIS — I35.0 NONRHEUMATIC AORTIC VALVE STENOSIS: ICD-10-CM

## 2020-07-10 DIAGNOSIS — Z95.1 S/P CABG (CORONARY ARTERY BYPASS GRAFT): ICD-10-CM

## 2020-07-10 DIAGNOSIS — Z95.2 HEART VALVE REPLACED: ICD-10-CM

## 2020-07-10 LAB
CAPILLARY BLOOD COLLECTION: NORMAL
INR PPP: 2 (ref 0.86–1.14)

## 2020-07-10 PROCEDURE — 36416 COLLJ CAPILLARY BLOOD SPEC: CPT | Performed by: FAMILY MEDICINE

## 2020-07-10 PROCEDURE — 85610 PROTHROMBIN TIME: CPT | Performed by: FAMILY MEDICINE

## 2020-07-10 PROCEDURE — 99207 ZZC NO CHARGE NURSE ONLY: CPT

## 2020-07-10 NOTE — PROGRESS NOTES
Chart reviewed with [facility] RN.  Due to stopping amiodarone 06/26/2020, and knowing that effect will diminish with time, and INR will continue to trend down if kept on same warfarin dose, recommend  ~10% dose increase to maintenance dose and continue to closely monitor.    Sandra Hodge, PharmD BCACP  Anticoagulation Clinical Pharmacist

## 2020-07-10 NOTE — PROGRESS NOTES
ANTICOAGULATION FOLLOW-UP CLINIC VISIT    Patient Name:  Benjamín Us  Date:  7/10/2020  Contact Type:  Telephone/ with Michelle and pt sitting near the phone    SUBJECTIVE:  Patient Findings     Comments:   INR still at low end of therapeutic, Amiodarone discontinued 2020.  See [facility] PharmD's note regarding warfarin dose increase.        Clinical Outcomes     Negatives:   Major bleeding event, Thromboembolic event, Anticoagulation-related hospital admission, Anticoagulation-related ED visit, Anticoagulation-related fatality    Comments:   INR still at low end of therapeutic, Amiodarone discontinued 2020.  See [facility] PharmD's note regarding warfarin dose increase.           OBJECTIVE    Recent labs: (last 7 days)     07/10/20  0933   INR 2.00*       ASSESSMENT / PLAN  INR assessment THER    Recheck INR In: 6 DAYS    INR Location Clinic      Anticoagulation Summary  As of 7/10/2020    INR goal:   2.0-3.0   TTR:   10.8 % (1.1 mo)   INR used for dosin.00 (7/10/2020)   Warfarin maintenance plan:   5 mg (5 mg x 1) every Mon, Thu; 7.5 mg (5 mg x 1.5) all other days   Full warfarin instructions:   5 mg every Mon, Thu; 7.5 mg all other days   Weekly warfarin total:   47.5 mg   Plan last modified:   Linsey Barrios RN (7/10/2020)   Next INR check:   2020   Priority:   High   Target end date:   Indefinite    Indications    S/P CABG (coronary artery bypass graft) [Z95.1]  Heart valve replaced [Z95.2]             Anticoagulation Episode Summary     INR check location:       Preferred lab:       Send INR reminders to:   [facility]    Comments:         Anticoagulation Care Providers     Provider Role Specialty Phone number    Mag Richards PA-C Referring Physician Assistant [phone]            See the Encounter Report to view Anticoagulation Flowsheet and Dosing Calendar (Go to Encounters tab in chart review, and find the Anticoagulation Therapy Visit)        Linsey Barrios,  RN

## 2020-07-13 ENCOUNTER — HOSPITAL ENCOUNTER (OUTPATIENT)
Dept: CARDIAC REHAB | Facility: CLINIC | Age: 55
End: 2020-07-13
Attending: PHYSICIAN ASSISTANT
Payer: COMMERCIAL

## 2020-07-13 PROCEDURE — 40000116 ZZH STATISTIC OP CR VISIT

## 2020-07-13 PROCEDURE — 93798 PHYS/QHP OP CAR RHAB W/ECG: CPT

## 2020-07-15 ENCOUNTER — HOSPITAL ENCOUNTER (OUTPATIENT)
Dept: CARDIAC REHAB | Facility: CLINIC | Age: 55
End: 2020-07-15
Attending: PHYSICIAN ASSISTANT
Payer: COMMERCIAL

## 2020-07-15 PROCEDURE — 93798 PHYS/QHP OP CAR RHAB W/ECG: CPT

## 2020-07-15 PROCEDURE — 40000116 ZZH STATISTIC OP CR VISIT

## 2020-07-16 ENCOUNTER — ANTICOAGULATION THERAPY VISIT (OUTPATIENT)
Dept: NURSING | Facility: CLINIC | Age: 55
End: 2020-07-16

## 2020-07-16 DIAGNOSIS — I35.0 NONRHEUMATIC AORTIC VALVE STENOSIS: ICD-10-CM

## 2020-07-16 DIAGNOSIS — Z95.2 HEART VALVE REPLACED: ICD-10-CM

## 2020-07-16 DIAGNOSIS — Z95.1 S/P CABG (CORONARY ARTERY BYPASS GRAFT): ICD-10-CM

## 2020-07-16 LAB
CAPILLARY BLOOD COLLECTION: NORMAL
INR PPP: 2.4 (ref 0.86–1.14)

## 2020-07-16 PROCEDURE — 36415 COLL VENOUS BLD VENIPUNCTURE: CPT | Performed by: FAMILY MEDICINE

## 2020-07-16 PROCEDURE — 85610 PROTHROMBIN TIME: CPT | Performed by: FAMILY MEDICINE

## 2020-07-16 PROCEDURE — 99207 ZZC NO CHARGE NURSE ONLY: CPT

## 2020-07-16 RX ORDER — WARFARIN SODIUM 5 MG/1
TABLET ORAL
Qty: 40 TABLET | Refills: 3 | Status: SHIPPED | OUTPATIENT
Start: 2020-07-16 | End: 2020-10-27

## 2020-07-16 NOTE — PROGRESS NOTES
ANTICOAGULATION FOLLOW-UP CLINIC VISIT    Patient Name:  Benjamín Us  Date:  2020  Contact Type:  Telephone/ with Michelle pt's wife    SUBJECTIVE:  Patient Findings     Positives:   Change in activity (Pt will be discharged from cardiac rehab on 20, pt then plans to join a gym)        Clinical Outcomes     Negatives:   Major bleeding event, Thromboembolic event, Anticoagulation-related hospital admission, Anticoagulation-related ED visit, Anticoagulation-related fatality           OBJECTIVE    Recent labs: (last 7 days)     20  1041   INR 2.40*       ASSESSMENT / PLAN  INR assessment THER    Recheck INR In: 1 WEEK    INR Location Clinic      Anticoagulation Summary  As of 2020    INR goal:   2.0-3.0   TTR:   24.5 % (1.3 mo)   INR used for dosin.40 (2020)   Warfarin maintenance plan:   5 mg (5 mg x 1) every Mon, Thu; 7.5 mg (5 mg x 1.5) all other days   Full warfarin instructions:   5 mg every Mon, Thu; 7.5 mg all other days   Weekly warfarin total:   47.5 mg   Plan last modified:   Linsey Barrios RN (2020)   Next INR check:   2020   Priority:   High   Target end date:   Indefinite    Indications    S/P CABG (coronary artery bypass graft) [Z95.1]  Heart valve replaced [Z95.2]             Anticoagulation Episode Summary     INR check location:       Preferred lab:       Send INR reminders to:   [facility]    Comments:         Anticoagulation Care Providers     Provider Role Specialty Phone number    Mag Richards PA-C Referring Physician Assistant [phone]            See the Encounter Report to view Anticoagulation Flowsheet and Dosing Calendar (Go to Encounters tab in chart review, and find the Anticoagulation Therapy Visit)        Linsey Barrios RN

## 2020-07-20 ENCOUNTER — HOSPITAL ENCOUNTER (OUTPATIENT)
Dept: CARDIOLOGY | Facility: CLINIC | Age: 55
Discharge: HOME | End: 2020-07-20
Attending: INTERNAL MEDICINE | Admitting: INTERNAL MEDICINE
Payer: COMMERCIAL

## 2020-07-20 ENCOUNTER — HOSPITAL ENCOUNTER (OUTPATIENT)
Dept: CARDIAC REHAB | Facility: CLINIC | Age: 55
End: 2020-07-20
Attending: PHYSICIAN ASSISTANT
Payer: COMMERCIAL

## 2020-07-20 DIAGNOSIS — I48.3 TYPICAL ATRIAL FLUTTER (H): ICD-10-CM

## 2020-07-20 PROCEDURE — 0296T LEADLESS EKG MONITOR 3 TO 14 DAYS: CPT

## 2020-07-20 PROCEDURE — 0298T LEADLESS EKG MONITOR 3 TO 14 DAYS: CPT | Performed by: INTERNAL MEDICINE

## 2020-07-20 PROCEDURE — 93798 PHYS/QHP OP CAR RHAB W/ECG: CPT | Performed by: REHABILITATION PRACTITIONER

## 2020-07-20 PROCEDURE — 40000116 ZZH STATISTIC OP CR VISIT: Performed by: REHABILITATION PRACTITIONER

## 2020-07-20 NOTE — PROGRESS NOTES
Placed a 14 day Zio patch monitor. Of note, Patient did not allow me to shave his chest due to the pain of it growing back. Monitor may have trouble adhering to skin.

## 2020-07-21 ENCOUNTER — TELEPHONE (OUTPATIENT)
Dept: OTHER | Facility: CLINIC | Age: 55
End: 2020-07-21

## 2020-07-21 NOTE — TELEPHONE ENCOUNTER
Patient called wanting guidance on how to proceed with sternal precautions past 8 weeks. Instructed patient the lifting restriction is increased to 30 lbs. Continue with non alternating movements of upper chest for 4 more weeks. Call if increased pain or clicking noted with increased activity. Using a  [facility]. Continues to experience bilateral hand tremors. Instructed to address with his primary care. States it was a pre existing condition but has worsened since surgery. Patient also has zio patch in place and leads fell off. EP dept contact number given.

## 2020-07-22 ENCOUNTER — HOSPITAL ENCOUNTER (OUTPATIENT)
Dept: CARDIAC REHAB | Facility: CLINIC | Age: 55
End: 2020-07-22
Attending: PHYSICIAN ASSISTANT
Payer: COMMERCIAL

## 2020-07-22 PROCEDURE — 40000116 ZZH STATISTIC OP CR VISIT

## 2020-07-22 PROCEDURE — 93798 PHYS/QHP OP CAR RHAB W/ECG: CPT

## 2020-07-23 ENCOUNTER — HOSPITAL ENCOUNTER (OUTPATIENT)
Dept: CARDIAC REHAB | Facility: CLINIC | Age: 55
End: 2020-07-23
Attending: PHYSICIAN ASSISTANT
Payer: COMMERCIAL

## 2020-07-23 ENCOUNTER — ANTICOAGULATION THERAPY VISIT (OUTPATIENT)
Dept: NURSING | Facility: CLINIC | Age: 55
End: 2020-07-23

## 2020-07-23 DIAGNOSIS — I35.0 NONRHEUMATIC AORTIC VALVE STENOSIS: ICD-10-CM

## 2020-07-23 DIAGNOSIS — Z95.1 S/P CABG (CORONARY ARTERY BYPASS GRAFT): ICD-10-CM

## 2020-07-23 DIAGNOSIS — Z95.2 HEART VALVE REPLACED: ICD-10-CM

## 2020-07-23 LAB
CAPILLARY BLOOD COLLECTION: NORMAL
INR PPP: 2.5 (ref 0.86–1.14)

## 2020-07-23 PROCEDURE — 99207 ZZC NO CHARGE NURSE ONLY: CPT

## 2020-07-23 PROCEDURE — 93797 PHYS/QHP OP CAR RHAB WO ECG: CPT | Mod: 59

## 2020-07-23 PROCEDURE — 85610 PROTHROMBIN TIME: CPT | Performed by: FAMILY MEDICINE

## 2020-07-23 PROCEDURE — 40000575 ZZH STATISTIC OP CARDIAC VISIT #2

## 2020-07-23 PROCEDURE — 36415 COLL VENOUS BLD VENIPUNCTURE: CPT | Performed by: FAMILY MEDICINE

## 2020-07-23 PROCEDURE — 93798 PHYS/QHP OP CAR RHAB W/ECG: CPT

## 2020-07-23 PROCEDURE — 40000116 ZZH STATISTIC OP CR VISIT

## 2020-07-23 NOTE — PROGRESS NOTES
ANTICOAGULATION FOLLOW-UP CLINIC VISIT    Patient Name:  Benjamín Us  Date:  2020  Contact Type:  Telephone with Michelle, patient's wife    SUBJECTIVE:  Patient Findings     Positives:   Change in activity (Discharged from cardiac rehab today, pt plans to join a gym. Pt will be going to the gym 3 times per week and plans to do cardio and weight training with a .  Will continue to monitor INR closesly as his activity increases)    Comments:   Patient has appt with PCP on  so INR will be checked same day.        Clinical Outcomes     Negatives:   Major bleeding event, Thromboembolic event, Anticoagulation-related hospital admission, Anticoagulation-related ED visit, Anticoagulation-related fatality    Comments:   Patient has appt with PCP on  so INR will be checked same day.           OBJECTIVE    Recent labs: (last 7 days)     20  1344   INR 2.50*       ASSESSMENT / PLAN  INR assessment THER    Recheck INR In: 10 DAYS    INR Location Clinic      Anticoagulation Summary  As of 2020    INR goal:   2.0-3.0   TTR:   36.0 % (1.5 mo)   INR used for dosin.50 (2020)   Warfarin maintenance plan:   5 mg (5 mg x 1) every Mon, Thu; 7.5 mg (5 mg x 1.5) all other days   Full warfarin instructions:   5 mg every Mon, Thu; 7.5 mg all other days   Weekly warfarin total:   47.5 mg   No change documented:   Linsey Barrios, RN   Plan last modified:   Linsey Barrios RN (2020)   Next INR check:   2020   Priority:   High   Target end date:   Indefinite    Indications    S/P CABG (coronary artery bypass graft) [Z95.1]  Heart valve replaced [Z95.2]             Anticoagulation Episode Summary     INR check location:       Preferred lab:       Send INR reminders to:   [facility] [employer] [facility]    Comments:         Anticoagulation Care Providers     Provider Role Specialty Phone number    Mag Richards PA-C Referring Physician Assistant [phone]            See the Encounter Report to view  Anticoagulation Flowsheet and Dosing Calendar (Go to Encounters tab in chart review, and find the Anticoagulation Therapy Visit)        Linsey Barrios RN

## 2020-07-29 ENCOUNTER — TELEPHONE (OUTPATIENT)
Dept: OTHER | Facility: CLINIC | Age: 55
End: 2020-07-29

## 2020-07-29 ENCOUNTER — MYC MEDICAL ADVICE (OUTPATIENT)
Dept: FAMILY MEDICINE | Facility: CLINIC | Age: 55
End: 2020-07-29

## 2020-07-29 DIAGNOSIS — R25.1 TREMOR: ICD-10-CM

## 2020-07-29 DIAGNOSIS — L30.9 DERMATITIS: Primary | ICD-10-CM

## 2020-07-30 NOTE — TELEPHONE ENCOUNTER
"Patient sends [employer] message saying, \"I would like to ask you for a referral for two different areas.  Neurology for my tremors since my heart surgery.  They are now worse than before.  Also dermatology as the ointment isn't working as I'd hoped.  I can wait until our follow-up appointment on the 4th if you'd like.\"   T'd up referrals, please advise, route back for coordination.   Altagracia Kessler, BECKYN, RN, PHN    "

## 2020-08-04 ENCOUNTER — ANTICOAGULATION THERAPY VISIT (OUTPATIENT)
Dept: NURSING | Facility: CLINIC | Age: 55
End: 2020-08-04
Payer: COMMERCIAL

## 2020-08-04 ENCOUNTER — OFFICE VISIT (OUTPATIENT)
Dept: FAMILY MEDICINE | Facility: CLINIC | Age: 55
End: 2020-08-04
Payer: COMMERCIAL

## 2020-08-04 VITALS
TEMPERATURE: 97.8 F | BODY MASS INDEX: 30.58 KG/M2 | DIASTOLIC BLOOD PRESSURE: 92 MMHG | WEIGHT: 213.1 LBS | HEART RATE: 82 BPM | SYSTOLIC BLOOD PRESSURE: 160 MMHG | OXYGEN SATURATION: 98 %

## 2020-08-04 DIAGNOSIS — I35.0 NONRHEUMATIC AORTIC VALVE STENOSIS: ICD-10-CM

## 2020-08-04 DIAGNOSIS — I10 ESSENTIAL HYPERTENSION, BENIGN: ICD-10-CM

## 2020-08-04 DIAGNOSIS — Z95.2 HEART VALVE REPLACED: ICD-10-CM

## 2020-08-04 DIAGNOSIS — Z95.1 S/P CABG (CORONARY ARTERY BYPASS GRAFT): ICD-10-CM

## 2020-08-04 DIAGNOSIS — E11.620 TYPE 2 DIABETES MELLITUS WITH DIABETIC DERMATITIS, WITHOUT LONG-TERM CURRENT USE OF INSULIN (H): Primary | ICD-10-CM

## 2020-08-04 LAB
CAPILLARY BLOOD COLLECTION: NORMAL
INR PPP: 2.4 (ref 0.86–1.14)

## 2020-08-04 PROCEDURE — 85610 PROTHROMBIN TIME: CPT | Performed by: FAMILY MEDICINE

## 2020-08-04 PROCEDURE — 99207 ZZC NO CHARGE NURSE ONLY: CPT

## 2020-08-04 PROCEDURE — 36416 COLLJ CAPILLARY BLOOD SPEC: CPT | Performed by: FAMILY MEDICINE

## 2020-08-04 PROCEDURE — 99214 OFFICE O/P EST MOD 30 MIN: CPT | Performed by: FAMILY MEDICINE

## 2020-08-04 ASSESSMENT — ANXIETY QUESTIONNAIRES
GAD7 TOTAL SCORE: 0
7. FEELING AFRAID AS IF SOMETHING AWFUL MIGHT HAPPEN: NOT AT ALL
7. FEELING AFRAID AS IF SOMETHING AWFUL MIGHT HAPPEN: NOT AT ALL
3. WORRYING TOO MUCH ABOUT DIFFERENT THINGS: NOT AT ALL
GAD7 TOTAL SCORE: 0
1. FEELING NERVOUS, ANXIOUS, OR ON EDGE: NOT AT ALL
5. BEING SO RESTLESS THAT IT IS HARD TO SIT STILL: NOT AT ALL
2. NOT BEING ABLE TO STOP OR CONTROL WORRYING: NOT AT ALL
6. BECOMING EASILY ANNOYED OR IRRITABLE: NOT AT ALL
4. TROUBLE RELAXING: NOT AT ALL
GAD7 TOTAL SCORE: 0

## 2020-08-04 ASSESSMENT — PATIENT HEALTH QUESTIONNAIRE - PHQ9
10. IF YOU CHECKED OFF ANY PROBLEMS, HOW DIFFICULT HAVE THESE PROBLEMS MADE IT FOR YOU TO DO YOUR WORK, TAKE CARE OF THINGS AT HOME, OR GET ALONG WITH OTHER PEOPLE: NOT DIFFICULT AT ALL
SUM OF ALL RESPONSES TO PHQ QUESTIONS 1-9: 1
SUM OF ALL RESPONSES TO PHQ QUESTIONS 1-9: 1

## 2020-08-04 NOTE — PROGRESS NOTES
ANTICOAGULATION FOLLOW-UP CLINIC VISIT    Patient Name:  Benjamín Us  Date:  8/4/2020  Contact Type:  Telephone    SUBJECTIVE:  Patient Findings     Positives:   Change in diet/appetite (stopped taking Boost 1-2 weeks ago)    Comments:   Called patient to discuss today's INR results: The patient was assessed for medication, and activity level changes, missed or extra doses, bruising or bleeding, with no problem findings. Patient states he really hasn't had any green veggies, had several teeth pulled and really doesn't like green veggies that much. Provided further education that warfarin works by blocking vit K, so if in the future we are having trouble keeping him in the therapeutic range, we may want to look back at adding some vit K into his diet. However, for now, patient will continue with maintenance dosing, he is continuing to work out several days/week at the gym, and we will recheck INR in another 2 weeks. Patient stated understanding and is in agreement with plan.  Alisa HIGGINBOTHAM RN  Anticoagulation Clinic  [place]            Clinical Outcomes     Negatives:   Major bleeding event, Thromboembolic event, Anticoagulation-related hospital admission, Anticoagulation-related ED visit, Anticoagulation-related fatality    Comments:   Called patient to discuss today's INR results: The patient was assessed for medication, and activity level changes, missed or extra doses, bruising or bleeding, with no problem findings. Patient states he really hasn't had any green veggies, had several teeth pulled and really doesn't like green veggies that much. Provided further education that warfarin works by blocking vit K, so if in the future we are having trouble keeping him in the therapeutic range, we may want to look back at adding some vit K into his diet. However, for now, patient will continue with maintenance dosing, he is continuing to work out several days/week at the gym, and we will recheck INR in another 2 weeks.  Patient stated understanding and is in agreement with plan.  Alisa HIGGINBOTHAM RN  Anticoagulation Clinic  [place]               OBJECTIVE    Recent labs: (last 7 days)     20  1041   INR 2.40*       ASSESSMENT / PLAN  INR assessment THER    Recheck INR In: 2 WEEKS    INR Location Clinic      Anticoagulation Summary  As of 2020    INR goal:   2.0-3.0   TTR:   49.0 % (1.9 mo)   INR used for dosin.40 (2020)   Warfarin maintenance plan:   5 mg (5 mg x 1) every Mon, Thu; 7.5 mg (5 mg x 1.5) all other days   Full warfarin instructions:   5 mg every Mon, Thu; 7.5 mg all other days   Weekly warfarin total:   47.5 mg   Plan last modified:   Linsey Barrios RN (2020)   Next INR check:   2020   Priority:   Maintenance   Target end date:   Indefinite    Indications    S/P CABG (coronary artery bypass graft) [Z95.1]  Heart valve replaced [Z95.2]             Anticoagulation Episode Summary     INR check location:       Preferred lab:       Send INR reminders to:   [facility]    Comments:         Anticoagulation Care Providers     Provider Role Specialty Phone number    Mag Richards PA-C Referring Physician Assistant [phone]            See the Encounter Report to view Anticoagulation Flowsheet and Dosing Calendar (Go to Encounters tab in chart review, and find the Anticoagulation Therapy Visit)    Alisa Marie RN

## 2020-08-04 NOTE — PROGRESS NOTES
SUBJECTIVE:    CC: Benjamín Us is a 54 year old male who presents for follow up post aoritic valve replacement     HPI: he's had some leg pain since his surgery but he was sedentary for years until the valve was replaced. We discussed the role for hillary , he's stable on his meds         PROBLEM LIST:                   Patient Active Problem List   Diagnosis     Hypertensive urgency     CARDIOVASCULAR SCREENING; LDL GOAL LESS THAN 160     Aortic valve disorder     Aneurysm of thoracic aorta (H)     Dermatitis     Essential hypertension, benign     Type 2 diabetes mellitus with diabetic dermatitis, without long-term current use of insulin (H)     Aortic valve stenosis, etiology of cardiac valve disease unspecified     Obstructive sleep apnea syndrome     Tobacco abuse     CAD (coronary artery disease)     Aortic stenosis     Aortic insufficiency with aortic stenosis     Aortic valve disease     S/P CABG (coronary artery bypass graft)     Anemia due to blood loss, acute     Fluid overload     Heart valve replaced       PAST MEDICAL HISTORY:                  Past Medical History:   Diagnosis Date     ADD (attention deficit disorder)      Aneurysm of thoracic aorta (H) 5/21/2014     Problem list name updated by automated process. Provider to review     Aortic valve disorder 5/21/2014     Atrial flutter (H)     post op AVR and CABG     CAD (coronary artery disease)      CARDIOVASCULAR SCREENING; LDL GOAL LESS THAN 160 2/25/2014     Dermatitis 9/14/2018     Essential hypertension, benign 9/14/2018     Hypertension      Hypertensive urgency 8/8/2013     HARIS (obstructive sleep apnea)      Type 2 diabetes mellitus with diabetic dermatitis, without long-term current use of insulin (H) 9/14/2018       PAST SURGICAL HISTORY:                  Past Surgical History:   Procedure Laterality Date     APPENDECTOMY       BYPASS GRAFT ARTERY CORONARY N/A 5/22/2020    Procedure: CORONARY ARTERY BYPASS GRAFT X 1  WITH LEFT LEG   ENDOSCOPIC VEIN HARVEST, ON PUMP WITH LEONA. WOUND VAC PLACEMENT. LIMA-LAD;  Surgeon: Aimee Rose MD;  Location:  OR     COLONOSCOPY N/A 6/15/2015    Procedure: COLONOSCOPY;  Surgeon: Vasyl Lawson MD;  Location:  GI     CV CORONARY ANGIOGRAM N/A 1/3/2020    Procedure: Coronary Angiogram;  Surgeon: Álvaro Andrade MD;  Location:  HEART CARDIAC CATH LAB     EXTRACTION(S) DENTAL N/A 5/21/2020    Procedure: EXTRACTION, REMAINING TEETH;  Surgeon: Vasyl Brand DDS;  Location:  OR     REPLACE VALVE AORTIC N/A 5/22/2020    Procedure: AORTIC VALVE REPLACEMENT WITH REGENT MECHANICAL VALVE SIZE 23 MM.;  Surgeon: Aimee Rose MD;  Location:  OR       CURRENT MEDICATIONS:                  Current Outpatient Medications   Medication Sig Dispense Refill     acetaminophen (TYLENOL) 325 MG tablet Take 2 tablets (650 mg) by mouth every 6 hours as needed for mild pain or fever 1 Bottle 0     alcohol swab prep pads Use to swab area of injection/víctor as directed. 100 each 3     amiodarone (PACERONE) 200 MG tablet Take 1 tablet (200 mg) by mouth daily 30 tablet 0     amphetamine-dextroamphetamine (ADDERALL) 20 MG tablet 20 mg 3 times daily        aspirin (ASPIRIN) 81 MG EC tablet Take 1 tablet (81 mg) by mouth daily       atorvastatin (LIPITOR) 40 MG tablet Take 1 tablet (40 mg) by mouth daily 90 tablet 1     augmented betamethasone dipropionate (DIPROLENE-AF) 0.05 % external cream Apply topically 2 times daily 30 g 1     blood glucose (ACCU-CHEK FASTCLIX) lancing device Lancing device to be used with lancets. 1 each 0     blood glucose (NO BRAND SPECIFIED) test strip Use to test blood sugar 4 times daily or as directed. 100 strip 6     blood glucose calibration (NO BRAND SPECIFIED) solution Use to calibrate blood glucose monitor as needed as directed. 1 Bottle 3     blood glucose monitoring (ACCU-CHEK FASTCLIX) lancets Use to test blood sugar 4 times daily or as directed. 102 each 11      blood glucose monitoring (NO BRAND SPECIFIED) meter device kit Use to test blood sugar 4 times daily or as directed. 1 kit 0     furosemide (LASIX) 40 MG tablet Take 1 tablet (40 mg) by mouth daily for 5 days 5 tablet 0     metFORMIN (GLUCOPHAGE) 500 MG tablet Take 500mg twice daily for 1 week, then 500mg in AM and 1000mg in PM for 1 week, then 1000mg twice daily. 120 tablet 5     methocarbamol (ROBAXIN) 500 MG tablet Take 1 tablet (500 mg) by mouth 4 times daily as needed for muscle spasms 60 tablet 0     metoprolol tartrate (LOPRESSOR) 25 MG tablet Take 1 tablet (25 mg) by mouth 2 times daily 60 tablet 3     potassium chloride (KLOR-CON) 20 MEQ packet Take 20 mEq by mouth 2 times daily       thin (NO BRAND SPECIFIED) lancets Use with lanceting device. 100 each 3     varenicline (CHANTIX) 1 MG tablet Take 1 mg by mouth 2 times daily       warfarin ANTICOAGULANT (COUMADIN) 5 MG tablet Take 5mg every Mon & Thurs / Take 7.5mg all other days OR as instructed by INR clinic 40 tablet 3             FAMILY HISTORY:                   Family History   Problem Relation Age of Onset     Hypertension Mother      Breast Cancer Mother      Diabetes Father      Diabetes Brother      Thyroid Disease Sister      Colon Cancer No family hx of        HEALTH MAINTENANCE:                    REVIEW OF OUTSIDE RECORDS: NO    REVIEW OF SYSTEMS:  CONSTITUTIONAL: NEGATIVE for fever, chills  EYES: NEGATIVE for vision changes   RESP: NEGATIVE for significant cough or SOB  CV: NEGATIVE for chest pain, palpitations   GI: NEGATIVE for nausea, abdominal pain, heartburn, or change in bowel habits  : NEGATIVE for frequency, dysuria, or hematuria  MUSCULOSKELETAL: NEGATIVE for significant arthralgias or myalgia  NEURO: NEGATIVE for weakness, dizziness or paresthesias or headache  NVS:no headache or balance issus  INTEG:no moles or new rashes  LYMPH:no nodes or night sweats    EXAM:BP (!) 160/92 (BP Location: Left arm, Patient Position: Chair, Cuff  Size: Adult Large)   Pulse 82   Temp 97.8  F (36.6  C) (Oral)   Wt 96.7 kg (213 lb 1.6 oz)   SpO2 98%   BMI 30.58 kg/m    Cardiology monitoring, in Cardiac Rehab    GENERAL APPEARANCE: healthy, alert and no distress   EXAM:  GENERAL APPEARANCE: healthy, alert and no distress  EYES: EOMI,  PERRL  HENT: ear canals and TM's normal and nose and mouth without ulcers or lesions  RESP: lungs clear to auscultation - no rales, rhonchi or wheezes  CV: regular rates and rhythm, normal S1 S2, no S3 or S4 and no murmur, click or rub -  ABDOMEN:  soft, nontender, no HSM or masses and bowel sounds normal  BACK:no tenderness or pain on straight let raise           ASSESSMENT/PLAN  1. Type 2 diabetes mellitus with diabetic dermatitis, without long-term current use of insulin (H)    2. Essential hypertension, benign        Three month follow up                              I have discussed with patient the risks, benefits, medications, treatment options and modalities.   I have instructed the patient to call or schedule a follow-up appointment if any problems or failure to improve.                         Answers for HPI/ROS submitted by the patient on 8/4/2020   If you checked off any problems, how difficult have these problems made it for you to do your work, take care of things at home, or get along with other people?: Not difficult at all  PHQ9 TOTAL SCORE: 1  TERESA 7 TOTAL SCORE: 0

## 2020-08-04 NOTE — PROGRESS NOTES
Subjective     Benjamín Us is a 54 year old male who presents to clinic today for the following health issues:    HPI       Diabetes Follow-up    How often are you checking your blood sugar? Four or more times daily  Blood sugar testing frequency justification:  to see what the blood sugar is   What time of day are you checking your blood sugars (select all that apply)?  After meals  Have you had any blood sugars above 200?  No  Have you had any blood sugars below 70?  No    What symptoms do you notice when your blood sugar is low?  None    What concerns do you have today about your diabetes? Other: discuss getting tested for some arterial issues in the legs     Do you have any of these symptoms? (Select all that apply)  No numbness or tingling in feet.  No redness, sores or blisters on feet.  No complaints of excessive thirst.  No reports of blurry vision.  No significant changes to weight.    Have you had a diabetic eye exam in the last 12 months? No        BP Readings from Last 2 Encounters:   06/26/20 139/85   06/26/20 125/81     Hemoglobin A1C (%)   Date Value   06/26/2020 7.5 (H)   05/21/2020 12.2 (H)     LDL Cholesterol Calculated (mg/dL)   Date Value   03/25/2014 135 (H)     LDL Cholesterol Direct (mg/dL)   Date Value   03/25/2016 131 (H)           How many servings of fruits and vegetables do you eat daily?  0-1    On average, how many sweetened beverages do you drink each day (Examples: soda, juice, sweet tea, etc.  Do NOT count diet or artificially sweetened beverages)?   0    How many days per week do you exercise enough to make your heart beat faster? 4    How many minutes a day do you exercise enough to make your heart beat faster? 60 or more    How many days per week do you miss taking your medication? 0        BP Readings from Last 3 Encounters:   08/04/20 (!) 160/92   06/26/20 139/85   06/26/20 125/81    Wt Readings from Last 3 Encounters:   08/04/20 96.7 kg (213 lb 1.6 oz)   06/26/20 97.1 kg  (214 lb)   06/26/20 96.2 kg (212 lb)                    Reviewed and updated as needed this visit by Provider         Review of Systems   Constitutional, HEENT, cardiovascular, pulmonary, GI, , musculoskeletal, neuro, skin, endocrine and psych systems are negative, except as otherwise noted.      Objective    There were no vitals taken for this visit.  There is no height or weight on file to calculate BMI.  Physical Exam   GENERAL: healthy, alert and no distress  EYES: Eyes grossly normal to inspection, PERRL and conjunctivae and sclerae normal  HENT: ear canals and TM's normal, nose and mouth without ulcers or lesions  NECK: no adenopathy, no asymmetry, masses, or scars and thyroid normal to palpation  RESP: lungs clear to auscultation - no rales, rhonchi or wheezes  CV: regular rate and rhythm, normal S1 S2, no S3 or S4, no murmur, click or rub, no peripheral edema and peripheral pulses strong  ABDOMEN: soft, nontender, no hepatosplenomegaly, no masses and bowel sounds normal  MS: no gross musculoskeletal defects noted, no edema  SKIN: no suspicious lesions or rashes  NEURO: Normal strength and tone, mentation intact and speech normal  PSYCH: mentation appears normal, affect normal/bright    Diagnostic Test Results:  Labs reviewed in Epic        Assessment & Plan     1. Type 2 diabetes mellitus with diabetic dermatitis, without long-term current use of insulin (H)  Cardiology does his INR  - Capillary Blood Collection    2. Essential hypertension, benign  labile    3. Nonrheumatic aortic valve stenosis  2 months post op   - INR       Work on weight loss  Rehab     Return in about 3 months (around 11/4/2020) for Routine Visit.    Axel Roberson MD  [facility]    Answers for HPI/ROS submitted by the patient on 8/4/2020   If you checked off any problems, how difficult have these problems made it for you to do your work, take care of things at home, or get along with other people?: Not  difficult at all  PHQ9 TOTAL SCORE: 1  TERESA 7 TOTAL SCORE: 0

## 2020-08-05 ASSESSMENT — PATIENT HEALTH QUESTIONNAIRE - PHQ9: SUM OF ALL RESPONSES TO PHQ QUESTIONS 1-9: 1

## 2020-08-05 ASSESSMENT — ANXIETY QUESTIONNAIRES: GAD7 TOTAL SCORE: 0

## 2020-08-06 ENCOUNTER — TELEPHONE (OUTPATIENT)
Dept: CARDIOLOGY | Facility: CLINIC | Age: 55
End: 2020-08-06

## 2020-08-06 NOTE — TELEPHONE ENCOUNTER
8/6/20 Result note on recent Leadless EKG Monitor received from Dr Villanueva:  Good news, no afib or aflutter.   Please confirm that he stopped amiodarone.   Is his tremor improved?   Spoke with patient and reviewed monitor results. He verified he had stopped Amiodarone prior to monitor placement. He reported his tremors are not any better since stopping med. He is awaiting a call back from Neurology to set up appt. Referral came from pts PCP Dr Pieter North. Will update Dr Villanueva. Pt in agreement w celina Arana 1040 am

## 2020-08-10 ENCOUNTER — MYC MEDICAL ADVICE (OUTPATIENT)
Dept: FAMILY MEDICINE | Facility: CLINIC | Age: 55
End: 2020-08-10

## 2020-08-10 DIAGNOSIS — E11.9 TYPE 2 DIABETES MELLITUS WITHOUT COMPLICATION, WITHOUT LONG-TERM CURRENT USE OF INSULIN (H): Primary | ICD-10-CM

## 2020-08-10 RX ORDER — GLIPIZIDE 5 MG/1
5 TABLET, FILM COATED, EXTENDED RELEASE ORAL DAILY
Qty: 30 TABLET | Refills: 1 | Status: SHIPPED | OUTPATIENT
Start: 2020-08-10 | End: 2020-10-05

## 2020-08-10 NOTE — TELEPHONE ENCOUNTER
Patient wonders if there is an alternate med for metformin, as he has had side effects he associates with metformin, namely tremors and leg cramps. Please advise, reroute as needed.   Altagracia Kessler, BECKYN, RN, PHN

## 2020-08-17 DIAGNOSIS — R25.1 INVOLUNTARY TREMBLING: Primary | ICD-10-CM

## 2020-08-17 DIAGNOSIS — F10.11 HISTORY OF ALCOHOL ABUSE: ICD-10-CM

## 2020-08-18 ENCOUNTER — ANTICOAGULATION THERAPY VISIT (OUTPATIENT)
Dept: ANTICOAGULATION | Facility: CLINIC | Age: 55
End: 2020-08-18

## 2020-08-18 DIAGNOSIS — I35.0 NONRHEUMATIC AORTIC VALVE STENOSIS: ICD-10-CM

## 2020-08-18 DIAGNOSIS — Z95.2 HEART VALVE REPLACED: ICD-10-CM

## 2020-08-18 DIAGNOSIS — Z95.1 S/P CABG (CORONARY ARTERY BYPASS GRAFT): ICD-10-CM

## 2020-08-18 LAB
CAPILLARY BLOOD COLLECTION: NORMAL
INR PPP: 2 (ref 0.86–1.14)

## 2020-08-18 PROCEDURE — 85610 PROTHROMBIN TIME: CPT | Performed by: FAMILY MEDICINE

## 2020-08-18 PROCEDURE — 36416 COLLJ CAPILLARY BLOOD SPEC: CPT | Performed by: FAMILY MEDICINE

## 2020-08-18 NOTE — PROGRESS NOTES
ANTICOAGULATION FOLLOW-UP CLINIC VISIT    Patient Name:  Benjamín Us  Date:  2020  Contact Type:  Telephone/ disussed with patient    SUBJECTIVE:  Patient Findings     Positives:   Change in medications (switched from metformin to glipizide due to cramping from the metformin.  Patient is feeling better now and blood sugars are stable.)    Comments:   The patient was assessed for diet, and activity level changes, missed or extra doses, bruising or bleeding, with no problem findings.  INR dropped since last check, but is still in range today.  If INR continues to drop with next check will consider maintenance dose change.          Clinical Outcomes     Negatives:   Major bleeding event, Thromboembolic event, Anticoagulation-related hospital admission, Anticoagulation-related ED visit, Anticoagulation-related fatality    Comments:   The patient was assessed for diet, and activity level changes, missed or extra doses, bruising or bleeding, with no problem findings.  INR dropped since last check, but is still in range today.  If INR continues to drop with next check will consider maintenance dose change.             OBJECTIVE    Recent labs: (last 7 days)     20  1115   INR 2.00*       ASSESSMENT / PLAN  INR assessment THER    Recheck INR In: 2 WEEKS    INR Location Clinic      Anticoagulation Summary  As of 2020    INR goal:   2.0-3.0   TTR:   58.9 % (2.4 mo)   INR used for dosin.00 (2020)   Warfarin maintenance plan:   5 mg (5 mg x 1) every Mon, Thu; 7.5 mg (5 mg x 1.5) all other days   Full warfarin instructions:   5 mg every Mon, Thu; 7.5 mg all other days   Weekly warfarin total:   47.5 mg   No change documented:   Mar Bacon RN   Plan last modified:   Linsey Barrios RN (2020)   Next INR check:   2020   Priority:   Maintenance   Target end date:   Indefinite    Indications    S/P CABG (coronary artery bypass graft) [Z95.1]  Heart valve replaced [Z95.2]              Anticoagulation Episode Summary     INR check location:       Preferred lab:       Send INR reminders to:   [facility]    Comments:         Anticoagulation Care Providers     Provider Role Specialty Phone number    Mag Richards PA-C Referring Physician Assistant [phone]            See the Encounter Report to view Anticoagulation Flowsheet and Dosing Calendar (Go to Encounters tab in chart review, and find the Anticoagulation Therapy Visit)    Dosage adjustment made based on physician directed care plan.    Mar Bacon RN

## 2020-08-21 ENCOUNTER — OFFICE VISIT (OUTPATIENT)
Dept: DERMATOLOGY | Facility: CLINIC | Age: 55
End: 2020-08-21
Payer: COMMERCIAL

## 2020-08-21 VITALS — SYSTOLIC BLOOD PRESSURE: 132 MMHG | OXYGEN SATURATION: 97 % | DIASTOLIC BLOOD PRESSURE: 86 MMHG | HEART RATE: 89 BPM

## 2020-08-21 DIAGNOSIS — L40.9 PSORIASIS: ICD-10-CM

## 2020-08-21 DIAGNOSIS — L21.9 DERMATITIS, SEBORRHEIC: Primary | ICD-10-CM

## 2020-08-21 PROCEDURE — 99203 OFFICE O/P NEW LOW 30 MIN: CPT | Performed by: PHYSICIAN ASSISTANT

## 2020-08-21 RX ORDER — BETAMETHASONE DIPROPIONATE 0.5 MG/G
CREAM TOPICAL
Qty: 50 G | Refills: 2 | Status: SHIPPED | OUTPATIENT
Start: 2020-08-21

## 2020-08-21 RX ORDER — KETOCONAZOLE 20 MG/G
CREAM TOPICAL
Qty: 60 G | Refills: 3 | Status: SHIPPED | OUTPATIENT
Start: 2020-08-21

## 2020-08-21 RX ORDER — KETOCONAZOLE 20 MG/ML
SHAMPOO TOPICAL
Qty: 120 ML | Refills: 11 | Status: SHIPPED | OUTPATIENT
Start: 2020-08-21

## 2020-08-21 NOTE — PROGRESS NOTES
HPI:   Chief complaints: Benjamín Us is a 55 year old male who presents for evaluation of psoriasis. Has had psoriasis for 25 years and has only used topicals for this. Has betamethasone ointment which he does not use because it is too goopy. In the past has also done light treatment, but he does not want to repeat this. His main areas of concern are on the face, beard, scalp and lower legs.   Condition present for:  years.   Previous treatments include: topical steroids, light    Review Of Systems  Eyes: negative  Ears/Nose/Throat: negative  Respiratory: No shortness of breath, dyspnea on exertion, cough, or hemoptysis  Cardiovascular: negative  Gastrointestinal: negative  Genitourinary: negative  Musculoskeletal: negative  Neurologic: negative  Psychiatric: negative  Skin: positive for psoraisis      PHYSICAL EXAM:    /86   Pulse 89   SpO2 97%   Skin exam performed as follows: Type 2 skin. Mood appropriate  Alert and Oriented X 3. Well developed, well nourished in no distress.  General appearance: Normal  Head including face: Normal  Eyes: conjunctiva and lids: Normal  Mouth: Lips, teeth, gums: Normal  Neck: Normal  Chest-breast/axillae: Normal  Back: Normal  Spleen and liver: Normal  Cardiovascular: Exam of peripheral vascular system by observation for swelling, varicosities, edema: Normal  Genitalia: groin, buttocks: Normal  Extremities: digits/nails (clubbing): Normal  Eccrine and Apocrine glands: Normal  Right upper extremity: Normal  Left upper extremity: Normal  Right lower extremity: Normal  Left lower extremity: Normal  Skin: Scalp and body hair: See below    1. Flaking and erythema on medial cheeks, ala, mid forehead and scalp  2. Psoriasiform dermatitis on bilateral shins, bilateral elbows    ASSESSMENT/PLAN:     Seborrheic dermatitis - advised on chronic, recurrent condition. Discussed that it is a reaction to the normal yeast on the skin. Start ketoconazole shampoo to scalp and beard daily  as needed; ketoconazole cream BID to face PRN.   Psoriasis - discussed topicals, ILK, NBUVB, methotrexate and Otezla (he is interested in this).  After lengthy discussion he prefers to switch topicals. Start betamethsone cream BID x 1-2 weeks then PRN.         Follow-up: 4 weeks if needed/PRN if doing well  CC:   Scribed By: Vy Caraballo, MS, PA-C

## 2020-08-21 NOTE — LETTER
8/21/2020         RE: Benjamín Us  [address] [facility] [id number]        Dear Colleague,    Thank you for referring your patient, Benjamín Us, to the [facility]. Please see a copy of my visit note below.    HPI:   Chief complaints: Benjamín Us is a 55 year old male who presents for evaluation of psoriasis. Has had psoriasis for 25 years and has only used topicals for this. Has betamethasone ointment which he does not use because it is too goopy. In the past has also done light treatment, but he does not want to repeat this. His main areas of concern are on the face, beard, scalp and lower legs.   Condition present for:  years.   Previous treatments include: topical steroids, light    Review Of Systems  Eyes: negative  Ears/Nose/Throat: negative  Respiratory: No shortness of breath, dyspnea on exertion, cough, or hemoptysis  Cardiovascular: negative  Gastrointestinal: negative  Genitourinary: negative  Musculoskeletal: negative  Neurologic: negative  Psychiatric: negative  Skin: positive for psoraisis      PHYSICAL EXAM:    /86   Pulse 89   SpO2 97%   Skin exam performed as follows: Type 2 skin. Mood appropriate  Alert and Oriented X 3. Well developed, well nourished in no distress.  General appearance: Normal  Head including face: Normal  Eyes: conjunctiva and lids: Normal  Mouth: Lips, teeth, gums: Normal  Neck: Normal  Chest-breast/axillae: Normal  Back: Normal  Spleen and liver: Normal  Cardiovascular: Exam of peripheral vascular system by observation for swelling, varicosities, edema: Normal  Genitalia: groin, buttocks: Normal  Extremities: digits/nails (clubbing): Normal  Eccrine and Apocrine glands: Normal  Right upper extremity: Normal  Left upper extremity: Normal  Right lower extremity: Normal  Left lower extremity: Normal  Skin: Scalp and body hair: See below    1. Flaking and erythema on medial cheeks, ala, mid forehead and scalp  2.  Psoriasiform dermatitis on bilateral shins, bilateral elbows    ASSESSMENT/PLAN:     Seborrheic dermatitis - advised on chronic, recurrent condition. Discussed that it is a reaction to the normal yeast on the skin. Start ketoconazole shampoo to scalp and beard daily as needed; ketoconazole cream BID to face PRN.   Psoriasis - discussed topicals, ILK, NBUVB, methotrexate and Otezla (he is interested in this).  After lengthy discussion he prefers to switch topicals. Start betamethsone cream BID x 1-2 weeks then PRN.         Follow-up: 4 weeks if needed/PRN if doing well  CC:   Scribed By: Vy Caraballo, MS, PARodrigoC        Again, thank you for allowing me to participate in the care of your patient.        Sincerely,        Vy Caraballo PA-C

## 2020-08-25 DIAGNOSIS — F10.11 HISTORY OF ALCOHOL ABUSE: ICD-10-CM

## 2020-08-25 DIAGNOSIS — R25.1 INVOLUNTARY TREMBLING: ICD-10-CM

## 2020-08-25 LAB — VIT B12 SERPL-MCNC: 433 PG/ML (ref 193–986)

## 2020-08-25 PROCEDURE — 84443 ASSAY THYROID STIM HORMONE: CPT | Performed by: PSYCHIATRY & NEUROLOGY

## 2020-08-25 PROCEDURE — 99000 SPECIMEN HANDLING OFFICE-LAB: CPT | Performed by: PSYCHIATRY & NEUROLOGY

## 2020-08-25 PROCEDURE — 82607 VITAMIN B-12: CPT | Performed by: PSYCHIATRY & NEUROLOGY

## 2020-08-25 PROCEDURE — 36415 COLL VENOUS BLD VENIPUNCTURE: CPT | Performed by: PSYCHIATRY & NEUROLOGY

## 2020-08-25 PROCEDURE — 82525 ASSAY OF COPPER: CPT | Mod: 90 | Performed by: PSYCHIATRY & NEUROLOGY

## 2020-08-26 LAB — TSH SERPL DL<=0.005 MIU/L-ACNC: 1.46 MU/L (ref 0.4–4)

## 2020-08-27 ENCOUNTER — VIRTUAL VISIT (OUTPATIENT)
Dept: CARDIOLOGY | Facility: CLINIC | Age: 55
End: 2020-08-27
Payer: COMMERCIAL

## 2020-08-27 DIAGNOSIS — I25.10 CORONARY ARTERY DISEASE INVOLVING NATIVE CORONARY ARTERY OF NATIVE HEART WITHOUT ANGINA PECTORIS: ICD-10-CM

## 2020-08-27 DIAGNOSIS — Z95.2 S/P AVR: ICD-10-CM

## 2020-08-27 DIAGNOSIS — I10 BENIGN ESSENTIAL HYPERTENSION: Primary | ICD-10-CM

## 2020-08-27 LAB — COPPER SERPL-MCNC: 92 UG/DL (ref 70–140)

## 2020-08-27 PROCEDURE — 99214 OFFICE O/P EST MOD 30 MIN: CPT | Mod: 95 | Performed by: INTERNAL MEDICINE

## 2020-08-27 RX ORDER — AMLODIPINE BESYLATE 5 MG/1
5 TABLET ORAL DAILY
Qty: 90 TABLET | Refills: 3 | Status: SHIPPED | OUTPATIENT
Start: 2020-08-27 | End: 2020-09-08

## 2020-08-27 NOTE — LETTER
"8/27/2020    Axel Roberson MD  [id number]  [facility] [address]    RE: Benjamín Us       Dear Colleague,    I had the pleasure of seeing Benjamín Us in the [facility].    Benjamín Us is a 55 year old male who is being evaluated via a billable video visit.      The patient has been notified of following:     \"This video visit will be conducted via a call between you and your physician/provider. We have found that certain health care needs can be provided without the need for an in-person physical exam.  This service lets us provide the care you need with a video conversation.  If a prescription is necessary we can send it directly to your pharmacy.  If lab work is needed we can place an order for that and you can then stop by our lab to have the test done at a later time.    Video visits are billed at different rates depending on your insurance coverage.  Please reach out to your insurance provider with any questions.    If during the course of the call the physician/provider feels a video visit is not appropriate, you will not be charged for this service.\"    Patient has given verbal consent for Video visit? Yes  How would you like to obtain your AVS? Mail a copy  If you are dropped from the video visit, the video invite should be resent to: Text to cell phone: [phone]  Will anyone else be joining your video visit? No      Review Of Systems  Skin: negative  Eyes: negative  Ears/Nose/Throat: negative  Respiratory: No shortness of breath, dyspnea on exertion, cough, or hemoptysis  Cardiovascular: negative  Gastrointestinal: negative  Genitourinary: negative  Musculoskeletal: negative  Neurologic: negative  Psychiatric: negative  Hematologic/Lymphatic/Immunologic: negative  Endocrine: diabetes    /95, HR 81 bpm (lowest 142/90- 2 weeks ago)    Anca Parker CMA 8/27/2020    Video-Visit Details    Initially video visit was attempted and briefly we were able " to do video visit but due to technical issues this was converted eventually into a phone visit.  Overall phone call duration 15 minutes      Mr. Agustin is a pleasant 55-year-old gentleman who have seen in the past for aortic stenosis.  I saw him last in 2017.  Late last in 2019 echocardiogram showed worsening of aortic stenosis severe, he was seen by Dr. Stevens and CV surgery and due to young age was felt to be a candidate for mechanical aortic valve replacement and underwent traditional aortic root replacement surgery in May this year with 23 mm [employer] [place] mechanical valve along with LIMA to LAD bypass surgery.  Postoperatively he developed atrial flutter and spontaneously converted, was put on amiodarone developed tremors was seen by Dr. Villanueva amiodarone was stopped and subsequent monitoring did not reveal recurrence of atrial fibrillation.  To be noted patient also has history of hypertension.  Today's visit is a routine follow-up visit.  Due to ongoing coronavirus pandemic and patient preference this was converted into a virtual visit, a video which is to begin with but due to technical issues was converted into a phone visit.  Patient tells me healthwise he feels quite well.,  He regularly goes to gym and does 4 to 5 days of cardiovascular exercise on a particular.  No chest discomfort, shortness of breath, dizziness presyncope or syncope.  He is tolerating aspirin and Coumadin quite well without any bleeding issues.  Additionally he is on Lipitor 40 mg daily.  I do not see any recent lipid panel done.  Postoperative echocardiogram showed normal LV function with normal gradients across mechanical aortic valve.  Remarkably patient tells me that his blood pressure has been running high today it was systolic in 170s, diastolic in 90s.  The lowest blood pressure is recorded in last 2 weeks is 140/92.  He is presently on metoprolol tartrate 25 mg twice daily.    Assessment and plan  A pleasant 55-year gentleman  status post mechanical aortic replacement for severe aortic stenosis, single-vessel CABG LIMA to LAD in May 2020 with subsequent echocardiogram showing normal LV function and normal gradients across mechanical aortic valve.  He also has other comorbidities of hypertension that does not appear to be well controlled.  He is not complaining of any headache or any shortness of breath or dizziness.  I recommend patient to start amlodipine 5 mg daily.  Common adverse effect of amlodipine were discussed with patient.  He will probably require further increase in doses or additional antihypertensive medication.  I recommend patient to call his primary care physician office for close follow-up regarding hypertension.  Patient tells me that he already has a follow-up next month but he is going to call and schedule it sooner.  I recommend follow-up within a week.  I also recommend follow-up with us in cardiology clinic in a month with a lipid profile.  Patient tells me that his chest wound has healed quite well although due to technical issues I was not able to see the wound today.  I recommend that next clinic visit which we are planning in a month be face-to-face so that we can examine him evaluate healing of chest wound.  He should continue aspirin, Coumadin, Lipitor, beta-blocker and newly added amlodipine.       Thank you for allowing me to participate in the care of your patient.    Sincerely,     Myles Downs MD     [facility]

## 2020-08-27 NOTE — PROGRESS NOTES
"Benjamín Us is a 55 year old male who is being evaluated via a billable video visit.      The patient has been notified of following:     \"This video visit will be conducted via a call between you and your physician/provider. We have found that certain health care needs can be provided without the need for an in-person physical exam.  This service lets us provide the care you need with a video conversation.  If a prescription is necessary we can send it directly to your pharmacy.  If lab work is needed we can place an order for that and you can then stop by our lab to have the test done at a later time.    Video visits are billed at different rates depending on your insurance coverage.  Please reach out to your insurance provider with any questions.    If during the course of the call the physician/provider feels a video visit is not appropriate, you will not be charged for this service.\"    Patient has given verbal consent for Video visit? Yes  How would you like to obtain your AVS? Mail a copy  If you are dropped from the video visit, the video invite should be resent to: Text to cell phone: [phone]  Will anyone else be joining your video visit? No      Review Of Systems  Skin: negative  Eyes: negative  Ears/Nose/Throat: negative  Respiratory: No shortness of breath, dyspnea on exertion, cough, or hemoptysis  Cardiovascular: negative  Gastrointestinal: negative  Genitourinary: negative  Musculoskeletal: negative  Neurologic: negative  Psychiatric: negative  Hematologic/Lymphatic/Immunologic: negative  Endocrine: diabetes    /95, HR 81 bpm (lowest 142/90- 2 weeks ago)    Anca Parker CMA 8/27/2020    Video-Visit Details    Initially video visit was attempted and briefly we were able to do video visit but due to technical issues this was converted eventually into a phone visit.  Overall phone call duration 15 minutes      Mr. Agustin is a pleasant 55-year-old gentleman who have seen in the past for " aortic stenosis.  I saw him last in 2017.  Late last in 2019 echocardiogram showed worsening of aortic stenosis severe, he was seen by Dr. Stevens and CV surgery and due to young age was felt to be a candidate for mechanical aortic valve replacement and underwent traditional aortic root replacement surgery in May this year with 23 mm [employer] [place] mechanical valve along with LIMA to LAD bypass surgery.  Postoperatively he developed atrial flutter and spontaneously converted, was put on amiodarone developed tremors was seen by Dr. Villanueva amiodarone was stopped and subsequent monitoring did not reveal recurrence of atrial fibrillation.  To be noted patient also has history of hypertension.  Today's visit is a routine follow-up visit.  Due to ongoing coronavirus pandemic and patient preference this was converted into a virtual visit, a video which is to begin with but due to technical issues was converted into a phone visit.  Patient tells me healthwise he feels quite well.,  He regularly goes to gym and does 4 to 5 days of cardiovascular exercise on a particular.  No chest discomfort, shortness of breath, dizziness presyncope or syncope.  He is tolerating aspirin and Coumadin quite well without any bleeding issues.  Additionally he is on Lipitor 40 mg daily.  I do not see any recent lipid panel done.  Postoperative echocardiogram showed normal LV function with normal gradients across mechanical aortic valve.  Remarkably patient tells me that his blood pressure has been running high today it was systolic in 170s, diastolic in 90s.  The lowest blood pressure is recorded in last 2 weeks is 140/92.  He is presently on metoprolol tartrate 25 mg twice daily.    Assessment and plan  A pleasant 55-year gentleman status post mechanical aortic replacement for severe aortic stenosis, single-vessel CABG LIMA to LAD in May 2020 with subsequent echocardiogram showing normal LV function and normal gradients across mechanical aortic  valve.  He also has other comorbidities of hypertension that does not appear to be well controlled.  He is not complaining of any headache or any shortness of breath or dizziness.  I recommend patient to start amlodipine 5 mg daily.  Common adverse effect of amlodipine were discussed with patient.  He will probably require further increase in doses or additional antihypertensive medication.  I recommend patient to call his primary care physician office for close follow-up regarding hypertension.  Patient tells me that he already has a follow-up next month but he is going to call and schedule it sooner.  I recommend follow-up within a week.  I also recommend follow-up with us in cardiology clinic in a month with a lipid profile.  Patient tells me that his chest wound has healed quite well although due to technical issues I was not able to see the wound today.  I recommend that next clinic visit which we are planning in a month be face-to-face so that we can examine him evaluate healing of chest wound.  He should continue aspirin, Coumadin, Lipitor, beta-blocker and newly added amlodipine.      Myles Downs MD

## 2020-08-29 ENCOUNTER — TRANSFERRED RECORDS (OUTPATIENT)
Dept: HEALTH INFORMATION MANAGEMENT | Facility: CLINIC | Age: 55
End: 2020-08-29

## 2020-08-29 LAB
RETINOPATHY: NEGATIVE
RETINOPATHY: NORMAL

## 2020-08-31 ENCOUNTER — TELEPHONE (OUTPATIENT)
Dept: FAMILY MEDICINE | Facility: CLINIC | Age: 55
End: 2020-08-31

## 2020-08-31 NOTE — TELEPHONE ENCOUNTER
I can see him one afternoon this week, tue, wed or Friday see when he can come and create a slot or use a permission slot in the am.

## 2020-08-31 NOTE — TELEPHONE ENCOUNTER
My needs have changed. I had a tele visit with my cardiologist Dr. Downs and he wants me to be seen by Dr. North before my next scheduled visit on Nov 4th.  My blood pressure is up and with the blood thinner I take he believes it is too high.  He put me on Glipizide, 5mg, but thinks this won't be enough, and wants this to be reviewed by my regular doctor.  So I don't know what to do from here.  Should I see someone else so I can be seen sooner?  The Glipizide doesn't seem to be helping much.      Patient sent this in looking for sooner appointment, you are booked out, patient needs to be seen sooner than 11/4    Laura Key/

## 2020-08-31 NOTE — TELEPHONE ENCOUNTER
Call to patient, scheduled appt for Wednesday. Patient agrees to plan of care.     Next 5 appointments (look out 90 days)    Sep 02, 2020  2:00 PM CDT  (Arrive by 1:40 PM)  [facility] [address] [place] [id number]  [phone]   Nov 04, 2020 10:30 AM CST  (Arrive by 10:10 AM)  [facility] [address] [place] [id number]  [phone]        Altagracia Kessler, BECKYN, RN, PHN

## 2020-09-01 ENCOUNTER — ANTICOAGULATION THERAPY VISIT (OUTPATIENT)
Dept: ANTICOAGULATION | Facility: CLINIC | Age: 55
End: 2020-09-01

## 2020-09-01 DIAGNOSIS — I35.0 NONRHEUMATIC AORTIC VALVE STENOSIS: ICD-10-CM

## 2020-09-01 DIAGNOSIS — Z95.1 S/P CABG (CORONARY ARTERY BYPASS GRAFT): ICD-10-CM

## 2020-09-01 DIAGNOSIS — Z95.2 HEART VALVE REPLACED: ICD-10-CM

## 2020-09-01 LAB
CAPILLARY BLOOD COLLECTION: NORMAL
INR PPP: 2.4 (ref 0.86–1.14)

## 2020-09-01 PROCEDURE — 85610 PROTHROMBIN TIME: CPT | Performed by: FAMILY MEDICINE

## 2020-09-01 PROCEDURE — 36416 COLLJ CAPILLARY BLOOD SPEC: CPT | Performed by: FAMILY MEDICINE

## 2020-09-01 NOTE — PROGRESS NOTES
ANTICOAGULATION FOLLOW-UP CLINIC VISIT    Patient Name:  Benjamín Us  Date:  2020  Contact Type:  Telephone/ Called patient, he denies any changes. Orders discussed with patient, he agrees with the plan. Lab INR appointment scheduled on 20.    SUBJECTIVE:  Patient Findings     Comments:   The patient was assessed for diet, medication, and activity level changes, missed or extra doses, bruising or bleeding, with no problem findings. Maintenance warfarin dosing was reviewed with patient and will remain on the same dose until next INR check. Patient did not have any questions or concerns.           Clinical Outcomes     Negatives:   Major bleeding event, Thromboembolic event, Anticoagulation-related hospital admission, Anticoagulation-related ED visit, Anticoagulation-related fatality    Comments:   The patient was assessed for diet, medication, and activity level changes, missed or extra doses, bruising or bleeding, with no problem findings. Maintenance warfarin dosing was reviewed with patient and will remain on the same dose until next INR check. Patient did not have any questions or concerns.              OBJECTIVE    Recent labs: (last 7 days)     20  1108   INR 2.40*       ASSESSMENT / PLAN  INR assessment THER    Recheck INR In: 3 WEEKS    INR Location Outside lab      Anticoagulation Summary  As of 2020    INR goal:   2.0-3.0   TTR:   65.5 % (2.9 mo)   INR used for dosin.40 (2020)   Warfarin maintenance plan:   5 mg (5 mg x 1) every Mon, Thu; 7.5 mg (5 mg x 1.5) all other days   Full warfarin instructions:   5 mg every Mon, Thu; 7.5 mg all other days   Weekly warfarin total:   47.5 mg   No change documented:   Sonya Hodge RN   Plan last modified:   Linsey Barrios RN (2020)   Next INR check:   2020   Priority:   Maintenance   Target end date:   Indefinite    Indications    S/P CABG (coronary artery bypass graft) [Z95.1]  Heart valve replaced [Z95.2]              Anticoagulation Episode Summary     INR check location:       Preferred lab:       Send INR reminders to:   [facility]    Comments:         Anticoagulation Care Providers     Provider Role Specialty Phone number    Mag Richards PA-C Referring Physician Assistant [phone]            See the Encounter Report to view Anticoagulation Flowsheet and Dosing Calendar (Go to Encounters tab in chart review, and find the Anticoagulation Therapy Visit)    Dosage adjustment made based on physician directed care plan.    Sonya Hodge RN

## 2020-09-02 ENCOUNTER — OFFICE VISIT (OUTPATIENT)
Dept: FAMILY MEDICINE | Facility: CLINIC | Age: 55
End: 2020-09-02
Payer: COMMERCIAL

## 2020-09-02 ENCOUNTER — TRANSFERRED RECORDS (OUTPATIENT)
Dept: HEALTH INFORMATION MANAGEMENT | Facility: CLINIC | Age: 55
End: 2020-09-02

## 2020-09-02 VITALS
SYSTOLIC BLOOD PRESSURE: 144 MMHG | DIASTOLIC BLOOD PRESSURE: 98 MMHG | WEIGHT: 221 LBS | TEMPERATURE: 98.8 F | OXYGEN SATURATION: 97 % | BODY MASS INDEX: 31.71 KG/M2 | HEART RATE: 84 BPM

## 2020-09-02 DIAGNOSIS — Z86.73 HISTORY OF CEREBRAL EMBOLIC INFARCTION: ICD-10-CM

## 2020-09-02 DIAGNOSIS — Z95.2 HX OF PROSTHETIC HEART VALVE: ICD-10-CM

## 2020-09-02 DIAGNOSIS — I35.9 AORTIC VALVE DISEASE: Primary | ICD-10-CM

## 2020-09-02 DIAGNOSIS — I10 ESSENTIAL HYPERTENSION: ICD-10-CM

## 2020-09-02 DIAGNOSIS — I25.810 CORONARY ARTERY DISEASE INVOLVING CORONARY BYPASS GRAFT OF NATIVE HEART WITHOUT ANGINA PECTORIS: ICD-10-CM

## 2020-09-02 PROCEDURE — 99214 OFFICE O/P EST MOD 30 MIN: CPT | Performed by: FAMILY MEDICINE

## 2020-09-02 ASSESSMENT — ANXIETY QUESTIONNAIRES
7. FEELING AFRAID AS IF SOMETHING AWFUL MIGHT HAPPEN: NOT AT ALL
2. NOT BEING ABLE TO STOP OR CONTROL WORRYING: NOT AT ALL
GAD7 TOTAL SCORE: 0
5. BEING SO RESTLESS THAT IT IS HARD TO SIT STILL: NOT AT ALL
GAD7 TOTAL SCORE: 0
6. BECOMING EASILY ANNOYED OR IRRITABLE: NOT AT ALL
3. WORRYING TOO MUCH ABOUT DIFFERENT THINGS: NOT AT ALL
7. FEELING AFRAID AS IF SOMETHING AWFUL MIGHT HAPPEN: NOT AT ALL
1. FEELING NERVOUS, ANXIOUS, OR ON EDGE: NOT AT ALL
GAD7 TOTAL SCORE: 0
4. TROUBLE RELAXING: NOT AT ALL

## 2020-09-02 ASSESSMENT — PATIENT HEALTH QUESTIONNAIRE - PHQ9
SUM OF ALL RESPONSES TO PHQ QUESTIONS 1-9: 0
SUM OF ALL RESPONSES TO PHQ QUESTIONS 1-9: 0
10. IF YOU CHECKED OFF ANY PROBLEMS, HOW DIFFICULT HAVE THESE PROBLEMS MADE IT FOR YOU TO DO YOUR WORK, TAKE CARE OF THINGS AT HOME, OR GET ALONG WITH OTHER PEOPLE: NOT DIFFICULT AT ALL

## 2020-09-02 NOTE — PROGRESS NOTES
Subjective     Benjamín Us is a 55 year old male who presents to clinic today for the following health issues:    History of Present Illness        Diabetes:   He presents for follow up of diabetes.  He is checking home blood glucose three times daily. He checks blood glucose before and after meals.  Blood glucose is sometimes over 200 and never under 70. When his blood glucose is low, the patient is asymptomatic for confusion, blurred vision, lethargy and reports not feeling dizzy, shaky, or weak.  He has no concerns regarding his diabetes at this time.  He is having weight gain. The patient has had a diabetic eye exam in the last 12 months. Eye exam performed on 8/29/2020. Location of last eye exam [facility].        Hypertension: He presents for follow up of hypertension.  He does check blood pressure  regularly outside of the clinic. Outside blood pressures have been over 140/90. He does not follow a low salt diet.     He eats 0-1 servings of fruits and vegetables daily.He consumes 0 sweetened beverage(s) daily.He exercises with enough effort to increase his heart rate 20 to 29 minutes per day.  He exercises with enough effort to increase his heart rate 5 days per week.   He is taking medications regularly.     Neurology workup showed previous tiny infarcts seeing Dr Johnson to investigate, recovering from his dental surgery EATING LOTS OF SALT AND BP has been higher NEW MED amlodipine from Cardiology           Review of Systems   Constitutional, HEENT, cardiovascular, pulmonary, gi and gu systems are negative, except as otherwise noted.      Objective  BP (!) 144/98   Pulse 84   Temp 98.8  F (37.1  C) (Oral)   Wt 100.2 kg (221 lb)   SpO2 97%   BMI 31.71 kg/m      Physical Exam   GENERAL: healthy, alert and no distress  NECK: no adenopathy, no asymmetry, masses, or scars and thyroid normal to palpation  RESP: lungs clear to auscultation - no rales, rhonchi or wheezes  CV: regular rate and  rhythm, normal S1 S2, no S3 or S4, no murmur, click or rub, no peripheral edema and peripheral pulses strong  ABDOMEN: soft, nontender, no hepatosplenomegaly, no masses and bowel sounds normal  MS: no gross musculoskeletal defects noted, no edema    Back to low salt diet, consider dose adjust amlodipine        Assessment & Plan     Aortic valve disease  Coumadin and artifical     Coronary artery disease involving coronary bypass graft of native heart without angina pectoris  Post surgery good exercise tolerance     Essential hypertension  Heavy salt after dental    Hx of prosthetic heart valve      History of cerebral embolic infarction  New finding at Neurology, no symptoms         He'll call to update as he curbs the salt,    Return in about 1 week (around 9/9/2020) for telephone call re blood pressure.    Axel Roberson MD  [facility]    Answers for HPI/ROS submitted by the patient on 9/2/2020   Chronic problems general questions HPI Form  If you checked off any problems, how difficult have these problems made it for you to do your work, take care of things at home, or get along with other people?: Not difficult at all  PHQ9 TOTAL SCORE: 0  TERESA 7 TOTAL SCORE: 0

## 2020-09-03 ASSESSMENT — PATIENT HEALTH QUESTIONNAIRE - PHQ9: SUM OF ALL RESPONSES TO PHQ QUESTIONS 1-9: 0

## 2020-09-03 ASSESSMENT — ANXIETY QUESTIONNAIRES: GAD7 TOTAL SCORE: 0

## 2020-09-08 ENCOUNTER — MYC MEDICAL ADVICE (OUTPATIENT)
Dept: FAMILY MEDICINE | Facility: CLINIC | Age: 55
End: 2020-09-08

## 2020-09-08 DIAGNOSIS — I10 ESSENTIAL HYPERTENSION: Primary | ICD-10-CM

## 2020-09-08 RX ORDER — AMLODIPINE BESYLATE 5 MG/1
5 TABLET ORAL
COMMUNITY
Start: 2020-09-08 | End: 2020-09-22 | Stop reason: DRUGHIGH

## 2020-09-10 ENCOUNTER — MYC MEDICAL ADVICE (OUTPATIENT)
Dept: FAMILY MEDICINE | Facility: CLINIC | Age: 55
End: 2020-09-10

## 2020-09-10 DIAGNOSIS — I10 ESSENTIAL HYPERTENSION: ICD-10-CM

## 2020-09-11 RX ORDER — AMLODIPINE BESYLATE 10 MG/1
10 TABLET ORAL DAILY
Qty: 90 TABLET | Refills: 0 | Status: SHIPPED | OUTPATIENT
Start: 2020-09-11 | End: 2020-11-30

## 2020-09-22 ENCOUNTER — ANTICOAGULATION THERAPY VISIT (OUTPATIENT)
Dept: NURSING | Facility: CLINIC | Age: 55
End: 2020-09-22

## 2020-09-22 DIAGNOSIS — Z95.1 S/P CABG (CORONARY ARTERY BYPASS GRAFT): ICD-10-CM

## 2020-09-22 DIAGNOSIS — Z95.2 HEART VALVE REPLACED: ICD-10-CM

## 2020-09-22 DIAGNOSIS — I35.0 NONRHEUMATIC AORTIC VALVE STENOSIS: ICD-10-CM

## 2020-09-22 LAB
CAPILLARY BLOOD COLLECTION: NORMAL
INR PPP: 2.5 (ref 0.86–1.14)

## 2020-09-22 PROCEDURE — 36416 COLLJ CAPILLARY BLOOD SPEC: CPT | Performed by: FAMILY MEDICINE

## 2020-09-22 PROCEDURE — 99207 ZZC NO CHARGE NURSE ONLY: CPT

## 2020-09-22 PROCEDURE — 85610 PROTHROMBIN TIME: CPT | Performed by: FAMILY MEDICINE

## 2020-09-22 NOTE — PROGRESS NOTES
ANTICOAGULATION FOLLOW-UP CLINIC VISIT    Patient Name:  Benjamín Us  Date:  2020  Contact Type:  Telephone with Michelle-patient's wife    SUBJECTIVE:  Patient Findings     Positives:   Change in medications (Norvasc increased from 5mg QD to 10mg QD earlier this month)        Clinical Outcomes     Negatives:   Major bleeding event, Thromboembolic event, Anticoagulation-related hospital admission, Anticoagulation-related ED visit, Anticoagulation-related fatality           OBJECTIVE    Recent labs: (last 7 days)     20  1420   INR 2.50*       ASSESSMENT / PLAN  INR assessment THER    Recheck INR In: 4 WEEKS    INR Location Clinic      Anticoagulation Summary  As of 2020    INR goal:   2.0-3.0   TTR:   72.3 % (3.6 mo)   INR used for dosin.50 (2020)   Warfarin maintenance plan:   5 mg (5 mg x 1) every Mon, Thu; 7.5 mg (5 mg x 1.5) all other days   Full warfarin instructions:   5 mg every Mon, Thu; 7.5 mg all other days   Weekly warfarin total:   47.5 mg   Plan last modified:   Linsey Barrios RN (2020)   Next INR check:   10/20/2020   Priority:   Maintenance   Target end date:   Indefinite    Indications    S/P CABG (coronary artery bypass graft) [Z95.1]  Heart valve replaced [Z95.2]             Anticoagulation Episode Summary     INR check location:       Preferred lab:       Send INR reminders to:   [facility]    Comments:         Anticoagulation Care Providers     Provider Role Specialty Phone number    Mag Richards PA-C Referring Physician Assistant [phone]            See the Encounter Report to view Anticoagulation Flowsheet and Dosing Calendar (Go to Encounters tab in chart review, and find the Anticoagulation Therapy Visit)        Linsey Barrios RN

## 2020-09-29 ENCOUNTER — HOSPITAL ENCOUNTER (OUTPATIENT)
Dept: CARDIOLOGY | Facility: CLINIC | Age: 55
Discharge: HOME | End: 2020-09-29
Attending: PSYCHIATRY & NEUROLOGY | Admitting: PSYCHIATRY & NEUROLOGY
Payer: COMMERCIAL

## 2020-09-29 DIAGNOSIS — I63.9 CVA (CEREBRAL VASCULAR ACCIDENT) (H): ICD-10-CM

## 2020-09-29 PROCEDURE — 93306 TTE W/DOPPLER COMPLETE: CPT

## 2020-09-29 PROCEDURE — 25800025 ZZH RX 258: Performed by: PSYCHIATRY & NEUROLOGY

## 2020-09-29 PROCEDURE — 25500064 ZZH RX 255 OP 636: Performed by: PSYCHIATRY & NEUROLOGY

## 2020-09-29 PROCEDURE — 93306 TTE W/DOPPLER COMPLETE: CPT | Mod: 26 | Performed by: INTERNAL MEDICINE

## 2020-09-29 RX ORDER — ACYCLOVIR 200 MG/1
30 CAPSULE ORAL ONCE
Status: COMPLETED | OUTPATIENT
Start: 2020-09-29 | End: 2020-09-29

## 2020-09-29 RX ADMIN — SODIUM CHLORIDE 30 ML: 9 INJECTION, SOLUTION INTRAMUSCULAR; INTRAVENOUS; SUBCUTANEOUS at 14:15

## 2020-09-29 RX ADMIN — HUMAN ALBUMIN MICROSPHERES AND PERFLUTREN 2 ML: 10; .22 INJECTION, SOLUTION INTRAVENOUS at 14:16

## 2020-10-04 ENCOUNTER — MYC MEDICAL ADVICE (OUTPATIENT)
Dept: FAMILY MEDICINE | Facility: CLINIC | Age: 55
End: 2020-10-04

## 2020-10-04 DIAGNOSIS — E11.40 TYPE 2 DIABETES MELLITUS WITH DIABETIC NEUROPATHY, WITHOUT LONG-TERM CURRENT USE OF INSULIN (H): Primary | ICD-10-CM

## 2020-10-04 DIAGNOSIS — M62.81 MUSCLE WEAKNESS (GENERALIZED): ICD-10-CM

## 2020-10-04 DIAGNOSIS — Z95.2 HISTORY OF PROSTHETIC AORTIC VALVE: ICD-10-CM

## 2020-10-05 DIAGNOSIS — E11.9 TYPE 2 DIABETES MELLITUS WITHOUT COMPLICATION, WITHOUT LONG-TERM CURRENT USE OF INSULIN (H): ICD-10-CM

## 2020-10-05 RX ORDER — GLIPIZIDE 5 MG/1
5 TABLET, FILM COATED, EXTENDED RELEASE ORAL DAILY
Qty: 30 TABLET | Refills: 1 | Status: SHIPPED | OUTPATIENT
Start: 2020-10-05 | End: 2020-11-30

## 2020-10-07 DIAGNOSIS — M62.81 MUSCLE WEAKNESS (GENERALIZED): ICD-10-CM

## 2020-10-07 DIAGNOSIS — E11.40 TYPE 2 DIABETES MELLITUS WITH DIABETIC NEUROPATHY, WITHOUT LONG-TERM CURRENT USE OF INSULIN (H): ICD-10-CM

## 2020-10-07 LAB
ALBUMIN SERPL-MCNC: 3.4 G/DL (ref 3.4–5)
ALP SERPL-CCNC: 103 U/L (ref 40–150)
ALT SERPL W P-5'-P-CCNC: 32 U/L (ref 0–70)
ANION GAP SERPL CALCULATED.3IONS-SCNC: 7 MMOL/L (ref 3–14)
AST SERPL W P-5'-P-CCNC: 25 U/L (ref 0–45)
BILIRUB SERPL-MCNC: 0.4 MG/DL (ref 0.2–1.3)
BUN SERPL-MCNC: 14 MG/DL (ref 7–30)
CALCIUM SERPL-MCNC: 8.7 MG/DL (ref 8.5–10.1)
CHLORIDE SERPL-SCNC: 109 MMOL/L (ref 94–109)
CO2 SERPL-SCNC: 23 MMOL/L (ref 20–32)
CREAT SERPL-MCNC: 0.95 MG/DL (ref 0.66–1.25)
GFR SERPL CREATININE-BSD FRML MDRD: 88 ML/MIN/{1.73_M2}
GLUCOSE SERPL-MCNC: 136 MG/DL (ref 70–99)
HBA1C MFR BLD: 6.9 % (ref 0–5.6)
POTASSIUM SERPL-SCNC: 4.1 MMOL/L (ref 3.4–5.3)
PROT SERPL-MCNC: 7.2 G/DL (ref 6.8–8.8)
SODIUM SERPL-SCNC: 139 MMOL/L (ref 133–144)

## 2020-10-07 PROCEDURE — 83036 HEMOGLOBIN GLYCOSYLATED A1C: CPT | Performed by: FAMILY MEDICINE

## 2020-10-07 PROCEDURE — 84270 ASSAY OF SEX HORMONE GLOBUL: CPT | Performed by: FAMILY MEDICINE

## 2020-10-07 PROCEDURE — 84403 ASSAY OF TOTAL TESTOSTERONE: CPT | Mod: 90 | Performed by: FAMILY MEDICINE

## 2020-10-07 PROCEDURE — 80053 COMPREHEN METABOLIC PANEL: CPT | Performed by: FAMILY MEDICINE

## 2020-10-07 PROCEDURE — 99000 SPECIMEN HANDLING OFFICE-LAB: CPT | Performed by: FAMILY MEDICINE

## 2020-10-09 DIAGNOSIS — R53.83 FATIGUE, UNSPECIFIED TYPE: Primary | ICD-10-CM

## 2020-10-09 DIAGNOSIS — Z95.2 HISTORY OF PROSTHETIC AORTIC VALVE: ICD-10-CM

## 2020-10-09 LAB
SHBG SERPL-SCNC: 38 NMOL/L (ref 11–80)
TESTOST FREE SERPL-MCNC: 8.98 NG/DL (ref 4.7–24.4)
TESTOST SERPL-MCNC: 471 NG/DL (ref 240–950)

## 2020-10-12 DIAGNOSIS — Z95.1 S/P CABG (CORONARY ARTERY BYPASS GRAFT): ICD-10-CM

## 2020-10-12 RX ORDER — METOPROLOL TARTRATE 25 MG/1
25 TABLET, FILM COATED ORAL 2 TIMES DAILY
Qty: 180 TABLET | Refills: 0 | Status: SHIPPED | OUTPATIENT
Start: 2020-10-12 | End: 2020-12-23

## 2020-10-12 NOTE — TELEPHONE ENCOUNTER
Received refill request for:  Metoprolol Tartrate  Last OV was: 8/27/2020 with Dr. Downs  Labs/EKG: na  F/U scheduled: overdue orders in Epic.  Messaged scheduling to reach out to get appt scheduled  New script sent to: [facility]

## 2020-10-20 ENCOUNTER — ANTICOAGULATION THERAPY VISIT (OUTPATIENT)
Dept: ANTICOAGULATION | Facility: CLINIC | Age: 55
End: 2020-10-20

## 2020-10-20 DIAGNOSIS — Z95.2 HEART VALVE REPLACED: ICD-10-CM

## 2020-10-20 DIAGNOSIS — Z95.1 S/P CABG (CORONARY ARTERY BYPASS GRAFT): ICD-10-CM

## 2020-10-20 DIAGNOSIS — I35.0 NONRHEUMATIC AORTIC VALVE STENOSIS: ICD-10-CM

## 2020-10-20 LAB
CAPILLARY BLOOD COLLECTION: NORMAL
INR PPP: 1.9 (ref 0.86–1.14)

## 2020-10-20 PROCEDURE — 36416 COLLJ CAPILLARY BLOOD SPEC: CPT | Performed by: FAMILY MEDICINE

## 2020-10-20 PROCEDURE — 85610 PROTHROMBIN TIME: CPT | Performed by: FAMILY MEDICINE

## 2020-10-20 NOTE — PROGRESS NOTES
ANTICOAGULATION MANAGEMENT     Patient Name:  Benjamín Us  Date:  10/20/2020    ASSESSMENT /SUBJECTIVE:    Today's INR result of 1.9 is subtherapeutic. Goal INR of 2.0-3.0      Warfarin dose taken: Warfarin taken as instructed    Diet: No new diet changes affecting INR    Medication changes/ interactions: No new medications/supplements affecting INR    Previous INR: Therapeutic     S/S of bleeding or thromboembolism: No    New injury or illness: No    Upcoming surgery, procedure or cardioversion: No    Additional findings: None      PLAN:    Telephone call with Benjamín regarding INR result and instructed:     Warfarin Dosing Instructions: Continue your current warfarin dose 5 mg M  TH; 7.5 mg ROW    Instructed patient to follow up no later than: 2 weeks  Lab visit scheduled    Education provided: Monitoring for clotting signs and symptoms and When to seek medical attention/emergency care      Benjamín verbalizes understanding and agrees to warfarin dosing plan.    Instructed to call the Anticoagulation Clinic for any changes, questions or concerns. (#[phone])        Tracy Roque RN      OBJECTIVE:  Recent labs: (last 7 days)     10/20/20  1059   INR 1.90*         No question data found.  Anticoagulation Summary  As of 10/20/2020    INR goal:  2.0-3.0   TTR:  74.6 % (4.5 mo)   INR used for dosin.90 (10/20/2020)   Warfarin maintenance plan:  5 mg (5 mg x 1) every Mon, Thu; 7.5 mg (5 mg x 1.5) all other days   Full warfarin instructions:  5 mg every Mon, Thu; 7.5 mg all other days   Weekly warfarin total:  47.5 mg   No change documented:  Adia Bland RN   Plan last modified:  Linsey Barrios RN (2020)   Next INR check:  2020   Priority:  Maintenance   Target end date:  Indefinite    Indications    S/P CABG (coronary artery bypass graft) [Z95.1]  Heart valve replaced [Z95.2]             Anticoagulation Episode Summary     INR check location:      Preferred lab:      Send INR reminders  to:  [facility]    Comments:  Call Michelle, pt's wife, on her cell with INR results      Anticoagulation Care Providers     Provider Role Specialty Phone number    Mag Richards PA-C Referring Physician Assistant [phone]

## 2020-10-27 ENCOUNTER — ANCILLARY PROCEDURE (OUTPATIENT)
Dept: GENERAL RADIOLOGY | Facility: CLINIC | Age: 55
End: 2020-10-27
Attending: PHYSICIAN ASSISTANT
Payer: COMMERCIAL

## 2020-10-27 ENCOUNTER — TELEPHONE (OUTPATIENT)
Dept: FAMILY MEDICINE | Facility: CLINIC | Age: 55
End: 2020-10-27

## 2020-10-27 ENCOUNTER — OFFICE VISIT (OUTPATIENT)
Dept: URGENT CARE | Facility: URGENT CARE | Age: 55
End: 2020-10-27
Payer: COMMERCIAL

## 2020-10-27 VITALS
OXYGEN SATURATION: 98 % | HEART RATE: 78 BPM | SYSTOLIC BLOOD PRESSURE: 140 MMHG | RESPIRATION RATE: 20 BRPM | TEMPERATURE: 98 F | DIASTOLIC BLOOD PRESSURE: 82 MMHG

## 2020-10-27 DIAGNOSIS — Z95.2 HEART VALVE REPLACED: ICD-10-CM

## 2020-10-27 DIAGNOSIS — Z95.1 S/P CABG (CORONARY ARTERY BYPASS GRAFT): ICD-10-CM

## 2020-10-27 DIAGNOSIS — R07.81 RIB PAIN ON RIGHT SIDE: ICD-10-CM

## 2020-10-27 DIAGNOSIS — R07.81 RIB PAIN ON RIGHT SIDE: Primary | ICD-10-CM

## 2020-10-27 PROCEDURE — 99214 OFFICE O/P EST MOD 30 MIN: CPT | Performed by: PHYSICIAN ASSISTANT

## 2020-10-27 PROCEDURE — 71101 X-RAY EXAM UNILAT RIBS/CHEST: CPT | Mod: RT | Performed by: RADIOLOGY

## 2020-10-27 RX ORDER — CYCLOBENZAPRINE HCL 10 MG
10 TABLET ORAL 3 TIMES DAILY PRN
Qty: 30 TABLET | Refills: 0 | Status: SHIPPED | OUTPATIENT
Start: 2020-10-27 | End: 2020-11-06

## 2020-10-27 RX ORDER — WARFARIN SODIUM 5 MG/1
TABLET ORAL
Qty: 42 TABLET | Refills: 4 | Status: SHIPPED | OUTPATIENT
Start: 2020-10-27 | End: 2021-02-09

## 2020-10-27 NOTE — TELEPHONE ENCOUNTER
Patient's wife calls, states patient fell a couple of days ago, while there is no bruising, there is significant pain at the rib cage. Advised to go to urgent care for assessment. Patient's agrees to plan of care and denies further questions or concerns.  Altagracia Kessler, BSN, RN, PHN

## 2020-10-27 NOTE — PROGRESS NOTES
SUBJECTIVE:  Chief Complaint   Patient presents with     Urgent Care     fell and injured rib X3 days     Benjamín Us is a 55 year old male presents with a chief complaint of right rib pain .  The injury occurred 3 day(s) ago.   How: tripped over hitch landed on elbow.  The patient complained of moderate pain  and has not had decreased ROM.  Pain exacerbated by twisting.  Relieved by nothing.  He treated it initially with no therapy. This is the first time this type of injury has occurred to this patient.     Past Medical History:   Diagnosis Date     ADD (attention deficit disorder)      Aneurysm of thoracic aorta (H) 5/21/2014     Problem list name updated by automated process. Provider to review     Aortic valve disorder 5/21/2014     Atrial flutter (H)     post op AVR and CABG     CAD (coronary artery disease)      CARDIOVASCULAR SCREENING; LDL GOAL LESS THAN 160 2/25/2014     Dermatitis 9/14/2018     Essential hypertension, benign 9/14/2018     Hypertension      Hypertensive urgency 8/8/2013     HARIS (obstructive sleep apnea)      Type 2 diabetes mellitus with diabetic dermatitis, without long-term current use of insulin (H) 9/14/2018     Current Outpatient Medications   Medication Sig Dispense Refill     acetaminophen (TYLENOL) 325 MG tablet Take 2 tablets (650 mg) by mouth every 6 hours as needed for mild pain or fever 1 Bottle 0     alcohol swab prep pads Use to swab area of injection/víctor as directed. 100 each 3     amLODIPine (NORVASC) 10 MG tablet Take 1 tablet (10 mg) by mouth daily 90 tablet 0     aspirin (ASPIRIN) 81 MG EC tablet Take 1 tablet (81 mg) by mouth daily       atorvastatin (LIPITOR) 40 MG tablet Take 1 tablet (40 mg) by mouth daily 90 tablet 1     augmented betamethasone dipropionate (DIPROLENE-AF) 0.05 % external cream Apply to AA on the body BID x 1-2 weeks then PRN only. Do not apply to face 50 g 2     blood glucose (ACCU-CHEK FASTCLIX) lancing device Lancing device to be used with  lancets. 1 each 0     blood glucose (NO BRAND SPECIFIED) test strip Use to test blood sugar 4 times daily or as directed. 100 strip 6     blood glucose calibration (NO BRAND SPECIFIED) solution Use to calibrate blood glucose monitor as needed as directed. 1 Bottle 3     blood glucose monitoring (ACCU-CHEK FASTCLIX) lancets Use to test blood sugar 4 times daily or as directed. 102 each 11     blood glucose monitoring (NO BRAND SPECIFIED) meter device kit Use to test blood sugar 4 times daily or as directed. 1 kit 0     cyclobenzaprine (FLEXERIL) 10 MG tablet Take 1 tablet (10 mg) by mouth 3 times daily as needed for muscle spasms 30 tablet 0     glipiZIDE (GLUCOTROL XL) 5 MG 24 hr tablet Take 1 tablet (5 mg) by mouth daily 30 tablet 1     ketoconazole (NIZORAL) 2 % external cream Use BID PRN 60 g 3     ketoconazole (NIZORAL) 2 % external shampoo Use daily as needed 120 mL 11     metoprolol tartrate (LOPRESSOR) 25 MG tablet Take 1 tablet (25 mg) by mouth 2 times daily 180 tablet 0     potassium chloride (KLOR-CON) 20 MEQ packet Take 20 mEq by mouth 2 times daily       thin (NO BRAND SPECIFIED) lancets Use with lanceting device. 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  No evidence appreciated.      ASSESSMENT  (R07.81) Rib pain on right side  (primary encounter diagnosis)  Comment: no evidence of fracture, dislocation, pneumothorax  Plan: XR Ribs & Chest Right G/E 3 Views,         cyclobenzaprine (FLEXERIL) 10 MG tablet      Red flags and emergent follow up discussed, and understood by patient  Follow up with PCP if symptoms worsen or fail to improve      Patient Instructions     Patient Education     Rib Contusion or Minor Fracture    A rib contusion is a bruise to one or more rib bones. It may cause pain, tenderness, swelling, and a purplish color to the skin. There may be a sharp pain with each breath. A rib contusion takes anywhere from a few days to a few weeks to heal. A minor rib fracture or break may cause the same symptoms as a rib contusion. The small crack may not be seen on a regular chest X-ray. Treatment for both problems is basically the same.  Home care    You may use over-the-counter pain medicine to control pain, unless another pain medicine was prescribed. If you have chronic liver or kidney disease or ever had a stomach ulcer or GI (gastrointestinal) bleeding, talk with your healthcare provider before using these medicines.    Rest. Don't lift anything heavy or do any activity that causes pain.    Apply an ice pack over the injured area for 15 to 20 minutes every 1 to 2 hours. You should do this for the first 24 to 48 hours. To make an ice pack, put ice cubes in a plastic bag that seals at the top. Wrap the bag in a clean, thin towel or cloth. Never put ice or an ice pack directly on the skin. Continue with ice packs as needed for the  relief of pain and swelling.    The first 3 to 4 weeks of healing will be the most painful. If your pain is not under control with the treatment given, call your healthcare provider. Sometimes a stronger pain medicine may be needed. A nerve block can be done in case of severe pain. It will numb the nerve between the ribs.  Follow-up care  Follow up with your healthcare provider, or as advised.  If X-rays were taken, you will be told of any new findings that may affect your care.  Call 911  Call 911 if you have:    Dizziness, weakness or fainting    Shortness of breath with or without chest discomfort    New or worsening pain  When to seek medical advice  Call your healthcare provider right away if any of these occur:    Fever of 100.4 F (38 C) or higher, or as directed by your healthcare provider    Chills    Stomach pain, vomiting  Date Last Reviewed: 6/1/2018 2000-2019 [employer]. [address] [place] [address], [place] [address]. All rights reserved. This information is not intended as a substitute for professional medical care. Always follow your healthcare professional's instructions.

## 2020-10-27 NOTE — PATIENT INSTRUCTIONS
Patient Education     Rib Contusion or Minor Fracture    A rib contusion is a bruise to one or more rib bones. It may cause pain, tenderness, swelling, and a purplish color to the skin. There may be a sharp pain with each breath. A rib contusion takes anywhere from a few days to a few weeks to heal. A minor rib fracture or break may cause the same symptoms as a rib contusion. The small crack may not be seen on a regular chest X-ray. Treatment for both problems is basically the same.  Home care    You may use over-the-counter pain medicine to control pain, unless another pain medicine was prescribed. If you have chronic liver or kidney disease or ever had a stomach ulcer or GI (gastrointestinal) bleeding, talk with your healthcare provider before using these medicines.    Rest. Don't lift anything heavy or do any activity that causes pain.    Apply an ice pack over the injured area for 15 to 20 minutes every 1 to 2 hours. You should do this for the first 24 to 48 hours. To make an ice pack, put ice cubes in a plastic bag that seals at the top. Wrap the bag in a clean, thin towel or cloth. Never put ice or an ice pack directly on the skin. Continue with ice packs as needed for the relief of pain and swelling.    The first 3 to 4 weeks of healing will be the most painful. If your pain is not under control with the treatment given, call your healthcare provider. Sometimes a stronger pain medicine may be needed. A nerve block can be done in case of severe pain. It will numb the nerve between the ribs.  Follow-up care  Follow up with your healthcare provider, or as advised.  If X-rays were taken, you will be told of any new findings that may affect your care.  Call 911  Call 911 if you have:    Dizziness, weakness or fainting    Shortness of breath with or without chest discomfort    New or worsening pain  When to seek medical advice  Call your healthcare provider right away if any of these occur:    Fever of 100.4 F  (38 C) or higher, or as directed by your healthcare provider    Chills    Stomach pain, vomiting  Date Last Reviewed: 6/1/2018 2000-2019 [employer]. [address] [place] [address], [place] [address]. All rights reserved. This information is not intended as a substitute for professional medical care. Always follow your healthcare professional's instructions.

## 2020-10-27 NOTE — TELEPHONE ENCOUNTER
Medication requested:   warfarin 5 mg tablets    Last Written Prescription Date: 7/16/2020  Last Fill Qty: 40, # refills: 3  Last Office Visit with [facility] or [facility] prescribing provider: 9/2/2020     Lab Results   Component Value Date    INR 1.90 10/20/2020    INR 2.50 09/22/2020     Refilled per nurse protocol standing orders.  Yasemin Narvaez RN

## 2020-11-04 ENCOUNTER — OFFICE VISIT (OUTPATIENT)
Dept: FAMILY MEDICINE | Facility: CLINIC | Age: 55
End: 2020-11-04
Payer: COMMERCIAL

## 2020-11-04 ENCOUNTER — ANTICOAGULATION THERAPY VISIT (OUTPATIENT)
Dept: ANTICOAGULATION | Facility: CLINIC | Age: 55
End: 2020-11-04

## 2020-11-04 VITALS
SYSTOLIC BLOOD PRESSURE: 136 MMHG | TEMPERATURE: 97.9 F | BODY MASS INDEX: 36.22 KG/M2 | HEART RATE: 98 BPM | DIASTOLIC BLOOD PRESSURE: 92 MMHG | OXYGEN SATURATION: 96 % | WEIGHT: 252.4 LBS

## 2020-11-04 DIAGNOSIS — Z95.2 HEART VALVE REPLACED: ICD-10-CM

## 2020-11-04 DIAGNOSIS — R53.83 FATIGUE, UNSPECIFIED TYPE: ICD-10-CM

## 2020-11-04 DIAGNOSIS — I10 BENIGN ESSENTIAL HYPERTENSION: ICD-10-CM

## 2020-11-04 DIAGNOSIS — E66.01 MORBID OBESITY (H): ICD-10-CM

## 2020-11-04 DIAGNOSIS — I25.10 CORONARY ARTERY DISEASE INVOLVING NATIVE CORONARY ARTERY OF NATIVE HEART WITHOUT ANGINA PECTORIS: ICD-10-CM

## 2020-11-04 DIAGNOSIS — Z95.1 S/P CABG (CORONARY ARTERY BYPASS GRAFT): ICD-10-CM

## 2020-11-04 DIAGNOSIS — Z95.2 S/P AVR: ICD-10-CM

## 2020-11-04 DIAGNOSIS — Z95.2 HISTORY OF PROSTHETIC AORTIC VALVE: ICD-10-CM

## 2020-11-04 DIAGNOSIS — I35.0 NONRHEUMATIC AORTIC VALVE STENOSIS: ICD-10-CM

## 2020-11-04 DIAGNOSIS — I70.90 ASVD (ARTERIOSCLEROTIC VASCULAR DISEASE): Primary | ICD-10-CM

## 2020-11-04 DIAGNOSIS — R60.0 LOCALIZED EDEMA: ICD-10-CM

## 2020-11-04 DIAGNOSIS — Z95.2 HISTORY OF HEART VALVE REPLACEMENT: ICD-10-CM

## 2020-11-04 LAB
ERYTHROCYTE [DISTWIDTH] IN BLOOD BY AUTOMATED COUNT: 14.8 % (ref 10–15)
HCT VFR BLD AUTO: 38.2 % (ref 40–53)
HGB BLD-MCNC: 13 G/DL (ref 13.3–17.7)
INR PPP: 2.8 (ref 0.86–1.14)
MCH RBC QN AUTO: 30.2 PG (ref 26.5–33)
MCHC RBC AUTO-ENTMCNC: 34 G/DL (ref 31.5–36.5)
MCV RBC AUTO: 89 FL (ref 78–100)
PLATELET # BLD AUTO: 237 10E9/L (ref 150–450)
RBC # BLD AUTO: 4.31 10E12/L (ref 4.4–5.9)
WBC # BLD AUTO: 7 10E9/L (ref 4–11)

## 2020-11-04 PROCEDURE — 99214 OFFICE O/P EST MOD 30 MIN: CPT | Performed by: FAMILY MEDICINE

## 2020-11-04 PROCEDURE — 85027 COMPLETE CBC AUTOMATED: CPT | Performed by: FAMILY MEDICINE

## 2020-11-04 PROCEDURE — 80053 COMPREHEN METABOLIC PANEL: CPT | Performed by: FAMILY MEDICINE

## 2020-11-04 PROCEDURE — 99207 PR NO CHARGE NURSE ONLY: CPT

## 2020-11-04 PROCEDURE — 80061 LIPID PANEL: CPT | Performed by: FAMILY MEDICINE

## 2020-11-04 PROCEDURE — 85610 PROTHROMBIN TIME: CPT | Performed by: FAMILY MEDICINE

## 2020-11-04 PROCEDURE — 36415 COLL VENOUS BLD VENIPUNCTURE: CPT | Performed by: FAMILY MEDICINE

## 2020-11-04 NOTE — PROGRESS NOTES
Subjective     Benjamín Us is a 55 year old male who presents to clinic today for the following health issues:    HPI has a new aortic prosthetic valve but hasn't gone back to Cardiology since Rehab         Diabetes Follow-up    How often are you checking your blood sugar? Three times daily  Blood sugar testing frequency justification:  curiosity   What time of day are you checking your blood sugars (select all that apply)?  Before meals, After meals and At bedtime  Have you had any blood sugars above 200?  Yes a couple of times  Have you had any blood sugars below 70?  No    What symptoms do you notice when your blood sugar is low?  None    What concerns do you have today about your diabetes? None     Do you have any of these symptoms? (Select all that apply)  Numbness in feet      BP Readings from Last 2 Encounters:   10/27/20 (!) 140/82   09/02/20 (!) 144/98     Hemoglobin A1C (%)   Date Value   10/07/2020 6.9 (H)   06/26/2020 7.5 (H)     LDL Cholesterol Calculated (mg/dL)   Date Value   03/25/2014 135 (H)     LDL Cholesterol Direct (mg/dL)   Date Value   03/25/2016 131 (H)           How many servings of fruits and vegetables do you eat daily?  0-1    On average, how many sweetened beverages do you drink each day (Examples: soda, juice, sweet tea, etc.  Do NOT count diet or artificially sweetened beverages)?   0    How many days per week do you exercise enough to make your heart beat faster? 4    How many minutes a day do you exercise enough to make your heart beat faster? 60 or more    How many days per week do you miss taking your medication? 0    He bypassed his Cardiology follow up thinking that they didn't need to see  Him but he feels like he's putting on weight and that on the amlodipine his has leg edema     Review of Systems   Constitutional, HEENT, cardiovascular, pulmonary, GI, , musculoskeletal, neuro, skin, endocrine and psych systems are negative, except as otherwise noted.      Objective     Physical Exam  BP (!) 136/92 (BP Location: Right arm, Patient Position: Chair, Cuff Size: Adult Large)   Pulse 98   Temp 97.9  F (36.6  C) (Oral)   Wt 114.5 kg (252 lb 6.4 oz)   SpO2 96%   BMI 36.22 kg/m      GENERAL: healthy, alert and no distress  EYES: Eyes grossly normal to inspection, PERRL and conjunctivae and sclerae normal  HENT: ear canals and TM's normal, nose and mouth without ulcers or lesions  NECK: no adenopathy, no asymmetry, masses, or scars and thyroid normal to palpation  RESP: lungs clear to auscultation - no rales, rhonchi or wheezes  CV: regular rate and rhythm, normal S1 S2, no S3 or S4, no murmur, click or rub, no peripheral edema and peripheral pulses strong  ABDOMEN: soft, nontender, no hepatosplenomegaly, no masses and bowel sounds normal  MS: no gross musculoskeletal defects noted, no edema  SKIN: no suspicious lesions or rashes  SKIN: no suspicious lesions or rashes and has bilateral shin edema   NEURO: Normal strength and tone, mentation intact and speech normal  PSYCH: mentation appears normal, affect normal/bright    (I70.90) ASVD (arteriosclerotic vascular disease)  (primary encounter diagnosis)  Comment: no chest pain  Plan: CARDIOLOGY EVAL ADULT REFERRAL, Comprehensive         metabolic panel, Lipid panel reflex to direct         LDL Non-fasting, CANCELED: Lipid panel reflex         to direct LDL Fasting, CANCELED:         **Comprehensive metabolic panel FUTURE anytime        Diabetes reasonably controlled     (Z95.2) History of heart valve replacement  Comment:   Plan: CARDIOLOGY EVAL ADULT REFERRAL            (R60.0) Localized edema  Comment:   Plan: US Abdomen Complete        Check for any ascites     (E66.01) Morbid obesity (H)  Comment:   Plan: lost weight , may have put some back on    (I35.0) Nonrheumatic aortic valve stenosis  Comment:   Plan: replaced     (R53.83) Fatigue, unspecified type  Comment:   Plan: etiology unclear    (Z95.2) History of prosthetic aortic  valve  Comment:   Plan:     (I10) Benign essential hypertension  Comment:   Plan: maintained    (I25.10) Coronary artery disease involving native coronary artery of native heart without angina pectoris  Comment:   Plan: no chest pain    (Z95.2) S/P AVR  Comment:   Plan: with anticoagulation

## 2020-11-04 NOTE — PROGRESS NOTES
ANTICOAGULATION FOLLOW-UP CLINIC VISIT    Patient Name:  Benjamín Us  Date:  2020  Contact Type:  Telephone/ discussed with patient    SUBJECTIVE:  Patient Findings     Comments:  The patient was assessed for diet, medication, and activity level changes, missed or extra doses, bruising or bleeding, with no problem findings.          Clinical Outcomes     Negatives:  Major bleeding event, Thromboembolic event, Anticoagulation-related hospital admission, Anticoagulation-related ED visit, Anticoagulation-related fatality    Comments:  The patient was assessed for diet, medication, and activity level changes, missed or extra doses, bruising or bleeding, with no problem findings.             OBJECTIVE    Recent labs: (last 7 days)     20  1109   INR 2.80*       ASSESSMENT / PLAN  INR assessment THER    Recheck INR In: 3 WEEKS    INR Location Clinic      Anticoagulation Summary  As of 2020    INR goal:  2.0-3.0   TTR:  76.0 % (5 mo)   INR used for dosin.80 (2020)   Warfarin maintenance plan:  5 mg (5 mg x 1) every Mon, Thu; 7.5 mg (5 mg x 1.5) all other days   Full warfarin instructions:  5 mg every Mon, Thu; 7.5 mg all other days   Weekly warfarin total:  47.5 mg   No change documented:  Mar Bacon RN   Plan last modified:  Linsey Barrios RN (2020)   Next INR check:  2020   Priority:  Maintenance   Target end date:  Indefinite    Indications    S/P CABG (coronary artery bypass graft) [Z95.1]  Heart valve replaced [Z95.2]             Anticoagulation Episode Summary     INR check location:      Preferred lab:      Send INR reminders to:  [facility]    Comments:  Call Michelle, pt's wife, on her cell with INR results      Anticoagulation Care Providers     Provider Role Specialty Phone number    Mag Richards PA-C Referring Cardiovascular & Thoracic Surgery [phone]            See the Encounter Report to view Anticoagulation Flowsheet and Dosing Calendar (Go to  Encounters tab in chart review, and find the Anticoagulation Therapy Visit)    Dosage adjustment made based on physician directed care plan.    Mar Bacon RN

## 2020-11-05 LAB
ALBUMIN SERPL-MCNC: 3.5 G/DL (ref 3.4–5)
ALP SERPL-CCNC: 137 U/L (ref 40–150)
ALT SERPL W P-5'-P-CCNC: 31 U/L (ref 0–70)
ANION GAP SERPL CALCULATED.3IONS-SCNC: 4 MMOL/L (ref 3–14)
AST SERPL W P-5'-P-CCNC: 28 U/L (ref 0–45)
BILIRUB SERPL-MCNC: 0.4 MG/DL (ref 0.2–1.3)
BUN SERPL-MCNC: 12 MG/DL (ref 7–30)
CALCIUM SERPL-MCNC: 8.6 MG/DL (ref 8.5–10.1)
CHLORIDE SERPL-SCNC: 108 MMOL/L (ref 94–109)
CHOLEST SERPL-MCNC: 112 MG/DL
CO2 SERPL-SCNC: 24 MMOL/L (ref 20–32)
CREAT SERPL-MCNC: 0.95 MG/DL (ref 0.66–1.25)
GFR SERPL CREATININE-BSD FRML MDRD: 88 ML/MIN/{1.73_M2}
GLUCOSE SERPL-MCNC: 250 MG/DL (ref 70–99)
HDLC SERPL-MCNC: 43 MG/DL
LDLC SERPL CALC-MCNC: 48 MG/DL
NONHDLC SERPL-MCNC: 69 MG/DL
POTASSIUM SERPL-SCNC: 4.7 MMOL/L (ref 3.4–5.3)
PROT SERPL-MCNC: 7 G/DL (ref 6.8–8.8)
SODIUM SERPL-SCNC: 136 MMOL/L (ref 133–144)
TRIGL SERPL-MCNC: 106 MG/DL

## 2020-11-06 ENCOUNTER — TELEPHONE (OUTPATIENT)
Dept: CARDIOLOGY | Facility: CLINIC | Age: 55
End: 2020-11-06

## 2020-11-06 NOTE — TELEPHONE ENCOUNTER
Called for an appointment with Dr. Downs. No openings until January. His primary Dr. North noted he was over due for his follow up with cardiology. He thought he did not need to follow up with cardiology.     Per pending orders, was due to follow up with cardiology TIFFANIE back in September. Had an appointment scheduled in October, but it was cancelled.    Informed patient that he should follow up and that he will need to follow up yearly at minimum from here on. Verbalized understanding.    Informed him I will message scheduling to call him to set up appointment. Will get him set up to follow up with an TIFFANIE preferably in [place] clinic, however he is okay if he needs to come to [facility]. BP remain elevated at 140's systolic, however he stated he thought that was good. Informed that we like to have cardiac patient's BP around 120 systolic.     Just had his cholesterol labs done at primary clinic and LDL is 48.     Agreed with plan. No further questions or issues at this time.   How Severe Are Your Spot(S)?: mild What Type Of Note Output Would You Prefer (Optional)?: Bullet Format What Is The Reason For Today's Visit?: Full Body Skin Examination What Is The Reason For Today's Visit? (Being Monitored For X): concerning skin lesions on an annual basis

## 2020-11-16 DIAGNOSIS — E11.69 TYPE 2 DIABETES MELLITUS WITH OTHER SPECIFIED COMPLICATION, WITHOUT LONG-TERM CURRENT USE OF INSULIN (H): ICD-10-CM

## 2020-11-16 NOTE — TELEPHONE ENCOUNTER
Prescription approved per [facility] Refill Protocol.    May Hartman, RN   [facility] -- Triage Nurse

## 2020-11-16 NOTE — TELEPHONE ENCOUNTER
[facility] calling and stating that patient needs a  refill of this test strip    blood glucose (NO BRAND SPECIFIED) test strip    Routed to CR refill pool please review and refills    Duc Michel RN

## 2020-11-17 NOTE — TELEPHONE ENCOUNTER
Called [facility] and informed that blood glucose test strip was sent in.    Duc Michel RN

## 2020-11-30 DIAGNOSIS — I25.10 CORONARY ARTERY DISEASE INVOLVING NATIVE CORONARY ARTERY OF NATIVE HEART WITHOUT ANGINA PECTORIS: ICD-10-CM

## 2020-11-30 RX ORDER — ATORVASTATIN CALCIUM 40 MG/1
40 TABLET, FILM COATED ORAL DAILY
Qty: 90 TABLET | Refills: 3 | Status: SHIPPED | OUTPATIENT
Start: 2020-11-30 | End: 2021-02-17

## 2020-11-30 NOTE — TELEPHONE ENCOUNTER
Received refill request for:  Atorvastastin  Last OV was: 8/27/2020 with Dr. Downs  Labs/EKG: last lipid 11/4/2020  F/U scheduled: overdue orders in Epic.  Letter sent  New script sent to: [facility]

## 2020-12-07 ENCOUNTER — ANTICOAGULATION THERAPY VISIT (OUTPATIENT)
Dept: NURSING | Facility: CLINIC | Age: 55
End: 2020-12-07

## 2020-12-07 DIAGNOSIS — Z95.1 S/P CABG (CORONARY ARTERY BYPASS GRAFT): ICD-10-CM

## 2020-12-07 DIAGNOSIS — Z95.2 HEART VALVE REPLACED: ICD-10-CM

## 2020-12-07 DIAGNOSIS — I35.0 NONRHEUMATIC AORTIC VALVE STENOSIS: ICD-10-CM

## 2020-12-07 LAB
CAPILLARY BLOOD COLLECTION: NORMAL
INR PPP: 2.4 (ref 0.86–1.14)

## 2020-12-07 PROCEDURE — 36416 COLLJ CAPILLARY BLOOD SPEC: CPT | Performed by: FAMILY MEDICINE

## 2020-12-07 PROCEDURE — 85610 PROTHROMBIN TIME: CPT | Performed by: FAMILY MEDICINE

## 2020-12-07 PROCEDURE — 99207 PR NO CHARGE NURSE ONLY: CPT

## 2020-12-07 NOTE — PROGRESS NOTES
ANTICOAGULATION FOLLOW-UP CLINIC VISIT    Patient Name:  Benjamín Us  Date:  2020  Contact Type:  Telephone/ with Michelle, pt's wife.      SUBJECTIVE:  Patient Findings     Positives:  Change in health (Pt has gained about 45# since his heart surgery earlier this year, pt has appt. with cardiologist on 20 to discuss.)    Comments:  I scheduled next INR out 4 weeks; however, I informed Michelle, patient's wife, that Benjamín may need sooner follow up if cardiology has concerns or makes any changes.  I will review patient's chart on  after his visit.        Clinical Outcomes     Negatives:  Major bleeding event, Thromboembolic event, Anticoagulation-related hospital admission, Anticoagulation-related ED visit, Anticoagulation-related fatality    Comments:  I scheduled next INR out 4 weeks; however, I informed Michelle, patient's wife, that Benjamín may need sooner follow up if cardiology has concerns or makes any changes.  I will review patient's chart on  after his visit.           OBJECTIVE    Recent labs: (last 7 days)     20  1404   INR 2.40*       ASSESSMENT / PLAN  INR assessment THER    Recheck INR In: 4 WEEKS    INR Location Clinic      Anticoagulation Summary  As of 2020    INR goal:  2.0-3.0   TTR:  80.3 % (6.1 mo)   INR used for dosin.40 (2020)   Warfarin maintenance plan:  5 mg (5 mg x 1) every Mon, Thu; 7.5 mg (5 mg x 1.5) all other days   Full warfarin instructions:  5 mg every Mon, Thu; 7.5 mg all other days   Weekly warfarin total:  47.5 mg   No change documented:  Linsey Barrios, RN   Plan last modified:  Linsey Barrios RN (2020)   Next INR check:  2021   Priority:  Maintenance   Target end date:  Indefinite    Indications    S/P CABG (coronary artery bypass graft) [Z95.1]  Heart valve replaced [Z95.2]             Anticoagulation Episode Summary     INR check location:      Preferred lab:      Send INR reminders to:  [facility]    Comments:  Call Michelle  pt's wife, on her cell with INR results      Anticoagulation Care Providers     Provider Role Specialty Phone number    Mag Richards PA-C Referring Cardiovascular & Thoracic Surgery [phone]            See the Encounter Report to view Anticoagulation Flowsheet and Dosing Calendar (Go to Encounters tab in chart review, and find the Anticoagulation Therapy Visit)        Linsey Barrios RN

## 2020-12-22 NOTE — PROGRESS NOTES
HPI:  Benjamín Us is a 55 year old male who presents for 6-month follow-up cardiology visit.  He is a patient of Dr. Villanueva and Dr. Downs.  He medical history includes     1. Aortic stenosis s/p mechanical AVR (23 mm [place] valve) on 5/22/2020  2. Coronary artery disease.  Single vessel bypass surgery (LIMA to LAD) on 5/22/2020  3. Postoperative atrial flutter (5/2020)   4. hypertension  5. Dyslipidemia  6. Diabetes type 2  7. HARIS      In May 2020, patient underwent a mechanical AVR and single-vessel bypass surgery.  He developed typical atrial flutter with 2-1 AV conduction with spontaneous conversion.  She was aware that he was in an arrhythmia.  During the hospitalization she was started on amiodarone    In June 2020, patient met with Dr. Villanueva stating he is having upper extremity tremors since hospitalization and cramping in his calves.  The amiodarone was discontinued and recommended to follow-up with a 14-day leadless cardiac monitor which revealed sinus rhythm.  His tremors did not resolve with discontinuation of amiodarone    In July 2020, patient was asking to restart Adderall and Dr. Downs would not approve the medication as it has a black box warning contradicting its use in patients with known cardiac issues including coronary artery disease.     in August, patient met with Dr. Downs he was hypertensive and amlodipine was started.      In September 2020, an echocardiogram revealed EF 65-70% with a small area of apical dyskinesis, asymmetric with dynamic LVOT obstruction peak gradient is 54 mmHg, normal prosthetic valve gradient.    Today he reports having almost constant topical chest pain with episodes of sharp pain across his chest at the pectoral muscle.  In addition he has gained 45# since surgery despite watching his caloric intake and exercising.  He states his abdomen is firm and he does have shortness of breath with exertion.   He was discharged weighing 205 pounds and is currently 257.   He denies headaches, dizziness,  angina, dyspnea at rest, palpitations, orthopnea, PND.   He states he takes his medication as prescribed and his warfarin and denies bleeding, hematuria, hematochezia, epistaxis and signs/symptoms of stroke.       ASSESSMENT AND PLAN    Aortic stenosis     s/p mechanical AVR (23 mm [facility] [place] [facility] [place] valve) on 5/22/2020    Normal health unction per echo (9/2020)    Currently on warfarin which has been therapeutic between 2.5 and 3.5    Coronary artery disease.      Single vessel bypass surgery (LIMA to LAD) on 5/22/2020    He is currently taking aspirin, metoprolol and atorvastatin.  His last 20) months 2020)    Hypertension    On 10/2019 echo patient is noted to have severe left ventricular hypertrophy     slightly elevated today despite taking metoprolol tartrate 25 mg twice daily, amlodipine 10 mg daily    Shortness of breath with weight gain.    Patient was noted to have a normal EF    States he has gained 50 pounds in 6 months despite exercise and during his intake    His abdomen is firm today on exam    Start the furosemide 40 mg daily x3 days then decrease to 20 mg daily    BMP now and in 1 week    Follow-up with TIFFANIE in 1 week -patient's insurance disappears in 2021.      Repeat limited echo due to 9/2020 his echo stating asymmetric with dynamic LVOT obstruction peak gradient is 54 mmHg    Plan:    Start the furosemide 40 mg daily x3 days then decrease to 20 mg daily    BMP now and in 1 week    Follow-up with TIFFANIE in 1 week -patient's insurance disappears in 2021.      Repeat limited echo due to 9/2020 echo stating asymmetric with dynamic LVOT obstruction peak gradient is 54 mmHg which was a new finding      Patient expresses understanding and agreement with the plan.     I appreciate the chance to help with Benjamín Us Please let me know if you have any questions or concerns.    Adamaris Palma, APRN, CNP    This note was completed in part using Dragon voice recognition  software. Although reviewed after completion, some word and grammatical errors may occur.    Orders Placed This Encounter   Procedures     Basic metabolic panel     N terminal pro BNP outpatient     Basic metabolic panel     Follow-Up with Cardiac Advanced Practice Provider     Echocardiogram Limited     Orders Placed This Encounter   Medications     furosemide (LASIX) 20 MG tablet     Sig: Take 1 tablet (20 mg) by mouth daily Take 2 tablets for the first 3 days then take 1 tablet (20 mg daily)     Dispense:  30 tablet     Refill:  1     metoprolol tartrate (LOPRESSOR) 25 MG tablet     Sig: Take 1 tablet (25 mg) by mouth 2 times daily     Dispense:  180 tablet     Refill:  3     amLODIPine (NORVASC) 10 MG tablet     Sig: Take 1 tablet (10 mg) by mouth daily     Dispense:  90 tablet     Refill:  3     Medications Discontinued During This Encounter   Medication Reason     metoprolol tartrate (LOPRESSOR) 25 MG tablet      amLODIPine (NORVASC) 10 MG tablet          Encounter Diagnoses   Name Primary?     ASVD (arteriosclerotic vascular disease)      History of heart valve replacement      Shortness of breath Yes     S/P CABG (coronary artery bypass graft)      Essential hypertension        CURRENT MEDICATIONS:  Current Outpatient Medications   Medication Sig Dispense Refill     amLODIPine (NORVASC) 10 MG tablet Take 1 tablet (10 mg) by mouth daily 90 tablet 3     furosemide (LASIX) 20 MG tablet Take 1 tablet (20 mg) by mouth daily Take 2 tablets for the first 3 days then take 1 tablet (20 mg daily) 30 tablet 1     metoprolol tartrate (LOPRESSOR) 25 MG tablet Take 1 tablet (25 mg) by mouth 2 times daily 180 tablet 3     acetaminophen (TYLENOL) 325 MG tablet Take 2 tablets (650 mg) by mouth every 6 hours as needed for mild pain or fever 1 Bottle 0     alcohol swab prep pads Use to swab area of injection/víctor as directed. 100 each 3     aspirin (ASPIRIN) 81 MG EC tablet Take 1 tablet (81 mg) by mouth daily        atorvastatin (LIPITOR) 40 MG tablet Take 1 tablet (40 mg) by mouth daily 90 tablet 3     augmented betamethasone dipropionate (DIPROLENE-AF) 0.05 % external cream Apply to AA on the body BID x 1-2 weeks then PRN only. Do not apply to face 50 g 2     blood glucose (ACCU-CHEK FASTCLIX) lancing device Lancing device to be used with lancets. 1 each 0     blood glucose (NO BRAND SPECIFIED) test strip Use to test blood sugar 4 times daily or as directed. 100 strip 6     blood glucose calibration (NO BRAND SPECIFIED) solution Use to calibrate blood glucose monitor as needed as directed. 1 Bottle 3     blood glucose monitoring (ACCU-CHEK FASTCLIX) lancets Use to test blood sugar 4 times daily or as directed. 102 each 11     blood glucose monitoring (NO BRAND SPECIFIED) meter device kit Use to test blood sugar 4 times daily or as directed. 1 kit 0     glipiZIDE (GLUCOTROL XL) 5 MG 24 hr tablet Take 1 tablet (5 mg) by mouth daily. 90 tablet 1     ketoconazole (NIZORAL) 2 % external cream Use BID PRN 60 g 3     ketoconazole (NIZORAL) 2 % external shampoo Use daily as needed 120 mL 11     potassium chloride (KLOR-CON) 20 MEQ packet Take 20 mEq by mouth 2 times daily       thin (NO BRAND SPECIFIED) lancets Use with lanceting device. 100 each 3     varenicline (CHANTIX) 1 MG tablet Take 1 mg by mouth 2 times daily       warfarin ANTICOAGULANT (COUMADIN) 5 MG tablet Take 5mg every Mon & Thurs / Take 7.5mg all other days OR as instructed by INR clinic 42 tablet 4       ALLERGIES     Allergies   Allergen Reactions     Penicillins Nausea and Vomiting     Zithromax [Azithromycin Dihydrate]      hives       PAST MEDICAL HISTORY:  Past Medical History:   Diagnosis Date     ADD (attention deficit disorder)      Aneurysm of thoracic aorta (H) 5/21/2014     Problem list name updated by automated process. Provider to review     Aortic valve disorder 5/21/2014     Atrial flutter (H)     post op AVR and CABG     CAD (coronary artery disease)       CARDIOVASCULAR SCREENING; LDL GOAL LESS THAN 160 2/25/2014     Dermatitis 9/14/2018     Essential hypertension, benign 9/14/2018     Hypertension      Hypertensive urgency 8/8/2013     HARIS (obstructive sleep apnea)      Type 2 diabetes mellitus with diabetic dermatitis, without long-term current use of insulin (H) 9/14/2018       PAST SURGICAL HISTORY:  Past Surgical History:   Procedure Laterality Date     APPENDECTOMY       BYPASS GRAFT ARTERY CORONARY N/A 5/22/2020    Procedure: CORONARY ARTERY BYPASS GRAFT X 1  WITH LEFT LEG  ENDOSCOPIC VEIN HARVEST, ON PUMP WITH LEONA. WOUND VAC PLACEMENT. LIMA-LAD;  Surgeon: Aimee Rose MD;  Location:  OR     COLONOSCOPY N/A 6/15/2015    Procedure: COLONOSCOPY;  Surgeon: Vasyl Lawson MD;  Location:  GI     CV CORONARY ANGIOGRAM N/A 1/3/2020    Procedure: Coronary Angiogram;  Surgeon: Álvaro Andrade MD;  Location:  HEART CARDIAC CATH LAB     EXTRACTION(S) DENTAL N/A 5/21/2020    Procedure: EXTRACTION, REMAINING TEETH;  Surgeon: Vasyl Brand DDS;  Location:  OR     REPLACE VALVE AORTIC N/A 5/22/2020    Procedure: AORTIC VALVE REPLACEMENT WITH REGENT MECHANICAL VALVE SIZE 23 MM.;  Surgeon: Aimee Rose MD;  Location:  OR       FAMILY HISTORY:  Family History   Problem Relation Age of Onset     Hypertension Mother      Breast Cancer Mother      Diabetes Father      Diabetes Brother      Thyroid Disease Sister      Colon Cancer No family hx of        SOCIAL HISTORY:  Social History     Socioeconomic History     Marital status:      Spouse name: None     Number of children: None     Years of education: None     Highest education level: None   Occupational History     None   Social Needs     Financial resource strain: None     Food insecurity     Worry: None     Inability: None     Transportation needs     Medical: None     Non-medical: None   Tobacco Use     Smoking status: Former Smoker     Packs/day: 1.00     Years:  "40.00     Pack years: 40.00     Types: Cigarettes     Start date:      Quit date: 2020     Years since quittin.8     Smokeless tobacco: Never Used   Substance and Sexual Activity     Alcohol use: No     Alcohol/week: 0.0 standard drinks     Comment: 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     Drug use: No     Sexual activity: Yes     Partners: Female   Lifestyle     Physical activity     Days per week: None     Minutes per session: None     Stress: None   Relationships     Social connections     Talks on phone: None     Gets together: None     Attends Mandaen service: None     Active member of club or organization: None     Attends meetings of clubs or organizations: None     Relationship status: None     Intimate partner violence     Fear of current or ex partner: None     Emotionally abused: None     Physically abused: None     Forced sexual activity: None   Other Topics Concern     Parent/sibling w/ CABG, MI or angioplasty before 65F 55M? Not Asked      Service Not Asked     Blood Transfusions Not Asked     Caffeine Concern No     Occupational Exposure Not Asked     Hobby Hazards Not Asked     Sleep Concern No     Stress Concern Not Asked     Weight Concern No     Special Diet Not Asked     Back Care Not Asked     Exercise No     Bike Helmet Not Asked     Seat Belt Yes     Self-Exams Not Asked   Social History Narrative     None       Review of Systems:  Skin:  Positive for     Eyes:  Positive for glasses  ENT:  Negative    Respiratory:  Positive for dyspnea on exertion;shortness of breath;wheezing;sleep apnea  Cardiovascular:    Positive for;palpitations;chest pain;lightheadedness;edema;fatigue  Gastroenterology: Negative    Genitourinary:  Negative    Musculoskeletal:  Negative    Neurologic:  Negative    Psychiatric:  Negative    Heme/Lymph/Imm:  Positive for allergies  Endocrine:  Positive for diabetes    Physical Exam:  Vitals: /84   Pulse 92   Ht 1.778 m (5' 10\")   Wt 116.8 kg (257 lb 8 oz)   " BMI 36.95 kg/m      Constitutional:  cooperative;in no acute distress obese      Skin:  warm and dry to the touch surgical scars well-healed      Head:  normocephalic        Eyes:  pupils equal and round        ENT:  no pallor or cyanosis        Neck:           Chest:  clear to auscultation        Cardiac: regular rhythm             Valve click    Abdomen:    obese Firm    Vascular: pulses full and equal     right radial artery;2+             left radial artery;2+                  Extremities and Back:  no edema;no deformities, clubbing, cyanosis, erythema observed        Neurological:  no gross motor deficits          Recent Lab Results:  LIPID RESULTS:  Lab Results   Component Value Date    CHOL 112 11/04/2020    HDL 43 11/04/2020    LDL 48 11/04/2020    TRIG 106 11/04/2020    CHOLHDLRATIO 5.3 (H) 03/25/2014       LIVER ENZYME RESULTS:  Lab Results   Component Value Date    AST 28 11/04/2020    ALT 31 11/04/2020       CBC RESULTS:  Lab Results   Component Value Date    WBC 7.0 11/04/2020    RBC 4.31 (L) 11/04/2020    HGB 13.0 (L) 11/04/2020    HCT 38.2 (L) 11/04/2020    MCV 89 11/04/2020    MCH 30.2 11/04/2020    MCHC 34.0 11/04/2020    RDW 14.8 11/04/2020     11/04/2020       BMP RESULTS:  Lab Results   Component Value Date     11/04/2020    POTASSIUM 4.7 11/04/2020    CHLORIDE 108 11/04/2020    CO2 24 11/04/2020    ANIONGAP 4 11/04/2020     (H) 11/04/2020    BUN 12 11/04/2020    CR 0.95 11/04/2020    GFRESTIMATED 88 11/04/2020    GFRESTBLACK >90 11/04/2020    ARMIN 8.6 11/04/2020        A1C RESULTS:  Lab Results   Component Value Date    A1C 6.9 (H) 10/07/2020       INR RESULTS:  Lab Results   Component Value Date    INR 2.40 (H) 12/07/2020    INR 2.80 (H) 11/04/2020           CC  Axel Roberson MD  [id number] [place] [address]

## 2020-12-23 ENCOUNTER — HOSPITAL ENCOUNTER (OUTPATIENT)
Dept: CARDIOLOGY | Facility: CLINIC | Age: 55
Discharge: HOME | End: 2020-12-23
Attending: NURSE PRACTITIONER | Admitting: NURSE PRACTITIONER
Payer: COMMERCIAL

## 2020-12-23 ENCOUNTER — OFFICE VISIT (OUTPATIENT)
Dept: CARDIOLOGY | Facility: CLINIC | Age: 55
End: 2020-12-23
Payer: COMMERCIAL

## 2020-12-23 VITALS
BODY MASS INDEX: 36.86 KG/M2 | WEIGHT: 257.5 LBS | HEART RATE: 92 BPM | HEIGHT: 70 IN | SYSTOLIC BLOOD PRESSURE: 134 MMHG | DIASTOLIC BLOOD PRESSURE: 84 MMHG

## 2020-12-23 DIAGNOSIS — Z95.1 S/P CABG (CORONARY ARTERY BYPASS GRAFT): ICD-10-CM

## 2020-12-23 DIAGNOSIS — I10 ESSENTIAL HYPERTENSION: ICD-10-CM

## 2020-12-23 DIAGNOSIS — R06.02 SHORTNESS OF BREATH: Primary | ICD-10-CM

## 2020-12-23 DIAGNOSIS — I70.90 ASVD (ARTERIOSCLEROTIC VASCULAR DISEASE): ICD-10-CM

## 2020-12-23 DIAGNOSIS — Z95.2 HISTORY OF HEART VALVE REPLACEMENT: ICD-10-CM

## 2020-12-23 DIAGNOSIS — R06.02 SHORTNESS OF BREATH: ICD-10-CM

## 2020-12-23 LAB
ANION GAP SERPL CALCULATED.3IONS-SCNC: 2 MMOL/L (ref 3–14)
BUN SERPL-MCNC: 15 MG/DL (ref 7–30)
CALCIUM SERPL-MCNC: 8.5 MG/DL (ref 8.5–10.1)
CHLORIDE SERPL-SCNC: 105 MMOL/L (ref 94–109)
CO2 SERPL-SCNC: 27 MMOL/L (ref 20–32)
CREAT SERPL-MCNC: 0.91 MG/DL (ref 0.66–1.25)
GFR SERPL CREATININE-BSD FRML MDRD: >90 ML/MIN/{1.73_M2}
GLUCOSE SERPL-MCNC: 269 MG/DL (ref 70–99)
NT-PROBNP SERPL-MCNC: 185 PG/ML (ref 0–125)
POTASSIUM SERPL-SCNC: 4.2 MMOL/L (ref 3.4–5.3)
SODIUM SERPL-SCNC: 134 MMOL/L (ref 133–144)

## 2020-12-23 PROCEDURE — 93308 TTE F-UP OR LMTD: CPT | Mod: 26 | Performed by: INTERNAL MEDICINE

## 2020-12-23 PROCEDURE — 99215 OFFICE O/P EST HI 40 MIN: CPT | Mod: 25 | Performed by: NURSE PRACTITIONER

## 2020-12-23 PROCEDURE — 80048 BASIC METABOLIC PNL TOTAL CA: CPT | Performed by: NURSE PRACTITIONER

## 2020-12-23 PROCEDURE — 93325 DOPPLER ECHO COLOR FLOW MAPG: CPT | Mod: 26 | Performed by: INTERNAL MEDICINE

## 2020-12-23 PROCEDURE — 83880 ASSAY OF NATRIURETIC PEPTIDE: CPT | Performed by: NURSE PRACTITIONER

## 2020-12-23 PROCEDURE — 36415 COLL VENOUS BLD VENIPUNCTURE: CPT | Performed by: NURSE PRACTITIONER

## 2020-12-23 PROCEDURE — 93325 DOPPLER ECHO COLOR FLOW MAPG: CPT

## 2020-12-23 PROCEDURE — 93321 DOPPLER ECHO F-UP/LMTD STD: CPT | Mod: 26 | Performed by: INTERNAL MEDICINE

## 2020-12-23 PROCEDURE — 255N000002 HC RX 255 OP 636: Performed by: NURSE PRACTITIONER

## 2020-12-23 RX ORDER — AMLODIPINE BESYLATE 10 MG/1
10 TABLET ORAL DAILY
Qty: 90 TABLET | Refills: 3 | Status: SHIPPED | OUTPATIENT
Start: 2020-12-23 | End: 2021-02-16

## 2020-12-23 RX ORDER — FUROSEMIDE 20 MG
20 TABLET ORAL DAILY
Qty: 30 TABLET | Refills: 1 | Status: SHIPPED | OUTPATIENT
Start: 2020-12-23 | End: 2021-01-05

## 2020-12-23 RX ORDER — METOPROLOL TARTRATE 25 MG/1
25 TABLET, FILM COATED ORAL 2 TIMES DAILY
Qty: 180 TABLET | Refills: 3 | Status: SHIPPED | OUTPATIENT
Start: 2020-12-23 | End: 2021-02-09

## 2020-12-23 RX ADMIN — HUMAN ALBUMIN MICROSPHERES AND PERFLUTREN 3 ML: 10; .22 INJECTION, SOLUTION INTRAVENOUS at 16:00

## 2020-12-23 ASSESSMENT — MIFFLIN-ST. JEOR: SCORE: 1999.26

## 2020-12-23 NOTE — LETTER
12/23/2020    Axel Roberson MD  [id number] [facility] [id number]    RE: Benjamín Us       Dear Colleague,    I had the pleasure of seeing Benjamín Us in the [facility].    HPI:  Benjamín Us is a 55 year old male who presents for 6-month follow-up cardiology visit.  He is a patient of Dr. Villanueva and Dr. Downs.  He medical history includes     1. Aortic stenosis s/p mechanical AVR (23 mm [place] valve) on 5/22/2020  2. Coronary artery disease.  Single vessel bypass surgery (LIMA to LAD) on 5/22/2020  3. Postoperative atrial flutter (5/2020)   4. hypertension  5. Dyslipidemia  6. Diabetes type 2  7. HARIS      In May 2020, patient underwent a mechanical AVR and single-vessel bypass surgery.  He developed typical atrial flutter with 2-1 AV conduction with spontaneous conversion.  She was aware that he was in an arrhythmia.  During the hospitalization she was started on amiodarone    In June 2020, patient met with Dr. Villanueva stating he is having upper extremity tremors since hospitalization and cramping in his calves.  The amiodarone was discontinued and recommended to follow-up with a 14-day leadless cardiac monitor which revealed sinus rhythm.  His tremors did not resolve with discontinuation of amiodarone    In July 2020, patient was asking to restart Adderall and Dr. Downs would not approve the medication as it has a black box warning contradicting its use in patients with known cardiac issues including coronary artery disease.     in August, patient met with Dr. Downs he was hypertensive and amlodipine was started.      In September 2020, an echocardiogram revealed EF 65-70% with a small area of apical dyskinesis, asymmetric with dynamic LVOT obstruction peak gradient is 54 mmHg, normal prosthetic valve gradient.    Today he reports having almost constant topical chest pain with episodes of sharp pain across his chest at the pectoral muscle.  In addition he  has gained 45# since surgery despite watching his caloric intake and exercising.  He states his abdomen is firm and he does have shortness of breath with exertion.   He was discharged weighing 205 pounds and is currently 257.  He denies headaches, dizziness,  angina, dyspnea at rest, palpitations, orthopnea, PND.   He states he takes his medication as prescribed and his warfarin and denies bleeding, hematuria, hematochezia, epistaxis and signs/symptoms of stroke.       ASSESSMENT AND PLAN    Aortic stenosis     s/p mechanical AVR (23 mm [place] valve) on 5/22/2020    Normal health unction per echo (9/2020)    Currently on warfarin which has been therapeutic between 2.5 and 3.5    Coronary artery disease.      Single vessel bypass surgery (LIMA to LAD) on 5/22/2020    He is currently taking aspirin, metoprolol and atorvastatin.  His last 20) months 2020)    Hypertension    On 10/2019 echo patient is noted to have severe left ventricular hypertrophy     slightly elevated today despite taking metoprolol tartrate 25 mg twice daily, amlodipine 10 mg daily    Shortness of breath with weight gain.    Patient was noted to have a normal EF    States he has gained 50 pounds in 6 months despite exercise and during his intake    His abdomen is firm today on exam    Start the furosemide 40 mg daily x3 days then decrease to 20 mg daily    BMP now and in 1 week    Follow-up with TIFFANIE in 1 week -patient's insurance disappears in 2021.      Repeat limited echo due to 9/2020 his echo stating asymmetric with dynamic LVOT obstruction peak gradient is 54 mmHg    Plan:    Start the furosemide 40 mg daily x3 days then decrease to 20 mg daily    BMP now and in 1 week    Follow-up with TIFFANIE in 1 week -patient's insurance disappears in 2021.      Repeat limited echo due to 9/2020 echo stating asymmetric with dynamic LVOT obstruction peak gradient is 54 mmHg which was a new finding      Patient expresses understanding and agreement  with the plan.     I appreciate the chance to help with Benjamín Us Please let me know if you have any questions or concerns.    Adamaris Palma, APRN, CNP    This note was completed in part using Dragon voice recognition software. Although reviewed after completion, some word and grammatical errors may occur.    Orders Placed This Encounter   Procedures     Basic metabolic panel     N terminal pro BNP outpatient     Basic metabolic panel     Follow-Up with Cardiac Advanced Practice Provider     Echocardiogram Limited     Orders Placed This Encounter   Medications     furosemide (LASIX) 20 MG tablet     Sig: Take 1 tablet (20 mg) by mouth daily Take 2 tablets for the first 3 days then take 1 tablet (20 mg daily)     Dispense:  30 tablet     Refill:  1     metoprolol tartrate (LOPRESSOR) 25 MG tablet     Sig: Take 1 tablet (25 mg) by mouth 2 times daily     Dispense:  180 tablet     Refill:  3     amLODIPine (NORVASC) 10 MG tablet     Sig: Take 1 tablet (10 mg) by mouth daily     Dispense:  90 tablet     Refill:  3     Medications Discontinued During This Encounter   Medication Reason     metoprolol tartrate (LOPRESSOR) 25 MG tablet      amLODIPine (NORVASC) 10 MG tablet          Encounter Diagnoses   Name Primary?     ASVD (arteriosclerotic vascular disease)      History of heart valve replacement      Shortness of breath Yes     S/P CABG (coronary artery bypass graft)      Essential hypertension        CURRENT MEDICATIONS:  Current Outpatient Medications   Medication Sig Dispense Refill     amLODIPine (NORVASC) 10 MG tablet Take 1 tablet (10 mg) by mouth daily 90 tablet 3     furosemide (LASIX) 20 MG tablet Take 1 tablet (20 mg) by mouth daily Take 2 tablets for the first 3 days then take 1 tablet (20 mg daily) 30 tablet 1     metoprolol tartrate (LOPRESSOR) 25 MG tablet Take 1 tablet (25 mg) by mouth 2 times daily 180 tablet 3     acetaminophen (TYLENOL) 325 MG tablet Take 2 tablets (650 mg) by mouth every 6  hours as needed for mild pain or fever 1 Bottle 0     alcohol swab prep pads Use to swab area of injection/víctor as directed. 100 each 3     aspirin (ASPIRIN) 81 MG EC tablet Take 1 tablet (81 mg) by mouth daily       atorvastatin (LIPITOR) 40 MG tablet Take 1 tablet (40 mg) by mouth daily 90 tablet 3     augmented betamethasone dipropionate (DIPROLENE-AF) 0.05 % external cream Apply to AA on the body BID x 1-2 weeks then PRN only. Do not apply to face 50 g 2     blood glucose (ACCU-CHEK FASTCLIX) lancing device Lancing device to be used with lancets. 1 each 0     blood glucose (NO BRAND SPECIFIED) test strip Use to test blood sugar 4 times daily or as directed. 100 strip 6     blood glucose calibration (NO BRAND SPECIFIED) solution Use to calibrate blood glucose monitor as needed as directed. 1 Bottle 3     blood glucose monitoring (ACCU-CHEK FASTCLIX) lancets Use to test blood sugar 4 times daily or as directed. 102 each 11     blood glucose monitoring (NO BRAND SPECIFIED) meter device kit Use to test blood sugar 4 times daily or as directed. 1 kit 0     glipiZIDE (GLUCOTROL XL) 5 MG 24 hr tablet Take 1 tablet (5 mg) by mouth daily. 90 tablet 1     ketoconazole (NIZORAL) 2 % external cream Use BID PRN 60 g 3     ketoconazole (NIZORAL) 2 % external shampoo Use daily as needed 120 mL 11     potassium chloride (KLOR-CON) 20 MEQ packet Take 20 mEq by mouth 2 times daily       thin (NO BRAND SPECIFIED) lancets Use with lanceting device. 100 each 3     varenicline (CHANTIX) 1 MG tablet Take 1 mg by mouth 2 times daily       warfarin ANTICOAGULANT (COUMADIN) 5 MG tablet Take 5mg every Mon & Thurs / Take 7.5mg all other days OR as instructed by INR clinic 42 tablet 4       ALLERGIES     Allergies   Allergen Reactions     Penicillins Nausea and Vomiting     Zithromax [Azithromycin Dihydrate]      hives       PAST MEDICAL HISTORY:  Past Medical History:   Diagnosis Date     ADD (attention deficit disorder)      Aneurysm of  thoracic aorta (H) 5/21/2014     Problem list name updated by automated process. Provider to review     Aortic valve disorder 5/21/2014     Atrial flutter (H)     post op AVR and CABG     CAD (coronary artery disease)      CARDIOVASCULAR SCREENING; LDL GOAL LESS THAN 160 2/25/2014     Dermatitis 9/14/2018     Essential hypertension, benign 9/14/2018     Hypertension      Hypertensive urgency 8/8/2013     HARIS (obstructive sleep apnea)      Type 2 diabetes mellitus with diabetic dermatitis, without long-term current use of insulin (H) 9/14/2018       PAST SURGICAL HISTORY:  Past Surgical History:   Procedure Laterality Date     APPENDECTOMY       BYPASS GRAFT ARTERY CORONARY N/A 5/22/2020    Procedure: CORONARY ARTERY BYPASS GRAFT X 1  WITH LEFT LEG  ENDOSCOPIC VEIN HARVEST, ON PUMP WITH LEONA. WOUND VAC PLACEMENT. LIMA-LAD;  Surgeon: Aimee Rose MD;  Location:  OR     COLONOSCOPY N/A 6/15/2015    Procedure: COLONOSCOPY;  Surgeon: Vasyl Lawson MD;  Location:  GI     CV CORONARY ANGIOGRAM N/A 1/3/2020    Procedure: Coronary Angiogram;  Surgeon: Álvaro Andrade MD;  Location:  HEART CARDIAC CATH LAB     EXTRACTION(S) DENTAL N/A 5/21/2020    Procedure: EXTRACTION, REMAINING TEETH;  Surgeon: Vasyl Brand DDS;  Location:  OR     REPLACE VALVE AORTIC N/A 5/22/2020    Procedure: AORTIC VALVE REPLACEMENT WITH REGENT MECHANICAL VALVE SIZE 23 MM.;  Surgeon: Aimee Rose MD;  Location:  OR       FAMILY HISTORY:  Family History   Problem Relation Age of Onset     Hypertension Mother      Breast Cancer Mother      Diabetes Father      Diabetes Brother      Thyroid Disease Sister      Colon Cancer No family hx of        SOCIAL HISTORY:  Social History     Socioeconomic History     Marital status:      Spouse name: None     Number of children: None     Years of education: None     Highest education level: None   Occupational History     None   Social Needs     Financial  resource strain: None     Food insecurity     Worry: None     Inability: None     Transportation needs     Medical: None     Non-medical: None   Tobacco Use     Smoking status: Former Smoker     Packs/day: 1.00     Years: 40.00     Pack years: 40.00     Types: Cigarettes     Start date:      Quit date: 2020     Years since quittin.8     Smokeless tobacco: Never Used   Substance and Sexual Activity     Alcohol use: No     Alcohol/week: 0.0 standard drinks     Comment: 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     Drug use: No     Sexual activity: Yes     Partners: Female   Lifestyle     Physical activity     Days per week: None     Minutes per session: None     Stress: None   Relationships     Social connections     Talks on phone: None     Gets together: None     Attends Congregational service: None     Active member of club or organization: None     Attends meetings of clubs or organizations: None     Relationship status: None     Intimate partner violence     Fear of current or ex partner: None     Emotionally abused: None     Physically abused: None     Forced sexual activity: None   Other Topics Concern     Parent/sibling w/ CABG, MI or angioplasty before 65F 55M? Not Asked      Service Not Asked     Blood Transfusions Not Asked     Caffeine Concern No     Occupational Exposure Not Asked     Hobby Hazards Not Asked     Sleep Concern No     Stress Concern Not Asked     Weight Concern No     Special Diet Not Asked     Back Care Not Asked     Exercise No     Bike Helmet Not Asked     Seat Belt Yes     Self-Exams Not Asked   Social History Narrative     None       Review of Systems:  Skin:  Positive for     Eyes:  Positive for glasses  ENT:  Negative    Respiratory:  Positive for dyspnea on exertion;shortness of breath;wheezing;sleep apnea  Cardiovascular:    Positive for;palpitations;chest pain;lightheadedness;edema;fatigue  Gastroenterology: Negative    Genitourinary:  Negative    Musculoskeletal:  Negative   "  Neurologic:  Negative    Psychiatric:  Negative    Heme/Lymph/Imm:  Positive for allergies  Endocrine:  Positive for diabetes    Physical Exam:  Vitals: /84   Pulse 92   Ht 1.778 m (5' 10\")   Wt 116.8 kg (257 lb 8 oz)   BMI 36.95 kg/m      Constitutional:  cooperative;in no acute distress obese      Skin:  warm and dry to the touch surgical scars well-healed      Head:  normocephalic        Eyes:  pupils equal and round        ENT:  no pallor or cyanosis        Neck:           Chest:  clear to auscultation        Cardiac: regular rhythm             Valve click    Abdomen:    obese Firm    Vascular: pulses full and equal     right radial artery;2+             left radial artery;2+                  Extremities and Back:  no edema;no deformities, clubbing, cyanosis, erythema observed        Neurological:  no gross motor deficits          Recent Lab Results:  LIPID RESULTS:  Lab Results   Component Value Date    CHOL 112 11/04/2020    HDL 43 11/04/2020    LDL 48 11/04/2020    TRIG 106 11/04/2020    CHOLHDLRATIO 5.3 (H) 03/25/2014       LIVER ENZYME RESULTS:  Lab Results   Component Value Date    AST 28 11/04/2020    ALT 31 11/04/2020       CBC RESULTS:  Lab Results   Component Value Date    WBC 7.0 11/04/2020    RBC 4.31 (L) 11/04/2020    HGB 13.0 (L) 11/04/2020    HCT 38.2 (L) 11/04/2020    MCV 89 11/04/2020    MCH 30.2 11/04/2020    MCHC 34.0 11/04/2020    RDW 14.8 11/04/2020     11/04/2020       BMP RESULTS:  Lab Results   Component Value Date     11/04/2020    POTASSIUM 4.7 11/04/2020    CHLORIDE 108 11/04/2020    CO2 24 11/04/2020    ANIONGAP 4 11/04/2020     (H) 11/04/2020    BUN 12 11/04/2020    CR 0.95 11/04/2020    GFRESTIMATED 88 11/04/2020    GFRESTBLACK >90 11/04/2020    ARMIN 8.6 11/04/2020        A1C RESULTS:  Lab Results   Component Value Date    A1C 6.9 (H) 10/07/2020       INR RESULTS:  Lab Results   Component Value Date    INR 2.40 (H) 12/07/2020    INR 2.80 (H) 11/04/2020 "           CC  [place]  [address] [place] [address]                    Thank you for allowing me to participate in the care of your patient.      Sincerely,     [facility]    cc:   [place]  [address] [place] [address]

## 2020-12-23 NOTE — PATIENT INSTRUCTIONS
Take lasix 40 mg for 3 days then decrease to 20 mg daily.    Labs today then again next Thursday.        Keep a diary of weight, BP and HR.  Bring the list of medication and dosages next visit       If you have problems or questions please call the RNs (Michelle Holbrook & Eileen) at [phone]

## 2020-12-23 NOTE — LETTER
12/23/2020    Axel Roberson MD  [id number] [facility] [id number]    RE: Benjamín Us       Dear Colleague,    I had the pleasure of seeing Benjamín Us in the [facility].    HPI:  Benjamín Us is a 55 year old male who presents for 6-month follow-up cardiology visit.  He is a patient of Dr. Villanueva and Dr. Downs.  He medical history includes     1. Aortic stenosis s/p mechanical AVR (23 mm [place] valve) on 5/22/2020  2. Coronary artery disease.  Single vessel bypass surgery (LIMA to LAD) on 5/22/2020  3. Postoperative atrial flutter (5/2020)   4. hypertension  5. Dyslipidemia  6. Diabetes type 2  7. HARIS      In May 2020, patient underwent a mechanical AVR and single-vessel bypass surgery.  He developed typical atrial flutter with 2-1 AV conduction with spontaneous conversion.  She was aware that he was in an arrhythmia.  During the hospitalization she was started on amiodarone    In June 2020, patient met with Dr. Villanueva stating he is having upper extremity tremors since hospitalization and cramping in his calves.  The amiodarone was discontinued and recommended to follow-up with a 14-day leadless cardiac monitor which revealed sinus rhythm.  His tremors did not resolve with discontinuation of amiodarone    In July 2020, patient was asking to restart Adderall and Dr. Downs would not approve the medication as it has a black box warning contradicting its use in patients with known cardiac issues including coronary artery disease.     in August, patient met with Dr. Downs he was hypertensive and amlodipine was started.      In September 2020, an echocardiogram revealed EF 65-70% with a small area of apical dyskinesis, asymmetric with dynamic LVOT obstruction peak gradient is 54 mmHg, normal prosthetic valve gradient.    Today he reports having almost constant topical chest pain with episodes of sharp pain across his chest at the pectoral muscle.  In addition he  has gained 45# since surgery despite watching his caloric intake and exercising.  He states his abdomen is firm and he does have shortness of breath with exertion.   He was discharged weighing 205 pounds and is currently 257.  He denies headaches, dizziness,  angina, dyspnea at rest, palpitations, orthopnea, PND.   He states he takes his medication as prescribed and his warfarin and denies bleeding, hematuria, hematochezia, epistaxis and signs/symptoms of stroke.       ASSESSMENT AND PLAN    Aortic stenosis     s/p mechanical AVR (23 mm [place] valve) on 5/22/2020    Normal health unction per echo (9/2020)    Currently on warfarin which has been therapeutic between 2.5 and 3.5    Coronary artery disease.      Single vessel bypass surgery (LIMA to LAD) on 5/22/2020    He is currently taking aspirin, metoprolol and atorvastatin.  His last 20) months 2020)    Hypertension    On 10/2019 echo patient is noted to have severe left ventricular hypertrophy     slightly elevated today despite taking metoprolol tartrate 25 mg twice daily, amlodipine 10 mg daily    Shortness of breath with weight gain.    Patient was noted to have a normal EF    States he has gained 50 pounds in 6 months despite exercise and during his intake    His abdomen is firm today on exam    Start the furosemide 40 mg daily x3 days then decrease to 20 mg daily    BMP now and in 1 week    Follow-up with TIFFANIE in 1 week -patient's insurance disappears in 2021.      Repeat limited echo due to 9/2020 his echo stating asymmetric with dynamic LVOT obstruction peak gradient is 54 mmHg    Plan:    Start the furosemide 40 mg daily x3 days then decrease to 20 mg daily    BMP now and in 1 week    Follow-up with TIFFANIE in 1 week -patient's insurance disappears in 2021.      Repeat limited echo due to 9/2020 echo stating asymmetric with dynamic LVOT obstruction peak gradient is 54 mmHg which was a new finding      Patient expresses understanding and agreement  with the plan.     I appreciate the chance to help with Benjamín Us Please let me know if you have any questions or concerns.    Adamaris Palma, APRN, CNP    This note was completed in part using Dragon voice recognition software. Although reviewed after completion, some word and grammatical errors may occur.    Orders Placed This Encounter   Procedures     Basic metabolic panel     N terminal pro BNP outpatient     Basic metabolic panel     Follow-Up with Cardiac Advanced Practice Provider     Echocardiogram Limited     Orders Placed This Encounter   Medications     furosemide (LASIX) 20 MG tablet     Sig: Take 1 tablet (20 mg) by mouth daily Take 2 tablets for the first 3 days then take 1 tablet (20 mg daily)     Dispense:  30 tablet     Refill:  1     metoprolol tartrate (LOPRESSOR) 25 MG tablet     Sig: Take 1 tablet (25 mg) by mouth 2 times daily     Dispense:  180 tablet     Refill:  3     amLODIPine (NORVASC) 10 MG tablet     Sig: Take 1 tablet (10 mg) by mouth daily     Dispense:  90 tablet     Refill:  3     Medications Discontinued During This Encounter   Medication Reason     metoprolol tartrate (LOPRESSOR) 25 MG tablet      amLODIPine (NORVASC) 10 MG tablet          Encounter Diagnoses   Name Primary?     ASVD (arteriosclerotic vascular disease)      History of heart valve replacement      Shortness of breath Yes     S/P CABG (coronary artery bypass graft)      Essential hypertension        CURRENT MEDICATIONS:  Current Outpatient Medications   Medication Sig Dispense Refill     amLODIPine (NORVASC) 10 MG tablet Take 1 tablet (10 mg) by mouth daily 90 tablet 3     furosemide (LASIX) 20 MG tablet Take 1 tablet (20 mg) by mouth daily Take 2 tablets for the first 3 days then take 1 tablet (20 mg daily) 30 tablet 1     metoprolol tartrate (LOPRESSOR) 25 MG tablet Take 1 tablet (25 mg) by mouth 2 times daily 180 tablet 3     acetaminophen (TYLENOL) 325 MG tablet Take 2 tablets (650 mg) by mouth every 6  hours as needed for mild pain or fever 1 Bottle 0     alcohol swab prep pads Use to swab area of injection/víctor as directed. 100 each 3     aspirin (ASPIRIN) 81 MG EC tablet Take 1 tablet (81 mg) by mouth daily       atorvastatin (LIPITOR) 40 MG tablet Take 1 tablet (40 mg) by mouth daily 90 tablet 3     augmented betamethasone dipropionate (DIPROLENE-AF) 0.05 % external cream Apply to AA on the body BID x 1-2 weeks then PRN only. Do not apply to face 50 g 2     blood glucose (ACCU-CHEK FASTCLIX) lancing device Lancing device to be used with lancets. 1 each 0     blood glucose (NO BRAND SPECIFIED) test strip Use to test blood sugar 4 times daily or as directed. 100 strip 6     blood glucose calibration (NO BRAND SPECIFIED) solution Use to calibrate blood glucose monitor as needed as directed. 1 Bottle 3     blood glucose monitoring (ACCU-CHEK FASTCLIX) lancets Use to test blood sugar 4 times daily or as directed. 102 each 11     blood glucose monitoring (NO BRAND SPECIFIED) meter device kit Use to test blood sugar 4 times daily or as directed. 1 kit 0     glipiZIDE (GLUCOTROL XL) 5 MG 24 hr tablet Take 1 tablet (5 mg) by mouth daily. 90 tablet 1     ketoconazole (NIZORAL) 2 % external cream Use BID PRN 60 g 3     ketoconazole (NIZORAL) 2 % external shampoo Use daily as needed 120 mL 11     potassium chloride (KLOR-CON) 20 MEQ packet Take 20 mEq by mouth 2 times daily       thin (NO BRAND SPECIFIED) lancets Use with lanceting device. 100 each 3     varenicline (CHANTIX) 1 MG tablet Take 1 mg by mouth 2 times daily       warfarin ANTICOAGULANT (COUMADIN) 5 MG tablet Take 5mg every Mon & Thurs / Take 7.5mg all other days OR as instructed by INR clinic 42 tablet 4       ALLERGIES     Allergies   Allergen Reactions     Penicillins Nausea and Vomiting     Zithromax [Azithromycin Dihydrate]      hives       PAST MEDICAL HISTORY:  Past Medical History:   Diagnosis Date     ADD (attention deficit disorder)      Aneurysm of  thoracic aorta (H) 5/21/2014     Problem list name updated by automated process. Provider to review     Aortic valve disorder 5/21/2014     Atrial flutter (H)     post op AVR and CABG     CAD (coronary artery disease)      CARDIOVASCULAR SCREENING; LDL GOAL LESS THAN 160 2/25/2014     Dermatitis 9/14/2018     Essential hypertension, benign 9/14/2018     Hypertension      Hypertensive urgency 8/8/2013     HARIS (obstructive sleep apnea)      Type 2 diabetes mellitus with diabetic dermatitis, without long-term current use of insulin (H) 9/14/2018       PAST SURGICAL HISTORY:  Past Surgical History:   Procedure Laterality Date     APPENDECTOMY       BYPASS GRAFT ARTERY CORONARY N/A 5/22/2020    Procedure: CORONARY ARTERY BYPASS GRAFT X 1  WITH LEFT LEG  ENDOSCOPIC VEIN HARVEST, ON PUMP WITH LEONA. WOUND VAC PLACEMENT. LIMA-LAD;  Surgeon: Aimee Rose MD;  Location:  OR     COLONOSCOPY N/A 6/15/2015    Procedure: COLONOSCOPY;  Surgeon: Vasyl Lawson MD;  Location:  GI     CV CORONARY ANGIOGRAM N/A 1/3/2020    Procedure: Coronary Angiogram;  Surgeon: Álvaro Andrade MD;  Location:  HEART CARDIAC CATH LAB     EXTRACTION(S) DENTAL N/A 5/21/2020    Procedure: EXTRACTION, REMAINING TEETH;  Surgeon: Vasyl Brand DDS;  Location:  OR     REPLACE VALVE AORTIC N/A 5/22/2020    Procedure: AORTIC VALVE REPLACEMENT WITH REGENT MECHANICAL VALVE SIZE 23 MM.;  Surgeon: Aimee Rose MD;  Location:  OR       FAMILY HISTORY:  Family History   Problem Relation Age of Onset     Hypertension Mother      Breast Cancer Mother      Diabetes Father      Diabetes Brother      Thyroid Disease Sister      Colon Cancer No family hx of        SOCIAL HISTORY:  Social History     Socioeconomic History     Marital status:      Spouse name: None     Number of children: None     Years of education: None     Highest education level: None   Occupational History     None   Social Needs     Financial  resource strain: None     Food insecurity     Worry: None     Inability: None     Transportation needs     Medical: None     Non-medical: None   Tobacco Use     Smoking status: Former Smoker     Packs/day: 1.00     Years: 40.00     Pack years: 40.00     Types: Cigarettes     Start date:      Quit date: 2020     Years since quittin.8     Smokeless tobacco: Never Used   Substance and Sexual Activity     Alcohol use: No     Alcohol/week: 0.0 standard drinks     Comment: 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     Drug use: No     Sexual activity: Yes     Partners: Female   Lifestyle     Physical activity     Days per week: None     Minutes per session: None     Stress: None   Relationships     Social connections     Talks on phone: None     Gets together: None     Attends Confucianist service: None     Active member of club or organization: None     Attends meetings of clubs or organizations: None     Relationship status: None     Intimate partner violence     Fear of current or ex partner: None     Emotionally abused: None     Physically abused: None     Forced sexual activity: None   Other Topics Concern     Parent/sibling w/ CABG, MI or angioplasty before 65F 55M? Not Asked      Service Not Asked     Blood Transfusions Not Asked     Caffeine Concern No     Occupational Exposure Not Asked     Hobby Hazards Not Asked     Sleep Concern No     Stress Concern Not Asked     Weight Concern No     Special Diet Not Asked     Back Care Not Asked     Exercise No     Bike Helmet Not Asked     Seat Belt Yes     Self-Exams Not Asked   Social History Narrative     None       Review of Systems:  Skin:  Positive for     Eyes:  Positive for glasses  ENT:  Negative    Respiratory:  Positive for dyspnea on exertion;shortness of breath;wheezing;sleep apnea  Cardiovascular:    Positive for;palpitations;chest pain;lightheadedness;edema;fatigue  Gastroenterology: Negative    Genitourinary:  Negative    Musculoskeletal:  Negative   "  Neurologic:  Negative    Psychiatric:  Negative    Heme/Lymph/Imm:  Positive for allergies  Endocrine:  Positive for diabetes    Physical Exam:  Vitals: /84   Pulse 92   Ht 1.778 m (5' 10\")   Wt 116.8 kg (257 lb 8 oz)   BMI 36.95 kg/m      Constitutional:  cooperative;in no acute distress obese      Skin:  warm and dry to the touch surgical scars well-healed      Head:  normocephalic        Eyes:  pupils equal and round        ENT:  no pallor or cyanosis        Neck:           Chest:  clear to auscultation        Cardiac: regular rhythm             Valve click    Abdomen:    obese Firm    Vascular: pulses full and equal     right radial artery;2+             left radial artery;2+                  Extremities and Back:  no edema;no deformities, clubbing, cyanosis, erythema observed        Neurological:  no gross motor deficits          Recent Lab Results:  LIPID RESULTS:  Lab Results   Component Value Date    CHOL 112 11/04/2020    HDL 43 11/04/2020    LDL 48 11/04/2020    TRIG 106 11/04/2020    CHOLHDLRATIO 5.3 (H) 03/25/2014       LIVER ENZYME RESULTS:  Lab Results   Component Value Date    AST 28 11/04/2020    ALT 31 11/04/2020       CBC RESULTS:  Lab Results   Component Value Date    WBC 7.0 11/04/2020    RBC 4.31 (L) 11/04/2020    HGB 13.0 (L) 11/04/2020    HCT 38.2 (L) 11/04/2020    MCV 89 11/04/2020    MCH 30.2 11/04/2020    MCHC 34.0 11/04/2020    RDW 14.8 11/04/2020     11/04/2020       BMP RESULTS:  Lab Results   Component Value Date     11/04/2020    POTASSIUM 4.7 11/04/2020    CHLORIDE 108 11/04/2020    CO2 24 11/04/2020    ANIONGAP 4 11/04/2020     (H) 11/04/2020    BUN 12 11/04/2020    CR 0.95 11/04/2020    GFRESTIMATED 88 11/04/2020    GFRESTBLACK >90 11/04/2020    ARMIN 8.6 11/04/2020        A1C RESULTS:  Lab Results   Component Value Date    A1C 6.9 (H) 10/07/2020       INR RESULTS:  Lab Results   Component Value Date    INR 2.40 (H) 12/07/2020    INR 2.80 (H) 11/04/2020 "       Thank you for allowing me to participate in the care of your patient.    Sincerely,     JACKSON Valentino [facility]

## 2020-12-24 ENCOUNTER — MYC MEDICAL ADVICE (OUTPATIENT)
Dept: FAMILY MEDICINE | Facility: CLINIC | Age: 55
End: 2020-12-24

## 2020-12-24 DIAGNOSIS — E11.59 TYPE 2 DIABETES MELLITUS WITH OTHER CIRCULATORY COMPLICATION, WITH LONG-TERM CURRENT USE OF INSULIN (H): Primary | ICD-10-CM

## 2020-12-24 DIAGNOSIS — Z79.4 TYPE 2 DIABETES MELLITUS WITH OTHER CIRCULATORY COMPLICATION, WITH LONG-TERM CURRENT USE OF INSULIN (H): Primary | ICD-10-CM

## 2020-12-24 RX ORDER — INSULIN GLARGINE 100 [IU]/ML
10-20 INJECTION, SOLUTION SUBCUTANEOUS DAILY
Qty: 6 ML | Refills: 1 | Status: SHIPPED | OUTPATIENT
Start: 2020-12-24 | End: 2021-01-05

## 2020-12-24 NOTE — TELEPHONE ENCOUNTER
Dr. Roberson -     Please review my chart message from patient     RN did speak to patient requesting visit, patient has physical scheduled for 1/12/2020     Yudith García, Registered Nurse, PAL (Patient Advocate Liason)   [facility]   [phone]

## 2020-12-29 ENCOUNTER — TELEPHONE (OUTPATIENT)
Dept: CARDIOLOGY | Facility: CLINIC | Age: 55
End: 2020-12-29

## 2020-12-29 ENCOUNTER — MYC MEDICAL ADVICE (OUTPATIENT)
Dept: FAMILY MEDICINE | Facility: CLINIC | Age: 55
End: 2020-12-29

## 2020-12-29 NOTE — TELEPHONE ENCOUNTER
Pt called to state that lasix and has not worked very well.  Pt state that his urine out has increased about 10-15%.  Pt taking his last dose of 40 mg today then will drop to 20 mg and will see Anah Fontana on 12/31.  Pt wondering if there was anything that could be done different.  Pt told that this writer has no one that can help and that when he  See's Anabenjie on Thursday she may try a different med. Pt was ok with waiting. Rowan

## 2020-12-30 ENCOUNTER — TELEPHONE (OUTPATIENT)
Dept: FAMILY MEDICINE | Facility: CLINIC | Age: 55
End: 2020-12-30

## 2020-12-30 NOTE — TELEPHONE ENCOUNTER
I'd like to request a prior authorization. With Benjamín's labile blood sugar and and cardiac issues as well as chronic anticoagulation he needs the sustained action of the long acting insulin to stay safe.

## 2020-12-30 NOTE — TELEPHONE ENCOUNTER
Basaglar/Lantus SoloStar not on formulary list. Covered alternates:  Humalog Mix 75-25 KwikPen  Humalog KwikPen Insulin 100 Unit/mL Inpn    Please advise alternate above or what is rationale for PA?    Prior Authorization Retail Medication Request    Medication/Dose: Lantus SoloStar 100 Unit/mL pen injectors  ICD code (if different than what is on RX):    Previously Tried and Failed:   Rationale:     CoverMyMeds Key: [id number]    Mateo DUMONT RN

## 2020-12-31 DIAGNOSIS — R06.02 SHORTNESS OF BREATH: ICD-10-CM

## 2020-12-31 LAB
ANION GAP SERPL CALCULATED.3IONS-SCNC: 2 MMOL/L (ref 3–14)
BUN SERPL-MCNC: 19 MG/DL (ref 7–30)
CALCIUM SERPL-MCNC: 8.9 MG/DL (ref 8.5–10.1)
CHLORIDE SERPL-SCNC: 103 MMOL/L (ref 94–109)
CO2 SERPL-SCNC: 28 MMOL/L (ref 20–32)
CREAT SERPL-MCNC: 1.06 MG/DL (ref 0.66–1.25)
GFR SERPL CREATININE-BSD FRML MDRD: 77 ML/MIN/{1.73_M2}
GLUCOSE SERPL-MCNC: 291 MG/DL (ref 70–99)
POTASSIUM SERPL-SCNC: 4.7 MMOL/L (ref 3.4–5.3)
SODIUM SERPL-SCNC: 133 MMOL/L (ref 133–144)

## 2020-12-31 PROCEDURE — 36415 COLL VENOUS BLD VENIPUNCTURE: CPT | Performed by: NURSE PRACTITIONER

## 2020-12-31 PROCEDURE — 80048 BASIC METABOLIC PNL TOTAL CA: CPT | Performed by: NURSE PRACTITIONER

## 2020-12-31 NOTE — TELEPHONE ENCOUNTER
Prior Authorization Retail Medication Request    Medication/Dose: Basaglar KwikPen 100 UNIT/ML Subcutaneous Solution Pen-injector  ICD code (if different than what is on RX):       Rationale: labile blood sugar and and cardiac issues as well as chronic anticoagulation he needs the sustained action of the long acting insulin to stay safe    CoverMyMeds Key: [id number]    Mateo DUMONT RN

## 2020-12-31 NOTE — TELEPHONE ENCOUNTER
Prior Authorization Approval    Authorization Effective Date: 12/31/2020  Authorization Expiration Date: 12/31/2021  Medication: Lantus-APPROVED  Approved Dose/Quantity:   Reference #:     Insurance Company: [employer] ([facility]) - Phone [phone] Fax [phone]  Expected CoPay:       CoPay Card Available:      Foundation Assistance Needed:    Which Pharmacy is filling the prescription (Not needed for infusion/clinic administered): [id number] - [place], [place] - [address] [address]  Pharmacy Notified: Yes  Patient Notified: No    Received a fax from insurance stating PA is not needed for the medication.  Called and spoke with Vilma at insurance, advised it needs to be a quantity limit PA. Completed new PA via phone, got verbal approval.  Called pharmacy and they are still getting a PA needed for the quantity.  [facility] is going to call the pharmacy help desk and call back.

## 2020-12-31 NOTE — TELEPHONE ENCOUNTER
Received a call back from [facility], he states insurance hasn't gotten it updated yet and to try again in a couple of hours.  Pharmacy has my direct number and will keep me informed in case I need to call insurance on Monday morning.

## 2020-12-31 NOTE — TELEPHONE ENCOUNTER
Central Prior Authorization Team   Phone: [phone]      PA Initiation    Medication: Lantus-Initiated  Insurance Company: [employer] ([facility]) - Phone [phone] Fax [phone]  Pharmacy Filling the Rx: [id number] [place], [place] - [address]  Filling Pharmacy Phone: [phone]  Filling Pharmacy Fax:    Start Date: 12/31/2020

## 2021-01-03 ENCOUNTER — TELEPHONE (OUTPATIENT)
Dept: CARDIOLOGY | Facility: CLINIC | Age: 56
End: 2021-01-03

## 2021-01-03 NOTE — TELEPHONE ENCOUNTER
Can you call pt to let him know his electrolytes and ECHO were normal.  (His glucose level is very very high and he should follow up with a primary care provider as his diabetic medications might need to be adjusted)  He was suppose to follow up last week but the appointment looks like it was changed to the end of January.  Can you reschedule him for a telephone visit with me this week and cancel Thee's follow up on 1/21.        Thank you,    Adamaris Palma, JACKSON CNP on 1/3/2021 at 8:32 AM

## 2021-01-04 ENCOUNTER — ANTICOAGULATION THERAPY VISIT (OUTPATIENT)
Dept: NURSING | Facility: CLINIC | Age: 56
End: 2021-01-04

## 2021-01-04 DIAGNOSIS — R06.02 SHORTNESS OF BREATH: ICD-10-CM

## 2021-01-04 DIAGNOSIS — Z95.1 S/P CABG (CORONARY ARTERY BYPASS GRAFT): ICD-10-CM

## 2021-01-04 DIAGNOSIS — Z95.2 HEART VALVE REPLACED: ICD-10-CM

## 2021-01-04 DIAGNOSIS — I35.0 NONRHEUMATIC AORTIC VALVE STENOSIS: ICD-10-CM

## 2021-01-04 LAB
CAPILLARY BLOOD COLLECTION: NORMAL
INR PPP: 2.8 (ref 0.86–1.14)

## 2021-01-04 PROCEDURE — 36416 COLLJ CAPILLARY BLOOD SPEC: CPT | Performed by: FAMILY MEDICINE

## 2021-01-04 PROCEDURE — 99207 PR NO CHARGE NURSE ONLY: CPT

## 2021-01-04 PROCEDURE — 85610 PROTHROMBIN TIME: CPT | Performed by: FAMILY MEDICINE

## 2021-01-04 NOTE — TELEPHONE ENCOUNTER
Called back to insurance and spoke with supervisor Roman, he states the pharmacy has to run 15mL for a 31 day supply.  Called pharmacy and spoke with Allie, advised of what the supervisor said, gave his name, Roman LYONS, and is log in ID- [id number].  Phone for insurance [phone].

## 2021-01-04 NOTE — TELEPHONE ENCOUNTER
01/04/21 Spoke with patient who voiced understanding about test results. He knows his glucose is running high and is fighting w insurance over the amt of insulin is needed.. Pt stated he has not seen a big improvement since taking Lasix. No increase in urination or change in weight. Will send update to Adamaris . Pt scheduled for virtual visit tomorrow w Adamaris.  Sumit 920 am

## 2021-01-04 NOTE — PROGRESS NOTES
"Vitals - Patient Reported 8/27/2020 1/5/2021   Height (Patient Reported) - 5' 10\"   Weight (Patient Reported) 215 lb 251 lb   BMI (Based on Pt Reported Ht/Wt) - 36.01 kg/m2   Systolic (Patient Reported) 177 134   Diastolic (Patient Reported) 104 91   Pulse (Patient Reported) 81 105         Review Of Systems  Skin: NEGATIVE  Eyes:Ears/Nose/Throat: Glasses  Respiratory: Sob w/exertion  Cardiovascular: Chest pain, edema  Gastrointestinal: NEGATIVE  Genitourinary:NEGATIVE   Musculoskeletal: NEGATIVE  Neurologic: NEGATIVE  Psychiatric: NEGATIVE  Hematologic/Lymphatic/Immunologic: NEGATIVE  Endocrine: Diabetic    Telephone number of patient: [phone]    Violetta Us is a 55 year old male who is following up to discuss medication changes and echo results.   This visit is being conducted as a virtual visit due to the emphasis on mitigation of the COVID-19 virus pandemic. The clinician has decided that the risk of an in-office visit outweighs the benefit for this patient.   He is a patient of Dr. Downs.  His medical history includes     1. Aortic stenosis s/p mechanical AVR (23 mm Saint Garry [place] valve) on 5/22/2020  2. Coronary artery disease.  Single vessel bypass surgery (LIMA to LAD) on 5/22/2020  3. Postoperative atrial flutter (5/2020)   4. hypertension  5. Dyslipidemia  6. Diabetes type 2  7. HARIS - does not use CPAP     Diagnostics:    ECHO (12/23/2020) revealed Hyperdynamic left ventricular function EF >70%. Small area of dyskinesis at the apex.Right ventricular global function is normal. History of aortic valve replacement with a 23mm St. Garry [place] mechanical valve. Normal function on Doppler interrogation. Vmax 2.3 m/s, mean gradient 21 mmHg, DI 0.68. No valvular or paravalvular insufficiency. Mild mitral stenosis, mean gradient 6 mmHg at 98 bpm.  This study was compared to a TTE from 9/29/2020. Prosthetic AV function is stable. The previously noted LVOT gradient was not seen on " this present study. MV mean gradient previously 6.9 mmHg.        In May 2020, patient underwent a mechanical AVR and single-vessel bypass surgery.  He developed typical atrial flutter with 2-1 AV conduction with spontaneous conversion.  He was aware of the arrhythmia.  During the hospitalization he was started on amiodarone     In June 2020, patient met with Dr. Villanueva stating he is having upper extremity tremors since hospitalization and cramping in his calves.  The amiodarone was discontinued and recommended to follow-up with a 14-day leadless cardiac monitor which revealed sinus rhythm.  His tremors did not resolve with discontinuation of amiodarone     In July 2020, patient was asking to restart Adderall and Dr. Downs would not approve the medication as it has a black box warning contradicting its use in patients with known cardiac issues including coronary artery disease.     In August, patient met with Dr. Downs he was hypertensive and amlodipine was started.       In September 2020, an echocardiogram revealed EF 65-70% with a small area of apical dyskinesis, asymmetric with dynamic LVOT obstruction peak gradient is 54 mmHg, normal prosthetic valve gradient.    On 12/23/2020 he was seen with a in constant topical chest pain with episodes of sharp pain across his chest at the pectoral muscle and a 45# weight gain since surgery.   She was started on lasix and an ECHO revealed normal EF.  His ProBNP was normal      Today he reports feeling no changes since starting lasix -  his weight actually went up a pound.  He states he does not consume more than 2000 calories per day and was exercising until the gym closed 1 month ago.  He thinks his weight gain may be related to diabetic medications as he was taken off metformin.   Blood pressure at home is usually 120-140 mmHg. Today his BP is elevated but he just took his medications.  His topical chest pain is improving slowly and he was told by CV surgery it is within  normal.   He denies angina, dyspnea at rest, palpitations, orthopnea, PND, or edema.  Reports taking medications as prescribed       The remainder of the physical exam was deferred due to public health crisis.   Acknowledge and reviewed ROS completed by Violetta GUDINO    ASSESSMENT AND PLAN  Aortic stenosis     s/p mechanical AVR (23 mm Saint Garry [place] valve) on 5/22/2020    Normal valve function per echo (9/2020 and 12/2020)    Currently on warfarin which has been therapeutic between 2.5 and 3.5 since 10/2020.  Hgb is 13.0 (11.2020)     Coronary artery disease.      Single vessel bypass surgery (LIMA to LAD) on 5/22/2020    He is currently taking aspirin, metoprolol and atorvastatin.  His last LDL was 48 (11/2020)     Hypertension    On 10/2020 echo patient is noted to have severe left ventricular hypertrophy and was not noted on limited ECHO completed on 12/2020    Controlled at home while taking metoprolol tartrate 25 mg twice daily, amlodipine 10 mg daily     Shortness of breath with weight gain.    Patient was noted to have a normal EF    States he has gained 50 pounds in 6 months despite exercise and during his intake.  His BNP was normal and EF was normal.      He was started furosemide 40 mg daily x3 days then 20 mg daily however this has not made any improvement in symptoms .  Weight gain is likely not caused by HF.  Recommend following up with PCP to discuss DM medications, recommend keeping a strict food/exercise diary      Plan:  Stop lasix   Recommend following up with PCP to discuss DM medications, recommend keeping a strict food/exercise diary  Follow up with Dr. Downs in 1 year  Recommend following up with sleep medicine to find a mask that works.       Video-Visit Details    Type of service:  Video Visit was converted to a phone visit which lasted 21 minutes    Originating Location (pt. Location): Home    Distant Location (provider location):  [facility]  used for Video Visit: Ynes

## 2021-01-04 NOTE — TELEPHONE ENCOUNTER
Called pharmacy they are still not able to process the medication.  Called and spoke with Anthony at insurance. They state that the pharmacy has to run the 15 mL for a 30 days supply, advised pharmacy cannot run that due to they have to run for the correct day supply.  A supervisor will be calling me back

## 2021-01-04 NOTE — PROGRESS NOTES
ANTICOAGULATION FOLLOW-UP CLINIC VISIT    Patient Name:  Benjamín sU  Date:  2021  Contact Type:  Telephone with Benjamín    SUBJECTIVE:  Patient Findings     Positives:  Change in health (Elevated glucose on recent blood draws.  Pt spoke to cardiology RN today, phone encounter states that pt is aware of elevated glucose and also stated he has not seen a big improvement since taking Lasix, no increase in urination or change in weight.), Change in medications (Started Lasix 20, pt took 40mg QD x 3 days then decreased to 20mg QD)    Comments:  Patient has phone visit with cardiology NP on 21. Patient states he still has intermittent SOB and has gained 50# in 5 months.  Patient working with insurance on insulin prescription.  Patient states he has a high deductible plan so prefers to stretch INR appts out as far as he can.        Clinical Outcomes     Negatives:  Major bleeding event, Thromboembolic event, Anticoagulation-related hospital admission, Anticoagulation-related ED visit, Anticoagulation-related fatality    Comments:  Patient has phone visit with cardiology NP on 21. Patient states he still has intermittent SOB and has gained 50# in 5 months.  Patient working with insurance on insulin prescription.  Patient states he has a high deductible plan so prefers to stretch INR appts out as far as he can.           OBJECTIVE    Recent labs: (last 7 days)     21  1104   INR 2.80*       ASSESSMENT / PLAN  INR assessment THER    Recheck INR In: 5 WEEKS    INR Location Clinic      Anticoagulation Summary  As of 2021    INR goal:  2.0-3.0   TTR:  82.9 % (7 mo)   INR used for dosin.80 (2021)   Warfarin maintenance plan:  5 mg (5 mg x 1) every Mon, Thu; 7.5 mg (5 mg x 1.5) all other days   Full warfarin instructions:  5 mg every Mon, Thu; 7.5 mg all other days   Weekly warfarin total:  47.5 mg   Plan last modified:  Linsey Barrios RN (2021)   Next INR check:  2021   Priority:   Maintenance   Target end date:  Indefinite    Indications    S/P CABG (coronary artery bypass graft) [Z95.1]  Heart valve replaced [Z95.2]             Anticoagulation Episode Summary     INR check location:      Preferred lab:      Send INR reminders to:  [facility]    Comments:  Call Michelle, pt's wife, on her cell with INR results      Anticoagulation Care Providers     Provider Role Specialty Phone number    Mag Richards PA-C Referring Cardiovascular & Thoracic Surgery [phone]            See the Encounter Report to view Anticoagulation Flowsheet and Dosing Calendar (Go to Encounters tab in chart review, and find the Anticoagulation Therapy Visit)        Linsey Barrios RN

## 2021-01-04 NOTE — PROGRESS NOTES
Medication Therapy Management (MTM) Encounter    ASSESSMENT/PLAN:                            Medication Adherence/Access: No issues identified    Type 2 Diabetes: Patient is not meeting A1c goal of < 7%. Self monitoring of blood glucose is not at goal of fasting  mg/dL and post prandial < 180 mg/dL. He would benefit from switching insulin therapy to GLP-1 agonist. Ozempic is covered for him. Also discussed improving diet and exercise. To assist with weight lowering, will max out Ozempic dose as tolerated and plan to add Jardiance in 2-3 months instead of glipizide which can also be causing his weight gain. Jardiance is also covered for him and will need additional coupon. Jardiance on hold at his pharmacy currently.    Hypertension/CAD/Hx prosthetic heart valve: Due to side effects from amlodipine, he would benefit from decreasing dose and starting lisinopril. With recent discontinuation of potassium, he would also benefit from trialing magnesium otc for leg cramps. Recheck BMP in 1 month with lisinopril initiation. Future considerations: metoprolol has small risk of causing slight weight gain and if continues to be a problem, future may consider switching back to carvedilol. May also consider increasing dose of beta blocker in the future if pulse still remains high on f/up.   Hyperlipidemia: stable  Pain: stable  Dermatitis: stable    PLAN:   1. Start lisinopril 10mg daily and decrease amlodipine to 5mg daily. BMP ordered for labs in 1 month.   2. Stop insulin. Start Ozempic 0.25mg weekly and decrease Basaglar down to 15 units daily. If tolerated after 4 weeks, may increase Ozempic to 0.5mg weekly and stop Basaglar.   3. May start magnesium 400mg daily as needed for cramps.     Will follow up in 1 month.    SUBJECTIVE/OBJECTIVE:                           Benjamín Us is a 55 year old male called for an initial visit. He was referred to me from Dr. Roberson. Patient was accompanied by wife.     Reason for  visit: Weight gain over past year since hospital. Now diabetes out of control with weight gain too.     Allergies/ADRs: Reviewed in chart  Tobacco: He reports that he quit smoking about 11 months ago. His smoking use included cigarettes. He started smoking about 41 years ago. He has a 40.00 pack-year smoking history. He has never used smokeless tobacco.  Alcohol: not currently using  Caffeine: 3 diet zero sodas/day  Activity: was exercising 3-4x weekly before gyms closed  Past Medical History: Reviewed in chart      Medication Adherence/Access: no issues reported but reports issues getting Lantus/Basaglar through insurance    Type 2 Diabetes:  Currently taking glipizide XL 5mg daily and Basaglar 30 units daily (old supply at home, hasn't gotten his new Lantus rx yet b/c coverage issues). Patient is experiencing the following side effects: weight gain Had loose stools and body aches on metformin.   Blood sugar monitorin-2 time(s) daily. Ranges (patient reported): up to 300's throughout the day, lowest 188mg/dL  Symptoms of low blood sugar? none  Symptoms of high blood sugar? none  Eye exam: up to date  Foot exam: due  Diet/Exercise: initially was able to control blood glucose with diet and exercise. Keeping calorie intake under 2000/day. Snacks-popcorn. Sugar free soda and no sugars at all in diet. Does eat pasta and other carbs in meals.   Aspirin: Taking 81mg daily and denies side effects  Statin: Yes: atorvastatin   ACEi/ARB: No.   Urine Albumin: No results found for: UMALCR   Lab Results   Component Value Date    A1C 6.9 10/07/2020    A1C 7.5 2020    A1C 12.2 2020    A1C 7.2 2016    A1C 6.5 2014     Wt Readings from Last 3 Encounters:   20 257 lb 8 oz (116.8 kg)   20 252 lb 6.4 oz (114.5 kg)   20 221 lb (100.2 kg)       Hypertension/CAD/Hx prosthetic heart valve: Current medications include amlodipine 10mg daily, metoprolol 25mg twice daily, aspirin 81mg daily, and  "warfarin 5mg Mon/Thurs and 7.5mg all other days. Stopped furosemide and potassium today per cardiology. Patient does report edema in ankles for past 5 weeks, did not notice any difference with edema with furosemide. Patient does self-monitor blood pressure. Home BP monitoring in range of 120-130's systolic over 70-80's diastolic. Pulses are 90's. States hard to keep his pulses lower, he would like them lower b/c \"can feel my valve.\" he's also not sure why he was taken off carvedilol and lisinopril over the summer. Did not have any issues in the past from lisinopril. Patient reports no current medication side effects. Does worry about his cramps worsening when stopping potassium.  BP Readings from Last 3 Encounters:   12/23/20 134/84   11/04/20 (!) 136/92   10/27/20 (!) 140/82     Lab Results   Component Value Date    INR 2.80 01/04/2021    INR 2.40 12/07/2020    INR 2.80 11/04/2020     Last Comprehensive Metabolic Panel:  Sodium   Date Value Ref Range Status   12/31/2020 133 133 - 144 mmol/L Final     Potassium   Date Value Ref Range Status   12/31/2020 4.7 3.4 - 5.3 mmol/L Final     Chloride   Date Value Ref Range Status   12/31/2020 103 94 - 109 mmol/L Final     Carbon Dioxide   Date Value Ref Range Status   12/31/2020 28 20 - 32 mmol/L Final     Anion Gap   Date Value Ref Range Status   12/31/2020 2 (L) 3 - 14 mmol/L Final     Glucose   Date Value Ref Range Status   12/31/2020 291 (H) 70 - 99 mg/dL Final     Urea Nitrogen   Date Value Ref Range Status   12/31/2020 19 7 - 30 mg/dL Final     Creatinine   Date Value Ref Range Status   12/31/2020 1.06 0.66 - 1.25 mg/dL Final     GFR Estimate   Date Value Ref Range Status   12/31/2020 77 >60 mL/min/[1.73_m2] Final     Comment:     Non  GFR Calc  Starting 12/18/2018, serum creatinine based estimated GFR (eGFR) will be   calculated using the Chronic Kidney Disease Epidemiology Collaboration   (CKD-EPI) equation.       Calcium   Date Value Ref Range " Status   12/31/2020 8.9 8.5 - 10.1 mg/dL Final       Hyperlipidemia: Current therapy includes atorvastatin 40mg daily.  Patient reports no significant myalgias or other side effects.  Recent Labs   Lab Test 11/04/20  1109 03/25/16  1059 03/25/14  0753   CHOL 112  --  198   HDL 43  --  37*   LDL 48 131* 135*   TRIG 106  --  129   CHOLHDLRATIO  --   --  5.3*       Pain: Patient taking acetaminophen 650mg as needed (takes occasionally) and reports no issues.     Dermatitis: Patient using ketoconazole cream and shampoo as needed and betamethosone cream as needed. No issues reported.     Today's Vitals: There were no vitals taken for this visit.      I spent 60 minutes with this patient today. All changes were made via collaborative practice agreement with Axel Roberson. A copy of the visit note was provided to the patient's primary care provider.    The patient was sent via [employer] a summary of these recommendations.     Sandra Abdul, PharmD  Medication Therapy Management Provider, [facility]  Pager: [phone]    Patient consented to a telehealth visit: yes  Telemedicine Visit Details  Type of service:  Telephone visit  Start Time: 11:00 AM  End Time: 12:00 PM  Originating Location (patient location): Home  Distant Location (provider location):  [facility]  Mode of Communication:  Telephone        Medication Therapy Recommendations  Essential hypertension    Current Medication: amLODIPine (NORVASC) 10 MG tablet   Rationale: Undesirable effect - Adverse medication event - Safety   Recommendation: Change Medication - lisinopril 10 MG tablet - Decrease amlodipine to 5mg daily and start lisinopril 10mg daily. Ordered BMP lab.   Status: Accepted per CPA          Current Medication: magnesium oxide 400 MG CAPS   Rationale: Synergistic therapy - Needs additional medication therapy - Indication   Recommendation: Start Medication   Status: Accepted - no CPA Needed          Type 2 diabetes mellitus with diabetic dermatitis, without long-term current use of insulin (H)    Current Medication: Semaglutide,0.25 or 0.5MG/DOS, (OZEMPIC, 0.25 OR 0.5 MG/DOSE,) 2 MG/1.5ML SOPN   Rationale: Synergistic therapy - Needs additional medication therapy - Indication   Recommendation: Start Medication - insulin glargine 100 UNIT/ML pen - Stop basaglar and start Ozempic 0.25mg weekly. if tolerated after 4 weeks, may increase to 0.5mg weekly.   Status: Accepted per CPA

## 2021-01-05 ENCOUNTER — VIRTUAL VISIT (OUTPATIENT)
Dept: PHARMACY | Facility: CLINIC | Age: 56
End: 2021-01-05
Attending: FAMILY MEDICINE

## 2021-01-05 ENCOUNTER — VIRTUAL VISIT (OUTPATIENT)
Dept: CARDIOLOGY | Facility: CLINIC | Age: 56
End: 2021-01-05
Payer: COMMERCIAL

## 2021-01-05 DIAGNOSIS — L30.9 DERMATITIS: ICD-10-CM

## 2021-01-05 DIAGNOSIS — I35.9 AORTIC VALVE DISEASE: ICD-10-CM

## 2021-01-05 DIAGNOSIS — Z86.73 HISTORY OF CEREBRAL EMBOLIC INFARCTION: ICD-10-CM

## 2021-01-05 DIAGNOSIS — E66.01 MORBID OBESITY (H): ICD-10-CM

## 2021-01-05 DIAGNOSIS — Z13.6 CARDIOVASCULAR SCREENING; LDL GOAL LESS THAN 160: ICD-10-CM

## 2021-01-05 DIAGNOSIS — I25.810 CORONARY ARTERY DISEASE INVOLVING CORONARY BYPASS GRAFT OF NATIVE HEART WITHOUT ANGINA PECTORIS: ICD-10-CM

## 2021-01-05 DIAGNOSIS — Z95.1 S/P CABG (CORONARY ARTERY BYPASS GRAFT): ICD-10-CM

## 2021-01-05 DIAGNOSIS — I35.9 AORTIC VALVE DISORDER: ICD-10-CM

## 2021-01-05 DIAGNOSIS — Z95.2 HEART VALVE REPLACED: ICD-10-CM

## 2021-01-05 DIAGNOSIS — I10 ESSENTIAL HYPERTENSION: ICD-10-CM

## 2021-01-05 DIAGNOSIS — G47.33 OBSTRUCTIVE SLEEP APNEA SYNDROME: ICD-10-CM

## 2021-01-05 DIAGNOSIS — E66.9 OBESITY WITH SERIOUS COMORBIDITY, UNSPECIFIED CLASSIFICATION, UNSPECIFIED OBESITY TYPE: Primary | ICD-10-CM

## 2021-01-05 DIAGNOSIS — G44.209 TENSION HEADACHE: ICD-10-CM

## 2021-01-05 DIAGNOSIS — E11.620 TYPE 2 DIABETES MELLITUS WITH DIABETIC DERMATITIS, WITHOUT LONG-TERM CURRENT USE OF INSULIN (H): Primary | ICD-10-CM

## 2021-01-05 PROCEDURE — 99214 OFFICE O/P EST MOD 30 MIN: CPT | Mod: 95 | Performed by: NURSE PRACTITIONER

## 2021-01-05 PROCEDURE — 99605 MTMS BY PHARM NP 15 MIN: CPT | Mod: TEL | Performed by: PHARMACIST

## 2021-01-05 PROCEDURE — 99607 MTMS BY PHARM ADDL 15 MIN: CPT | Mod: TEL | Performed by: PHARMACIST

## 2021-01-05 RX ORDER — LISINOPRIL 10 MG/1
10 TABLET ORAL DAILY
Qty: 90 TABLET | Refills: 0 | Status: SHIPPED | OUTPATIENT
Start: 2021-01-05 | End: 2021-02-09

## 2021-01-05 RX ORDER — SEMAGLUTIDE 1.34 MG/ML
INJECTION, SOLUTION SUBCUTANEOUS
Qty: 1.5 ML | Refills: 1 | Status: SHIPPED | OUTPATIENT
Start: 2021-01-05 | End: 2021-02-09

## 2021-01-05 RX ORDER — CALCIUM CARBONATE/VITAMIN D3 500-10/5ML
1 LIQUID (ML) ORAL DAILY
COMMUNITY

## 2021-01-05 RX ORDER — DULAGLUTIDE 0.75 MG/.5ML
0.75 INJECTION, SOLUTION SUBCUTANEOUS
Qty: 2 ML | Refills: 1 | Status: SHIPPED | OUTPATIENT
Start: 2021-01-05 | End: 2021-01-05

## 2021-01-05 RX ORDER — METHOCARBAMOL 500 MG/1
500 TABLET, FILM COATED ORAL 4 TIMES DAILY PRN
COMMUNITY
End: 2021-01-05

## 2021-01-05 NOTE — LETTER
"1/5/2021    Axel Roberson MD  [id number]  [facility] [address]    RE: Benjamín SCRUGGS Magen       Dear Colleague,    I had the pleasure of seeing Benjamín Us in the [facility].    Vitals - Patient Reported 8/27/2020 1/5/2021   Height (Patient Reported) - 5' 10\"   Weight (Patient Reported) 215 lb 251 lb   BMI (Based on Pt Reported Ht/Wt) - 36.01 kg/m2   Systolic (Patient Reported) 177 134   Diastolic (Patient Reported) 104 91   Pulse (Patient Reported) 81 105         Review Of Systems  Skin: NEGATIVE  Eyes:Ears/Nose/Throat: Glasses  Respiratory: Sob w/exertion  Cardiovascular: Chest pain, edema  Gastrointestinal: NEGATIVE  Genitourinary:NEGATIVE   Musculoskeletal: NEGATIVE  Neurologic: NEGATIVE  Psychiatric: NEGATIVE  Hematologic/Lymphatic/Immunologic: NEGATIVE  Endocrine: Diabetic    Telephone number of patient: [phone]    Violetta Us is a 55 year old male who is following up to discuss medication changes and echo results.   This visit is being conducted as a virtual visit due to the emphasis on mitigation of the COVID-19 virus pandemic. The clinician has decided that the risk of an in-office visit outweighs the benefit for this patient.   He is a patient of Dr. Downs.  His medical history includes     1. Aortic stenosis s/p mechanical AVR (23 mm Saint Garry [place] valve) on 5/22/2020  2. Coronary artery disease.  Single vessel bypass surgery (LIMA to LAD) on 5/22/2020  3. Postoperative atrial flutter (5/2020)   4. hypertension  5. Dyslipidemia  6. Diabetes type 2  7. HARIS - does not use CPAP     Diagnostics:    ECHO (12/23/2020) revealed Hyperdynamic left ventricular function EF >70%. Small area of dyskinesis at the apex.Right ventricular global function is normal. History of aortic valve replacement with a 23mm St. Garry [place] mechanical valve. Normal function on Doppler interrogation. Vmax 2.3 m/s, mean gradient 21 mmHg, DI 0.68. No valvular " or paravalvular insufficiency. Mild mitral stenosis, mean gradient 6 mmHg at 98 bpm.  This study was compared to a TTE from 9/29/2020. Prosthetic AV function is stable. The previously noted LVOT gradient was not seen on this present study. MV mean gradient previously 6.9 mmHg.        In May 2020, patient underwent a mechanical AVR and single-vessel bypass surgery.  He developed typical atrial flutter with 2-1 AV conduction with spontaneous conversion.  He was aware of the arrhythmia.  During the hospitalization he was started on amiodarone     In June 2020, patient met with Dr. Villanueva stating he is having upper extremity tremors since hospitalization and cramping in his calves.  The amiodarone was discontinued and recommended to follow-up with a 14-day leadless cardiac monitor which revealed sinus rhythm.  His tremors did not resolve with discontinuation of amiodarone     In July 2020, patient was asking to restart Adderall and Dr. Downs would not approve the medication as it has a black box warning contradicting its use in patients with known cardiac issues including coronary artery disease.     In August, patient met with Dr. Downs he was hypertensive and amlodipine was started.       In September 2020, an echocardiogram revealed EF 65-70% with a small area of apical dyskinesis, asymmetric with dynamic LVOT obstruction peak gradient is 54 mmHg, normal prosthetic valve gradient.    On 12/23/2020 he was seen with a in constant topical chest pain with episodes of sharp pain across his chest at the pectoral muscle and a 45# weight gain since surgery.   She was started on lasix and an ECHO revealed normal EF.  His ProBNP was normal      Today he reports feeling no changes since starting lasix -  his weight actually went up a pound.  He states he does not consume more than 2000 calories per day and was exercising until the gym closed 1 month ago.  He thinks his weight gain may be related to diabetic medications as he was  taken off metformin.   Blood pressure at home is usually 120-140 mmHg. Today his BP is elevated but he just took his medications.  His topical chest pain is improving slowly and he was told by CV surgery it is within normal.   He denies angina, dyspnea at rest, palpitations, orthopnea, PND, or edema.  Reports taking medications as prescribed       The remainder of the physical exam was deferred due to public health crisis.   Acknowledge and reviewed ROS completed by Violetta GUDINO    ASSESSMENT AND PLAN  Aortic stenosis     s/p mechanical AVR (23 mm Saint Garry [place] valve) on 5/22/2020    Normal valve function per echo (9/2020 and 12/2020)    Currently on warfarin which has been therapeutic between 2.5 and 3.5 since 10/2020.  Hgb is 13.0 (11.2020)     Coronary artery disease.      Single vessel bypass surgery (LIMA to LAD) on 5/22/2020    He is currently taking aspirin, metoprolol and atorvastatin.  His last LDL was 48 (11/2020)     Hypertension    On 10/2020 echo patient is noted to have severe left ventricular hypertrophy and was not noted on limited ECHO completed on 12/2020    Controlled at home while taking metoprolol tartrate 25 mg twice daily, amlodipine 10 mg daily     Shortness of breath with weight gain.    Patient was noted to have a normal EF    States he has gained 50 pounds in 6 months despite exercise and during his intake.  His BNP was normal and EF was normal.      He was started furosemide 40 mg daily x3 days then 20 mg daily however this has not made any improvement in symptoms .  Weight gain is likely not caused by HF.  Recommend following up with PCP to discuss DM medications, recommend keeping a strict food/exercise diary      Plan:  Stop lasix   Recommend following up with PCP to discuss DM medications, recommend keeping a strict food/exercise diary  Follow up with Dr. Downs in 1 year  Recommend following up with sleep medicine to find a mask that works.       Video-Visit Details    Type  of service:  Video Visit was converted to a phone visit which lasted 21 minutes    Originating Location (pt. Location): Home    Distant Location (provider location):  [facility]     Platform used for Video Visit: Ynes      Thank you for allowing me to participate in the care of your patient.    Sincerely,     JACKSON Valentino [facility]

## 2021-01-05 NOTE — PATIENT INSTRUCTIONS
Recommendations from today's MTM visit:                                                    MTM (medication therapy management) is a service provided by a clinical pharmacist designed to help you get the most of out of your medicines.   Today we reviewed what your medicines are for, how to know if they are working, that your medicines are safe and how to make your medicine regimen as easy as possible.      1. Start lisinopril 10mg daily and decrease amlodipine to 5mg daily. Labs in 1 month.   2. Stop insulin. Start Ozempic 0.25mg weekly and decrease Basaglar down to 15 units daily. If tolerated after 4 weeks, may increase Ozempic to 0.5mg weekly and stop Basaglar.   3. May start magnesium 400mg daily as needed for cramps.     It was great to speak with you today.  I value your experience and would be very thankful for your time with providing feedback on our clinic survey. You may receive a survey via email or text message in the next few days.   To schedule another MTM appointment, please call the clinic directly or you may call the MTM scheduling line at [phone] or toll-free at [phone].     My Clinical Pharmacist's contact information:                                                      It was a pleasure talking with you today!  Please feel free to contact me with any questions or concerns you have.      Sandra Abdul, PharmD  Medication Therapy Management Provider, [facility]

## 2021-01-05 NOTE — Clinical Note
FYI - starting him on Ozempic. Also decreasing amlodipine dose d/t side effects and adding lisinopril back in.

## 2021-01-08 ENCOUNTER — TELEPHONE (OUTPATIENT)
Dept: NURSING | Facility: CLINIC | Age: 56
End: 2021-01-08

## 2021-01-08 NOTE — TELEPHONE ENCOUNTER
Patient called to inform INR clinic that he ate 1/2 can of green beans 2 nights ago and plans to start eating 1 can of green beans every week.    Patient's INR was 2.8 on 01/04/21, moved up his next INR appt. To 01/15/21 as patient will have had 3-4 servings by then.    Linsey Barrios RN

## 2021-01-14 ENCOUNTER — HEALTH MAINTENANCE LETTER (OUTPATIENT)
Age: 56
End: 2021-01-14

## 2021-01-15 ENCOUNTER — ANTICOAGULATION THERAPY VISIT (OUTPATIENT)
Dept: NURSING | Facility: CLINIC | Age: 56
End: 2021-01-15

## 2021-01-15 DIAGNOSIS — Z95.1 S/P CABG (CORONARY ARTERY BYPASS GRAFT): ICD-10-CM

## 2021-01-15 DIAGNOSIS — Z95.2 HEART VALVE REPLACED: ICD-10-CM

## 2021-01-15 DIAGNOSIS — I35.0 NONRHEUMATIC AORTIC VALVE STENOSIS: ICD-10-CM

## 2021-01-15 LAB
CAPILLARY BLOOD COLLECTION: NORMAL
INR PPP: 2.1 (ref 0.86–1.14)

## 2021-01-15 PROCEDURE — 36416 COLLJ CAPILLARY BLOOD SPEC: CPT | Performed by: FAMILY MEDICINE

## 2021-01-15 PROCEDURE — 99207 PR NO CHARGE NURSE ONLY: CPT

## 2021-01-15 PROCEDURE — 85610 PROTHROMBIN TIME: CPT | Performed by: FAMILY MEDICINE

## 2021-01-15 NOTE — PROGRESS NOTES
ANTICOAGULATION FOLLOW-UP     Patient Name:  Benjamín Us  Date:  1/15/2021  Contact Type:  Telephone    SUBJECTIVE:  Patient Findings     Positives:  Change in diet/appetite (Increased his green bean intake by 1 can this week.)    Comments:  The patient was assessed for   medication,   missed or extra doses,   bruising or bleeding,   with no problem findings.  No questions or concerns.  Reviewed previous warfarin dosing with patient.  He took warfarin as instructed.    INR is therapeutic today at 2.1.   Patient will continue same maintenance dose.   Although his INR dropped some with the increase in green beans, he wanted to continue his same maintenance dose as long as his INR was still in goal range.  Follow up in 3 weeks.            Clinical Outcomes     Comments:  The patient was assessed for   medication,   missed or extra doses,   bruising or bleeding,   with no problem findings.  No questions or concerns.  Reviewed previous warfarin dosing with patient.  He took warfarin as instructed.    INR is therapeutic today at 2.1.   Patient will continue same maintenance dose.   Although his INR dropped some with the increase in green beans, he wanted to continue his same maintenance dose as long as his INR was still in goal range.  Follow up in 3 weeks.               OBJECTIVE    Recent labs: (last 7 days)     01/15/21  1133   INR 2.10*       ASSESSMENT / PLAN  INR assessment THER    Recheck INR In: 3 WEEKS    INR Location Clinic lab     Anticoagulation Summary  As of 1/15/2021    INR goal:  2.0-3.0   TTR:  83.8 % (7.4 mo)   INR used for dosin.10 (1/15/2021)   Warfarin maintenance plan:  5 mg (5 mg x 1) every Mon, Thu; 7.5 mg (5 mg x 1.5) all other days   Full warfarin instructions:  5 mg every Mon, Thu; 7.5 mg all other days   Weekly warfarin total:  47.5 mg   No change documented:  Yasemin Narvaez, RN   Plan last modified:  Linsey Barrios RN (2021)   Next INR check:  2021   Priority:  Maintenance    Target end date:  Indefinite    Indications    S/P CABG (coronary artery bypass graft) [Z95.1]  Heart valve replaced [Z95.2]             Anticoagulation Episode Summary     INR check location:      Preferred lab:      Send INR reminders to:  [facility]    Comments:  Call Michelle, pt's wife, on her cell with INR results      Anticoagulation Care Providers     Provider Role Specialty Phone number    Mag Richards PA-C Referring Cardiovascular & Thoracic Surgery [phone]            See the Encounter Report to view Anticoagulation Flowsheet and Dosing Calendar (Go to Encounters tab in chart review, and find the Anticoagulation Therapy Visit)        Yasemin Narvaez RN

## 2021-02-08 NOTE — PROGRESS NOTES
Medication Therapy Management (MTM) Encounter    ASSESSMENT:                            Medication Adherence/Access: No issues identified    Type 2 Diabetes: Blood glucose improved and closer to goal fasting 80-130mg/dL and post-prandial <180mg/dL. Plan in place to continue increasing Ozempic as discussed. Future will see if able to increase Ozempic dose to max and discontinue glipizide. Jardiance is covered for him so would be able to add Jardiance if needed in the future for further blood glucose control and weight loss.     Hypertension/CAD/Hx prosthetic heart valve: He would benefit from decreasing amlodipine dose as discussed last visit and will increase metoprolol dose d/t higher pulses.   Hyperlipidemia: stable    PLAN:                            1. Decrease amlodipine back to 5mg daily.   2. Increase metoprolol to 50mg twice daily.   3. Increase Ozempic to 0.5mg weekly as planned.     Follow-up: 1 month    SUBJECTIVE/OBJECTIVE:                          Benjamín Us is a 55 year old male called for a follow-up visit. He was referred to me from Dr. Roberson.  Today's visit is a follow-up MTM visit from .     Reason for visit: Ozempic and blood pressure f/up.    Tobacco: He reports that he quit smoking about a year ago. His smoking use included cigarettes. He started smoking about 41 years ago. He has a 40.00 pack-year smoking history. He has never used smokeless tobacco.  Alcohol: not currently using  Caffeine: 3 diet zero sodas/day  Activity: was exercising 3-4x weekly before gyms closed  Past Medical History: Reviewed in chart    Medication Adherence/Access: no issues reported     Type 2 Diabetes:  Currently taking glipizide XL 5mg daily and Ozempic 0.25mg weekly (increasing to 0.5mg tomorrow). Patient is not experiencing side effects. Had loose stools and body aches on metformin. Gained maybe 1 lb since last visit.   Blood sugar monitorin-2 time(s) daily. Ranges (patient reported): fasting- 120,  "119, 157, 140, 124, 148, [id number], 230 (higher readings last few days)  Symptoms of low blood sugar? none  Symptoms of high blood sugar? none  Eye exam: up to date  Foot exam: due  Diet/Exercise: Intermittent fasting now. initially was able to control blood glucose with diet and exercise. Keeping calorie intake under 2000/day. Snacks-popcorn. Wants to lose weight, not gain anymore.   Aspirin: Taking 81mg daily and denies side effects  Statin: Yes: atorvastatin   ACEi/ARB: No.   No results found for: UMALCR  Lab Results   Component Value Date    A1C 6.9 10/07/2020    A1C 7.5 06/26/2020    A1C 12.2 05/21/2020    A1C 7.2 01/26/2016    A1C 6.5 03/12/2014     Wt Readings from Last 3 Encounters:   12/23/20 257 lb 8 oz (116.8 kg)   11/04/20 252 lb 6.4 oz (114.5 kg)   09/02/20 221 lb (100.2 kg)       Hypertension/CAD/Hx prosthetic heart valve: Current medications include amlodipine 10mg daily (didn't decrease as instructed), lisinopril 10mg daily, metoprolol 25mg twice daily, aspirin 81mg daily, and warfarin 5mg Mon/Thurs and 7.5mg all other days. Patient does report edema in ankles but slight improvement recently. Patient does self-monitor blood pressure. Home BP monitoring in range of 120's systolic over 70-80's diastolic. Pulses are 's. States hard to keep his pulses lower, he would like them lower b/c \"can feel my valve.\" he's also not sure why he was taken off carvedilol and lisinopril over the summer last year.  Patient reports no current medication side effects.  BP Readings from Last 3 Encounters:   12/23/20 134/84   11/04/20 (!) 136/92   10/27/20 (!) 140/82     Lab Results   Component Value Date    INR 2.10 01/15/2021    INR 2.80 01/04/2021    INR 2.40 12/07/2020    INR 2.80 11/04/2020    INR 1.90 10/20/2020       Hyperlipidemia: Current therapy includes atorvastatin 40mg daily.  Patient reports no significant myalgias or other side effects.  Recent Labs   Lab Test 11/04/20  1109 03/25/16  1059 " 03/25/14  0753   CHOL 112  --  198   HDL 43  --  37*   LDL 48 131* 135*   TRIG 106  --  129   CHOLHDLRATIO  --   --  5.3*       Today's Vitals: There were no vitals taken for this visit.  ----------------    I spent 15 minutes with this patient today. All changes were made via collaborative practice agreement with Axel Roberson. A copy of the visit note was provided to the patient's primary care provider.    The patient was sent via [employer] a summary of these recommendations.     Sandra Abdul, PharmD  Medication Therapy Management Provider, [facility]  Pager: [phone]    Telemedicine Visit Details  Type of service:  Telephone visit  Start Time: 11:30 AM  End Time: 11:46 AM  Originating Location (patient location): Home  Distant Location (provider location):  [facility]        Medication Therapy Recommendations  Essential hypertension    Current Medication: amLODIPine (NORVASC) 10 MG tablet   Rationale: Undesirable effect - Adverse medication event - Safety   Recommendation: Decrease Dose - metoprolol tartrate 50 MG tablet - Decrease amlodipine to 5mg daily and increase metoprolol to 50mg twice daily.   Status: Accepted per CPA

## 2021-02-09 ENCOUNTER — ANTICOAGULATION THERAPY VISIT (OUTPATIENT)
Dept: NURSING | Facility: CLINIC | Age: 56
End: 2021-02-09

## 2021-02-09 ENCOUNTER — ALLIED HEALTH/NURSE VISIT (OUTPATIENT)
Dept: PHARMACY | Facility: CLINIC | Age: 56
End: 2021-02-09
Payer: COMMERCIAL

## 2021-02-09 DIAGNOSIS — Z13.6 CARDIOVASCULAR SCREENING; LDL GOAL LESS THAN 160: ICD-10-CM

## 2021-02-09 DIAGNOSIS — Z95.2 HEART VALVE REPLACED: ICD-10-CM

## 2021-02-09 DIAGNOSIS — Z86.73 HISTORY OF CEREBRAL EMBOLIC INFARCTION: ICD-10-CM

## 2021-02-09 DIAGNOSIS — I10 ESSENTIAL HYPERTENSION: ICD-10-CM

## 2021-02-09 DIAGNOSIS — I35.9 AORTIC VALVE DISEASE: ICD-10-CM

## 2021-02-09 DIAGNOSIS — I35.0 NONRHEUMATIC AORTIC VALVE STENOSIS: ICD-10-CM

## 2021-02-09 DIAGNOSIS — Z95.1 S/P CABG (CORONARY ARTERY BYPASS GRAFT): ICD-10-CM

## 2021-02-09 DIAGNOSIS — E11.9 TYPE 2 DIABETES MELLITUS WITHOUT COMPLICATION, WITHOUT LONG-TERM CURRENT USE OF INSULIN (H): ICD-10-CM

## 2021-02-09 DIAGNOSIS — E11.620 TYPE 2 DIABETES MELLITUS WITH DIABETIC DERMATITIS, WITHOUT LONG-TERM CURRENT USE OF INSULIN (H): Primary | ICD-10-CM

## 2021-02-09 LAB
CAPILLARY BLOOD COLLECTION: NORMAL
INR PPP: 3.1 (ref 0.86–1.14)

## 2021-02-09 PROCEDURE — 99606 MTMS BY PHARM EST 15 MIN: CPT | Mod: TEL | Performed by: PHARMACIST

## 2021-02-09 PROCEDURE — 99207 PR NO CHARGE NURSE ONLY: CPT

## 2021-02-09 PROCEDURE — 36416 COLLJ CAPILLARY BLOOD SPEC: CPT | Performed by: FAMILY MEDICINE

## 2021-02-09 PROCEDURE — 85610 PROTHROMBIN TIME: CPT | Performed by: FAMILY MEDICINE

## 2021-02-09 RX ORDER — LISINOPRIL 10 MG/1
10 TABLET ORAL DAILY
Qty: 90 TABLET | Refills: 0 | OUTPATIENT
Start: 2021-02-09

## 2021-02-09 RX ORDER — GLIPIZIDE 5 MG/1
5 TABLET, FILM COATED, EXTENDED RELEASE ORAL DAILY
Qty: 90 TABLET | Refills: 1 | OUTPATIENT
Start: 2021-02-09

## 2021-02-09 RX ORDER — METOPROLOL TARTRATE 50 MG
50 TABLET ORAL 2 TIMES DAILY
Qty: 180 TABLET | Refills: 1 | Status: SHIPPED | OUTPATIENT
Start: 2021-02-09 | End: 2021-02-09

## 2021-02-09 RX ORDER — WARFARIN SODIUM 5 MG/1
TABLET ORAL
Qty: 115 TABLET | Refills: 0 | Status: SHIPPED | OUTPATIENT
Start: 2021-02-09 | End: 2021-04-19

## 2021-02-09 RX ORDER — METOPROLOL TARTRATE 50 MG
50 TABLET ORAL 2 TIMES DAILY
Qty: 180 TABLET | Refills: 1 | Status: SHIPPED | OUTPATIENT
Start: 2021-02-09 | End: 2021-02-10

## 2021-02-09 RX ORDER — LISINOPRIL 10 MG/1
10 TABLET ORAL DAILY
Qty: 90 TABLET | Refills: 1 | Status: SHIPPED | OUTPATIENT
Start: 2021-02-09 | End: 2021-03-10

## 2021-02-09 RX ORDER — SEMAGLUTIDE 1.34 MG/ML
0.5 INJECTION, SOLUTION SUBCUTANEOUS WEEKLY
Qty: 1.5 ML | Refills: 1 | Status: SHIPPED | OUTPATIENT
Start: 2021-02-09 | End: 2021-03-10 | Stop reason: DRUGHIGH

## 2021-02-09 NOTE — TELEPHONE ENCOUNTER
Last INR: today, 02/09/21=3.1  Next INR: 02/23/21  INR referral and OV up-to-date: yes      Prescription approved per [facility] Refill Protocol.    Linsey Barrios RN

## 2021-02-09 NOTE — PATIENT INSTRUCTIONS
Recommendations from today's MTM visit:                                                       1. Decrease amlodipine back to 5mg daily.   2. Increase metoprolol to 50mg twice daily.   3. Increase Ozempic to 0.5mg weekly as planned.     It was great to speak with you today.  I value your experience and would be very thankful for your time with providing feedback on our clinic survey. You may receive a survey via email or text message in the next few days.     To schedule another MTM appointment, please call the clinic directly or you may call the MTM scheduling line at [phone] or toll-free at [phone].     My Clinical Pharmacist's contact information:                                                      It was a pleasure talking with you today!  Please feel free to contact me with any questions or concerns you have.      Sandra Abdul, PharmD  Medication Therapy Management Provider, [facility]

## 2021-02-09 NOTE — TELEPHONE ENCOUNTER
Patient has an appointment with MTM today, refills of lisinopril and glipizide will be addressed then.     Leslie Ceron RN on 2/9/2021 at 9:48 AM

## 2021-02-09 NOTE — PROGRESS NOTES
ANTICOAGULATION FOLLOW-UP CLINIC VISIT    Patient Name:  Benjamín Us  Date:  2/9/2021  Contact Type:  Telephone    SUBJECTIVE:  Patient Findings     Positives:  Change in health (Continues to gain weight, cardiologist and MTM PharmD are aware.  Pt states he has gained 53# in 5 months.), Change in medications (MTM PharmD increased Metoprolol today, 02/09/21, to 50mg BID. Pt has been on Ozempic x 4 weeks, dose titrating up to 0.5mg starting today.), Change in diet/appetite (Less greens in the past week, pt will resume his usual regimen.  Pt also only eating 1 meal per day, MTM PharmD would like him to resume 2 meals per day.  Pt also avoiding all sugars.)    Comments:  Patient moving 03/05 to [facility], is aware to look for a provider there that can follow/manage his INR.        Clinical Outcomes     Negatives:  Major bleeding event, Thromboembolic event, Anticoagulation-related hospital admission, Anticoagulation-related ED visit, Anticoagulation-related fatality    Comments:  Patient moving 03/05 to [facility], is aware to look for a provider there that can follow/manage his INR.           OBJECTIVE    Recent labs: (last 7 days)     02/09/21  1319   INR 3.10*       ASSESSMENT / PLAN  INR assessment SUPRA    Recheck INR In: 2 WEEKS    INR Location Clinic      Anticoagulation Summary  As of 2/9/2021    INR goal:  2.0-3.0   TTR:  84.4 % (8.2 mo)   INR used for dosing:  3.10 (2/9/2021)   Warfarin maintenance plan:  5 mg (5 mg x 1) every Mon, Thu; 7.5 mg (5 mg x 1.5) all other days   Full warfarin instructions:  5 mg every Mon, Thu; 7.5 mg all other days   Weekly warfarin total:  47.5 mg   No change documented:  Linsey Barrios, RN   Plan last modified:  Linsey Barrios RN (1/4/2021)   Next INR check:  2/23/2021   Priority:  High   Target end date:  Indefinite    Indications    S/P CABG (coronary artery bypass graft) [Z95.1]  Heart valve replaced [Z95.2]             Anticoagulation Episode Summary     INR check  location:      Preferred lab:      Send INR reminders to:  [facility]    Comments:  Call Michelle, pt's wife, on her cell with INR results      Anticoagulation Care Providers     Provider Role Specialty Phone number    Mag Richards PA-C Referring Cardiovascular & Thoracic Surgery [phone]            See the Encounter Report to view Anticoagulation Flowsheet and Dosing Calendar (Go to Encounters tab in chart review, and find the Anticoagulation Therapy Visit)        Linsey Barrios RN

## 2021-02-10 DIAGNOSIS — Z95.1 S/P CABG (CORONARY ARTERY BYPASS GRAFT): ICD-10-CM

## 2021-02-10 DIAGNOSIS — E11.9 TYPE 2 DIABETES MELLITUS WITHOUT COMPLICATION, WITHOUT LONG-TERM CURRENT USE OF INSULIN (H): ICD-10-CM

## 2021-02-10 RX ORDER — METOPROLOL TARTRATE 50 MG
50 TABLET ORAL 2 TIMES DAILY
Qty: 180 TABLET | Refills: 1 | Status: SHIPPED | OUTPATIENT
Start: 2021-02-10 | End: 2021-07-02

## 2021-02-11 RX ORDER — GLIPIZIDE 5 MG/1
5 TABLET, FILM COATED, EXTENDED RELEASE ORAL DAILY
Qty: 90 TABLET | Refills: 0 | Status: SHIPPED | OUTPATIENT
Start: 2021-02-11 | End: 2021-03-10

## 2021-02-11 NOTE — TELEPHONE ENCOUNTER
Prescription approved per [facility] Refill Protocol.    Leslie Ceron RN on 2/11/2021 at 9:25 AM

## 2021-02-11 NOTE — TELEPHONE ENCOUNTER
Received refill request for:  Metoprolol   Last OV was: 1/5/2021  Labs/EKG: na  F/U scheduled: na  [facility]  [phone]

## 2021-02-16 DIAGNOSIS — I25.10 CORONARY ARTERY DISEASE INVOLVING NATIVE CORONARY ARTERY OF NATIVE HEART WITHOUT ANGINA PECTORIS: ICD-10-CM

## 2021-02-16 DIAGNOSIS — I10 ESSENTIAL HYPERTENSION: ICD-10-CM

## 2021-02-17 ENCOUNTER — TELEPHONE (OUTPATIENT)
Dept: FAMILY MEDICINE | Facility: CLINIC | Age: 56
End: 2021-02-17

## 2021-02-17 RX ORDER — AMLODIPINE BESYLATE 5 MG/1
5 TABLET ORAL DAILY
Qty: 90 TABLET | Refills: 2 | Status: SHIPPED | OUTPATIENT
Start: 2021-02-17 | End: 2021-03-10 | Stop reason: SINTOL

## 2021-02-17 RX ORDER — ATORVASTATIN CALCIUM 40 MG/1
40 TABLET, FILM COATED ORAL DAILY
Qty: 90 TABLET | Refills: 2 | Status: SHIPPED | OUTPATIENT
Start: 2021-02-17 | End: 2021-11-16

## 2021-02-17 NOTE — TELEPHONE ENCOUNTER
Routing refill request to provider for review/approval because:  See cardiology, they are listed as prescribers, Axel Roberson MD do you want to?  See MTM visit, told to decrease amlodipine to 5 mg daily, need new rx sent with updated dose, 5 MG T'D UP IF OKAY  2/9/21--. Decrease amlodipine back to 5mg daily.     PLEASE ADVISE, SENT TO CARDIOLOGY?  Isaura Hernandez RN, BSN  Message handled by CLINIC NURSE.

## 2021-02-17 NOTE — CONFIDENTIAL NOTE
Prior Authorization Retail Medication Request    Medication/Dose: ozempic  ICD code (if different than what is on RX):  na  Previously Tried and Failed:  Had loose stools and body aches on metformin.  Rationale:  Currently on glipizide and need additional therapy. Tolerating Ozempic well.    Insurance Name:  [employer]  Insurance ID:  [id number]       Pharmacy Information (if different than what is on RX)  Name:    Phone:

## 2021-02-17 NOTE — TELEPHONE ENCOUNTER
"Fax from On license of [facility]    Pharmacy Benefit Information:    Provider: TRINY BENAVIDES MD/MTM  Phone #: [phone]  ID#: [id number]  BIN: [id number]  PCN: [id number]  Medication/SIG: oxepmic 2 mg/1.5 ml  Pharmacy: [facility]     \"looks like needs Prior Auth now\"  Step therapy/alternate drug therapy required  PA required-CALL [phone]    Routed to [place] (Sandra COHN) please review and advise, no alternates provided  Isaura Hernandez RN, BSN  Message handled by CLINIC NURSE.    "

## 2021-02-20 NOTE — TELEPHONE ENCOUNTER
Central Prior Authorization Team   Phone: [phone]    PA Initiation    Medication: ozempic  Insurance Company: [employer] ([facility]) - Phone [phone] Fax [phone]  Pharmacy Filling the Rx: [facility] - [address]  Filling Pharmacy Phone: [phone]  Filling Pharmacy Fax: [phone]  Start Date: 2/20/2021

## 2021-02-22 NOTE — TELEPHONE ENCOUNTER
Prior Authorization Approval    Authorization Effective Date: 2/20/2021  Authorization Expiration Date: 2/20/2022  Medication: ozempic-APPROVED  Approved Dose/Quantity:    Reference #:     Insurance Company: [employer] ([facility]) - Phone [phone] Fax [phone]  Expected CoPay:       CoPay Card Available:      Foundation Assistance Needed:    Which Pharmacy is filling the prescription (Not needed for infusion/clinic administered): [facility] - [address]  Pharmacy Notified: Yes  Patient Notified: Yes  PATIENT ADVISED TO CALL MAIL ORDER PHARMACY TO SET UP DELIVERY.

## 2021-02-22 NOTE — TELEPHONE ENCOUNTER
PA approved, pharmacy and pt informed, FYI to MD Isaura George RN, BSN  Message handled by CLINIC NURSE.

## 2021-02-23 ENCOUNTER — ANTICOAGULATION THERAPY VISIT (OUTPATIENT)
Dept: NURSING | Facility: CLINIC | Age: 56
End: 2021-02-23

## 2021-02-23 DIAGNOSIS — I10 ESSENTIAL HYPERTENSION: ICD-10-CM

## 2021-02-23 DIAGNOSIS — I35.0 NONRHEUMATIC AORTIC VALVE STENOSIS: ICD-10-CM

## 2021-02-23 DIAGNOSIS — Z95.1 S/P CABG (CORONARY ARTERY BYPASS GRAFT): ICD-10-CM

## 2021-02-23 DIAGNOSIS — Z95.2 HEART VALVE REPLACED: ICD-10-CM

## 2021-02-23 LAB — INR PPP: 3.6 (ref 0.86–1.14)

## 2021-02-23 PROCEDURE — 80048 BASIC METABOLIC PNL TOTAL CA: CPT | Performed by: FAMILY MEDICINE

## 2021-02-23 PROCEDURE — 36415 COLL VENOUS BLD VENIPUNCTURE: CPT | Performed by: FAMILY MEDICINE

## 2021-02-23 PROCEDURE — 99207 PR NO CHARGE NURSE ONLY: CPT

## 2021-02-23 PROCEDURE — 85610 PROTHROMBIN TIME: CPT | Performed by: FAMILY MEDICINE

## 2021-02-23 NOTE — PROGRESS NOTES
Anticoagulation Management    Unable to reach Benjamín today.    Today's INR result of 3.6 is supratherapeutic (goal INR of 2.0-3.0).  Result received from: Clinic Lab    Follow up required to confirm warfarin dose taken and assess for changes    Left message to take reduced dose of warfarin, 2.5 mg tonight.   Left VM to call Maddy with transfer to Linsey at: [phone]      Anticoagulation clinic to follow up    Linsey Barrios RN    ANTICOAGULATION FOLLOW-UP CLINIC VISIT    Patient Name:  Benjamín Us  Date:  2/23/2021  Contact Type:  Telephone--spoke to pt    SUBJECTIVE:  Patient Findings     Positives:  Change in health (No longer gaining weight but has not lost any weight, either.), Change in medications (Ozempic dose increased on 02/09/21), Change in diet/appetite (Still only eating 1 meal per day, this has been discouraged due to his diabetes but pt not interested in increasing his food intake until he starts to lose weight.  Pt only occasionally eats greens.)    Comments:  2nd supra INR so I decreased warfarin maintenance dose ~5%  Patient took his warfarin this morning so he will decrease dose on 02/24/21.  Patient will be moving so 03/09/21 may be last [facility] INR visit.        Clinical Outcomes     Negatives:  Major bleeding event, Thromboembolic event, Anticoagulation-related hospital admission, Anticoagulation-related ED visit, Anticoagulation-related fatality    Comments:  2nd supra INR so I decreased warfarin maintenance dose ~5%  Patient took his warfarin this morning so he will decrease dose on 02/24/21.  Patient will be moving so 03/09/21 may be last [facility] INR visit.           OBJECTIVE    Recent labs: (last 7 days)     02/23/21  1343   INR 3.60*       ASSESSMENT / PLAN  INR assessment SUPRA    Recheck INR In: 2 WEEKS    INR Location Clinic      Anticoagulation Summary  As of 2/23/2021    INR goal:  2.0-3.0   TTR:  79.9 % (8.7 mo)   INR used for dosing:  3.60 (2/23/2021)   Warfarin maintenance plan:  5 mg (5  mg x 1) every Mon, Wed, Fri; 7.5 mg (5 mg x 1.5) all other days   Full warfarin instructions:  2/24: 2.5 mg; Otherwise 5 mg every Mon, Wed, Fri; 7.5 mg all other days   Weekly warfarin total:  45 mg   Plan last modified:  Linsey Barrios RN (2/23/2021)   Next INR check:  3/9/2021   Priority:  High   Target end date:  Indefinite    Indications    S/P CABG (coronary artery bypass graft) [Z95.1]  Heart valve replaced [Z95.2]             Anticoagulation Episode Summary     INR check location:      Preferred lab:      Send INR reminders to:  [facility] [employer] [place]    Comments:  Call Michelle, pt's wife, on her cell with INR results      Anticoagulation Care Providers     Provider Role Specialty Phone number    Mag Richards PA-C Referring Cardiovascular & Thoracic Surgery [phone]            See the Encounter Report to view Anticoagulation Flowsheet and Dosing Calendar (Go to Encounters tab in chart review, and find the Anticoagulation Therapy Visit)        Linsey Barrios RN

## 2021-02-23 NOTE — PROGRESS NOTES
"Late entry:  Patient also noted that his \"back is out\" so he is unable to exercise.  Also, gets mild GI upset on the day that he injects Ozempic.    Linsey Barrios RN    "

## 2021-02-24 LAB
ANION GAP SERPL CALCULATED.3IONS-SCNC: 4 MMOL/L (ref 3–14)
BUN SERPL-MCNC: 18 MG/DL (ref 7–30)
CALCIUM SERPL-MCNC: 9.1 MG/DL (ref 8.5–10.1)
CHLORIDE SERPL-SCNC: 109 MMOL/L (ref 94–109)
CO2 SERPL-SCNC: 24 MMOL/L (ref 20–32)
CREAT SERPL-MCNC: 0.98 MG/DL (ref 0.66–1.25)
GFR SERPL CREATININE-BSD FRML MDRD: 85 ML/MIN/{1.73_M2}
GLUCOSE SERPL-MCNC: 159 MG/DL (ref 70–99)
POTASSIUM SERPL-SCNC: 4.4 MMOL/L (ref 3.4–5.3)
SODIUM SERPL-SCNC: 137 MMOL/L (ref 133–144)

## 2021-03-09 NOTE — PROGRESS NOTES
Medication Therapy Management (MTM) Encounter    ASSESSMENT:                            Medication Adherence/Access: No issues identified    Type 2 Diabetes: Blood glucose improved and mostly at goal fasting 80-130mg/dL and post-prandial <180mg/dL. Will increase Ozempic dose to be able to discontinue glipizide to assist with weight loss. Jardiance is covered for him so would be able to add Jardiance if needed in the future for further blood glucose control and weight loss.     Hypertension/CAD/Hx prosthetic heart valve: He would benefit from discontinuing amlodipine d/t edema and increasing lisinopril dose.     Hyperlipidemia: stable    PLAN:                            1. Increase Ozempic to 1mg weekly and stop glipizide.   2. Increase lisinopril to 20mg daily and stop amlodipine.     Follow-up: 1 month    SUBJECTIVE/OBJECTIVE:                          Benjamín Us is a 55 year old male called for a follow-up visit. He was referred to me from Dr. Roberson.  Today's visit is a follow-up MTM visit from .     Reason for visit: Ozempic and blood pressure f/up.    Tobacco: He reports that he quit smoking about 13 months ago. His smoking use included cigarettes. He started smoking about 41 years ago. He has a 40.00 pack-year smoking history. He has never used smokeless tobacco.  Alcohol: not currently using  Caffeine: 3 diet zero sodas/day  Activity: was exercising 3-4x weekly before gyms closed  Past Medical History: Reviewed in chart    Medication Adherence/Access: no issues reported     Type 2 Diabetes:  Currently taking glipizide XL 5mg daily and Ozempic 0.5mg weekly. Patient is not experiencing side effects. Had loose stools and body aches on metformin. Still not losing weight, concerned.   Blood sugar monitorin-2 time(s) daily. Ranges (patient reported): fasting- 93, 103  Symptoms of low blood sugar? none  Symptoms of high blood sugar? none  Eye exam: up to date  Foot exam: due  Diet/Exercise: Intermittent  fasting, only eats breakfast on Wed. initially was able to control blood glucose with diet and exercise. Keeping calorie intake under 2000/day, not eating much. Snacks-popcorn. Wants to lose weight, not gain anymore.   Aspirin: Taking 81mg daily and denies side effects  Statin: Yes: atorvastatin   ACEi/ARB: No.   No results found for: UMALCR  Lab Results   Component Value Date    A1C 6.9 10/07/2020    A1C 7.5 06/26/2020    A1C 12.2 05/21/2020    A1C 7.2 01/26/2016    A1C 6.5 03/12/2014     Wt Readings from Last 3 Encounters:   12/23/20 257 lb 8 oz (116.8 kg)   11/04/20 252 lb 6.4 oz (114.5 kg)   09/02/20 221 lb (100.2 kg)       Hypertension/CAD/Hx prosthetic heart valve: Current medications include amlodipine 5mg daily (decreased dose last visit, swelling much better reduced by 50% now), lisinopril 10mg daily, metoprolol 50mg twice daily, aspirin 81mg daily, and warfarin 5mg Mon/Wed/Fri and 7.5mg all other days. No issues with higher metoprolol dose. Patient does self-monitor blood pressure. Home BP monitoring in range of 120's systolic over 70-80's diastolic. Pulses are 90's.   BP Readings from Last 3 Encounters:   12/23/20 134/84   11/04/20 (!) 136/92   10/27/20 (!) 140/82     Lab Results   Component Value Date    INR 3.60 02/23/2021    INR 3.10 02/09/2021    INR 2.10 01/15/2021       Hyperlipidemia: Current therapy includes atorvastatin 40mg daily.  Patient reports no significant myalgias or other side effects.  Recent Labs   Lab Test 11/04/20  1109 03/25/16  1059 03/25/14  0753   CHOL 112  --  198   HDL 43  --  37*   LDL 48 131* 135*   TRIG 106  --  129   CHOLHDLRATIO  --   --  5.3*       Today's Vitals: There were no vitals taken for this visit.  ----------------    I spent 12 minutes with this patient today. All changes were made via collaborative practice agreement with Axel Roberson. A copy of the visit note was provided to the patient's primary care provider.    The patient was sent via Istp[employer] a  summary of these recommendations.     Sandra Abdul, PharmD  Medication Therapy Management Provider, [facility]  Pager: [phone]    Telemedicine Visit Details  Type of service:  Telephone visit  Start Time: 11:32 AM  End Time: 11:44 AM  Originating Location (patient location): Home  Distant Location (provider location):  [facility] MTM        Medication Therapy Recommendations  Essential hypertension    Current Medication: lisinopril (ZESTRIL) 20 MG tablet   Rationale: Undesirable effect - Adverse medication event - Safety   Recommendation: Discontinue Medication - amLODIPine 10 MG tablet - Stop amlodipine and increase lisinopril to 20mg daily.   Status: Accepted per CPA         Type 2 diabetes mellitus with diabetic dermatitis, without long-term current use of insulin (H)    Current Medication: semaglutide (OZEMPIC, 1 MG/DOSE,) 2 MG/1.5ML pen   Rationale: Dose too low - Dosage too low - Effectiveness   Recommendation: Increase Dose   Status: Accepted per CPA

## 2021-03-10 ENCOUNTER — ALLIED HEALTH/NURSE VISIT (OUTPATIENT)
Dept: PHARMACY | Facility: CLINIC | Age: 56
End: 2021-03-10

## 2021-03-10 DIAGNOSIS — I10 ESSENTIAL HYPERTENSION: ICD-10-CM

## 2021-03-10 DIAGNOSIS — I35.9 AORTIC VALVE DISEASE: ICD-10-CM

## 2021-03-10 DIAGNOSIS — Z86.73 HISTORY OF CEREBRAL EMBOLIC INFARCTION: ICD-10-CM

## 2021-03-10 DIAGNOSIS — E11.620 TYPE 2 DIABETES MELLITUS WITH DIABETIC DERMATITIS, WITHOUT LONG-TERM CURRENT USE OF INSULIN (H): Primary | ICD-10-CM

## 2021-03-10 DIAGNOSIS — Z13.6 CARDIOVASCULAR SCREENING; LDL GOAL LESS THAN 160: ICD-10-CM

## 2021-03-10 DIAGNOSIS — Z95.1 S/P CABG (CORONARY ARTERY BYPASS GRAFT): ICD-10-CM

## 2021-03-10 PROCEDURE — 99606 MTMS BY PHARM EST 15 MIN: CPT | Mod: TEL | Performed by: PHARMACIST

## 2021-03-10 RX ORDER — SEMAGLUTIDE 1.34 MG/ML
1 INJECTION, SOLUTION SUBCUTANEOUS
Qty: 3 ML | Refills: 5 | Status: SHIPPED | OUTPATIENT
Start: 2021-03-10 | End: 2021-07-19

## 2021-03-10 RX ORDER — LISINOPRIL 20 MG/1
20 TABLET ORAL DAILY
Qty: 90 TABLET | Refills: 1 | Status: SHIPPED | OUTPATIENT
Start: 2021-03-10 | End: 2021-07-08

## 2021-03-10 NOTE — PATIENT INSTRUCTIONS
Recommendations from today's MTM visit:                                                         1. Increase Ozempic to 1mg weekly and stop glipizide.   2. Increase lisinopril to 20mg daily and stop amlodipine.     It was great to speak with you today.  I value your experience and would be very thankful for your time with providing feedback on our clinic survey. You may receive a survey via email or text message in the next few days.     To schedule another MTM appointment, please call the clinic directly or you may call the MTM scheduling line at [phone] or toll-free at [phone].     My Clinical Pharmacist's contact information:                                                      It was a pleasure talking with you today!  Please feel free to contact me with any questions or concerns you have.      Sandra Abdul, PharmD  Medication Therapy Management Provider, [facility]

## 2021-03-15 ENCOUNTER — ANTICOAGULATION THERAPY VISIT (OUTPATIENT)
Dept: NURSING | Facility: CLINIC | Age: 56
End: 2021-03-15

## 2021-03-15 DIAGNOSIS — I35.0 NONRHEUMATIC AORTIC VALVE STENOSIS: ICD-10-CM

## 2021-03-15 DIAGNOSIS — Z95.1 S/P CABG (CORONARY ARTERY BYPASS GRAFT): ICD-10-CM

## 2021-03-15 DIAGNOSIS — Z95.2 HEART VALVE REPLACED: ICD-10-CM

## 2021-03-15 LAB
CAPILLARY BLOOD COLLECTION: NORMAL
INR PPP: 3.4 (ref 0.86–1.14)

## 2021-03-15 PROCEDURE — 99207 PR NO CHARGE NURSE ONLY: CPT

## 2021-03-15 PROCEDURE — 36416 COLLJ CAPILLARY BLOOD SPEC: CPT | Performed by: FAMILY MEDICINE

## 2021-03-15 PROCEDURE — 85610 PROTHROMBIN TIME: CPT | Performed by: FAMILY MEDICINE

## 2021-03-15 NOTE — PROGRESS NOTES
ANTICOAGULATION FOLLOW-UP CLINIC VISIT    Patient Name:  Benjamín Us  Date:  3/15/2021  Contact Type:  Telephone and My Chart message sent with new warfarin dosing and next appt. Date/time as patient was driving.    SUBJECTIVE:  Patient Findings     Positives:  Change in health (Pt states his blood sugars have dropped <100 for the first time since starting Ozempic.), Change in medications (Continuing to titrate up on Ozempic dose; Glipizide and Norvasc have been discontinued.)    Comments:  Warfarin maintenance dose was decreased 5% on 02/23/21. INR is still supra-therapeutic so maintenance dose was decreased more aggressively at 11%, which is within protocol.  Concurrent use of SEMAGLUTIDE and WARFARIN may result in increased warfarin exposure.   Patient finishing moving today to [facility], [place], he plans to continue with MTM PharmD and INR until he finds PCP closer to his new home (INR referral expires 05/2021).        Clinical Outcomes     Negatives:  Major bleeding event, Thromboembolic event, Anticoagulation-related hospital admission, Anticoagulation-related ED visit, Anticoagulation-related fatality    Comments:  Warfarin maintenance dose was decreased 5% on 02/23/21. INR is still supra-therapeutic so maintenance dose was decreased more aggressively at 11%, which is within protocol.  Concurrent use of SEMAGLUTIDE and WARFARIN may result in increased warfarin exposure.   Patient finishing moving today to [facility], [place], he plans to continue with MTM PharmD and INR until he finds PCP closer to his new home (INR referral expires 05/2021).           OBJECTIVE    Recent labs: (last 7 days)     03/15/21  1410   INR 3.40*       ASSESSMENT / PLAN  INR assessment SUPRA    Recheck INR In: 10 DAYS    INR Location Clinic      Anticoagulation Summary  As of 3/15/2021    INR goal:  2.0-3.0   TTR:  74.2 % (9.4 mo)   INR used for dosing:  3.40 (3/15/2021)   Warfarin maintenance plan:  7.5 mg (5 mg x 1.5) every Wed, Sat; 5 mg (5 mg  x 1) all other days   Full warfarin instructions:  7.5 mg every Wed, Sat; 5 mg all other days   Weekly warfarin total:  40 mg   Plan last modified:  Linsey Barrios RN (3/15/2021)   Next INR check:  3/26/2021   Priority:  High   Target end date:  Indefinite    Indications    S/P CABG (coronary artery bypass graft) [Z95.1]  Heart valve replaced [Z95.2]             Anticoagulation Episode Summary     INR check location:      Preferred lab:      Send INR reminders to:  [facility]    Comments:  Call Michelle, pt's wife, on her cell with INR results      Anticoagulation Care Providers     Provider Role Specialty Phone number    Mag Richards PA-C Referring Cardiovascular & Thoracic Surgery [phone]            See the Encounter Report to view Anticoagulation Flowsheet and Dosing Calendar (Go to Encounters tab in chart review, and find the Anticoagulation Therapy Visit)        Linsey Barrios RN

## 2021-03-17 DIAGNOSIS — E11.620 TYPE 2 DIABETES MELLITUS WITH DIABETIC DERMATITIS, WITHOUT LONG-TERM CURRENT USE OF INSULIN (H): ICD-10-CM

## 2021-03-17 RX ORDER — SEMAGLUTIDE 1.34 MG/ML
INJECTION, SOLUTION SUBCUTANEOUS
Qty: 1.5 ML | Refills: 0 | OUTPATIENT
Start: 2021-03-17 | End: 2022-04-14

## 2021-03-26 ENCOUNTER — ANTICOAGULATION THERAPY VISIT (OUTPATIENT)
Dept: NURSING | Facility: CLINIC | Age: 56
End: 2021-03-26

## 2021-03-26 DIAGNOSIS — Z95.1 S/P CABG (CORONARY ARTERY BYPASS GRAFT): ICD-10-CM

## 2021-03-26 DIAGNOSIS — Z95.2 HEART VALVE REPLACED: ICD-10-CM

## 2021-03-26 DIAGNOSIS — I35.0 NONRHEUMATIC AORTIC VALVE STENOSIS: ICD-10-CM

## 2021-03-26 LAB
CAPILLARY BLOOD COLLECTION: NORMAL
INR PPP: 2.5 (ref 0.86–1.14)

## 2021-03-26 PROCEDURE — 85610 PROTHROMBIN TIME: CPT | Performed by: FAMILY MEDICINE

## 2021-03-26 PROCEDURE — 36416 COLLJ CAPILLARY BLOOD SPEC: CPT | Performed by: FAMILY MEDICINE

## 2021-03-26 PROCEDURE — 99207 PR NO CHARGE NURSE ONLY: CPT

## 2021-03-26 NOTE — PROGRESS NOTES
ANTICOAGULATION FOLLOW-UP     Patient Name:  Benjamín Us  Date:  3/26/2021  Contact Type:  Telephone    SUBJECTIVE:  Patient Findings     Comments:  The patient was assessed for   diet, medication,   missed or extra doses,   bruising or bleeding,   with no problem findings.  Reviewed previous warfarin dosing with patient.  He took warfarin as instructed.    INR is therapeutic today.   Patient will continue same maintenance dose.   Follow up in 3 weeks.  He plans to drive to [place] for the next month for his INRs.  He lives about 1.5 hours away now.    Revolut message sent to him per his request with dosing instructions and next appointment date.          Clinical Outcomes     Comments:  The patient was assessed for   diet, medication,   missed or extra doses,   bruising or bleeding,   with no problem findings.  Reviewed previous warfarin dosing with patient.  He took warfarin as instructed.    INR is therapeutic today.   Patient will continue same maintenance dose.   Follow up in 3 weeks.  He plans to drive to [place] for the next month for his INRs.  He lives about 1.5 hours away now.    Revolut message sent to him per his request with dosing instructions and next appointment date.             OBJECTIVE    Recent labs: (last 7 days)     21  1116   INR 2.50*       ASSESSMENT / PLAN  INR assessment THER    Recheck INR In: 3 WEEKS    INR Location Clinic lab     Anticoagulation Summary  As of 3/26/2021    INR goal:  2.0-3.0   TTR:  73.5 % (9.7 mo)   INR used for dosin.50 (3/26/2021)   Warfarin maintenance plan:  7.5 mg (5 mg x 1.5) every Wed, Sat; 5 mg (5 mg x 1) all other days   Full warfarin instructions:  7.5 mg every Wed, Sat; 5 mg all other days   Weekly warfarin total:  40 mg   No change documented:  Yasemin Narvaez, RN   Plan last modified:  Linsey Barrios, CLARENCE (3/15/2021)   Next INR check:  2021   Priority:  High   Target end date:  Indefinite    Indications    S/P CABG (coronary  artery bypass graft) [Z95.1]  Heart valve replaced [Z95.2]             Anticoagulation Episode Summary     INR check location:      Preferred lab:      Send INR reminders to:  [facility]    Comments:  Call Michelle, pt's wife, on her cell with INR results      Anticoagulation Care Providers     Provider Role Specialty Phone number    Mag Richards PA-C Referring Cardiovascular & Thoracic Surgery [phone]            See the Encounter Report to view Anticoagulation Flowsheet and Dosing Calendar (Go to Encounters tab in chart review, and find the Anticoagulation Therapy Visit)        Yasemin Narvaez RN

## 2021-04-02 NOTE — PROGRESS NOTES
Medication Therapy Management (MTM) Encounter    ASSESSMENT:                            Medication Adherence/Access: No issues identified    Type 2 Diabetes: Blood glucose improved and at goal fasting 80-130mg/dL and post-prandial <180mg/dL. Will recheck A1c, no medication changes.  Jardiance is covered for him so would be able to add Jardiance if needed in the future for further blood glucose control and weight loss.     Hypertension/CAD/Hx prosthetic heart valve: Improved, due for BMP since increasing lisinopril dose. Discussed alternatives to Adderall which he will discuss with his psychiatrist.     Hyperlipidemia: stable    PLAN:                            1. No medication changes. A1c, BMP and microalbumin ordered.    Follow-up: pending labs    SUBJECTIVE/OBJECTIVE:                          Benjamín Us is a 55 year old male called for a follow-up visit. He was referred to me from Dr. Roberson.  Today's visit is a follow-up MTM visit from 3/10.     Reason for visit: Ozempic and blood pressure f/up.    Tobacco: He reports that he quit smoking about 14 months ago. His smoking use included cigarettes. He started smoking about 41 years ago. He has a 40.00 pack-year smoking history. He has never used smokeless tobacco.  Alcohol: not currently using  Caffeine: 3 diet zero sodas/day  Activity: was exercising 3-4x weekly before gyms closed  Past Medical History: Reviewed in chart    Medication Adherence/Access: no issues reported     Type 2 Diabetes:  Currently taking Ozempic 1mg weekly. Patient is not experiencing side effects. Had loose stools and body aches on metformin. Seeing some weight loss now. Off glipizide.   Blood sugar monitorin-2 time(s) daily. Ranges (patient reported): fasting- 111-125  Symptoms of low blood sugar? none  Symptoms of high blood sugar? none  Eye exam: up to date  Foot exam: due  Diet/Exercise: Intermittent fasting, only eats breakfast on Wed. initially was able to control blood  glucose with diet and exercise. Keeping calorie intake under 2000/day, not eating much. Snacks-popcorn. Wants to lose weight, not gain anymore.   Aspirin: Taking 81mg daily and denies side effects  Statin: Yes: atorvastatin   ACEi/ARB: No.   No results found for: UMALCR  Lab Results   Component Value Date    A1C 6.9 10/07/2020    A1C 7.5 06/26/2020    A1C 12.2 05/21/2020    A1C 7.2 01/26/2016    A1C 6.5 03/12/2014     Wt Readings from Last 3 Encounters:   12/23/20 257 lb 8 oz (116.8 kg)   11/04/20 252 lb 6.4 oz (114.5 kg)   09/02/20 221 lb (100.2 kg)       Hypertension/CAD/Hx prosthetic heart valve: Current medications include lisinopril 20mg daily, metoprolol 50mg twice daily, aspirin 81mg daily, and warfarin 7.5mg Wed/Sat and 5mg all other days. Patient does self-monitor blood pressure. Home BP monitoring in range of 120's systolic over 70-80's diastolic. Pulses are regularly 100's. Does have questions about alternatives to Adderall and risks for cardiovascular events. Hasn't been on Adderall for over a year but states ADD is bad.   BP Readings from Last 3 Encounters:   12/23/20 134/84   11/04/20 (!) 136/92   10/27/20 (!) 140/82     Lab Results   Component Value Date    INR 2.50 03/26/2021    INR 3.40 03/15/2021    INR 3.60 02/23/2021       Hyperlipidemia: Current therapy includes atorvastatin 40mg daily.  Patient reports no significant myalgias or other side effects.  Recent Labs   Lab Test 11/04/20  1109 03/25/16  1059 03/25/14  0753   CHOL 112  --  198   HDL 43  --  37*   LDL 48 131* 135*   TRIG 106  --  129   CHOLHDLRATIO  --   --  5.3*       Today's Vitals: There were no vitals taken for this visit.  ----------------    I spent 10 minutes with this patient today. All changes were made via collaborative practice agreement with Axel Roberson. A copy of the visit note was provided to the patient's primary care provider.    The patient was sent via Troodon a summary of these recommendations.     Sandra  Franko PharmD  Medication Therapy Management Provider, [facility]  Pager: [phone]    Telemedicine Visit Details  Type of service:  Telephone visit  Start Time: 11:23 AM  End Time: 11:33 AM  Originating Location (patient location): Home  Distant Location (provider location):  [facility] MTM        Medication Therapy Recommendations  Type 2 diabetes mellitus with diabetic dermatitis, without long-term current use of insulin (H)    Current Medication: semaglutide (OZEMPIC, 1 MG/DOSE,) 2 MG/1.5ML pen   Rationale: Medication requires monitoring - Needs additional monitoring - Effectiveness   Recommendation: Order Lab - a1c, bmp, and microalbumin   Status: Accepted per CPA

## 2021-04-07 ENCOUNTER — VIRTUAL VISIT (OUTPATIENT)
Dept: PHARMACY | Facility: CLINIC | Age: 56
End: 2021-04-07

## 2021-04-07 DIAGNOSIS — Z95.1 S/P CABG (CORONARY ARTERY BYPASS GRAFT): ICD-10-CM

## 2021-04-07 DIAGNOSIS — I10 ESSENTIAL HYPERTENSION: ICD-10-CM

## 2021-04-07 DIAGNOSIS — I35.9 AORTIC VALVE DISEASE: ICD-10-CM

## 2021-04-07 DIAGNOSIS — Z13.6 CARDIOVASCULAR SCREENING; LDL GOAL LESS THAN 160: ICD-10-CM

## 2021-04-07 DIAGNOSIS — E11.620 TYPE 2 DIABETES MELLITUS WITH DIABETIC DERMATITIS, WITHOUT LONG-TERM CURRENT USE OF INSULIN (H): Primary | ICD-10-CM

## 2021-04-07 DIAGNOSIS — Z86.73 HISTORY OF CEREBRAL EMBOLIC INFARCTION: ICD-10-CM

## 2021-04-07 PROCEDURE — 99606 MTMS BY PHARM EST 15 MIN: CPT | Mod: TEL | Performed by: PHARMACIST

## 2021-04-07 NOTE — PATIENT INSTRUCTIONS
Recommendations from today's MTM visit:                                                       No changes. Labs in 2 weeks.     It was great to speak with you today.  I value your experience and would be very thankful for your time with providing feedback on our clinic survey. You may receive a survey via email or text message in the next few days.     To schedule another MTM appointment, please call the clinic directly or you may call the MTM scheduling line at [phone] or toll-free at [phone].     My Clinical Pharmacist's contact information:                                                      Please feel free to contact me with any questions or concerns you have.      Sandra Abdul, PharmD  Medication Therapy Management Provider, [facility]

## 2021-04-19 ENCOUNTER — ANTICOAGULATION THERAPY VISIT (OUTPATIENT)
Dept: NURSING | Facility: CLINIC | Age: 56
End: 2021-04-19

## 2021-04-19 DIAGNOSIS — E11.620 TYPE 2 DIABETES MELLITUS WITH DIABETIC DERMATITIS, WITHOUT LONG-TERM CURRENT USE OF INSULIN (H): ICD-10-CM

## 2021-04-19 DIAGNOSIS — Z79.01 LONG TERM CURRENT USE OF ANTICOAGULANTS WITH INR GOAL OF 2.0-3.0: Primary | ICD-10-CM

## 2021-04-19 DIAGNOSIS — Z95.2 HEART VALVE REPLACED: ICD-10-CM

## 2021-04-19 DIAGNOSIS — Z95.1 S/P CABG (CORONARY ARTERY BYPASS GRAFT): ICD-10-CM

## 2021-04-19 DIAGNOSIS — I10 ESSENTIAL HYPERTENSION: ICD-10-CM

## 2021-04-19 DIAGNOSIS — I35.0 NONRHEUMATIC AORTIC VALVE STENOSIS: ICD-10-CM

## 2021-04-19 LAB
ANION GAP SERPL CALCULATED.3IONS-SCNC: <1 MMOL/L (ref 3–14)
BUN SERPL-MCNC: 16 MG/DL (ref 7–30)
CALCIUM SERPL-MCNC: 9.4 MG/DL (ref 8.5–10.1)
CHLORIDE SERPL-SCNC: 108 MMOL/L (ref 94–109)
CO2 SERPL-SCNC: 31 MMOL/L (ref 20–32)
CREAT SERPL-MCNC: 1.07 MG/DL (ref 0.66–1.25)
GFR SERPL CREATININE-BSD FRML MDRD: 76 ML/MIN/{1.73_M2}
GLUCOSE SERPL-MCNC: 119 MG/DL (ref 70–99)
HBA1C MFR BLD: 6.5 % (ref 0–5.6)
INR PPP: 2.2 (ref 0.86–1.14)
POTASSIUM SERPL-SCNC: 5.1 MMOL/L (ref 3.4–5.3)
SODIUM SERPL-SCNC: 138 MMOL/L (ref 133–144)

## 2021-04-19 PROCEDURE — 85610 PROTHROMBIN TIME: CPT | Performed by: FAMILY MEDICINE

## 2021-04-19 PROCEDURE — 36415 COLL VENOUS BLD VENIPUNCTURE: CPT | Performed by: FAMILY MEDICINE

## 2021-04-19 PROCEDURE — 82043 UR ALBUMIN QUANTITATIVE: CPT | Performed by: FAMILY MEDICINE

## 2021-04-19 PROCEDURE — 99207 PR NO CHARGE NURSE ONLY: CPT

## 2021-04-19 PROCEDURE — 83036 HEMOGLOBIN GLYCOSYLATED A1C: CPT | Performed by: FAMILY MEDICINE

## 2021-04-19 PROCEDURE — 80048 BASIC METABOLIC PNL TOTAL CA: CPT | Performed by: FAMILY MEDICINE

## 2021-04-19 RX ORDER — WARFARIN SODIUM 5 MG/1
TABLET ORAL
Qty: 96 TABLET | Refills: 0
Start: 2021-04-19 | End: 2021-05-05

## 2021-04-19 NOTE — PROGRESS NOTES
Anticoagulation Management    Unable to reach Benjamín today.    Today's INR result of 2.2 is therapeutic (goal INR of 2.0-3.0).  Result received from: Clinic Lab    Follow up required to confirm warfarin dose taken and assess for changes    Left VM to continue SAME dose and to call Maddy with transfer to Linsey at: [phone]      Anticoagulation clinic to follow up    Linsey Barrios RN    ANTICOAGULATION FOLLOW-UP CLINIC VISIT    Patient Name:  Benjamín Us  Date:  2021  Contact Type:  Telephone--Spoke to patient    SUBJECTIVE:  Patient Findings     Positives:  Change in health (Pt met with MTM PharmD on 21--no changes or concerns noted other than pt is to speak to PCP about resuming Adderall or trying an alternative due to ADD symptoms.), Bruising (bruise on stomach from Ozempic injection but it has healed.)    Comments:  Patient will come back for INR in 1 month and then will find a provider/ACC clinic closer to his new home (Anticoag referral expires end of 2021).        Clinical Outcomes     Negatives:  Major bleeding event, Thromboembolic event, Anticoagulation-related hospital admission, Anticoagulation-related ED visit, Anticoagulation-related fatality    Comments:  Patient will come back for INR in 1 month and then will find a provider/ACC clinic closer to his new home (Anticoag referral expires end of 2021).           OBJECTIVE    Recent labs: (last 7 days)     21  1100   INR 2.20*       ASSESSMENT / PLAN  INR assessment THER    Recheck INR In: 4 WEEKS    INR Location Clinic      Anticoagulation Summary  As of 2021    INR goal:  2.0-3.0   TTR:  75.5 % (10.5 mo)   INR used for dosin.20 (2021)   Warfarin maintenance plan:  7.5 mg (5 mg x 1.5) every Wed, Sat; 5 mg (5 mg x 1) all other days   Full warfarin instructions:  7.5 mg every Wed, Sat; 5 mg all other days   Weekly warfarin total:  40 mg   Plan last modified:  Linsey Barrios RN (2021)   Next INR check:  2021    Priority:  Maintenance   Target end date:  Indefinite    Indications    S/P CABG (coronary artery bypass graft) [Z95.1]  Heart valve replaced [Z95.2]             Anticoagulation Episode Summary     INR check location:      Preferred lab:      Send INR reminders to:  [facility]    Comments:  Call Michelle, pt's wife, on her cell with INR results      Anticoagulation Care Providers     Provider Role Specialty Phone number    Mag Richards PA-C Referring Cardiovascular & Thoracic Surgery [phone]            See the Encounter Report to view Anticoagulation Flowsheet and Dosing Calendar (Go to Encounters tab in chart review, and find the Anticoagulation Therapy Visit)        Linsey Barrios RN

## 2021-04-20 LAB
CREAT UR-MCNC: 111 MG/DL
MICROALBUMIN UR-MCNC: 162 MG/L
MICROALBUMIN/CREAT UR: 145.95 MG/G CR (ref 0–17)

## 2021-05-04 ENCOUNTER — MYC MEDICAL ADVICE (OUTPATIENT)
Dept: FAMILY MEDICINE | Facility: CLINIC | Age: 56
End: 2021-05-04

## 2021-05-04 DIAGNOSIS — Z95.2 HEART VALVE REPLACED: ICD-10-CM

## 2021-05-04 DIAGNOSIS — Z95.1 S/P CABG (CORONARY ARTERY BYPASS GRAFT): ICD-10-CM

## 2021-05-04 DIAGNOSIS — Z79.01 LONG TERM CURRENT USE OF ANTICOAGULANTS WITH INR GOAL OF 2.0-3.0: ICD-10-CM

## 2021-05-04 RX ORDER — WARFARIN SODIUM 5 MG/1
TABLET ORAL
Qty: 96 TABLET | Refills: 0 | Status: CANCELLED | OUTPATIENT
Start: 2021-05-04

## 2021-05-05 NOTE — TELEPHONE ENCOUNTER
Pt called in stating he's nearly out of his Warfarin, said he checked with his pharmacy and they don't have a reorder request / routed to Linsey Barrios RN / Rach Arnold/EMT-B

## 2021-05-06 NOTE — TELEPHONE ENCOUNTER
This was already approved 05/05/21, e-faxed to [facility] in [place], patient informed via My Chart.    Linsey Barrios RN

## 2021-05-06 NOTE — TELEPHONE ENCOUNTER
Anjali [facility] in [place] received the refill at 5:10pm on 05/05/21. I sent MC message to patient to inquire if he picked up the refill.    Linsey Barrios RN

## 2021-05-17 ENCOUNTER — ANTICOAGULATION THERAPY VISIT (OUTPATIENT)
Dept: NURSING | Facility: CLINIC | Age: 56
End: 2021-05-17

## 2021-05-17 DIAGNOSIS — Z95.1 S/P CABG (CORONARY ARTERY BYPASS GRAFT): ICD-10-CM

## 2021-05-17 DIAGNOSIS — Z95.2 HEART VALVE REPLACED: ICD-10-CM

## 2021-05-17 DIAGNOSIS — I35.0 NONRHEUMATIC AORTIC VALVE STENOSIS: ICD-10-CM

## 2021-05-17 LAB
CAPILLARY BLOOD COLLECTION: NORMAL
INR PPP: 1.9 (ref 0.86–1.14)

## 2021-05-17 PROCEDURE — 85610 PROTHROMBIN TIME: CPT | Performed by: FAMILY MEDICINE

## 2021-05-17 PROCEDURE — 99207 PR NO CHARGE NURSE ONLY: CPT

## 2021-05-17 PROCEDURE — 36416 COLLJ CAPILLARY BLOOD SPEC: CPT | Performed by: FAMILY MEDICINE

## 2021-05-17 NOTE — PROGRESS NOTES
"ANTICOAGULATION FOLLOW-UP CLINIC VISIT    Patient Name:  Benjamín Us  Date:  2021  Contact Type:  Telephone    SUBJECTIVE:  Patient Findings     Positives:  Change in diet/appetite (Ate a \"large\" serving of green beans 1 or 2 nights ago, pt typically does not eat any greens.  )    Comments:  MTM PharmD authorized 90 day supply of warfarin, patient's INR referral expires this month and he has moved 1.5 hours away.  Patient states his insurance will allow him to go to either [facility] or [facility], I suggested he look into clinics near his home to establish care.  Patient scheduled INR with [facility] for 21 since we are closed on 21. Patient states he will call to cancel the lab appt if he is able to get established closer to home. I did NOT send referral renewal to PCP since patient still plans to switch to clinic closer to his home.        Clinical Outcomes     Negatives:  Major bleeding event, Thromboembolic event, Anticoagulation-related hospital admission, Anticoagulation-related ED visit, Anticoagulation-related fatality    Comments:  MTMARY PharmREDDY authorized 90 day supply of warfarin, patient's INR referral expires this month and he has moved 1.5 hours away.  Patient states his insurance will allow him to go to either [facility] or [facility], I suggested he look into clinics near his home to establish care.  Patient scheduled INR with [facility] for 21 since we are closed on 21. Patient states he will call to cancel the lab appt if he is able to get established closer to home. I did NOT send referral renewal to PCP since patient still plans to switch to clinic closer to his home.           OBJECTIVE    Recent labs: (last 7 days)     21  1039   INR 1.90*       ASSESSMENT / PLAN  INR assessment SUB    Recheck INR In: 2 WEEKS    INR Location Clinic      Anticoagulation Summary  As of 2021    INR goal:  2.0-3.0   TTR:  74.8 % (11.5 mo)   INR used for dosin.90 (2021)   Warfarin " maintenance plan:  7.5 mg (5 mg x 1.5) every Wed, Sat; 5 mg (5 mg x 1) all other days   Full warfarin instructions:  5/17: 7.5 mg; Otherwise 7.5 mg every Wed, Sat; 5 mg all other days   Weekly warfarin total:  40 mg   Plan last modified:  Linsey Barrios RN (5/17/2021)   Next INR check:  6/2/2021   Priority:  High   Target end date:  Indefinite    Indications    S/P CABG (coronary artery bypass graft) [Z95.1]  Heart valve replaced [Z95.2]             Anticoagulation Episode Summary     INR check location:      Preferred lab:      Send INR reminders to:  [facility]    Comments:  Call Michelle, pt's wife, on her cell with INR results      Anticoagulation Care Providers     Provider Role Specialty Phone number    Mag Richards PA-C Referring Cardiovascular & Thoracic Surgery [phone]            See the Encounter Report to view Anticoagulation Flowsheet and Dosing Calendar (Go to Encounters tab in chart review, and find the Anticoagulation Therapy Visit)        Linsey Barrios RN

## 2021-05-18 DIAGNOSIS — Z79.01 LONG TERM CURRENT USE OF ANTICOAGULANTS WITH INR GOAL OF 2.0-3.0: Primary | ICD-10-CM

## 2021-05-18 DIAGNOSIS — Z95.2 S/P AVR: ICD-10-CM

## 2021-06-02 ENCOUNTER — ANTICOAGULATION THERAPY VISIT (OUTPATIENT)
Dept: FAMILY MEDICINE | Facility: CLINIC | Age: 56
End: 2021-06-02

## 2021-06-02 DIAGNOSIS — Z95.2 HEART VALVE REPLACED: ICD-10-CM

## 2021-06-02 DIAGNOSIS — Z95.1 S/P CABG (CORONARY ARTERY BYPASS GRAFT): ICD-10-CM

## 2021-06-02 DIAGNOSIS — Z79.01 LONG TERM CURRENT USE OF ANTICOAGULANTS WITH INR GOAL OF 2.0-3.0: ICD-10-CM

## 2021-06-02 DIAGNOSIS — Z95.2 S/P AVR: ICD-10-CM

## 2021-06-02 LAB
CAPILLARY BLOOD COLLECTION: NORMAL
INR PPP: 2.3 (ref 0.86–1.14)

## 2021-06-02 PROCEDURE — 85610 PROTHROMBIN TIME: CPT | Performed by: FAMILY MEDICINE

## 2021-06-02 PROCEDURE — 36416 COLLJ CAPILLARY BLOOD SPEC: CPT | Performed by: FAMILY MEDICINE

## 2021-06-02 NOTE — PROGRESS NOTES
"ANTICOAGULATION MANAGEMENT     Patient Name:  Benjamín Us  Date:  2021    ASSESSMENT /SUBJECTIVE:    Today's INR result of 2.3 is therapeutic. Goal INR of 2.0-3.0      Warfarin dose taken: Warfarin taken as instructed    Diet: \"I'm still dieting, no major changes.\" Patient is watching his calorie intake closely.    Medication changes/ interactions: No new medications/supplements affecting INR    Previous INR: Subtherapeutic     S/S of bleeding or thromboembolism: Yes, minor bruise at old injection site (abdomen).     New injury or illness: No    Upcoming surgery, procedure or cardioversion: No    Additional findings: Patient moved to [place] and is on the waiting list for [facility]. Patient will be transferring care later this summer.       PLAN:    Warfarin Dosing Instructions: Continue your current warfarin dose 7.5 mg Wed, Sat + 5 mg AOD.    Instructed patient to follow up no later than: 4 weeks  Patient elected to schedule next visit 21    Education provided: Please call back if any changes to your diet, medications or how you've been taking warfarin, Monitoring for bleeding signs and symptoms, Monitoring for clotting signs and symptoms and Importance of notifying clinic for changes in medications; a sooner lab recheck maybe needed.    Telephone call with Benjamín whom verbalizes understanding and agrees to plan    Instructed to call the Anticoagulation Clinic for any changes, questions or concerns. (#[phone])        Nohemi Dowell RN      OBJECTIVE:  Recent labs: (last 7 days)     21  1047   INR 2.30*         No question data found.  Anticoagulation Summary  As of 2021    INR goal:  2.0-3.0   TTR:  74.8 % (1 y)   INR used for dosin.30 (2021)   Warfarin maintenance plan:  7.5 mg (5 mg x 1.5) every Wed, Sat; 5 mg (5 mg x 1) all other days   Full warfarin instructions:  7.5 mg every Wed, Sat; 5 mg all other days   Weekly warfarin total:  40 mg   Plan last modified:  Linsey Barrios " MARY, RN (5/17/2021)   Next INR check:     Priority:  High   Target end date:  Indefinite    Indications    S/P CABG (coronary artery bypass graft) [Z95.1]  Heart valve replaced [Z95.2]             Anticoagulation Episode Summary     INR check location:      Preferred lab:      Send INR reminders to:  [facility]    Comments:  Call Michelle, pt's wife, on her cell with INR results      Anticoagulation Care Providers     Provider Role Specialty Phone number    Mag Richards PA-C Referring Cardiovascular & Thoracic Surgery [phone]

## 2021-07-02 DIAGNOSIS — Z95.1 S/P CABG (CORONARY ARTERY BYPASS GRAFT): ICD-10-CM

## 2021-07-02 RX ORDER — METOPROLOL TARTRATE 50 MG
50 TABLET ORAL 2 TIMES DAILY
Qty: 180 TABLET | Refills: 1 | Status: SHIPPED | OUTPATIENT
Start: 2021-07-02 | End: 2021-12-24

## 2021-07-02 NOTE — TELEPHONE ENCOUNTER
Received refill request for: Metoprolol   Last OV was: 1/5/21 w/SHAYY Bailon  Labs/EKG: N/a  F/U scheduled: Orders for 12/2021  New script sent to: [facility]

## 2021-07-07 DIAGNOSIS — I10 ESSENTIAL HYPERTENSION: ICD-10-CM

## 2021-07-08 RX ORDER — LISINOPRIL 20 MG/1
TABLET ORAL
Qty: 90 TABLET | Refills: 1 | Status: SHIPPED | OUTPATIENT
Start: 2021-07-08 | End: 2021-12-09

## 2021-07-13 ENCOUNTER — TELEPHONE (OUTPATIENT)
Dept: ANTICOAGULATION | Facility: CLINIC | Age: 56
End: 2021-07-13

## 2021-07-13 NOTE — TELEPHONE ENCOUNTER
ANTICOAGULATION     Benjamín Us is overdue for INR check.      Left message for patient to call and schedule lab appointment as soon as possible. If returning call, please schedule.     Linsey Barrios RN

## 2021-07-16 DIAGNOSIS — E11.620 TYPE 2 DIABETES MELLITUS WITH DIABETIC DERMATITIS, WITHOUT LONG-TERM CURRENT USE OF INSULIN (H): ICD-10-CM

## 2021-07-19 RX ORDER — SEMAGLUTIDE 1.34 MG/ML
1 INJECTION, SOLUTION SUBCUTANEOUS
Qty: 3 ML | Refills: 5 | Status: SHIPPED | OUTPATIENT
Start: 2021-07-19

## 2021-07-19 NOTE — TELEPHONE ENCOUNTER
New prescription. Routing to provider and MTM to confirm.     Yelitza Finley, BECKYN, RN  [facility]

## 2021-07-27 ENCOUNTER — TELEPHONE (OUTPATIENT)
Dept: ANTICOAGULATION | Facility: CLINIC | Age: 56
End: 2021-07-27

## 2021-07-27 NOTE — TELEPHONE ENCOUNTER
ANTICOAGULATION  MANAGEMENT    Benjamín Us is being discharged from the [facility] Anticoagulation Management Program ([facility]).    Reason for discharge: care has been transferred to clinic in or near [facility] where pt now lives    Anticoagulation episode resolved and INR Standing order discontinued. I was not able to discontinue the [facility] referral as a message popped up stating that I don't have security to discontinue the order because it was placed before patient was discharged??    If patient needs warfarin management in the future, please send a new referral    Linsey Barrios RN

## 2021-07-30 DIAGNOSIS — Z95.1 S/P CABG (CORONARY ARTERY BYPASS GRAFT): ICD-10-CM

## 2021-07-30 DIAGNOSIS — Z95.2 HEART VALVE REPLACED: ICD-10-CM

## 2021-07-30 DIAGNOSIS — Z79.01 LONG TERM CURRENT USE OF ANTICOAGULANTS WITH INR GOAL OF 2.0-3.0: ICD-10-CM

## 2021-07-30 RX ORDER — WARFARIN SODIUM 5 MG/1
TABLET ORAL
Refills: 3 | Status: SHIPPED
Start: 2021-07-30

## 2021-07-30 NOTE — TELEPHONE ENCOUNTER
I spoke to patient on 7/27/21, he no longer comes to [facility], he is going to a new clinic near his home in [facility], patient confirmed that his INR is being managed by new clinic.    PRESCRIPTION denied, will need to be approved by new clinic.    Linsey Barrios RN

## 2021-08-02 DIAGNOSIS — Z95.1 S/P CABG (CORONARY ARTERY BYPASS GRAFT): ICD-10-CM

## 2021-08-02 DIAGNOSIS — Z95.2 HEART VALVE REPLACED: ICD-10-CM

## 2021-08-02 DIAGNOSIS — Z79.01 LONG TERM CURRENT USE OF ANTICOAGULANTS WITH INR GOAL OF 2.0-3.0: ICD-10-CM

## 2021-08-03 RX ORDER — WARFARIN SODIUM 5 MG/1
TABLET ORAL
Qty: 96 TABLET | Refills: 3 | OUTPATIENT
Start: 2021-08-03

## 2021-10-23 ENCOUNTER — HEALTH MAINTENANCE LETTER (OUTPATIENT)
Age: 56
End: 2021-10-23

## 2021-10-26 ENCOUNTER — TELEPHONE (OUTPATIENT)
Dept: FAMILY MEDICINE | Facility: CLINIC | Age: 56
End: 2021-10-26
Payer: COMMERCIAL

## 2021-10-26 NOTE — TELEPHONE ENCOUNTER
Patient Quality Outreach    Patient is due/failing the following:   Diabetes -  BP Check    NEXT STEPS:   Schedule a nurse only visit for BP check    Type of outreach:    Phone, left message for patient/parent to call back.      Questions for provider review:    None     Saira Grove MA

## 2021-11-08 DIAGNOSIS — E11.620 TYPE 2 DIABETES MELLITUS WITH DIABETIC DERMATITIS, WITHOUT LONG-TERM CURRENT USE OF INSULIN (H): ICD-10-CM

## 2021-11-09 RX ORDER — SEMAGLUTIDE 1.34 MG/ML
INJECTION, SOLUTION SUBCUTANEOUS
Qty: 9 ML | Refills: 3 | OUTPATIENT
Start: 2021-11-09

## 2021-11-09 NOTE — TELEPHONE ENCOUNTER
Patient given a 6 month supply of medication on 7/19/21.    MA/TC: Please assist patient in scheduling office visit.     Leslie Ceron RN on 11/9/2021 at 11:43 AM

## 2021-11-14 DIAGNOSIS — I25.10 CORONARY ARTERY DISEASE INVOLVING NATIVE CORONARY ARTERY OF NATIVE HEART WITHOUT ANGINA PECTORIS: ICD-10-CM

## 2021-11-16 RX ORDER — ATORVASTATIN CALCIUM 40 MG/1
TABLET, FILM COATED ORAL
Qty: 90 TABLET | Refills: 0 | Status: SHIPPED | OUTPATIENT
Start: 2021-11-16 | End: 2022-02-10

## 2021-11-16 NOTE — TELEPHONE ENCOUNTER
Routing refill request to provider for review/approval because:  Drug interaction warning  Labs out of range:  LDL  Patient needs to be seen because it has been more than 1 year since last office visit.    Leslie Ceron RN on 11/16/2021 at 11:15 AM

## 2021-12-07 DIAGNOSIS — I10 ESSENTIAL HYPERTENSION: ICD-10-CM

## 2021-12-09 RX ORDER — LISINOPRIL 20 MG/1
TABLET ORAL
Qty: 90 TABLET | Refills: 0 | Status: SHIPPED | OUTPATIENT
Start: 2021-12-09

## 2021-12-09 NOTE — TELEPHONE ENCOUNTER
My-chart message sent    Nataliya Kraft/[facility]  [place]---[facility]

## 2021-12-09 NOTE — TELEPHONE ENCOUNTER
Medication is being filled for 1 time refill only due to:  Patient needs to be seen because it has been more than one year since last visit.    Please call patient and assist with scheduling prior to additional refills.    Alisa MARROQUIN - Registered Nurse  [facility]  Acute and Diagnostic Services

## 2021-12-24 DIAGNOSIS — Z95.1 S/P CABG (CORONARY ARTERY BYPASS GRAFT): ICD-10-CM

## 2021-12-24 RX ORDER — METOPROLOL TARTRATE 50 MG
50 TABLET ORAL 2 TIMES DAILY
Qty: 180 TABLET | Refills: 3 | Status: SHIPPED | OUTPATIENT
Start: 2021-12-24 | End: 2021-12-24

## 2021-12-24 RX ORDER — METOPROLOL TARTRATE 50 MG
50 TABLET ORAL 2 TIMES DAILY
Qty: 180 TABLET | Refills: 0 | Status: SHIPPED | OUTPATIENT
Start: 2021-12-24

## 2021-12-24 NOTE — TELEPHONE ENCOUNTER
Received refill request for:  Metoprolol Tartrate  Last OV was: 1/5/2021 with [facility]  Labs/EKG: na  F/U scheduled: orders in Epic for 12/2021, not yet scheduled.  Note to pharmacy and letter sent to pt.  New script sent to: [facility]

## 2021-12-24 NOTE — LETTER
December 24, 2021       TO: Benjamín Us  [address] [facility] [address]       Dear Benjamín Us,    We recently received a call from your pharmacy requesting a refill of your Metoprolol Tartrate.    Our records indicate that you are due for follow-up with your Heart Care Provider. We will refill your medications for 3 months which will allow you enough time to be seen.    Please call [phone] to schedule your appointment.    Thank you for allowing [facility] to be a part of your health care team and we look forward to seeing you soon.    Thank you,    [facility]

## 2022-01-26 ENCOUNTER — TELEPHONE (OUTPATIENT)
Dept: FAMILY MEDICINE | Facility: CLINIC | Age: 57
End: 2022-01-26
Payer: COMMERCIAL

## 2022-01-31 NOTE — TELEPHONE ENCOUNTER
Prior Authorization Approval    Authorization Effective Date: 1/31/2022  Authorization Expiration Date: 1/31/2023  Medication: semaglutide (OZEMPIC, 1 MG/DOSE,) 2 MG/1.5ML penD-APPROVED  Approved Dose/Quantity:   Reference #:     Insurance Company: [employer] ([facility]) - Phone [phone] Fax [phone]  Expected CoPay:       CoPay Card Available:      Foundation Assistance Needed:    Which Pharmacy is filling the prescription (Not needed for infusion/clinic administered): [facility] - [facility] [address], [address]  Pharmacy Notified: Yes  Patient Notified: No

## 2022-01-31 NOTE — TELEPHONE ENCOUNTER
Central Prior Authorization Team   Phone: [phone]      PA Initiation    Medication: semaglutide (OZEMPIC, 1 MG/DOSE,) 2 MG/1.5ML penD-Initiated  Insurance Company: [employer] ([facility]) - Phone [phone] Fax [phone]  Pharmacy Filling the Rx: [facility] - [address], [address]  Filling Pharmacy Phone: [phone]  Filling Pharmacy Fax:    Start Date: 1/31/2022

## 2022-02-12 ENCOUNTER — HEALTH MAINTENANCE LETTER (OUTPATIENT)
Age: 57
End: 2022-02-12

## 2022-04-27 DIAGNOSIS — I25.10 CORONARY ARTERY DISEASE INVOLVING NATIVE CORONARY ARTERY OF NATIVE HEART WITHOUT ANGINA PECTORIS: ICD-10-CM

## 2022-04-28 RX ORDER — ATORVASTATIN CALCIUM 40 MG/1
TABLET, FILM COATED ORAL
Qty: 90 TABLET | Refills: 3 | OUTPATIENT
Start: 2022-04-28

## 2022-04-28 NOTE — TELEPHONE ENCOUNTER
Routing refill request to provider for review/approval because:  Gabby given x1 and patient did not follow up, please advise  Labs not current:  LDL  Patient needs to be seen because it has been more than 1 year since last office visit.    Leslie Ceron RN on 4/28/2022 at 10:20 AM

## 2022-05-02 NOTE — TELEPHONE ENCOUNTER
Called pt, pt states he now lives in [place] and will no longer be coming to the clinic.    Saira Grove MA

## 2022-06-04 ENCOUNTER — HEALTH MAINTENANCE LETTER (OUTPATIENT)
Age: 57
End: 2022-06-04

## 2022-10-10 ENCOUNTER — HEALTH MAINTENANCE LETTER (OUTPATIENT)
Age: 57
End: 2022-10-10

## 2023-03-25 ENCOUNTER — HEALTH MAINTENANCE LETTER (OUTPATIENT)
Age: 58
End: 2023-03-25

## 2023-10-29 ENCOUNTER — HEALTH MAINTENANCE LETTER (OUTPATIENT)
Age: 58
End: 2023-10-29

## 2024-05-26 ENCOUNTER — HEALTH MAINTENANCE LETTER (OUTPATIENT)
Age: 59
End: 2024-05-26

## 2025-07-13 NOTE — TELEPHONE ENCOUNTER
Patient called stating he is seeing an oral surgeon for implants and dentures next week. Instructed patient recommendations are 6 months post Valve replacement. Patient aware of recommendation cut is having nutritional needs due to multiple extractions. Informed of need or prophylactic antibiotics before any invasive procedures. States understanding .  
no

## (undated) DEVICE — RESERVOIR CELL SAVING BLOOD COLLECTION EL2120

## (undated) DEVICE — SU PROLENE 3-0 SHDA 36" 8522H

## (undated) DEVICE — SU ETHIBOND 3-0 BBDA 36" X588H

## (undated) DEVICE — GLOVE PROTEXIS POWDER FREE 7.5 ORTHOPEDIC 2D73ET75

## (undated) DEVICE — BONE WAX OSTENE 3.5GM CT410

## (undated) DEVICE — VALVE VRV 9156R2

## (undated) DEVICE — PACK HEAD NECK SEN15HNFSF

## (undated) DEVICE — SPONGE RAY-TEC 4X4" 7317

## (undated) DEVICE — SUCTION CANISTER MEDIVAC LINER 3000ML W/LID 65651-530

## (undated) DEVICE — Device

## (undated) DEVICE — SU PLAIN 3-0 SH 27" G322H

## (undated) DEVICE — PACK TUBING MINI VAC CUSTOM 1/2X3/8T BB9J78R4

## (undated) DEVICE — SU SILK 1 TIE 10X30" SA87G

## (undated) DEVICE — SUCTION MINISQUAIR SMOKE EVAC CAPTURE DEVICE SQ20012-01

## (undated) DEVICE — SU VICRYL 3-0 FS-1 27" J442H

## (undated) DEVICE — BLOWER/MISTER CLEARVIEW 22150

## (undated) DEVICE — CABLE MYO/LEAD PACING WHITE DISP 019-530

## (undated) DEVICE — PACK TUBING MINI VAC CUSTOM 3/8X3/8T BB7V94R1

## (undated) DEVICE — SU STEEL 6 CCS 4X18" M654G

## (undated) DEVICE — SOMASENSOR CEREBRAL OXIMETER ADULT SAFB-SM

## (undated) DEVICE — DRSG KERLIX 4 1/2"X4YDS ROLL 6715

## (undated) DEVICE — DEVICE TISSUE STABILIZATION OCTOBASE 28707

## (undated) DEVICE — LEAD PACER MYOCARDIAL BIPOLAR TEMPORARY 53CM 6495F

## (undated) DEVICE — SYR EAR BULB 3OZ 0035830

## (undated) DEVICE — SUCTION DRY CHEST DRAIN OASIS 3600-100

## (undated) DEVICE — SU PROLENE 4-0 SHDA 36" 8521H

## (undated) DEVICE — TUBING SUCTION MINISQUAIR SMOKE EVAC NONSTERILE SQNS20018-01

## (undated) DEVICE — SURGICEL POWDER ABSORBABLE HEMOSTAT 3GM 3013SP

## (undated) DEVICE — KIT HAND CONTROL ANGIOTOUCH ACIST 65CM AT-P65

## (undated) DEVICE — CANNULA PERFUSION 14FRX16" LEFT 12016

## (undated) DEVICE — NDL 27GA 1.25" 305136

## (undated) DEVICE — CANNULA PERFUSION ARTERIAL 20FR 12" 77420

## (undated) DEVICE — SLEEVE TR BAND RADIAL COMPRESSION DEVICE 24CM TRB24-REG

## (undated) DEVICE — SU ETHIBOND 0 CT-1 CR 8X18" CX21D

## (undated) DEVICE — INTRO SHEATH 5FRX10CM PINNACLE RSS502

## (undated) DEVICE — KIT ENDO VASOVIEW HEMOPRO 2 VH-4000

## (undated) DEVICE — SU PROLENE 6-0 C-1DA 30" M8706

## (undated) DEVICE — DRAIN CHEST TUBE RIGHT ANGLED 32FR 8132

## (undated) DEVICE — SOL RINGERS LACTATED 1000ML BAG 2B2324X

## (undated) DEVICE — SU VICRYL 0 CTX 36" J370H

## (undated) DEVICE — CLIP HORIZON MULTI MED BLUE 002204

## (undated) DEVICE — SU PROLENE 7-0 BV175-6 24' M8737

## (undated) DEVICE — CATH ANGIO WEDGE PRESSURE 5FRX110CM DL AI-07124

## (undated) DEVICE — DRSG GAUZE 4X4" 3033

## (undated) DEVICE — CATH FOGARTY IRRIG EMBOLECTOMY 4FR 80CM LATEX FREE

## (undated) DEVICE — BUR SIDE CUT 51MM CARBIDE 5091-217

## (undated) DEVICE — SU VICRYL 2-0 CT-1 27" UND J259H

## (undated) DEVICE — INTRO GLIDESHEATH SLENDER 6FR 10X45CM 60-1060

## (undated) DEVICE — SU PLEDGET SOFT TFE 3/8"X3/26"X1/16" PCP40

## (undated) DEVICE — TOURNIQUET VASCULAR

## (undated) DEVICE — SPONGE PEANUT

## (undated) DEVICE — LINEN LEG ROLL 5489

## (undated) DEVICE — SU PROLENE 4-0 RB-1DA 36" 8557H

## (undated) DEVICE — SU UMBILICAL TAPE .125X30" U11T

## (undated) DEVICE — DRAIN CHEST TUBE 32FR STR 8032

## (undated) DEVICE — BLADE KNIFE BEAVER 6900

## (undated) DEVICE — SURGICEL HEMOSTAT 2X14" 1951

## (undated) DEVICE — BUR OVAL LINVATEC 7X70MM CARBIDE 5092-236

## (undated) DEVICE — TUBING PERFUSION 1/4X1/16X8FT

## (undated) DEVICE — SU PROLENE 7-0 BV-1DA 4X30" M8703

## (undated) DEVICE — SUCTION CATH AIRLIFE TRI-FLO W/CONTROL PORT 14FR  T60C

## (undated) DEVICE — KIT WASH CELL SAVING ATL2001

## (undated) DEVICE — PACK MINI VAC CUSTOM CARDOPULMONARY BB5Z97R15

## (undated) DEVICE — TOTE ANGIO CORP PC15AT SAN32CC83O

## (undated) DEVICE — GLOVE PROTEXIS W/NEU-THERA 7.5  2D73TE75

## (undated) DEVICE — PACK OPEN HEART PV12OH524

## (undated) DEVICE — SUCTION TIP YANKAUER STR K87

## (undated) DEVICE — CANISTER WOUND VAC W/GEL 500ML M8275063/5

## (undated) DEVICE — CATH ANGIO SELECTIVE 6FRX100CM AMPLATZ 534643T

## (undated) DEVICE — CANNULA PERFUSION VENOUS 3 STAGE 29FR 15" 91429

## (undated) DEVICE — CATH DIAGNOSTIC RADIAL 5FR TIG 4.0

## (undated) DEVICE — LINEN TOWEL PACK X6 WHITE 5487

## (undated) DEVICE — CONNECTOR DRAIN CHEST Y EXTENSION SET 19909

## (undated) DEVICE — POSITIONER ASSIST ESSTECH 3S T401210S

## (undated) DEVICE — SU STEEL MYO/WIRE II STERNOTOMY 8 BE-1 3X14" 048-217

## (undated) DEVICE — LINEN TOWEL PACK X30 5481

## (undated) DEVICE — SOL NACL 0.9% IRRIG 1000ML BOTTLE 2F7124

## (undated) DEVICE — ESU ELEC EDGE INSULATED BLADE 4" E1455-4

## (undated) DEVICE — SOL WATER IRRIG 1000ML BOTTLE 2F7114

## (undated) DEVICE — SU DEVICE COR KNOT KIT STD SHORT 2/KIT 030884

## (undated) DEVICE — RAD G/W INQWIRE .035X260CM J-TIP EXCHANGE IQ35F260J1O5RS

## (undated) DEVICE — INTRODUCER CATH VASC 5FRX10CM  MPIS-501-NT-U-SST

## (undated) DEVICE — CONNECTOR PREFUSION REDUCER 3/8X1/2" W/O LL 6013

## (undated) DEVICE — MANIFOLD KIT ANGIO AUTOMATED 014613

## (undated) DEVICE — PREP CHLORAPREP W/ORANGE TINT 10.5ML 260715

## (undated) DEVICE — PROTECTOR ARM ONE-STEP TRENDELENBURG 40418

## (undated) DEVICE — DEVICE ASSEMBLY SUCTION/ANTI COAG BTC93

## (undated) DEVICE — DRSG WOUND VAC SPONGE MED BLACK M8275052/5

## (undated) DEVICE — SU ETHIBOND 2-0 SHDA 30" X563H

## (undated) DEVICE — COVER TABLE POLY 65X90" 8186

## (undated) DEVICE — SU ETHIBOND 2-0 V-5DA 10X30" PXX52

## (undated) DEVICE — DECANTER BAG 2002S

## (undated) DEVICE — PUNCH AORTIC 4.0MMX8" RCB40

## (undated) DEVICE — GLOVE PROTEXIS BLUE W/NEU-THERA 8.0  2D73EB80

## (undated) DEVICE — CANNULA PERFUSION AORTIC ROOT 22FR 20012

## (undated) DEVICE — GOWN LG DISP 9515

## (undated) DEVICE — LINEN TOWEL PACK X5 5464

## (undated) DEVICE — SYR ANGIOGRAPHY MULTIUSE KIT ACIST 014612

## (undated) RX ORDER — FENTANYL CITRATE 0.05 MG/ML
INJECTION, SOLUTION INTRAMUSCULAR; INTRAVENOUS
Status: DISPENSED
Start: 2020-05-21

## (undated) RX ORDER — NEOSTIGMINE METHYLSULFATE 1 MG/ML
VIAL (ML) INJECTION
Status: DISPENSED
Start: 2020-05-21

## (undated) RX ORDER — FAMOTIDINE 20 MG/1
TABLET, FILM COATED ORAL
Status: DISPENSED
Start: 2020-05-22

## (undated) RX ORDER — ALBUMIN, HUMAN INJ 5% 5 %
SOLUTION INTRAVENOUS
Status: DISPENSED
Start: 2020-05-22

## (undated) RX ORDER — VANCOMYCIN HYDROCHLORIDE 500 MG/10ML
INJECTION, POWDER, LYOPHILIZED, FOR SOLUTION INTRAVENOUS
Status: DISPENSED
Start: 2020-05-22

## (undated) RX ORDER — LIDOCAINE HYDROCHLORIDE AND EPINEPHRINE 10; 10 MG/ML; UG/ML
INJECTION, SOLUTION INFILTRATION; PERINEURAL
Status: DISPENSED
Start: 2020-05-21

## (undated) RX ORDER — HEPARIN SODIUM 1000 [USP'U]/ML
INJECTION, SOLUTION INTRAVENOUS; SUBCUTANEOUS
Status: DISPENSED
Start: 2020-05-22

## (undated) RX ORDER — PROTAMINE SULFATE 10 MG/ML
INJECTION, SOLUTION INTRAVENOUS
Status: DISPENSED
Start: 2020-05-22

## (undated) RX ORDER — CHLORHEXIDINE GLUCONATE ORAL RINSE 1.2 MG/ML
SOLUTION DENTAL
Status: DISPENSED
Start: 2020-05-21

## (undated) RX ORDER — NITROGLYCERIN 5 MG/ML
VIAL (ML) INTRAVENOUS
Status: DISPENSED
Start: 2020-01-03

## (undated) RX ORDER — FENTANYL CITRATE 50 UG/ML
INJECTION, SOLUTION INTRAMUSCULAR; INTRAVENOUS
Status: DISPENSED
Start: 2020-05-22

## (undated) RX ORDER — OXYMETAZOLINE HYDROCHLORIDE 0.05 G/100ML
SPRAY NASAL
Status: DISPENSED
Start: 2020-05-21

## (undated) RX ORDER — PAPAVERINE HYDROCHLORIDE 30 MG/ML
INJECTION INTRAMUSCULAR; INTRAVENOUS
Status: DISPENSED
Start: 2020-05-22

## (undated) RX ORDER — VERAPAMIL HYDROCHLORIDE 2.5 MG/ML
INJECTION, SOLUTION INTRAVENOUS
Status: DISPENSED
Start: 2020-01-03

## (undated) RX ORDER — FENTANYL CITRATE 50 UG/ML
INJECTION, SOLUTION INTRAMUSCULAR; INTRAVENOUS
Status: DISPENSED
Start: 2020-05-21

## (undated) RX ORDER — CEFAZOLIN SODIUM 2 G/100ML
INJECTION, SOLUTION INTRAVENOUS
Status: DISPENSED
Start: 2020-05-21

## (undated) RX ORDER — CEFAZOLIN SODIUM 1 G/3ML
INJECTION, POWDER, FOR SOLUTION INTRAMUSCULAR; INTRAVENOUS
Status: DISPENSED
Start: 2020-05-22

## (undated) RX ORDER — GLYCOPYRROLATE 0.2 MG/ML
INJECTION, SOLUTION INTRAMUSCULAR; INTRAVENOUS
Status: DISPENSED
Start: 2020-05-21

## (undated) RX ORDER — FENTANYL CITRATE 50 UG/ML
INJECTION, SOLUTION INTRAMUSCULAR; INTRAVENOUS
Status: DISPENSED
Start: 2020-01-03

## (undated) RX ORDER — HEPARIN SODIUM 200 [USP'U]/100ML
INJECTION, SOLUTION INTRAVENOUS
Status: DISPENSED
Start: 2020-01-03

## (undated) RX ORDER — HYDROMORPHONE HYDROCHLORIDE 1 MG/ML
INJECTION, SOLUTION INTRAMUSCULAR; INTRAVENOUS; SUBCUTANEOUS
Status: DISPENSED
Start: 2020-05-21

## (undated) RX ORDER — CEFAZOLIN SODIUM 2 G/100ML
INJECTION, SOLUTION INTRAVENOUS
Status: DISPENSED
Start: 2020-05-22

## (undated) RX ORDER — VECURONIUM BROMIDE 1 MG/ML
INJECTION, POWDER, LYOPHILIZED, FOR SOLUTION INTRAVENOUS
Status: DISPENSED
Start: 2020-05-22

## (undated) RX ORDER — PROPOFOL 10 MG/ML
INJECTION, EMULSION INTRAVENOUS
Status: DISPENSED
Start: 2020-05-22

## (undated) RX ORDER — HEPARIN SODIUM 1000 [USP'U]/ML
INJECTION, SOLUTION INTRAVENOUS; SUBCUTANEOUS
Status: DISPENSED
Start: 2020-01-03

## (undated) RX ORDER — LIDOCAINE HYDROCHLORIDE 10 MG/ML
INJECTION, SOLUTION EPIDURAL; INFILTRATION; INTRACAUDAL; PERINEURAL
Status: DISPENSED
Start: 2020-01-03

## (undated) RX ORDER — LIDOCAINE HYDROCHLORIDE 20 MG/ML
INJECTION, SOLUTION EPIDURAL; INFILTRATION; INTRACAUDAL; PERINEURAL
Status: DISPENSED
Start: 2020-05-22